# Patient Record
Sex: MALE | Race: BLACK OR AFRICAN AMERICAN | Employment: OTHER | URBAN - METROPOLITAN AREA
[De-identification: names, ages, dates, MRNs, and addresses within clinical notes are randomized per-mention and may not be internally consistent; named-entity substitution may affect disease eponyms.]

---

## 2018-09-04 ENCOUNTER — HOSPITAL ENCOUNTER (EMERGENCY)
Age: 68
Discharge: HOME | End: 2018-09-04
Attending: EMERGENCY MEDICINE
Payer: MEDICARE

## 2018-09-04 ENCOUNTER — APPOINTMENT (OUTPATIENT)
Dept: GENERAL RADIOLOGY | Age: 68
End: 2018-09-04
Attending: EMERGENCY MEDICINE
Payer: MEDICARE

## 2018-09-04 VITALS
DIASTOLIC BLOOD PRESSURE: 83 MMHG | HEART RATE: 66 BPM | HEIGHT: 74 IN | TEMPERATURE: 98 F | SYSTOLIC BLOOD PRESSURE: 147 MMHG | RESPIRATION RATE: 15 BRPM | BODY MASS INDEX: 24.13 KG/M2 | WEIGHT: 188 LBS | OXYGEN SATURATION: 98 %

## 2018-09-04 DIAGNOSIS — M16.12 OSTEOARTHRITIS OF LEFT HIP, UNSPECIFIED OSTEOARTHRITIS TYPE: Primary | ICD-10-CM

## 2018-09-04 PROCEDURE — 73502 X-RAY EXAM HIP UNI 2-3 VIEWS: CPT

## 2018-09-04 PROCEDURE — 99283 EMERGENCY DEPT VISIT LOW MDM: CPT | Performed by: NURSE PRACTITIONER

## 2018-09-04 PROCEDURE — 74011250637 HC RX REV CODE- 250/637: Performed by: NURSE PRACTITIONER

## 2018-09-04 RX ORDER — HYDROCODONE BITARTRATE AND ACETAMINOPHEN 5; 325 MG/1; MG/1
1 TABLET ORAL
Qty: 20 TAB | Refills: 0 | Status: SHIPPED | OUTPATIENT
Start: 2018-09-04 | End: 2018-10-30

## 2018-09-04 RX ORDER — HYDROCODONE BITARTRATE AND ACETAMINOPHEN 5; 325 MG/1; MG/1
1 TABLET ORAL
Status: COMPLETED | OUTPATIENT
Start: 2018-09-04 | End: 2018-09-04

## 2018-09-04 RX ADMIN — HYDROCODONE BITARTRATE AND ACETAMINOPHEN 1 TABLET: 5; 325 TABLET ORAL at 12:50

## 2018-09-04 NOTE — DISCHARGE INSTRUCTIONS
Hip Arthritis: Care Instructions  Your Care Instructions    Arthritis, also called osteoarthritis, is a breakdown of the tissue (cartilage) that cushions your joints. Many people have some arthritis as they age. When the cartilage in your hip joints wears down, your hip bone rubs against the hip socket. This causes pain and stiffness. Work with your doctor to find the right mix of treatments for your arthritis. There are things you can do at home to protect your hip joints, ease your pain, and help you stay active. But if your arthritis becomes so bad that you cannot walk, you may need surgery to replace the hip joint. Follow-up care is a key part of your treatment and safety. Be sure to make and go to all appointments, and call your doctor if you are having problems. It's also a good idea to know your test results and keep a list of the medicines you take. How can you care for yourself at home? · Stay at a healthy weight. Being overweight puts extra strain on your hip joints. · Talk to your doctor or physical therapist about exercises that will help ease hip pain. These tips may help. ¨ Stretch to help prevent stiffness and to prevent injury before you exercise. You may enjoy gentle forms of yoga to help keep your joints and muscles flexible. ¨ Walk instead of jog. Other types of exercise that are less stressful on the joints include riding a bike, swimming, and doing water exercise. ¨ Lift weights. Strong muscles help reduce stress on your joints. Stronger thigh muscles, for example, take some of the stress off of the knees and hips. Learn the right way to lift weights so you do not make joint pain worse. · Take pain medicines exactly as directed. ¨ If the doctor gave you a prescription medicine for pain, take it as prescribed. ¨ If you are not taking a prescription pain medicine, ask your doctor if you can take an over-the-counter medicine.   · Use a cane, crutch, walker, or another device if you need help to get around. These can help rest your hips. You also can use other things to make life easier, such as a higher toilet seat. · Do not sit in low chairs, which can make it painful to get up. · Put heat or cold on your sore hips as needed. Use whichever helps you most. You also can go back and forth between hot and cold packs. ¨ Apply heat 2 or 3 times a day for 20 to 30 minutes-using a heating pad, hot shower, or hot pack-to relieve pain and stiffness. ¨ Put ice or a cold pack on your sore hips for 10 to 20 minutes at a time to numb the area. Put a thin cloth between the ice and your skin. · Think about talking to your doctor about using capsaicin, a cream you apply to the skin for pain relief. When should you call for help? Call your doctor now or seek immediate medical care if:    · You have sudden swelling, warmth, or pain in any joint.     · You have joint pain and a fever or rash.     · You have such bad pain that you cannot use the joint.    Watch closely for changes in your health, and be sure to contact your doctor if:    · You have mild joint symptoms that continue even with more than 6 weeks of care at home.     · You do not get better as expected.     · You have stomach pain or other problems with your medicine. Where can you learn more? Go to http://chris-kvng.info/. Enter I604 in the search box to learn more about \"Hip Arthritis: Care Instructions. \"  Current as of: October 10, 2017  Content Version: 11.7  © 2169-2001 [employer]. Care instructions adapted under license by [employer] (which disclaims liability or warranty for this information). If you have questions about a medical condition or this instruction, always ask your healthcare professional. [address] any warranty or liability for your use of this information.

## 2018-09-04 NOTE — ED PROVIDER NOTES
HPI Comments: Patient states chronic left hip pain. He states over the past couple of weeks pain in left hip has gotten progressively worse. Patient states known history of OA and he states he needs a hip replacement. Patient states he has been taking ibuprofen for pain but pain is not improving. Patient is a 80 y.o. male presenting with hip pain. The history is provided by the patient. Hip Pain This is a chronic problem. The problem occurs constantly. The problem has been gradually worsening. The pain is present in the left hip. The quality of the pain is described as aching. The pain is at a severity of 8/10. The pain is severe. Pertinent negatives include no numbness. The symptoms are aggravated by movement and palpation. Past Medical History:  
Diagnosis Date  Asthma  CAD (coronary artery disease) 9/15/2013  Coronary atherosclerosis of native coronary vessel 11/18/2015  Dental disorder  Dyspepsia and other specified disorders of function of stomach  Hyperlipidemia other unsp dyslipidemia 11/18/2015  Hypertension  Hypertension, benign, controlled 11/18/2015  MI (myocardial infarction) ([facility] [address]  Other ill-defined conditions(799.89)   
 hernia, pt unsure of site  Stroke [facility]) 2011 Past Surgical History:  
Procedure Laterality Date  CARDIAC SURG PROCEDURE UNLIST  2013  
 stent  HX COLECTOMY    
 due to polyp that could not be excised  HX COLONOSCOPY    
 HX PTCA    
 x 1 Family History:  
Problem Relation Age of Onset  Stroke Mother  Diabetes Mother  Heart Disease Maternal Grandmother Social History Social History  Marital status: SINGLE Spouse name: N/A  
 Number of children: N/A  
 Years of education: N/A Occupational History  Not on file. Social History Main Topics  Smoking status: Current Some Day Smoker Packs/day: 0.15  Smokeless tobacco: Not on file  Alcohol use 3.0 oz/week 6 Cans of beer per week Comment: OCC  Drug use: No  
 Sexual activity: Not on file Other Topics Concern  Not on file Social History Narrative ALLERGIES: Review of patient's allergies indicates no known allergies. Review of Systems Musculoskeletal: Positive for arthralgias. Neurological: Negative for numbness. Vitals:  
 09/04/18 1216 BP: 155/88 Pulse: 69 Resp: 18 Temp: 98.2 °F (36.8 °C) SpO2: 96% Weight: 85.3 kg (188 lb) Height: 6' 2\" (1.88 m) Physical Exam  
Constitutional: He is oriented to person, place, and time. He appears well-developed and well-nourished. No distress. Cardiovascular: Normal rate and regular rhythm. No murmur heard. Pulmonary/Chest: Effort normal and breath sounds normal. No respiratory distress. Musculoskeletal:  
     Left hip: He exhibits tenderness. Neurological: He is alert and oriented to person, place, and time. Skin: Skin is warm. He is not diaphoretic. Psychiatric: He has a normal mood and affect. His behavior is normal.  
Nursing note and vitals reviewed. Xr Hip Lt W Or Wo Pelv 2-3 Vws Result Date: 9/4/2018 Left hip Clinical indications: Left hip pain FINDINGS: Three views of the left hip show advanced osteoarthritis of the left hip joint with marginal osteophytes and complete loss of the joint space. There is extensive subchondral sclerosis and fibrocystic change present. No fracture noted. IMPRESSION: Advanced OA of the left hip joint. MDM Number of Diagnoses or Management Options Osteoarthritis of left hip, unspecified osteoarthritis type:  
Diagnosis management comments: Xray positive for sever OA. Patient referred to orthopedics and prescription for norco. Patient given po norco prior to discharge. Amount and/or Complexity of Data Reviewed Tests in the radiology section of CPT®: ordered and reviewed Tests in the medicine section of CPT®: ordered Patient Progress Patient progress: stable ED Course Procedures

## 2018-09-04 NOTE — ED TRIAGE NOTES
Pt c/o left hip pain, x4 years, pt unsure if it is arthritis related.  Pt denies injury/trauma to the leg, denies chest pain, denies shob, denies n/v/d.

## 2018-09-04 NOTE — ED NOTES
I have reviewed discharge instructions with the patient. The patient verbalized understanding. Patient left ED via Discharge Method: ambulatory to Home with (insert name of family/friend, self, transport self). Opportunity for questions and clarification provided. Patient given 1 scripts. To continue your aftercare when you leave the hospital, you may receive an automated call from our care team to check in on how you are doing. This is a free service and part of our promise to provide the best care and service to meet your aftercare needs.  If you have questions, or wish to unsubscribe from this service please call [phone]. Thank you for Choosing our [facility] Emergency Department.

## 2018-10-30 ENCOUNTER — HOSPITAL ENCOUNTER (OUTPATIENT)
Dept: SURGERY | Age: 68
Discharge: HOME | End: 2018-10-30
Payer: MEDICARE

## 2018-10-30 ENCOUNTER — HOSPITAL ENCOUNTER (OUTPATIENT)
Dept: PHYSICAL THERAPY | Age: 68
Discharge: HOME | End: 2018-10-30
Payer: MEDICARE

## 2018-10-30 VITALS
TEMPERATURE: 97.8 F | SYSTOLIC BLOOD PRESSURE: 159 MMHG | DIASTOLIC BLOOD PRESSURE: 98 MMHG | HEIGHT: 74 IN | BODY MASS INDEX: 24.13 KG/M2 | HEART RATE: 81 BPM | OXYGEN SATURATION: 96 % | WEIGHT: 188 LBS

## 2018-10-30 LAB
ALBUMIN SERPL-MCNC: 4.1 G/DL (ref 3.2–4.6)
ANION GAP SERPL CALC-SCNC: 9 MMOL/L
APPEARANCE UR: CLEAR
APTT PPP: 30.4 SEC (ref 23.2–35.3)
ATRIAL RATE: 77 BPM
BACTERIA SPEC CULT: ABNORMAL
BASOPHILS # BLD: 0 K/UL (ref 0–0.2)
BASOPHILS NFR BLD: 1 % (ref 0–2)
BILIRUB UR QL: NEGATIVE
BUN SERPL-MCNC: 6 MG/DL (ref 8–23)
CALCIUM SERPL-MCNC: 9.4 MG/DL (ref 8.3–10.4)
CALCULATED P AXIS, ECG09: 64 DEGREES
CALCULATED R AXIS, ECG10: -36 DEGREES
CALCULATED T AXIS, ECG11: 36 DEGREES
CHLORIDE SERPL-SCNC: 103 MMOL/L (ref 98–107)
CO2 SERPL-SCNC: 28 MMOL/L (ref 21–32)
COLOR UR: YELLOW
CREAT SERPL-MCNC: 1.15 MG/DL (ref 0.8–1.5)
DIAGNOSIS, 93000: NORMAL
DIFFERENTIAL METHOD BLD: ABNORMAL
EOSINOPHIL # BLD: 0.2 K/UL (ref 0–0.8)
EOSINOPHIL NFR BLD: 4 % (ref 0.5–7.8)
ERYTHROCYTE [DISTWIDTH] IN BLOOD BY AUTOMATED COUNT: 13.8 %
EST. AVERAGE GLUCOSE BLD GHB EST-MCNC: 126 MG/DL
GLUCOSE SERPL-MCNC: 108 MG/DL (ref 65–100)
GLUCOSE UR STRIP.AUTO-MCNC: NEGATIVE MG/DL
HBA1C MFR BLD: 6 %
HCT VFR BLD AUTO: 40.3 % (ref 41.1–50.3)
HGB BLD-MCNC: 12.9 G/DL (ref 13.6–17.2)
HGB UR QL STRIP: NEGATIVE
IMM GRANULOCYTES # BLD: 0 K/UL (ref 0–0.5)
IMM GRANULOCYTES NFR BLD AUTO: 0 % (ref 0–5)
INR PPP: 0.9
KETONES UR QL STRIP.AUTO: NEGATIVE MG/DL
LEUKOCYTE ESTERASE UR QL STRIP.AUTO: NEGATIVE
LYMPHOCYTES # BLD: 1.9 K/UL (ref 0.5–4.6)
LYMPHOCYTES NFR BLD: 43 % (ref 13–44)
MCH RBC QN AUTO: 27.2 PG (ref 26.1–32.9)
MCHC RBC AUTO-ENTMCNC: 32 G/DL (ref 31.4–35)
MCV RBC AUTO: 85 FL (ref 79.6–97.8)
MONOCYTES # BLD: 0.4 K/UL (ref 0.1–1.3)
MONOCYTES NFR BLD: 8 % (ref 4–12)
NEUTS SEG # BLD: 1.9 K/UL (ref 1.7–8.2)
NEUTS SEG NFR BLD: 44 % (ref 43–78)
NITRITE UR QL STRIP.AUTO: NEGATIVE
NRBC # BLD: 0 K/UL (ref 0–0.2)
P-R INTERVAL, ECG05: 164 MS
PH UR STRIP: 6 [PH] (ref 5–9)
PLATELET # BLD AUTO: 362 K/UL (ref 150–450)
PMV BLD AUTO: 9.8 FL (ref 9.4–12.3)
POTASSIUM SERPL-SCNC: 3.1 MMOL/L (ref 3.5–5.1)
PROT UR STRIP-MCNC: NEGATIVE MG/DL
PROTHROMBIN TIME: 12.4 SEC (ref 11.5–14.5)
Q-T INTERVAL, ECG07: 428 MS
QRS DURATION, ECG06: 88 MS
QTC CALCULATION (BEZET), ECG08: 484 MS
RBC # BLD AUTO: 4.74 M/UL (ref 4.23–5.6)
SERVICE CMNT-IMP: ABNORMAL
SODIUM SERPL-SCNC: 140 MMOL/L (ref 136–145)
SP GR UR REFRACTOMETRY: 1.02 (ref 1–1.02)
UROBILINOGEN UR QL STRIP.AUTO: 0.2 EU/DL (ref 0.2–1)
VENTRICULAR RATE, ECG03: 77 BPM
WBC # BLD AUTO: 4.4 K/UL (ref 4.3–11.1)

## 2018-10-30 PROCEDURE — 80048 BASIC METABOLIC PNL TOTAL CA: CPT

## 2018-10-30 PROCEDURE — 85610 PROTHROMBIN TIME: CPT

## 2018-10-30 PROCEDURE — 81003 URINALYSIS AUTO W/O SCOPE: CPT

## 2018-10-30 PROCEDURE — 80307 DRUG TEST PRSMV CHEM ANLYZR: CPT

## 2018-10-30 PROCEDURE — G8978 MOBILITY CURRENT STATUS: HCPCS

## 2018-10-30 PROCEDURE — 87641 MR-STAPH DNA AMP PROBE: CPT

## 2018-10-30 PROCEDURE — 83036 HEMOGLOBIN GLYCOSYLATED A1C: CPT

## 2018-10-30 PROCEDURE — G8979 MOBILITY GOAL STATUS: HCPCS

## 2018-10-30 PROCEDURE — 82040 ASSAY OF SERUM ALBUMIN: CPT

## 2018-10-30 PROCEDURE — 85730 THROMBOPLASTIN TIME PARTIAL: CPT

## 2018-10-30 PROCEDURE — G8980 MOBILITY D/C STATUS: HCPCS

## 2018-10-30 PROCEDURE — 97161 PT EVAL LOW COMPLEX 20 MIN: CPT

## 2018-10-30 PROCEDURE — 36415 COLL VENOUS BLD VENIPUNCTURE: CPT

## 2018-10-30 PROCEDURE — 85025 COMPLETE CBC W/AUTO DIFF WBC: CPT

## 2018-10-30 PROCEDURE — 93005 ELECTROCARDIOGRAM TRACING: CPT | Performed by: ORTHOPAEDIC SURGERY

## 2018-10-30 PROCEDURE — 77030027138 HC INCENT SPIROMETER -A

## 2018-10-30 RX ORDER — PRAVASTATIN SODIUM 40 MG/1
40 TABLET ORAL DAILY
COMMUNITY

## 2018-10-30 RX ORDER — OXYBUTYNIN CHLORIDE 5 MG/1
5 TABLET, EXTENDED RELEASE ORAL DAILY
COMMUNITY

## 2018-10-30 RX ORDER — HYDROCHLOROTHIAZIDE 12.5 MG/1
12.5 CAPSULE ORAL DAILY
COMMUNITY
End: 2019-07-02

## 2018-10-30 RX ORDER — OMEPRAZOLE 20 MG/1
20 CAPSULE, DELAYED RELEASE ORAL DAILY
COMMUNITY
End: 2019-07-02

## 2018-10-30 RX ORDER — AMLODIPINE BESYLATE 10 MG/1
10 TABLET ORAL DAILY
COMMUNITY

## 2018-10-30 NOTE — PERIOP NOTES
Dr. Iona Sorto, Your patient recently had labs drawn during a hospital appointment due to an upcoming surgery. The results are attached. If you have any questions or concerns please reach out to your patient for a follow-up in your office. Please do not respond to this message as my mailbox is not monitored. You may call [phone] with questions or concerns. Recent Results (from the past 12 hour(s)) CBC WITH AUTOMATED DIFF Collection Time: 10/30/18 12:25 PM  
Result Value Ref Range WBC 4.4 4.3 - 11.1 K/uL  
 RBC 4.74 4.23 - 5.6 M/uL  
 HGB 12.9 (L) 13.6 - 17.2 g/dL HCT 40.3 (L) 41.1 - 50.3 % MCV 85.0 79.6 - 97.8 FL  
 MCH 27.2 26.1 - 32.9 PG  
 MCHC 32.0 31.4 - 35.0 g/dL  
 RDW 13.8 % PLATELET 170 257 - 186 K/uL MPV 9.8 9.4 - 12.3 FL ABSOLUTE NRBC 0.00 0.0 - 0.2 K/uL  
 DF AUTOMATED NEUTROPHILS 44 43 - 78 % LYMPHOCYTES 43 13 - 44 % MONOCYTES 8 4.0 - 12.0 % EOSINOPHILS 4 0.5 - 7.8 % BASOPHILS 1 0.0 - 2.0 % IMMATURE GRANULOCYTES 0 0.0 - 5.0 %  
 ABS. NEUTROPHILS 1.9 1.7 - 8.2 K/UL  
 ABS. LYMPHOCYTES 1.9 0.5 - 4.6 K/UL  
 ABS. MONOCYTES 0.4 0.1 - 1.3 K/UL  
 ABS. EOSINOPHILS 0.2 0.0 - 0.8 K/UL  
 ABS. BASOPHILS 0.0 0.0 - 0.2 K/UL  
 ABS. IMM. GRANS. 0.0 0.0 - 0.5 K/UL PROTHROMBIN TIME + INR Collection Time: 10/30/18 12:25 PM  
Result Value Ref Range Prothrombin time 12.4 11.5 - 14.5 sec INR 0.9 PTT Collection Time: 10/30/18 12:25 PM  
Result Value Ref Range aPTT 30.4 23.2 - 35.3 SEC METABOLIC PANEL, BASIC Collection Time: 10/30/18 12:25 PM  
Result Value Ref Range Sodium 140 136 - 145 mmol/L Potassium 3.1 (L) 3.5 - 5.1 mmol/L Chloride 103 98 - 107 mmol/L  
 CO2 28 21 - 32 mmol/L Anion gap 9 mmol/L Glucose 108 (H) 65 - 100 mg/dL BUN 6 (L) 8 - 23 MG/DL Creatinine 1.15 0.8 - 1.5 MG/DL  
 GFR est AA >60 >60 ml/min/1.73m2 GFR est non-AA >60 ml/min/1.73m2 Calcium 9.4 8.3 - 10.4 MG/DL URINALYSIS W/ RFLX MICROSCOPIC  
 Collection Time: 10/30/18 12:25 PM  
Result Value Ref Range Color YELLOW Appearance CLEAR Specific gravity 1.017 1.001 - 1.023    
 pH (UA) 6.0 5.0 - 9.0 Protein NEGATIVE  NEG mg/dL Glucose NEGATIVE  mg/dL Ketone NEGATIVE  NEG mg/dL Bilirubin NEGATIVE  NEG Blood NEGATIVE  NEG Urobilinogen 0.2 0.2 - 1.0 EU/dL Nitrites NEGATIVE  NEG Leukocyte Esterase NEGATIVE  NEG    
ALBUMIN Collection Time: 10/30/18 12:25 PM  
Result Value Ref Range Albumin 4.1 3.2 - 4.6 g/dL HEMOGLOBIN A1C WITH EAG Collection Time: 10/30/18 12:25 PM  
Result Value Ref Range Hemoglobin A1c 6.0 % Est. average glucose 126 mg/dL

## 2018-10-30 NOTE — PROGRESS NOTES
10/30/18 1200 Oxygen Therapy O2 Sat (%) 96 % Pulse via Oximetry 96 beats per minute O2 Device Room air Pre-Treatment Breath Sounds Bilateral Diminished Pre FEV1 (liters) 1.7 liters % Predicted 47 
(COPD,ASTHMA) Incentive Spirometry Treatment Actual Volume (ml) 1750 ml Initial respiratory Assessment completed with pt. Pt was interviewed and evaluated in Joint camp prior to surgery. Patient ID: Shyam Beth [id number] 
89 y.o. 
1950 Surgeon: Dr. Wan Mackenzie Date of Surgery: The linked surgery was not found. Please check manually. Procedure: Total Left Hip Arthroplasty Primary Care Physician: None None Specialists:  
                    
          Pt instructed in the use of Incentive Spirometry. Pt instructed to bring Incentive Spirometer back on date of surgery & to start using Is upon return to pt room. Pt taught proper cough technique History of smoking:   CURRENT SMOKER SMOKING CESSATION INFO GIVEN TO PT Quit date:        
 
Secondhand smoke:MOTHER Past procedures with Oxygen desaturation:NONE Past Medical History:  
Diagnosis Date  Arthritis  Asthma   
 albuterol prn (uses 1-2 times per day)  CAD (coronary artery disease) 9/15/2013  Chronic obstructive pulmonary disease ([address]  Coronary atherosclerosis of native coronary vessel 11/18/2015  Current smoker on some days  Dental disorder  Dyspepsia and other specified disorders of function of stomach  GERD (gastroesophageal reflux disease)   
 on med for control  Hyperlipidemia other unsp dyslipidemia 11/18/2015  
 on med  Hypertension   
 on med for control  Hypertension, benign, controlled 11/18/2015  MI (myocardial infarction) ([address] 2013 NSTEMI  
 Multiple renal cysts  Other ill-defined conditions(799.89)   
 hernia, pt unsure of site (resolved with surery)  Prostate cancer ([address]  Stroke [facility]) 2011 TIA; no residual   
  
 
 
HX OF CRACK COCAINE AND ALCOHOL ABUSE., CURRENT SMOKER 
COPD,ASTHMA, HX OF BILATERAL PNA Respiratory history: SOB  ON EXERTION Respiratory meds:  ALBUTEROL NEB BID ACCORDING TO PT 
 
 
                                
FAMILY PRESENT:            
                                            NO 
 
                                   
PAST SLEEP STUDY:                  NO 
HX OF NITIN:                                          NO NITIN assessment: SLEEPS ON SIDE      ,       BACK         &       STOMACH 
                                          
 
 
PHYSICAL EXAM Body mass index is 24.14 kg/m². Visit Vitals BP (!) 159/98 (BP 1 Location: Right arm, BP Patient Position: At rest;Sitting) Pulse 81 Temp 97.8 °F (36.6 °C) Ht 6' 2\" (1.88 m) Wt 85.3 kg (188 lb) SpO2 96% BMI 24.14 kg/m² Neck circumference:  41    cm Loud snoring: DENIES Witnessed apnea or wakening gasping or choking:,             DENIES, Awakens with headaches:                                                  DENIES Morning or daytime tiredness/ sleepiness:                    DENIES Dry mouth or sore throat in morning: DENIES Molina stage:  4 SACS score:4 
 
STOP/BANG:3 
 
                         
CPAP:                       NONE ALBUTEROL NEB Q6 PRN          SPACER 
 
     CONT SAT HS       
 
NONE Referrals: 
 
Pt. Phone Number:

## 2018-10-30 NOTE — PERIOP NOTES
Patient verified name and . Order for consent found in EHR and matches case posting; patient verified. Type 3 surgery, walk in assessment complete. Labs per surgeon: CBC, BMP, U/A, PT/PTT, Albumin, HGB-A1C ; results within [facility] protocols. MRSA nasal swab and Urine nicotine pending. Labs per anesthesia protocol: included in surgeons orders. EKG: done today; abnormal. Reviewed by Dr. Hank Mcburney with anesthesia. Requests that patient gets cardiac clearance prior to surgery since he has a history of a stent/MI and hasn't seen cardio since . Paige Riggs at Dr. Steven Silvestre office and left voicemail that patient needs cardiac clearance prior to surgery per anesthesia request. [facility] cardiology office note dated 13 on chart for reference. Heart cath report dated 13 and prior ekg's with most recent on 05/05/15 in EHR for reference if needed. K+ level of 3.1 and elevated BP called to Santos Coello NP. No orders received. She came to see patient and encouraged diet high in K+. Patient with history of heart stent since . States hasn't been taking Aspirin in a long time. Encouraged patient to start an 81 mg aspirin as soon as possible and take dos. Patient verbalized understanding. Hibiclens and instructions to return bottle on DOS given per hospital policy. Patient provided with handouts including Guide to Surgery, Pain Management, Hand Hygiene, Blood Transfusion Education, and [place] [facility] Brochure. Patient answered medical/surgical history questions at their best of ability. All prior to admission medications documented in [facility]. Original medication prescription bottles visualized during patient appointment. Patient instructed to hold all vitamins 7 days prior to surgery and NSAIDS 5 days prior to surgery. Medications to be held: none.  
 
Patient instructed to continue previous medications as prescribed prior to surgery and to take the following medications the day of surgery according to anesthesia guidelines with a small sip of water: albuterol if needed, amlodipine, omeprazole, oxybutynin, pravastatin, and an aspirin if possible. Instructed to bring inhaler dos. Patient teach back successful and patient demonstrates knowledge of instruction.

## 2018-10-30 NOTE — PROGRESS NOTES
Justino Louis : [birth year](49 y.o.) [address] at [address] [place] [address], [address] [facility] [phone] Physical Therapy Prehab Plan of Treatment and Evaluation Summary:10/30/2018 ICD-10: Treatment Diagnosis:  
· Pain in left hip (M25.552) · Stiffness of Left Hip, Not elsewhere classified (M25.652) · Difficulty in walking, Not elsewhere classified (R26.2) · Other abnormalities of gait and mobility (R26.89) Precautions/Allergies:  
Lisinopril  MEDICAL/REFERRING DIAGNOSIS: 
Unilateral primary osteoarthritis, left hip [M16.12] REFERRING PHYSICIAN: Ilsa Baird MD 
DATE OF SURGERY: 18 Assessment:  
Comments:  Pain in left hip joint with decreased independence with functional mobility. Pt not sure home v. Rehab following hospital stay, as pt has no available family members to assist at home. Mentioned one night stay regarding hip. Pt hopes to go somewhere to get some turkey close to [facility]. Pt talks about being hungry throughout session. Pt offered snacks and water or coffee. PROBLEM LIST (Impacting functional limitations): Mr. Tash Suarez presents with the following left lower extremity(s) problems: 1. Transfers 2. Gait 3. Strength 4. Range of Motion 5. Pain INTERVENTIONS PLANNED: 
1. Home Exercise Program 
2. Educational Discussion TREATMENT PLAN: Effective Dates: 10/30/2018 TO 10/30/2018. Frequency/Duration: Patient to continue to perform home exercise program at least twice per day up until his surgery. GOALS: (Goals have been discussed and agreed upon with patient.) Discharge Goals: Time Frame: 1 Day 1. Patient will demonstrate independence with a home exercise program designed to increase strength, range of motion and pain control to minimize functional deficits and optimize patient for total joint replacement. Rehabilitation Potential For Stated Goals: Good Regarding Wan Valderrama's therapy, I certify that the treatment plan above will be carried out by a therapist or under their direction. Thank you for this referral, Ivette Melendrez, PT     
    
 
 
HISTORY:  
Present Symptoms: 
Pain Intensity 1: 7 Pain Location 1: Hip Pain Orientation 1: Left History of Present Injury/Illness (Reason for Referral): 
Medical/Referring Diagnosis: Unilateral primary osteoarthritis, left hip [M16.12] Past Medical History/Comorbidities:  
Mr. Dayanara Ocasio  has a past medical history of Arthritis, Asthma, CAD (coronary artery disease), Chronic obstructive pulmonary disease ([facility] [address] Coronary atherosclerosis of native coronary vessel, Current smoker on some days, Dental disorder, Dyspepsia and other specified disorders of function of stomach, GERD (gastroesophageal reflux disease), Hyperlipidemia other unsp dyslipidemia, Hypertension, Hypertension, benign, controlled, MI (myocardial infarction) ([address] Other ill-defined conditions(799.89), Prostate cancer ([facility] [address] and Stroke ([facility] [address] Mr. Dayanara Ocasio  has a past surgical history that includes hx colectomy; hx ptca; hx colonoscopy (2018); pr cardiac surg procedure unlist (2013); hx prostatectomy; and hx hernia repair. Social History/Living Environment:  
Home Environment: Private residence # Steps to Enter: 6 One/Two Story Residence: Two story, live on 1st floor # of Interior Steps: 6 Living Alone: Yes Support Systems: Family member(s)(limited support) Patient Expects to be Discharged to[de-identified] Private residence Tub or Shower Type: Tub/Shower combination Work/Activity: 
disabled Dominant Side: 
RIGHT Current Medications:  See Pre-assessment nursing note Number of Personal Factors/Comorbidities that affect the Plan of Care: 0: LOW COMPLEXITY EXAMINATION:  
ADLs (Current Functional Status):  
Ambulation: 
[x] Independent 
[] Walk Indoors Only 
[] Walk Outdoors [] Use Assistive Device [] Use Wheelchair Only Dressing: 
[x] Independent Requires Assistance from Someone for: 
[] Sock/Shoes 
[] Pants [] Everything Bathing/Showering:  
[x] Independent 
[] Requires Assistance from Someone 
[] Sponge Bath Only Household Activities: 
[] Routine house and yard work 
[] Light Housework Only 
[x] None Observation/Orthostatic Postural Assessment:  
Within defined limits ROM/Flexibility:  
AROM: Generally decreased, functional 
 
  
  
  
  
LLE AROM 
L Hip Flexion: 90   
 
RLE Assessment RLE Assessment (WDL): Within defined limits Strength:  
Strength: Generally decreased, functional 
 
  
    
 
   
Functional Mobility:   
Coordination: Generally decreased, functional 
 
Gait Description (WDL): Within defined limits Stand to Sit: Independent Sit to Stand: Independent Distance (ft): 1000 Feet (ft) Ambulation - Level of Assistance: Independent Gait Abnormalities: Antalgic Balance:   
Sitting: Intact Standing: Intact Body Structures Involved: 1. Bones 2. Joints 3. Muscles 4. Ligaments Body Functions Affected: 1. Neuromusculoskeletal 
2. Movement Related Activities and Participation Affected: 1. Mobility Number of elements that affect the Plan of Care: 4+: HIGH COMPLEXITY CLINICAL PRESENTATION:  
Presentation: Stable and uncomplicated: LOW COMPLEXITY CLINICAL DECISION MAKING:  
Outcome Measure: Tool Used: Lower Extremity Functional Scale (LEFS) Score:  Initial: 40/80 Most Recent: X/80 (Date: -- ) Interpretation of Score: 20 questions each scored on a 5 point scale with 0 representing \"extreme difficulty or unable to perform\" and 4 representing \"no difficulty\". The lower the score, the greater the functional disability. 80/80 represents no disability. Minimal detectable change is 9 points. Score 80 79-65 64-49 48-33 32-17 16-1 0 Modifier CH CI CJ CK CL CM CN  
 
? Mobility - Walking and Moving Around:  
  - CURRENT STATUS: CK - 40%-59% impaired, limited or restricted  - GOAL STATUS: CK - 40%-59% impaired, limited or restricted  - D/C STATUS:  CK - 40%-59% impaired, limited or restricted Medical Necessity:  
· Mr. Tomas Bello is expected to optimize his lower extremity strength and ROM in preparation for joint replacement surgery. Reason for Services/Other Comments: · Achieve baseline assesment of musculoskeletal system, functional mobility and home environment. , educate in PT HEP in preparation for surgery, educate in hospital plan of care. Use of outcome tool(s) and clinical judgement create a POC that gives a: Clear prediction of patient's progress: LOW COMPLEXITY  
TREATMENT:  
Treatment/Session Assessment:  Patient was instructed in PT- HEP to increase strength and ROM in LEs. Answered all questions. · Post session pain:  7 
· Compliance with Program/Exercises: compliant most of the time. Total Treatment Duration: PT Patient Time In/Time Out Time In: 1200 Time Out: 1230 Soheila Casas PT

## 2018-10-31 LAB
COTININE UR QL SCN: NEGATIVE NG/ML
DRUG SCREEN COMMENT:, 753798: NORMAL

## 2018-10-31 NOTE — ADVANCED PRACTICE NURSE
Total Joint Surgery Preoperative Chart Review Patient ID: Juwan John [id number] 
45 y.o. 
1950 Surgeon: Dr. Lisa Forbes Date of Surgery: 11/21/2018 Procedure: Total Left Hip Arthroplasty Subjective: Juwan John is a 80 y.o. BLACK OR  male who presents for preoperative evaluation for Total Left Hip arthroplasty. This is a preoperative chart review note based on data collected by the nurse at the surgical Pre-Assessment visit. Past Medical History:  
Diagnosis Date  Arthritis  Asthma   
 albuterol prn (uses 1-2 times per day)  CAD (coronary artery disease) 9/15/2013  Chronic obstructive pulmonary disease ([facility] [address]  Coronary atherosclerosis of native coronary vessel 11/18/2015  Current smoker on some days  Dental disorder  Dyspepsia and other specified disorders of function of stomach  GERD (gastroesophageal reflux disease)   
 on med for control  Hyperlipidemia other unsp dyslipidemia 11/18/2015  
 on med  Hypertension   
 on med for control  Hypertension, benign, controlled 11/18/2015  MI (myocardial infarction) ([facility] [address] 2013 NSTEMI  
 Multiple renal cysts  Other ill-defined conditions(799.89)   
 hernia, pt unsure of site (resolved with surery)  Prostate cancer ([facility] [address]  Stroke [facility]) 2011 TIA; no residual   
  
Past Surgical History:  
Procedure Laterality Date  CARDIAC SURG PROCEDURE UNLIST  2013  
 stent  HX COLECTOMY    
 due to polyp that could not be excised  HX COLONOSCOPY  2018  HX HERNIA REPAIR    
 HX PROSTATECTOMY  HX PTCA    
 x 1 Family History Problem Relation Age of Onset  Stroke Mother  Diabetes Mother  Heart Disease Maternal Grandmother  Cancer Father Social History Tobacco Use  Smoking status: Current Some Day Smoker Years: 50.00  Smokeless tobacco: Never Used Substance Use Topics  Alcohol use: Yes   Alcohol/week: 1.8 oz  
 Types: 3 Cans of beer per week Prior to Admission medications Medication Sig Start Date End Date Taking? Authorizing Provider  
hydroCHLOROthiazide (MICROZIDE) 12.5 mg capsule Take 12.5 mg by mouth daily. Yes Provider, Historical  
oxybutynin chloride XL (DITROPAN XL) 5 mg CR tablet Take 5 mg by mouth daily. Take / use AM day of surgery  per anesthesia protocols. Yes Provider, Historical  
omeprazole (PRILOSEC) 20 mg capsule Take 20 mg by mouth daily. Take / use AM day of surgery  per anesthesia protocols. Yes Provider, Historical  
pravastatin (PRAVACHOL) 40 mg tablet Take 40 mg by mouth daily. Take / use AM day of surgery  per anesthesia protocols. Yes Provider, Historical  
amLODIPine (NORVASC) 10 mg tablet Take 10 mg by mouth daily. Take / use AM day of surgery  per anesthesia protocols. Yes Provider, Historical  
albuterol (PROVENTIL HFA, VENTOLIN HFA, PROAIR HFA) 90 mcg/actuation inhaler Take 2 Puffs by inhalation every six (6) hours as needed for Wheezing. Take every 4-6 hours for wheezing 3/11/15  Yes Delmer Garcia MD  
 
Allergies Allergen Reactions  Lisinopril Swelling Objective:  
 
Physical Exam:  
Patient Vitals for the past 24 hrs: 
 Temp Pulse BP SpO2  
10/30/18 1420  81 (!) 159/98   
10/30/18 1341 97.8 °F (36.6 °C) 86 (!) 163/111 96 % ECG:   
EKG Results Procedure 720 Value Units Date/Time EKG, 12 LEAD, INITIAL [[id number]] Collected:  10/30/18 1334 Order Status:  Completed Updated:  10/30/18 1651 Ventricular Rate 77 BPM   
  Atrial Rate 77 BPM   
  P-R Interval 164 ms QRS Duration 88 ms Q-T Interval 428 ms QTC Calculation (Bezet) 484 ms Calculated P Axis 64 degrees Calculated R Axis -36 degrees Calculated T Axis 36 degrees Diagnosis --  
  Sinus rhythm with sinus arrhythmia with occasional Premature ventricular  
complexes Possible Left atrial enlargement Left axis deviation Prolonged QT Abnormal ECG 
 When compared with ECG of 05-MAY-2015 15:16, 
Premature ventricular complexes are now Present Confirmed by JUANCARLOS DEE ()Rylie ([id number]) on 10/30/2018 4:51:03 PM 
  
  
 
 
Data Review:  
Labs:  
Recent Results (from the past 24 hour(s)) EKG, 12 LEAD, INITIAL Collection Time: 10/30/18  1:34 PM  
Result Value Ref Range Ventricular Rate 77 BPM  
 Atrial Rate 77 BPM  
 P-R Interval 164 ms QRS Duration 88 ms Q-T Interval 428 ms QTC Calculation (Bezet) 484 ms Calculated P Axis 64 degrees Calculated R Axis -36 degrees Calculated T Axis 36 degrees Diagnosis Sinus rhythm with sinus arrhythmia with occasional Premature ventricular  
complexes Possible Left atrial enlargement Left axis deviation Prolonged QT Abnormal ECG When compared with ECG of 05-MAY-2015 15:16, 
Premature ventricular complexes are now Present Confirmed by JUANCARLOS DEE ()Rylie ([id number]) on 10/30/2018 4:51:03 PM 
  
MSSA/MRSA SC BY PCR, NASAL SWAB Collection Time: 10/30/18  2:20 PM  
Result Value Ref Range Special Requests: NO SPECIAL REQUESTS Culture result: (A) MRSA target DNA detected, SA target DNA detected. A positive test result does not necessarily indicate the presence of viable organisms. It is however, presumptive for the presence of MRSA or SA. Results for Jeff Morfin (MRN [id number]) as of 10/31/2018 12:28 Ref. Range 10/30/2018 12:25 Sodium Latest Ref Range: 136 - 145 mmol/L 140 Potassium Latest Ref Range: 3.5 - 5.1 mmol/L 3.1 (L) Chloride Latest Ref Range: 98 - 107 mmol/L 103 CO2 Latest Ref Range: 21 - 32 mmol/L 28 Anion gap Latest Units: mmol/L 9 Glucose Latest Ref Range: 65 - 100 mg/dL 108 (H) BUN Latest Ref Range: 8 - 23 MG/DL 6 (L) Creatinine Latest Ref Range: 0.8 - 1.5 MG/DL 1.15 Calcium Latest Ref Range: 8.3 - 10.4 MG/DL 9.4 GFR est non-AA Latest Units: ml/min/1.73m2 >60  
GFR est AA Latest Ref Range: >60 ml/min/1.73m2 >60  
 Albumin Latest Ref Range: 3.2 - 4.6 g/dL 4.1 Hemoglobin A1c, (calculated) Latest Units: % 6.0 Est. average glucose Latest Units: mg/dL 126 K+ level of 3.1 he was encouraged diet high in K+. Problem List: 
) Patient Active Problem List  
Diagnosis Code  Accelerated hypertension I10  
 NSTEMI (non-ST elevated myocardial infarction) ([facility] [address] I21.4  CAD (coronary artery disease) I25.10  Hyperlipidemia other unsp dyslipidemia E78.5  Hypertension, benign, controlled I10  
 Coronary atherosclerosis of native coronary vessel I25.10 Total Joint Surgery Pre-Assessment Recommendations:   
Current smoker. Recommend continuous saturation monitoring hours of sleep, during hospitalization. Albuterol every 6 hours as need during hospitalization. Signed By: Shekhar Urias, NP-C October 31, 2018

## 2018-10-31 NOTE — PERIOP NOTES
Nasal swab results reviewed:  
Culture result: (Abnormal)      Final  
Abnormal  
MRSA target DNA detected, SA target DNA detected.       A positive test result does not necessarily indicate the presence of viable organisms.  It is however, presumptive for the presence of MRSA or SA

## 2018-11-01 NOTE — PERIOP NOTES
NICOTINE/COTININE, UR, QT [id number] (Order [id number]) Lab Date: 10/30/2018 Department: [facility] Pre Assessment Released By: Guru Clements (auto-released) Authorizing: Carla Gonsales MD  
Component Value Flag Ref Range Units Status Cotinine Screen, urine NEGATIVE    Nomnqf=279 ng/mL Final  
Drug Screen Comment: Comment      Final

## 2018-11-08 PROBLEM — Z72.0 TOBACCO USE: Status: ACTIVE | Noted: 2018-11-08

## 2018-11-09 RX ORDER — ASPIRIN 81 MG/1
81 TABLET ORAL DAILY
COMMUNITY
End: 2018-11-22

## 2018-11-09 NOTE — PERIOP NOTES
Cardiac evaluation by Dr. Ira Salomon at [facility] 11/8/18. Per office note, pt will require stress test prior to surgery: 1. CAD:  Get back on ASA, remain on statin. Need NST before hip replacement.   
  
 
Pt scheduled to have stress test 11/12/18 at 0830 at [facility]. Has f/u appointment with cardiologist 11/20/18 (day before scheduled surgery). Updated medication list in EMR to include Aspirin 81mg daily. Chart posted for f/u after stress test .. Rohit Bullock

## 2018-11-15 NOTE — PERIOP NOTES
Stress test completed on 11/12/18 results as follows. GATED SPECT IMAGE INTERPRETATION:  
 
Left Ventricular Size: normal. 
Transient Ischemic Dilatation: not present. Gated cine images demonstrates reveals normal myocardial thickening and  
wall motion. Ejection Fraction: 63% CONCLUSION:  
1. Stress EKG: Normal.  
2. SPECT Perfusion Imaging: Normal Perfusion. 3. LV Systolic Function is  normal.  
4. Risk Assessment:  Low Risk Scan. Patient cardio follow-up appt 11/20/18 at 1200.

## 2018-11-20 ENCOUNTER — ANESTHESIA EVENT (OUTPATIENT)
Dept: SURGERY | Age: 68
DRG: 470 | End: 2018-11-20
Payer: MEDICARE

## 2018-11-20 PROBLEM — Z01.818 PRE-OP EXAMINATION: Status: ACTIVE | Noted: 2018-11-20

## 2018-11-20 NOTE — PERIOP NOTES
Clearance note dated 11/20/18 states \"Fine to proceed with surgery, recommend optimal BP control in the alvaro-op period. Remain on ASA 81, follow BP, follow labs with low K. \"

## 2018-11-21 ENCOUNTER — HOSPITAL ENCOUNTER (INPATIENT)
Age: 68
LOS: 1 days | Discharge: HOME HEALTH | DRG: 470 | End: 2018-11-22
Attending: ORTHOPAEDIC SURGERY | Admitting: ORTHOPAEDIC SURGERY
Payer: MEDICARE

## 2018-11-21 ENCOUNTER — HOME HEALTH ADMISSION (OUTPATIENT)
Dept: HOME HEALTH SERVICES | Facility: HOME HEALTH | Age: 68
End: 2018-11-21
Payer: MEDICARE

## 2018-11-21 ENCOUNTER — ANESTHESIA (OUTPATIENT)
Dept: SURGERY | Age: 68
DRG: 470 | End: 2018-11-21
Payer: MEDICARE

## 2018-11-21 ENCOUNTER — APPOINTMENT (OUTPATIENT)
Dept: GENERAL RADIOLOGY | Age: 68
DRG: 470 | End: 2018-11-21
Attending: ORTHOPAEDIC SURGERY
Payer: MEDICARE

## 2018-11-21 DIAGNOSIS — M16.12 PRIMARY OSTEOARTHRITIS OF LEFT HIP: Primary | ICD-10-CM

## 2018-11-21 PROBLEM — M16.9 OA (OSTEOARTHRITIS) OF HIP: Status: ACTIVE | Noted: 2018-11-21

## 2018-11-21 LAB
ABO + RH BLD: NORMAL
BLOOD GROUP ANTIBODIES SERPL: NORMAL
GLUCOSE BLD STRIP.AUTO-MCNC: 112 MG/DL (ref 65–100)
HGB BLD-MCNC: 11.7 G/DL (ref 13.6–17.2)
SPECIMEN EXP DATE BLD: NORMAL

## 2018-11-21 PROCEDURE — 77030007880 HC KT SPN EPDRL BBMI -B: Performed by: NURSE ANESTHETIST, CERTIFIED REGISTERED

## 2018-11-21 PROCEDURE — 77030034479 HC ADH SKN CLSR PRINEO J&J -B: Performed by: ORTHOPAEDIC SURGERY

## 2018-11-21 PROCEDURE — 77030003665 HC NDL SPN BBMI -A: Performed by: ANESTHESIOLOGY

## 2018-11-21 PROCEDURE — 77030002933 HC SUT MCRYL J&J -A: Performed by: ORTHOPAEDIC SURGERY

## 2018-11-21 PROCEDURE — 85018 HEMOGLOBIN: CPT

## 2018-11-21 PROCEDURE — 77030003666 HC NDL SPINAL BD -A: Performed by: ORTHOPAEDIC SURGERY

## 2018-11-21 PROCEDURE — C1776 JOINT DEVICE (IMPLANTABLE): HCPCS | Performed by: ORTHOPAEDIC SURGERY

## 2018-11-21 PROCEDURE — 94760 N-INVAS EAR/PLS OXIMETRY 1: CPT

## 2018-11-21 PROCEDURE — 74011250636 HC RX REV CODE- 250/636

## 2018-11-21 PROCEDURE — 77030033138 HC SUT PGA STRATFX J&J -B: Performed by: ORTHOPAEDIC SURGERY

## 2018-11-21 PROCEDURE — 0SRB04A REPLACEMENT OF LEFT HIP JOINT WITH CERAMIC ON POLYETHYLENE SYNTHETIC SUBSTITUTE, UNCEMENTED, OPEN APPROACH: ICD-10-PCS | Performed by: ORTHOPAEDIC SURGERY

## 2018-11-21 PROCEDURE — 72170 X-RAY EXAM OF PELVIS: CPT

## 2018-11-21 PROCEDURE — 74011000250 HC RX REV CODE- 250

## 2018-11-21 PROCEDURE — 77030032490 HC SLV COMPR SCD KNE COVD -B

## 2018-11-21 PROCEDURE — 74011250636 HC RX REV CODE- 250/636: Performed by: ANESTHESIOLOGY

## 2018-11-21 PROCEDURE — 82962 GLUCOSE BLOOD TEST: CPT

## 2018-11-21 PROCEDURE — 74011250636 HC RX REV CODE- 250/636: Performed by: ORTHOPAEDIC SURGERY

## 2018-11-21 PROCEDURE — 77030018836 HC SOL IRR NACL ICUM -A: Performed by: ORTHOPAEDIC SURGERY

## 2018-11-21 PROCEDURE — 97161 PT EVAL LOW COMPLEX 20 MIN: CPT

## 2018-11-21 PROCEDURE — 86580 TB INTRADERMAL TEST: CPT | Performed by: ORTHOPAEDIC SURGERY

## 2018-11-21 PROCEDURE — 74011250637 HC RX REV CODE- 250/637: Performed by: ORTHOPAEDIC SURGERY

## 2018-11-21 PROCEDURE — 65270000029 HC RM PRIVATE

## 2018-11-21 PROCEDURE — 86901 BLOOD TYPING SEROLOGIC RH(D): CPT

## 2018-11-21 PROCEDURE — 77030013727 HC IRR FAN PULSVC ZIMM -B: Performed by: ORTHOPAEDIC SURGERY

## 2018-11-21 PROCEDURE — 76210000016 HC OR PH I REC 1 TO 1.5 HR: Performed by: ORTHOPAEDIC SURGERY

## 2018-11-21 PROCEDURE — 77030018074 HC RTVR SUT ARTH4 S&N -B: Performed by: ORTHOPAEDIC SURGERY

## 2018-11-21 PROCEDURE — 76060000034 HC ANESTHESIA 1.5 TO 2 HR: Performed by: ORTHOPAEDIC SURGERY

## 2018-11-21 PROCEDURE — 74011000302 HC RX REV CODE- 302: Performed by: ORTHOPAEDIC SURGERY

## 2018-11-21 PROCEDURE — 77030002922 HC SUT FBRWRE ARTH -B: Performed by: ORTHOPAEDIC SURGERY

## 2018-11-21 PROCEDURE — 36415 COLL VENOUS BLD VENIPUNCTURE: CPT

## 2018-11-21 PROCEDURE — 77030011266 HC ELECTRD BLD INSL COVD -A: Performed by: ORTHOPAEDIC SURGERY

## 2018-11-21 PROCEDURE — 76010000162 HC OR TIME 1.5 TO 2 HR INTENSV-TIER 1: Performed by: ORTHOPAEDIC SURGERY

## 2018-11-21 PROCEDURE — 77030006835 HC BLD SAW SAG STRY -B: Performed by: ORTHOPAEDIC SURGERY

## 2018-11-21 PROCEDURE — 74011000250 HC RX REV CODE- 250: Performed by: ORTHOPAEDIC SURGERY

## 2018-11-21 PROCEDURE — 77030020782 HC GWN BAIR PAWS FLX 3M -B: Performed by: NURSE ANESTHETIST, CERTIFIED REGISTERED

## 2018-11-21 PROCEDURE — 77030034849: Performed by: ORTHOPAEDIC SURGERY

## 2018-11-21 PROCEDURE — 94762 N-INVAS EAR/PLS OXIMTRY CONT: CPT

## 2018-11-21 PROCEDURE — 97110 THERAPEUTIC EXERCISES: CPT

## 2018-11-21 PROCEDURE — 77030019557 HC ELECTRD VES SEAL MEDT -F: Performed by: ORTHOPAEDIC SURGERY

## 2018-11-21 PROCEDURE — 97165 OT EVAL LOW COMPLEX 30 MIN: CPT

## 2018-11-21 PROCEDURE — 77030006777 HC BLD SAW OSC CNMD -B: Performed by: ORTHOPAEDIC SURGERY

## 2018-11-21 PROCEDURE — 77030020263 HC SOL INJ SOD CL0.9% LFCR 1000ML

## 2018-11-21 PROCEDURE — 77030033067 HC SUT PDO STRATFX SPIR J&J -B: Performed by: ORTHOPAEDIC SURGERY

## 2018-11-21 DEVICE — LINER ACET SZ F ID36MM THK7.9MM 10DEG X3 FOR 58-60MM: Type: IMPLANTABLE DEVICE | Site: HIP | Status: FUNCTIONAL

## 2018-11-21 DEVICE — SCREW BNE L25MM DIA6.5MM STD CANC HIP TI ST CANN NONLOCKING: Type: IMPLANTABLE DEVICE | Site: HIP | Status: FUNCTIONAL

## 2018-11-21 DEVICE — HEAD FEM DELT V40 -2.5MM 36MM -- V40 BIOLOX: Type: IMPLANTABLE DEVICE | Site: HIP | Status: FUNCTIONAL

## 2018-11-21 DEVICE — SCREW BNE CANC STD 6.5X30 MM HIP ST PART THRD CANN NLCK TORX: Type: IMPLANTABLE DEVICE | Site: HIP | Status: FUNCTIONAL

## 2018-11-21 DEVICE — STEM FEM SZ 7 L114MM NK L37MM 46MM OFFSET 132DEG HIP TI: Type: IMPLANTABLE DEVICE | Site: HIP | Status: FUNCTIONAL

## 2018-11-21 DEVICE — SHELL ACET CLUS H 56F TRTANIUM -- TRIDENT II: Type: IMPLANTABLE DEVICE | Site: HIP | Status: FUNCTIONAL

## 2018-11-21 RX ORDER — DEXAMETHASONE SODIUM PHOSPHATE 100 MG/10ML
10 INJECTION INTRAMUSCULAR; INTRAVENOUS ONCE
Status: DISCONTINUED | OUTPATIENT
Start: 2018-11-22 | End: 2018-11-22 | Stop reason: HOSPADM

## 2018-11-21 RX ORDER — FENTANYL CITRATE 50 UG/ML
100 INJECTION, SOLUTION INTRAMUSCULAR; INTRAVENOUS ONCE
Status: DISCONTINUED | OUTPATIENT
Start: 2018-11-21 | End: 2018-11-21 | Stop reason: HOSPADM

## 2018-11-21 RX ORDER — ISOPROPYL ALCOHOL 70 ML/100ML
LIQUID TOPICAL AS NEEDED
Status: DISCONTINUED | OUTPATIENT
Start: 2018-11-21 | End: 2018-11-21 | Stop reason: HOSPADM

## 2018-11-21 RX ORDER — ROPIVACAINE HYDROCHLORIDE 2 MG/ML
INJECTION, SOLUTION EPIDURAL; INFILTRATION; PERINEURAL AS NEEDED
Status: DISCONTINUED | OUTPATIENT
Start: 2018-11-21 | End: 2018-11-21 | Stop reason: HOSPADM

## 2018-11-21 RX ORDER — ASPIRIN 325 MG
325 TABLET, DELAYED RELEASE (ENTERIC COATED) ORAL EVERY 12 HOURS
Status: DISCONTINUED | OUTPATIENT
Start: 2018-11-21 | End: 2018-11-22 | Stop reason: HOSPADM

## 2018-11-21 RX ORDER — ACETAMINOPHEN 500 MG
1000 TABLET ORAL EVERY 6 HOURS
Qty: 120 TAB | Refills: 0 | Status: SHIPPED | OUTPATIENT
Start: 2018-11-22 | End: 2020-01-17

## 2018-11-21 RX ORDER — MIDAZOLAM HYDROCHLORIDE 1 MG/ML
2 INJECTION, SOLUTION INTRAMUSCULAR; INTRAVENOUS
Status: DISCONTINUED | OUTPATIENT
Start: 2018-11-21 | End: 2018-11-21 | Stop reason: HOSPADM

## 2018-11-21 RX ORDER — NALOXONE HYDROCHLORIDE 0.4 MG/ML
.2-.4 INJECTION, SOLUTION INTRAMUSCULAR; INTRAVENOUS; SUBCUTANEOUS
Status: DISCONTINUED | OUTPATIENT
Start: 2018-11-21 | End: 2018-11-22 | Stop reason: HOSPADM

## 2018-11-21 RX ORDER — HYDROCODONE BITARTRATE AND ACETAMINOPHEN 7.5; 325 MG/1; MG/1
1 TABLET ORAL AS NEEDED
Status: DISCONTINUED | OUTPATIENT
Start: 2018-11-21 | End: 2018-11-21 | Stop reason: HOSPADM

## 2018-11-21 RX ORDER — SODIUM CHLORIDE 0.9 % (FLUSH) 0.9 %
5-10 SYRINGE (ML) INJECTION AS NEEDED
Status: DISCONTINUED | OUTPATIENT
Start: 2018-11-21 | End: 2018-11-21 | Stop reason: HOSPADM

## 2018-11-21 RX ORDER — ACETAMINOPHEN 500 MG
1000 TABLET ORAL EVERY 6 HOURS
Status: DISCONTINUED | OUTPATIENT
Start: 2018-11-22 | End: 2018-11-22 | Stop reason: HOSPADM

## 2018-11-21 RX ORDER — AMOXICILLIN 250 MG
2 CAPSULE ORAL DAILY
Qty: 120 TAB | Refills: 0 | Status: SHIPPED | OUTPATIENT
Start: 2018-11-22

## 2018-11-21 RX ORDER — HYDROMORPHONE HYDROCHLORIDE 2 MG/1
2 TABLET ORAL
Status: DISCONTINUED | OUTPATIENT
Start: 2018-11-21 | End: 2018-11-22 | Stop reason: HOSPADM

## 2018-11-21 RX ORDER — HYDRALAZINE HYDROCHLORIDE 20 MG/ML
INJECTION INTRAMUSCULAR; INTRAVENOUS
Status: ACTIVE
Start: 2018-11-21 | End: 2018-11-22

## 2018-11-21 RX ORDER — HYDROMORPHONE HYDROCHLORIDE 2 MG/ML
0.5 INJECTION, SOLUTION INTRAMUSCULAR; INTRAVENOUS; SUBCUTANEOUS
Status: DISCONTINUED | OUTPATIENT
Start: 2018-11-21 | End: 2018-11-21 | Stop reason: HOSPADM

## 2018-11-21 RX ORDER — CEFAZOLIN SODIUM/WATER 2 G/20 ML
2 SYRINGE (ML) INTRAVENOUS ONCE
Status: COMPLETED | OUTPATIENT
Start: 2018-11-21 | End: 2018-11-21

## 2018-11-21 RX ORDER — KETOROLAC TROMETHAMINE 15 MG/ML
15 INJECTION, SOLUTION INTRAMUSCULAR; INTRAVENOUS EVERY 8 HOURS
Status: COMPLETED | OUTPATIENT
Start: 2018-11-21 | End: 2018-11-22

## 2018-11-21 RX ORDER — HYDROCHLOROTHIAZIDE 12.5 MG/1
12.5 CAPSULE ORAL DAILY
Status: DISCONTINUED | OUTPATIENT
Start: 2018-11-22 | End: 2018-11-22 | Stop reason: HOSPADM

## 2018-11-21 RX ORDER — LIDOCAINE HYDROCHLORIDE 10 MG/ML
0.1 INJECTION INFILTRATION; PERINEURAL AS NEEDED
Status: DISCONTINUED | OUTPATIENT
Start: 2018-11-21 | End: 2018-11-21 | Stop reason: HOSPADM

## 2018-11-21 RX ORDER — FAMOTIDINE 20 MG/1
20 TABLET, FILM COATED ORAL ONCE
Status: DISCONTINUED | OUTPATIENT
Start: 2018-11-21 | End: 2018-11-21 | Stop reason: HOSPADM

## 2018-11-21 RX ORDER — TRANEXAMIC ACID 100 MG/ML
INJECTION, SOLUTION INTRAVENOUS AS NEEDED
Status: DISCONTINUED | OUTPATIENT
Start: 2018-11-21 | End: 2018-11-21 | Stop reason: HOSPADM

## 2018-11-21 RX ORDER — DIPHENHYDRAMINE HCL 25 MG
25 CAPSULE ORAL
Status: DISCONTINUED | OUTPATIENT
Start: 2018-11-21 | End: 2018-11-22 | Stop reason: HOSPADM

## 2018-11-21 RX ORDER — PROPOFOL 10 MG/ML
INJECTION, EMULSION INTRAVENOUS AS NEEDED
Status: DISCONTINUED | OUTPATIENT
Start: 2018-11-21 | End: 2018-11-21 | Stop reason: HOSPADM

## 2018-11-21 RX ORDER — PROPOFOL 10 MG/ML
INJECTION, EMULSION INTRAVENOUS
Status: DISCONTINUED | OUTPATIENT
Start: 2018-11-21 | End: 2018-11-21 | Stop reason: HOSPADM

## 2018-11-21 RX ORDER — SODIUM CHLORIDE 0.9 % (FLUSH) 0.9 %
5-10 SYRINGE (ML) INJECTION EVERY 8 HOURS
Status: DISCONTINUED | OUTPATIENT
Start: 2018-11-21 | End: 2018-11-22 | Stop reason: HOSPADM

## 2018-11-21 RX ORDER — SODIUM CHLORIDE 0.9 % (FLUSH) 0.9 %
5-10 SYRINGE (ML) INJECTION AS NEEDED
Status: DISCONTINUED | OUTPATIENT
Start: 2018-11-21 | End: 2018-11-22 | Stop reason: HOSPADM

## 2018-11-21 RX ORDER — FENTANYL CITRATE 50 UG/ML
INJECTION, SOLUTION INTRAMUSCULAR; INTRAVENOUS AS NEEDED
Status: DISCONTINUED | OUTPATIENT
Start: 2018-11-21 | End: 2018-11-21 | Stop reason: HOSPADM

## 2018-11-21 RX ORDER — ASPIRIN 325 MG
325 TABLET, DELAYED RELEASE (ENTERIC COATED) ORAL EVERY 12 HOURS
Qty: 60 TAB | Refills: 0 | Status: SHIPPED | OUTPATIENT
Start: 2018-11-21 | End: 2019-06-14

## 2018-11-21 RX ORDER — HYDROMORPHONE HYDROCHLORIDE 2 MG/ML
1 INJECTION, SOLUTION INTRAMUSCULAR; INTRAVENOUS; SUBCUTANEOUS
Status: DISCONTINUED | OUTPATIENT
Start: 2018-11-21 | End: 2018-11-22 | Stop reason: HOSPADM

## 2018-11-21 RX ORDER — ALBUTEROL SULFATE 90 UG/1
2 AEROSOL, METERED RESPIRATORY (INHALATION)
Status: DISCONTINUED | OUTPATIENT
Start: 2018-11-21 | End: 2018-11-21 | Stop reason: SDUPTHER

## 2018-11-21 RX ORDER — ALBUTEROL SULFATE 0.83 MG/ML
2.5 SOLUTION RESPIRATORY (INHALATION)
Status: DISCONTINUED | OUTPATIENT
Start: 2018-11-21 | End: 2018-11-22 | Stop reason: HOSPADM

## 2018-11-21 RX ORDER — HYDRALAZINE HYDROCHLORIDE 20 MG/ML
10 INJECTION INTRAMUSCULAR; INTRAVENOUS ONCE
Status: COMPLETED | OUTPATIENT
Start: 2018-11-21 | End: 2018-11-21

## 2018-11-21 RX ORDER — OXYBUTYNIN CHLORIDE 5 MG/1
5 TABLET, EXTENDED RELEASE ORAL DAILY
Status: DISCONTINUED | OUTPATIENT
Start: 2018-11-22 | End: 2018-11-22 | Stop reason: HOSPADM

## 2018-11-21 RX ORDER — SODIUM CHLORIDE 0.9 % (FLUSH) 0.9 %
5-10 SYRINGE (ML) INJECTION EVERY 8 HOURS
Status: DISCONTINUED | OUTPATIENT
Start: 2018-11-21 | End: 2018-11-21 | Stop reason: HOSPADM

## 2018-11-21 RX ORDER — ONDANSETRON 2 MG/ML
INJECTION INTRAMUSCULAR; INTRAVENOUS AS NEEDED
Status: DISCONTINUED | OUTPATIENT
Start: 2018-11-21 | End: 2018-11-21 | Stop reason: HOSPADM

## 2018-11-21 RX ORDER — ACETAMINOPHEN 10 MG/ML
1000 INJECTION, SOLUTION INTRAVENOUS ONCE
Status: COMPLETED | OUTPATIENT
Start: 2018-11-21 | End: 2018-11-21

## 2018-11-21 RX ORDER — CEFAZOLIN SODIUM/WATER 2 G/20 ML
2 SYRINGE (ML) INTRAVENOUS EVERY 8 HOURS
Status: COMPLETED | OUTPATIENT
Start: 2018-11-21 | End: 2018-11-22

## 2018-11-21 RX ORDER — NEOMYCIN AND POLYMYXIN B SULFATES 40; 200000 MG/ML; [USP'U]/ML
SOLUTION IRRIGATION AS NEEDED
Status: DISCONTINUED | OUTPATIENT
Start: 2018-11-21 | End: 2018-11-21 | Stop reason: HOSPADM

## 2018-11-21 RX ORDER — VANCOMYCIN HYDROCHLORIDE
1250 ONCE
Status: COMPLETED | OUTPATIENT
Start: 2018-11-21 | End: 2018-11-21

## 2018-11-21 RX ORDER — AMLODIPINE BESYLATE 10 MG/1
10 TABLET ORAL DAILY
Status: DISCONTINUED | OUTPATIENT
Start: 2018-11-22 | End: 2018-11-22 | Stop reason: HOSPADM

## 2018-11-21 RX ORDER — CELECOXIB 200 MG/1
200 CAPSULE ORAL EVERY 12 HOURS
Qty: 60 CAP | Refills: 2 | Status: SHIPPED | OUTPATIENT
Start: 2018-11-21 | End: 2019-02-19

## 2018-11-21 RX ORDER — AMOXICILLIN 250 MG
2 CAPSULE ORAL DAILY
Status: DISCONTINUED | OUTPATIENT
Start: 2018-11-22 | End: 2018-11-22 | Stop reason: HOSPADM

## 2018-11-21 RX ORDER — SODIUM CHLORIDE 9 MG/ML
25 INJECTION, SOLUTION INTRAVENOUS CONTINUOUS
Status: DISCONTINUED | OUTPATIENT
Start: 2018-11-21 | End: 2018-11-21 | Stop reason: HOSPADM

## 2018-11-21 RX ORDER — TRANEXAMIC ACID 650 1/1
1300 TABLET ORAL
Status: COMPLETED | OUTPATIENT
Start: 2018-11-21 | End: 2018-11-21

## 2018-11-21 RX ORDER — OXYCODONE HYDROCHLORIDE 5 MG/1
5 TABLET ORAL
Status: DISCONTINUED | OUTPATIENT
Start: 2018-11-21 | End: 2018-11-21 | Stop reason: HOSPADM

## 2018-11-21 RX ORDER — BUPIVACAINE HYDROCHLORIDE 7.5 MG/ML
INJECTION, SOLUTION EPIDURAL; RETROBULBAR
Status: DISCONTINUED | OUTPATIENT
Start: 2018-11-21 | End: 2018-11-21 | Stop reason: HOSPADM

## 2018-11-21 RX ORDER — SODIUM CHLORIDE 9 MG/ML
100 INJECTION, SOLUTION INTRAVENOUS CONTINUOUS
Status: DISCONTINUED | OUTPATIENT
Start: 2018-11-21 | End: 2018-11-22 | Stop reason: HOSPADM

## 2018-11-21 RX ORDER — CELECOXIB 200 MG/1
200 CAPSULE ORAL EVERY 12 HOURS
Status: DISCONTINUED | OUTPATIENT
Start: 2018-11-21 | End: 2018-11-22 | Stop reason: HOSPADM

## 2018-11-21 RX ORDER — LIDOCAINE HYDROCHLORIDE 10 MG/ML
INJECTION, SOLUTION EPIDURAL; INFILTRATION; INTRACAUDAL; PERINEURAL
Status: DISCONTINUED | OUTPATIENT
Start: 2018-11-21 | End: 2018-11-21 | Stop reason: HOSPADM

## 2018-11-21 RX ORDER — ONDANSETRON 8 MG/1
8 TABLET, ORALLY DISINTEGRATING ORAL
Status: DISCONTINUED | OUTPATIENT
Start: 2018-11-21 | End: 2018-11-22 | Stop reason: HOSPADM

## 2018-11-21 RX ORDER — SODIUM CHLORIDE, SODIUM LACTATE, POTASSIUM CHLORIDE, CALCIUM CHLORIDE 600; 310; 30; 20 MG/100ML; MG/100ML; MG/100ML; MG/100ML
100 INJECTION, SOLUTION INTRAVENOUS CONTINUOUS
Status: DISCONTINUED | OUTPATIENT
Start: 2018-11-21 | End: 2018-11-21 | Stop reason: HOSPADM

## 2018-11-21 RX ORDER — MIDAZOLAM HYDROCHLORIDE 1 MG/ML
INJECTION, SOLUTION INTRAMUSCULAR; INTRAVENOUS AS NEEDED
Status: DISCONTINUED | OUTPATIENT
Start: 2018-11-21 | End: 2018-11-21 | Stop reason: HOSPADM

## 2018-11-21 RX ORDER — HYDROMORPHONE HYDROCHLORIDE 2 MG/1
2 TABLET ORAL
Qty: 42 TAB | Refills: 0 | Status: SHIPPED | OUTPATIENT
Start: 2018-11-21 | End: 2019-06-11 | Stop reason: ALTCHOICE

## 2018-11-21 RX ORDER — NALOXONE HYDROCHLORIDE 0.4 MG/ML
0.1 INJECTION, SOLUTION INTRAMUSCULAR; INTRAVENOUS; SUBCUTANEOUS AS NEEDED
Status: DISCONTINUED | OUTPATIENT
Start: 2018-11-21 | End: 2018-11-21 | Stop reason: HOSPADM

## 2018-11-21 RX ORDER — DEXAMETHASONE SODIUM PHOSPHATE 4 MG/ML
INJECTION, SOLUTION INTRA-ARTICULAR; INTRALESIONAL; INTRAMUSCULAR; INTRAVENOUS; SOFT TISSUE AS NEEDED
Status: DISCONTINUED | OUTPATIENT
Start: 2018-11-21 | End: 2018-11-21 | Stop reason: HOSPADM

## 2018-11-21 RX ORDER — PRAVASTATIN SODIUM 20 MG/1
40 TABLET ORAL DAILY
Status: DISCONTINUED | OUTPATIENT
Start: 2018-11-22 | End: 2018-11-22 | Stop reason: HOSPADM

## 2018-11-21 RX ORDER — ONDANSETRON 8 MG/1
8 TABLET, ORALLY DISINTEGRATING ORAL
Qty: 30 TAB | Refills: 0 | Status: SHIPPED | OUTPATIENT
Start: 2018-11-21

## 2018-11-21 RX ADMIN — PROPOFOL 100 MG: 10 INJECTION, EMULSION INTRAVENOUS at 10:23

## 2018-11-21 RX ADMIN — TRANEXAMIC ACID 1000 MG: 100 INJECTION, SOLUTION INTRAVENOUS at 10:47

## 2018-11-21 RX ADMIN — FENTANYL CITRATE 100 MCG: 50 INJECTION, SOLUTION INTRAMUSCULAR; INTRAVENOUS at 09:30

## 2018-11-21 RX ADMIN — HYDROMORPHONE HYDROCHLORIDE 1 MG: 2 INJECTION, SOLUTION INTRAMUSCULAR; INTRAVENOUS; SUBCUTANEOUS at 13:23

## 2018-11-21 RX ADMIN — HYDROMORPHONE HYDROCHLORIDE 0.5 MG: 2 INJECTION, SOLUTION INTRAMUSCULAR; INTRAVENOUS; SUBCUTANEOUS at 12:05

## 2018-11-21 RX ADMIN — CELECOXIB 200 MG: 200 CAPSULE ORAL at 20:24

## 2018-11-21 RX ADMIN — VANCOMYCIN HYDROCHLORIDE 1250 MG: 10 INJECTION, POWDER, LYOPHILIZED, FOR SOLUTION INTRAVENOUS at 09:24

## 2018-11-21 RX ADMIN — ACETAMINOPHEN 1000 MG: 500 TABLET, FILM COATED ORAL at 23:39

## 2018-11-21 RX ADMIN — HYDROMORPHONE HYDROCHLORIDE 1 MG: 2 INJECTION, SOLUTION INTRAMUSCULAR; INTRAVENOUS; SUBCUTANEOUS at 17:15

## 2018-11-21 RX ADMIN — TUBERCULIN PURIFIED PROTEIN DERIVATIVE 5 UNITS: 5 INJECTION, SOLUTION INTRADERMAL at 07:43

## 2018-11-21 RX ADMIN — SODIUM CHLORIDE, SODIUM LACTATE, POTASSIUM CHLORIDE, AND CALCIUM CHLORIDE: 600; 310; 30; 20 INJECTION, SOLUTION INTRAVENOUS at 10:25

## 2018-11-21 RX ADMIN — MIDAZOLAM HYDROCHLORIDE 2 MG: 1 INJECTION, SOLUTION INTRAMUSCULAR; INTRAVENOUS at 09:37

## 2018-11-21 RX ADMIN — SODIUM CHLORIDE 100 ML/HR: 900 INJECTION, SOLUTION INTRAVENOUS at 13:00

## 2018-11-21 RX ADMIN — DEXAMETHASONE SODIUM PHOSPHATE 10 MG: 4 INJECTION, SOLUTION INTRA-ARTICULAR; INTRALESIONAL; INTRAMUSCULAR; INTRAVENOUS; SOFT TISSUE at 09:55

## 2018-11-21 RX ADMIN — ACETAMINOPHEN 1000 MG: 10 INJECTION, SOLUTION INTRAVENOUS at 17:12

## 2018-11-21 RX ADMIN — HYDRALAZINE HYDROCHLORIDE 10 MG: 20 INJECTION INTRAMUSCULAR; INTRAVENOUS at 12:38

## 2018-11-21 RX ADMIN — HYDROMORPHONE HYDROCHLORIDE 2 MG: 2 TABLET ORAL at 23:50

## 2018-11-21 RX ADMIN — Medication 2 G: at 09:27

## 2018-11-21 RX ADMIN — SODIUM CHLORIDE 100 ML/HR: 900 INJECTION, SOLUTION INTRAVENOUS at 23:39

## 2018-11-21 RX ADMIN — ONDANSETRON 4 MG: 2 INJECTION INTRAMUSCULAR; INTRAVENOUS at 09:55

## 2018-11-21 RX ADMIN — LIDOCAINE HYDROCHLORIDE 0 MG: 10 INJECTION, SOLUTION EPIDURAL; INFILTRATION; INTRACAUDAL; PERINEURAL at 09:35

## 2018-11-21 RX ADMIN — Medication 1 AMPULE: at 20:25

## 2018-11-21 RX ADMIN — TRANEXAMIC ACID 1300 MG: 650 TABLET, FILM COATED ORAL at 17:12

## 2018-11-21 RX ADMIN — PROPOFOL 50 MG: 10 INJECTION, EMULSION INTRAVENOUS at 09:48

## 2018-11-21 RX ADMIN — BUPIVACAINE HYDROCHLORIDE 12 MG: 7.5 INJECTION, SOLUTION EPIDURAL; RETROBULBAR at 09:35

## 2018-11-21 RX ADMIN — Medication 3 AMPULE: at 07:44

## 2018-11-21 RX ADMIN — TRANEXAMIC ACID 1300 MG: 650 TABLET, FILM COATED ORAL at 14:17

## 2018-11-21 RX ADMIN — HYDROMORPHONE HYDROCHLORIDE 2 MG: 2 TABLET ORAL at 20:18

## 2018-11-21 RX ADMIN — KETOROLAC TROMETHAMINE 15 MG: 15 INJECTION, SOLUTION INTRAMUSCULAR; INTRAVENOUS at 20:24

## 2018-11-21 RX ADMIN — ASPIRIN 325 MG: 325 TABLET, DELAYED RELEASE ORAL at 20:25

## 2018-11-21 RX ADMIN — HYDROMORPHONE HYDROCHLORIDE 0.5 MG: 2 INJECTION, SOLUTION INTRAMUSCULAR; INTRAVENOUS; SUBCUTANEOUS at 12:08

## 2018-11-21 RX ADMIN — TRANEXAMIC ACID 1000 MG: 100 INJECTION, SOLUTION INTRAVENOUS at 09:50

## 2018-11-21 RX ADMIN — SODIUM CHLORIDE, SODIUM LACTATE, POTASSIUM CHLORIDE, AND CALCIUM CHLORIDE 100 ML/HR: 600; 310; 30; 20 INJECTION, SOLUTION INTRAVENOUS at 07:41

## 2018-11-21 RX ADMIN — PROPOFOL 160 MCG/KG/MIN: 10 INJECTION, EMULSION INTRAVENOUS at 09:48

## 2018-11-21 RX ADMIN — Medication 2 G: at 17:19

## 2018-11-21 NOTE — PROGRESS NOTES
Patient arrived via bed into room. Patient alert and oriented. Patient able to move lower extremities due to surgery. Pedal pulses present 2+. SCD applied. Neurovascular status WNL. Admission assessment complete. Discuss diet and pain. Bed is low and locked. Call light within reach. Instructed to call for assistance.

## 2018-11-21 NOTE — ANESTHESIA PREPROCEDURE EVALUATION
Anesthetic History Review of Systems / Medical History Patient summary reviewed Pulmonary COPD Smoker Asthma Neuro/Psych  
 
 
 
TIA (? 2013) Cardiovascular Hypertension Past MI (2013), CAD and cardiac stents (2013) Exercise tolerance: >4 METS Comments: NUCLEAR PERFUSION STUDY 11/2018: low risk scan, EF 63% GI/Hepatic/Renal 
  
GERD: well controlled Endo/Other Arthritis Other Findings Physical Exam 
 
Airway Mallampati: III 
TM Distance: > 6 cm Neck ROM: decreased range of motion Mouth opening: Normal 
 
 Cardiovascular Regular rate and rhythm,  S1 and S2 normal,  no murmur, click, rub, or gallop Dental 
 
Dentition: Full upper dentures Pulmonary Breath sounds clear to auscultation Abdominal 
 
 
 
 Other Findings Anesthetic Plan ASA: 3 Anesthesia type: spinal 
 
 
 
 
 
Anesthetic plan and risks discussed with: Patient

## 2018-11-21 NOTE — PROGRESS NOTES
Problem: Self Care Deficits Care Plan (Adult) Goal: *Acute Goals and Plan of Care (Insert Text) GOALS:  
DISCHARGE GOALS (in preparation for going home/rehab):  3 days 1. Mr. Melody Manning will perform one lower body dressing activity with minimal assistance with adaptive equipment to demonstrate improved functional mobility and safety. 2.  Mr. Melody Manning will perform one lower body bathing activity with minimal  assistance with adaptive equipment to demonstrate improved functional mobility and safety. 3.  Mr. Melody Manning will perform toileting/toilet transfer with contact guard assistance with adaptive equipment to demonstrate improved functional mobility and safety. 4.  Mr. Melody Manning will perform shower transfer with contact guard assistance with adaptive equipment to demonstrate improved functional mobility and safety. 5.  Mr. Melody Manning will state DARIA precautions with two verbal cues to demonstrate improved functional mobility and safety. JOINT CAMP OCCUPATIONAL THERAPY DARIA: Initial Assessment 11/21/2018INPATIENT: Hospital Day: 1 Payor: [facility] [place] MEDICARE / Plan: [employer] / Product Type: Managed Care Medicare /  
  
NAME/AGE/GENDER: Kervin Frances is a 80 y.o. male PRIMARY DIAGNOSIS:  Localized osteoarthrosis of left hip [M16.12] Procedure(s) and Anesthesia Type: 
   * TOTAL HIP ARTHROPLASTY/ LEFT - Spinal (Left) ICD-10: Treatment Diagnosis:  
 · Pain in left hip (M25.552) · Stiffness of Left Hip, Not elsewhere classified (M25.652) ASSESSMENT:  
Mr. Melody Manning is s/p Left DARIA and presents with decreased weight bearing on L LE and decreased independence with functional mobility and activities of daily living as compared to prior level of function and safety. Patient would benefit from skilled Occupational Therapy to maximize independence and safety with self-care task and functional mobility.   Pt would also benefit from education on lower body adaptive equipment and hip precautions post-surgery in preparation for going home. Able to transfer from bed to recliner using a RW. This section established at most recent assessment PROBLEM LIST (Impairments causing functional limitations): 1. Decreased Strength 2. Decreased ADL/Functional Activities 3. Decreased Transfer Abilities 4. Increased Pain 5. Increased Fatigue 6. Decreased Flexibility/Joint Mobility 7. Decreased Knowledge of Precautions INTERVENTIONS PLANNED: (Benefits and precautions of occupational therapy have been discussed with the patient.) 1. Activities of daily living training 2. Adaptive equipment training 3. Balance training 4. Clothing management 5. Donning&doffing training 6. Theraputic activity TREATMENT PLAN: Frequency/Duration: Follow patient 1-2tx to address above goals. Rehabilitation Potential For Stated Goals: Excellent RECOMMENDED REHABILITATION/EQUIPMENT: (at time of discharge pending progress): Continue Skilled Therapy. OCCUPATIONAL PROFILE AND HISTORY:  
History of Present Injury/Illness (Reason for Referral): Pt presents this date s/p (left) DARIA. Past Medical History/Comorbidities:  
Mr. Melly Barber  has a past medical history of Arthritis, Asthma, CAD (coronary artery disease), Chronic obstructive pulmonary disease ([address] Coronary atherosclerosis of native coronary vessel, Current smoker on some days, Dental disorder, Dyspepsia and other specified disorders of function of stomach, GERD (gastroesophageal reflux disease), Hyperlipidemia other unsp dyslipidemia, Hypertension, Hypertension, benign, controlled, MI (myocardial infarction) ([address] Multiple renal cysts, Other ill-defined conditions(799.89), Prostate cancer ([address] and Stroke ([address] Mr. Melly Barber  has a past surgical history that includes hx colectomy; hx ptca; hx colonoscopy (2018); pr cardiac surg procedure unlist (2013); hx prostatectomy; and hx hernia repair. Social History/Living Environment: Home Environment: Private residence Living Alone: Yes Patient Expects to be Discharged to[de-identified] Unknown(Home vs Rehab) Prior Level of Function/Work/Activity: 
Used a walker prior, questionable historian. Number of Personal Factors/Comorbidities that affect the Plan of Care: Brief history (0):  LOW COMPLEXITY ASSESSMENT OF OCCUPATIONAL PERFORMANCE[de-identified]  
Most Recent Physical Functioning:  
Balance Sitting: Intact Standing: With support Coordination Fine Motor Skills-Upper: Left Intact; Right Intact Gross Motor Skills-Upper: Left Intact; Right Intact Mental Status Neurologic State: Alert Orientation Level: Oriented X4 Cognition: Follows commands Perception: Appears intact Perseveration: No perseveration noted Basic ADLs (From Assessment) Complex ADLs (From Assessment) Basic ADL Feeding: Independent Oral Facial Hygiene/Grooming: Setup Bathing: Minimum assistance Upper Body Dressing: Contact guard assistance Toileting: Minimum assistance Grooming/Bathing/Dressing Activities of Daily Living Functional Transfers Bathroom Mobility: Minimum assistance Toilet Transfer : Minimum assistance Shower Transfer: Minimum assistance Bed/Mat Mobility Supine to Sit: Contact guard assistance Sit to Stand: Contact guard assistance Bed to Chair: Minimum assistance Physical Skills Involved: 1. Range of Motion 2. Balance 3. Strength Cognitive Skills Affected (resulting in the inability to perform in a timely and safe manner): 
1. [facility] Psychosocial Skills Affected: 
1. WFL Number of elements that affect the Plan of Care: 1-3:  LOW COMPLEXITY CLINICAL DECISION MAKING:  
[employer] AM-PAC 6 Clicks Daily Activity Inpatient Short Form How much help from another person does the patient currently need. .. Total A Lot A Little None 1. Putting on and taking off regular lower body clothing?    [] 1   [x] 2   [] 3   [] 4  
 2.  Bathing (including washing, rinsing, drying)? [] 1   [x] 2   [] 3   [] 4  
3. Toileting, which includes using toilet, bedpan or urinal?   [] 1   [x] 2   [] 3   [] 4  
4. Putting on and taking off regular upper body clothing? [] 1   [] 2   [] 3   [x] 4  
5. Taking care of personal grooming such as brushing teeth? [] 1   [] 2   [] 3   [x] 4  
6. Eating meals? [] 1   [] 2   [] 3   [x] 4  
© 2007, Trustees of [address] [place] [address] [address] [address], under license to [employer]. All rights reserved Score:  Initial: 18 Most Recent: X (Date: -- ) Interpretation of Tool:  Represents activities that are increasingly more difficult (i.e. Bed mobility, Transfers, Gait). Score 24 23 22-20 19-15 14-10 9-7 6 Modifier CH CI CJ CK CL CM CN   
 
? Self Care:  
  - CURRENT STATUS: CK - 40%-59% impaired, limited or restricted  - GOAL STATUS: CJ - 20%-39% impaired, limited or restricted  - D/C STATUS:  ---------------To be determined--------------- Payor: [facility] [place] MEDICARE / Plan[place] [employer] [place] / Product Type: Managed Care Medicare /   
 
Medical Necessity:    
· Skilled intervention continues to be required due to Deficits listed above. Reason for Services/Other Comments: 
· Patient continues to require skilled intervention due to new DARIA. Use of outcome tool(s) and clinical judgement create a POC that gives a: MODERATE COMPLEXITY  
  
 
 
 
TREATMENT:  
(In addition to Assessment/Re-Assessment sessions the following treatments were rendered) Pre-treatment Symptoms/Complaints:   
Pain: Initial:  
Pain Intensity 1: 6  Post Session:  6 Assessment/Reassessment only, no treatment provided today Treatment/Session Assessment:   
 Response to Treatment:  Good, sitting up in recliner. Education: 
[] Home Exercises [x] Fall Precautions [x] Hip Precautions [] Going Home Video 
[] Knee/Hip Prosthesis Review [x] Walker Management/Safety [x] Adaptive Equipment as Needed Interdisciplinary Collaboration:  
o Physical Therapist 
o Occupational Therapist 
o Registered Nurse After treatment position/precautions:  
o Up in chair 
o Bed/Chair-wheels locked 
o Caregiver at bedside 
o Call light within reach 
o RN notified Compliance with Program/Exercises: Compliant all of the time. Recommendations/Intent for next treatment session:  Treatment next visit will focus on increasing Mr. Jasons independence with bed mobility, transfers, self care, functional mobility, modalities for pain, and patient education. Total Treatment Duration: OT Patient Time In/Time Out Time In: 6728 Time Out: 1325 Christa Silver OT

## 2018-11-21 NOTE — PROGRESS NOTES
Problem: Mobility Impaired (Adult and Pediatric) Goal: *Acute Goals and Plan of Care (Insert Text) GOALS (1-4 days): 
(1.)Mr. Kathia Lundy will move from supine to sit and sit to supine  in bed with SUPERVISION. (2.)Mr. Kathia Lundy will transfer from bed to chair and chair to bed with SUPERVISION using the least restrictive device. (3.)Mr. Kathia Lundy will ambulate with SUPERVISION for 230 feet with the least restrictive device. (4.)Mr. Kathia Lundy will ambulate up/down 3 steps with bilateral  railing with STAND BY ASSIST with no device. (5.)Mr. Kathia Lundy will state/observe DARIA precautions with 1 verbal cues. ________________________________________________________________________________________________ PHYSICAL THERAPY Joint camp Daria: Initial Assessment, Treatment Day: Day of Assessment, PM 11/21/2018INPATIENT: Hospital Day: 1 Payor: [facility] MEDICARE / Plan: [place] / Product Type: [employer] Care Medicare /  
  
NAME/AGE/GENDER: Rand Hernandez is a 80 y.o. male PRIMARY DIAGNOSIS:  Localized osteoarthrosis of left hip [M16.12] Procedure(s) and Anesthesia Type: 
   * TOTAL HIP ARTHROPLASTY/ LEFT - Spinal (Left) ICD-10: Treatment Diagnosis:  
 · Pain in left hip (M25.552) · Stiffness of Left Hip, Not elsewhere classified (M25.652) · Difficulty in walking, Not elsewhere classified (R26.2) ASSESSMENT:  
Mr. Kathia Lundy presents s/p L DARIA. Patient demonstrates decreased L LE strength and ROM and decreased independence with mobility. Patient would benefit from therapy to address these deficits. Patient lives alone and was considering SNF at discharge. Patient, however, very happy with how he was doing at time of assessment and feels he can go home. He reports he already feels better than he did prior to surgery. Therapists in agreement with his ability to function safely at home. He reports he was using a walker at home and has a cousin who is also his aide.   Patient anxious to be able to return to [employer]. Patient would benefit from [facility] services at d/c. This section established at most recent assessment PROBLEM LIST (Impairments causing functional limitations): 1. Decreased Strength 2. Decreased ADL/Functional Activities 3. Decreased Transfer Abilities 4. Decreased Ambulation Ability/Technique 5. Decreased Balance 6. Increased Pain 7. Edema/Girth 8. Decreased Knowledge of Precautions 9. Decreased [place] with Home Exercise Program 
 INTERVENTIONS PLANNED: (Benefits and precautions of physical therapy have been discussed with the patient.) 1. Bed Mobility 2. Gait Training 3. Home Exercise Program (HEP) 4. Therapeutic Exercise/Strengthening 5. Transfer Training 6. Range of Motion: active/assisted/passive 7. Therapeutic Activities 8. Group Therapy TREATMENT PLAN: Frequency/Duration: Follow patient BID for duration of hospital stay to address above goals. Rehabilitation Potential For Stated Goals: Good RECOMMENDED REHABILITATION/EQUIPMENT: (at time of discharge pending progress): Continue Skilled Therapy and Home Health: Physical Therapy. HISTORY:  
History of Present Injury/Illness (Reason for Referral): L DARIA Past Medical History/Comorbidities:  
Mr. Mega Lomax  has a past medical history of Arthritis, Asthma, CAD (coronary artery disease), Chronic obstructive pulmonary disease ([address] Coronary atherosclerosis of native coronary vessel, Current smoker on some days, Dental disorder, Dyspepsia and other specified disorders of function of stomach, GERD (gastroesophageal reflux disease), Hyperlipidemia other unsp dyslipidemia, Hypertension, Hypertension, benign, controlled, MI (myocardial infarction) ([address] Multiple renal cysts, Other ill-defined conditions(799.89), Prostate cancer ([address] and Stroke ([address]   Mr. Mega Lomax  has a past surgical history that includes hx colectomy; hx ptca; hx colonoscopy (2018); pr cardiac surg procedure unlist (2013); hx prostatectomy; and hx hernia repair. Social History/Living Environment:  
Home Environment: Private residence One/Two Story Residence: One story(per patient) Living Alone: Yes Support Systems: Jehovah's witness / ortega community, Family member(s) Patient Expects to be Discharged to[de-identified] Private residence Current DME Used/Available at Home: [employer] Prior Level of Function/Work/Activity: 
Per patient, using a walker prior to surgery. Need to clarify home set-up/stairs prior to d/c. Number of Personal Factors/Comorbidities that affect the Plan of Care: 1-2: MODERATE COMPLEXITY EXAMINATION:  
Most Recent Physical Functioning:  
  
Gross Assessment AROM: Within functional limits(R LE) Strength: Generally decreased, functional(R LE) Coordination: Generally decreased, functional 
 
         
 
LLE Strength L Hip Flexion: 2+ 
L Hip ABduction: 2+ L Knee Flexion: 3- 
L Knee Extension: 3- 
 
Bed Mobility Supine to Sit: Contact guard assistance Scooting: Stand-by assistance Transfers Sit to Stand: Contact guard assistance Stand to Sit: Contact guard assistance Bed to Chair: Contact guard assistance Balance Sitting: Intact Standing: With support Weight Bearing Status Left Side Weight Bearing: As tolerated Distance (ft): 4 Feet (ft) Ambulation - Level of Assistance: Contact guard assistance Assistive Device: Walker, rolling Base of Support: Center of gravity altered Speed/Daxa: Slow Step Length: Left shortened;Right shortened Stance: Left decreased Gait Abnormalities: Antalgic Interventions: Safety awareness training;Verbal cues Braces/Orthotics: none Left Hip Cold Type: Cold/ice pack Body Structures Involved: 1. Joints 2. Muscles Body Functions Affected: 1. Movement Related Activities and Participation Affected: 1. Mobility 2. Self Care Number of elements that affect the Plan of Care: 4+: HIGH COMPLEXITY CLINICAL PRESENTATION:  
Presentation: Stable and uncomplicated: LOW COMPLEXITY CLINICAL DECISION MAKING:  
[facility] AM-PAC 6 Clicks Basic Mobility Inpatient Short Form How much difficulty does the patient currently have. .. Unable A Lot A Little None 1. Turning over in bed (including adjusting bedclothes, sheets and blankets)? [] 1   [] 2   [x] 3   [] 4  
2. Sitting down on and standing up from a chair with arms ( e.g., wheelchair, bedside commode, etc.)   [] 1   [] 2   [x] 3   [] 4  
3. Moving from lying on back to sitting on the side of the bed? [] 1   [] 2   [x] 3   [] 4 How much help from another person does the patient currently need. .. Total A Lot A Little None 4. Moving to and from a bed to a chair (including a wheelchair)? [] 1   [] 2   [x] 3   [] 4  
5. Need to walk in hospital room? [] 1   [] 2   [x] 3   [] 4  
6. Climbing 3-5 steps with a railing? [] 1   [] 2   [x] 3   [] 4  
© 2007, Trustees of [facility], under license to [employer]. All rights reserved Score:  Initial: 18 Most Recent: X (Date: -- ) Interpretation of Tool:  Represents activities that are increasingly more difficult (i.e. Bed mobility, Transfers, Gait). Score 24 23 22-20 19-15 14-10 9-7 6 Modifier CH CI CJ CK CL CM CN   
 
? Mobility - Walking and Moving Around:  
  - CURRENT STATUS: CK - 40%-59% impaired, limited or restricted  - GOAL STATUS: CJ - 20%-39% impaired, limited or restricted  - D/C STATUS:  ---------------To be determined--------------- Payor: [facility] MEDICARE / Plan: [facility] MEDICARE HMO/PPO / Product Type: Managed Care Medicare /   
 
Medical Necessity:    
· Patient is expected to demonstrate progress in strength, range of motion, balance and coordination to increase independence with mobility and ADLs.  
Reason for Services/Other Comments: 
· Patient continues to require skilled intervention due to decreased L LE strength and ROM and decreased independence with mobility s/p DARIA. Use of outcome tool(s) and clinical judgement create a POC that gives a: Clear prediction of patient's progress: LOW COMPLEXITY  
  
 
 
 
TREATMENT:  
(In addition to Assessment/Re-Assessment sessions the following treatments were rendered) Pre-treatment Symptoms/Complaints:  Patient agreeable to working with therapy. Pain: Initial:  
Pain Intensity 1: 9 Pain Location 1: Hip Pain Orientation 1: Left Pain Intervention(s) 1: Ambulation/Increased Activity, Cold pack, Exercise  Post Session:  5/10 Therapeutic Exercise: (10 Minutes):  Exercises per grid below to improve mobility and strength. Required minimal verbal, manual and tactile cues to promote proper body alignment. Progressed repetitions and complexity of movement as indicated. Date: 
11/21/18 Date: 
 Date: 
  
ACTIVITY/EXERCISE AM PM AM PM AM PM  
GROUP THERAPY  []  []  []  []  []  [] Ankle Pumps  10 Quad Sets  10 Gluteal Sets  10 Hip ABd/ADduction  10 AA Straight Leg Raises Knee Slides  10 Short Arc Quads  10 [facility] [facility] Chair Slides B = bilateral; AA = active assistive; A = active; P = passive Treatment/Session Assessment:   
 Response to Treatment:  Patient participated well and very happy with how he is feeling and moving. Education: 
[] Home Exercises [x] Fall Precautions [] Hip Precautions [] D/C Instruction Review 
[] Knee/Hip Prosthesis Review [x] Walker Management/Safety [] Adaptive Equipment as Needed Interdisciplinary Collaboration:  
o Physical Therapist 
o Occupational Therapist 
o Registered Nurse 
o  After treatment position/precautions:  
o Up in chair 
o Bed/Chair-wheels locked 
o Call light within reach 
o RN notified Compliance with Program/Exercises: Compliant all of the time. Recommendations/Intent for next treatment session:  Treatment next visit will focus on increasing Mr. Valderrama's independence with bed mobility, transfers, gait training, strength/ROM exercises, modalities for pain, and patient education. Total Treatment Duration: PT Patient Time In/Time Out Time In: 1325 Time Out: 1355 Katiuska Navarro PT

## 2018-11-21 NOTE — ANESTHESIA POSTPROCEDURE EVALUATION
Procedure(s): 
TOTAL HIP ARTHROPLASTY/ LEFT. Anesthesia Post Evaluation Patient location during evaluation: PACU Patient participation: complete - patient participated Level of consciousness: awake and alert Pain management: adequate Airway patency: patent Anesthetic complications: no 
Cardiovascular status: acceptable Respiratory status: acceptable Hydration status: acceptable Visit Vitals BP (!) 176/98 (BP 1 Location: Right arm, BP Patient Position: At rest) Pulse 89 Temp 37 °C (98.6 °F) Resp 16 Ht 6' 2\" (1.88 m) Wt 85.3 kg (187 lb 16 oz) SpO2 99% BMI 24.14 kg/m²

## 2018-11-21 NOTE — PERIOP NOTES
TRANSFER - OUT REPORT: 
 
Verbal report given to Tim Terrell on Noe Hutson 72Cristiano  being transferred to room 320 for routine post - op Report consisted of patients Situation, Background, Assessment and  
Recommendations(SBAR). Information from the following report(s) SBAR, OR Summary, Intake/Output and MAR was reviewed with the receiving nurse. Lines:  
Peripheral IV 11/21/18 Left Forearm (Active) Site Assessment Clean, dry, & intact 11/21/2018  7:30 AM  
Phlebitis Assessment 0 11/21/2018  7:30 AM  
Infiltration Assessment 0 11/21/2018  7:30 AM  
Dressing Status Clean, dry, & intact 11/21/2018  7:30 AM  
Dressing Type Tape;Transparent 11/21/2018  7:30 AM  
Hub Color/Line Status Green; Infusing 11/21/2018  7:30 AM  
Action Taken Blood drawn 11/21/2018  7:30 AM  
  
 
Opportunity for questions and clarification was provided. Patient transported with: 
 O2 @ 3 liters

## 2018-11-21 NOTE — PROGRESS NOTES
11/21/18 1444 Oxygen Therapy O2 Sat (%) 99 % Pulse via Oximetry 79 beats per minute O2 Device Room air Incentive Spirometry Treatment Actual Volume (ml) 1500 ml Number of Attempts 1 Joint Camp Notes Reviewed. Pt working on IS. Pt encouraged to do 10 breaths per hour while awake on IS. Good NPC. No respiratory distress noted at this time. No complications noted at this time. C/s 15 at bedside.

## 2018-11-21 NOTE — PROGRESS NOTES
Care Management Interventions Transition of Care Consult (CM Consult): Discharge Planning, Home Health [facility] - INPATIENT: Yes Physical Therapy Consult: Yes Occupational Therapy Consult: Yes Current Support Network: Own Home, Lives Alone Confirm Follow Up Transport: Family Plan discussed with Pt/Family/Caregiver: Yes Freedom of Choice Offered: Yes Discharge Location Discharge Placement: Home with home health [facility] spoke with pt who was just evaluated by PT ( PT still in room ). PT states pt did very well and pt is also pleased with how well he did. Pt now has plans to d/c home with family support and HH. Pt lives alone but receives services from [facility] ( pt's cousin Jessica Connor is his aide ). Pt needs DME ( [facility] and [facility] ). [facility] made referral to [facility] for DME. Pt offered choices of HH and prefers [facility]. [facility]OPE made referral to Vinnie Baer with [facility].

## 2018-11-21 NOTE — PROGRESS NOTES
[facility] [place] - INPATIENT Face to Face Encounter Patients Name: Lizbeth Leyva    YOB: 1950 Ordering Physician:   Marcello Cota Primary Diagnosis: Localized osteoarthrosis of left hip [M16.12] Date of Face to Face:   11/21/2018 Face to Face Encounter findings are related to primary reason for home care:   yes. 1. I certify that the patient needs intermittent care as follows: physical therapy: strengthening and gait/stair training 2. I certify that this patient is homebound, that is: 1) patient requires the use of a walker device, special transportation, or assistance of another to leave the home; or 2) patient's condition makes leaving the home medically contraindicated; and 3) patient has a normal inability to leave the home and leaving the home requires considerable and taxing effort. Patient may leave the home for infrequent and short duration for medical reasons, and occasional absences for non-medical reasons. Homebound status is due to the following functional limitations: Patient currently under activity restrictions secondary to recent surgical procedure, this hinders their ability to safely leave the home. 3. I certify that this patient is under my care and that I, or a nurse practitioner or  [id number], or clinical nurse specialist, or certified nurse midwife, working with me, had a Face-to-Face Encounter that meets the physician Face-to-Face Encounter requirements. The following are the clinical findings from the [address] [place] [address] encounter that support the need for skilled services and is a summary of the encounter:   See Progress Notes See attached progess note LARA Ramirez 
11/21/2018 THE FOLLOWING TO BE COMPLETED BY THE COMMUNITY PHYSICIAN: 
 
I concur with the findings described above from the [facility] encounter that this patient is homebound and in need of a skilled service. Certifying Physician: _____________________________________ Printed Certifying Physician Name: _____________________________________ Date: _________________

## 2018-11-21 NOTE — H&P
[facility] Pre Operative History and Physical Exam 
 
Patient ID: Nico Winchester [id number] 
89 y.o. 
1950 Today: November 21, 2018 CC:  Left hip pain HPI:   The patient has end stage arthritis of the left hip. The patient was evaluated and examined in the office prior to today and was found to have a history and physical exam consistent with intra-articular pathology of the left hip. Patient complains of anterior groin pain predominately. Pain occurs during activity. Patient has difficulty putting on socks/shoes and has notable pain when getting up from a chair and getting out of a car. Patient does not complain of significant lateral hip pain or radicular pain down the leg. There have been no changes to the patient's orthopedic condition since the last office visit Past Medical History: 
Past Medical History:  
Diagnosis Date  Arthritis  Asthma   
 albuterol prn (uses 1-2 times per day)  CAD (coronary artery disease) 9/15/2013  Chronic obstructive pulmonary disease ([address]  Coronary atherosclerosis of native coronary vessel 11/18/2015  Current smoker on some days  Dental disorder  Dyspepsia and other specified disorders of function of stomach  GERD (gastroesophageal reflux disease)   
 on med for control  Hyperlipidemia other unsp dyslipidemia 11/18/2015  
 on med  Hypertension   
 on med for control  Hypertension, benign, controlled 11/18/2015  MI (myocardial infarction) ([address] 2013 NSTEMI  
 Multiple renal cysts  Other ill-defined conditions(799.89)   
 hernia, pt unsure of site (resolved with surery)  Prostate cancer ([address]  Stroke [facility]) 2011 TIA; no residual   
 
 
Past Surgical History: 
Past Surgical History:  
Procedure Laterality Date  CARDIAC SURG PROCEDURE UNLIST  2013  
 stent  HX COLECTOMY    
 due to polyp that could not be excised  HX COLONOSCOPY  2018  HX HERNIA REPAIR    
  HX PROSTATECTOMY  HX PTCA    
 x 1 Medications: 
  
Prior to Admission medications Medication Sig Start Date End Date Taking? Authorizing Provider  
aspirin delayed-release 81 mg tablet Take 81 mg by mouth daily. Provider, Historical  
hydroCHLOROthiazide (MICROZIDE) 12.5 mg capsule Take 12.5 mg by mouth daily. Provider, Historical  
oxybutynin chloride XL (DITROPAN XL) 5 mg CR tablet Take 5 mg by mouth daily. Take / use AM day of surgery  per anesthesia protocols. Provider, Historical  
omeprazole (PRILOSEC) 20 mg capsule Take 20 mg by mouth daily. Take / use AM day of surgery  per anesthesia protocols. Provider, Historical  
pravastatin (PRAVACHOL) 40 mg tablet Take 40 mg by mouth daily. Take / use AM day of surgery  per anesthesia protocols. Provider, Historical  
amLODIPine (NORVASC) 10 mg tablet Take 10 mg by mouth daily. Take / use AM day of surgery  per anesthesia protocols. Provider, Historical  
albuterol (PROVENTIL HFA, VENTOLIN HFA, PROAIR HFA) 90 mcg/actuation inhaler Take 2 Puffs by inhalation every six (6) hours as needed for Wheezing. Take every 4-6 hours for wheezing 3/11/15   Jakob Lei MD  
 
 
Family History: 
  
Family History Problem Relation Age of Onset  Stroke Mother  Diabetes Mother  Heart Disease Maternal Grandmother  Cancer Father Social History:  
  
Social History Tobacco Use  Smoking status: Current Some Day Smoker Years: 50.00  Smokeless tobacco: Never Used Substance Use Topics  Alcohol use: Yes Alcohol/week: 1.8 oz Types: 3 Cans of beer per week Allergies: Allergies Allergen Reactions  Lisinopril Swelling Vitals:  
  
Visit Vitals Ht 6' 2\" (1.88 m) Wt 85.3 kg (187 lb 16 oz) BMI 24.14 kg/m² Objective:  
      General: No Acute distress HEENT: Normocephalic/atramatic Lungs:  Breathing non-labored Heart:   RRR Abdomen: soft Extremities:  Pain with ROM of the left hip which manifests as anterior groin pain. There is decreased internal and external rotation of the affected hip. No significant pain with palpation over the greater trochanteric bursa. No radicular pain with straight leg raise. Patient walks with and antalgic gait. Distally patient has no neurologic deficit. Patient Active Problem List  
Diagnosis Code  Accelerated hypertension I10  
 CAD (coronary artery disease) I25.10  Hyperlipidemia other unsp dyslipidemia E78.5  Hypertension, benign, controlled I10  
 Coronary atherosclerosis of native coronary vessel I25.10  Tobacco use Z72.0  Pre-op examination Z01.818 Assessment: 1. Arthritis of the Left hip Plan: The patient has failed previous conservative treatment for this condition. The patient has pain in the left hip which causes daily symptoms which affects the patient's activities of daily living and quality of life. The risks, benefits, alternatives and possible complications of left total hip arthroplasty have been discussed with the patient including but not limited to infection, bleeding, damage to nerves and blood vessels with particular attention given to risk of damage of the femoral, obturator, lateral femoral cutaneous, superior gluteal, inferior gluteal, and sciatic nerve, risk of dislocation, fracture both intra-op and post-op, limb length inequality, polyethylene wear, implant loosening, risk for continued pain, and risk for revision surgery secondary to but not limited to all of these discussed risks. Further we discussed risk of venous thrombo-embolism including deep vein thrombosis and pulmonary embolism despite being on prophylaxis.  We have also previously discussed the potential of morbidity and mortality associated with, but not limited to, the actual surgical procedure, anesthesia, prior medical conditions, and/or the administration of medications perioperatively. I have made no guarantees to the patient regarding outcomes and the patient has voiced understanding of that. The patient has been given the opportunity to ask questions all of which have been answered and the patient wishes to proceed. The patient will be admitted the day of surgery for left total hip replacement. Signed By: Candida Balderas MD 
November 21, 2018

## 2018-11-21 NOTE — PERIOP NOTES
Teach back method used in review of Hibiclens usage preop/postop, TB screening, pain management goals, falls precautions and use of Nozin for prevention of staph infections. Incentive spirometer reviewed and pt reached  TOTAL TIDAL VOLUME 1500 ML OBSERVED  in preop holding.

## 2018-11-21 NOTE — PROGRESS NOTES
TRANSFER - IN REPORT: 
 
Verbal report received from Nadeem Hurt (name) on Oscar Calix  being received from PACU(unit) for routine post - op Report consisted of patients Situation, Background, Assessment and  
Recommendations(SBAR). Information from the following report(s) SBAR, Kardex, OR Summary, Procedure Summary, Intake/Output and MAR was reviewed with the receiving nurse. Opportunity for questions and clarification was provided. Assessment completed upon patients arrival to unit and care assumed.

## 2018-11-21 NOTE — ANESTHESIA PROCEDURE NOTES
Spinal Block Start time: 11/21/2018 9:29 AM 
End time: 11/21/2018 9:36 AM 
Performed by: Mar Boeck, MD 
Authorized by: Mar Boeck, MD  
 
Pre-procedure: Indications: primary anesthetic  Preanesthetic Checklist: patient identified, risks and benefits discussed, anesthesia consent, site marked, patient being monitored and timeout performed Timeout Time: 09:29 Spinal Block:  
Patient Position:  Seated Prep Region:  Lumbar Prep: DuraPrep Location:  L3-4 Technique:  Single shot Local Dose (mL):  3 Needle:  
Needle Type:  Quincke Needle Gauge:  22 G Attempts:  1 Events: CSF confirmed, no blood with aspiration and no paresthesia Assessment: 
Insertion:  Uncomplicated Patient tolerance:  Patient tolerated the procedure well with no immediate complications Paramedian approach

## 2018-11-21 NOTE — PERIOP NOTES
Betadine lavage:  17.5cc of betadine lot # [id number], exp. Date 01/2020, 
in 500cc of . 9NS Lot # [id number], exp. Date : 07/01/2021

## 2018-11-21 NOTE — OP NOTES
Total Hip Procedure Note    Arsenio Mark,  [id number],  1950    Pre-operative Diagnosis:  Localized osteoarthrosis of left hip [M16.12]    Post-operative Diagnosis: Localized osteoarthrosis of left hip [M16.12]    Procedure: Left Total Hip Arthroplasty    BMI: Body mass index is 24.14 kg/m². Tiesha Sampson Location: [address] [place] [address]    Surgeon: Brendon Alcantara MD    Assistant: None    Anesthesia: Spinal     Complications: None    EBL: 200cc    Drains: None    Case Note: It should be noted that no first assist help was available to help in retraction and/or wound closure. Therefore, if the case took longer than average is should noted that the absence of a first assistant contributed to the increased length of time. In addition, any delay in starting a case in a flip room is assuredly associated with the fact that I had no first assist help and had to close and breakdown drapes myself. Intra-op Findings: Pre-operatively leg lengths were assessed using preop Xrays and with the patient in the lateral decubitus position and operative leg was felt to be 2-3mm shorter in length compared to the contralateral leg. The operative hip showed notable cartilage loss of both the femoral head and acetabulum. No intra-operative periprosthetic fracture was encountered. Sciatic nerve was noted to be intact at the end of the procedure. We measured the distance of our resected femoral head/neck from the cut surface to the center of the femoral head to be approximately 37mm. The overall length replaced with the implanted head/neck was 35mm. Intra-operatively we felt that we restored the patients leg lengths to equal lengths using the method described later. Postop with the patient supine we assessed leg lengths and felt they were similar. Patient condition at completion of Procedure: Stable    Implants:   Implant Name Type Inv.  Item Serial No.  Lot No. LRB No. Used   SHELL ACET CLUS H 56F TRTANIUM -- TRIDENT II - [id number]  SHELL ACET CLUS H 56F TRTANIUM -- TRIDENT II [id number] [employer] HOW [id number] Left 1   SCR BNE ACET CANC 6.5X30MM -- TRIDENT - [id number]  SCR BNE ACET CANC 6.5X30MM -- TRIDENT [id number] [employer] HOW [id number] Left 1   SCR BNE ACET CANC 6.5X25MM -- TRIDENT - [id number]  SCR BNE ACET CANC 6.5X25MM -- TRIDENT [id number] [employer] HOW [id number] Left 1   INSERT ACET 10D 36MM F --  - [id number]  INSERT ACET 10D 36MM F --  [id number] [employer] HOW [id number] Left 1   STEM FEM SZ 7 132D [id number] -- ACCOLADE II V40 - [id number]  STEM FEM SZ 7 132D [id number] -- ACCOLADE II V40 [id number] [employer] HOW [id number] Left 1   HEAD FEM DELT V40 -2.5MM 36MM -- V40 BIOLOX - [id number]  HEAD FEM DELT V40 -2.5MM 36MM -- V40 BIOLOX [id number] [employer] HOW [id number] Left 1       Description of Procedure    The complexity of the total joint surgery requires the use of a first assistant for positioning, retraction and assistance in closure. However, none was provided or available     Katherine Herring was brought to the operating room. Patient was transferred from the stretcher to OR bed. Spinal anesthetic was induced. Pereira catheter was placed. IV antibiotics were administered per protocol. Katherine Herring was positioned in the lateral decubitus position and the pelvis/torso stabilized with posts. The limb was prepped and draped in the usual sterile fashion. A time out identifying the patient, procedure, operative side and surgeon was administered and charted by the OR Nurse. Prior to incision one gram of TXA was given intravenously. A standard posterior approach was utilized to expose the hip. The incision was carried through the subcutaneous tissue and underlying fascia with hemostasis obtained using the bovie cauterization and Aquamantys. A Charmley retractor was inserted. We resected any redundent bursal tissue off the external rotators. We were able to identify the piriformis tendon.  The short external rotators and capsule were dissected off the posterior femur as a single layer just superior to the piriformis tendon. The sciatic nerve was palpated and protected during dissection. The femoral head was dislocated. The articular surface revealed loss of cartilage, exposed bone and bone spurring. The neck was osteotomized approximately 1cm above the top of the lesser trochanter. We were careful to protect the greater trochanter during osteotomy and protect it from iatrogenic injury. Acetabular retractors were placed both anterior and posterior just superficial to the acetabular labrum. Remaining acetabular labrum was resected and any soft tissue was removed from the acetabulum including any tissue within the codyloid fossa. The acetabular surface revealed loss of cartilage with exposed bone. The acetabulum was sequentially reamed noting proper anteversion and inclination during reaming. The transverse acetabular ligament was used as the primary anatomic landmark to determine version and inclination. The acetabulum was sized to a 56 mm acetabular component. The prepared acetabulum was irrigated of any residual reamings and soft tissue. The permanent acetabular component was impacted into place to achieve appropriate horizontal tilt, anteversion, bone coverage and stability. We visualize that the acetabular component was fully seated. A trial liner was impacted into position. We did have to excise overhanging osteophytes anterior and posterior  in order to minimize the risk of impingement. We then turned our attention to the proximal femur. A 2-prong proximal femoral retractor was placed. We gained access to the proximal femur using a  and femoral canal finder. The canal was prepared with appropriate lateralization in which we used the initial smaller broaches to remove lateral bone to avoid varus placement of the femoral component. The canal was then broached progressively.  The broach was positioned with the appropriate anatomic femoral anteversion. We broached up to a size 7 Accolade II stem. A calcar planar was used. A trial reduction with a size #7 132 degree Accolade II stem with a -2.5 neck length and 36 mm head was performed. This was found to be the most stable to flexion greater than 90 degrees and on internal and external rotation. Limb lengths were assessed using three techniques. First, the position of the tip of the operative greater trochanter was compared to the center of the femoral head component and was felt to match this same anatomic relationship as the non-operative hip based on preoperative X-rays. Next, we measured the length of our resected femoral head neck and felt that the trial components matched this resected length as closely as possible when accounting for the length provided by the femoral neck/head. Finally, we compared leg lengths by comparing the possible of the patella with the patients heels together with the patient in the lateral decubitus position. Using these methods it was felt that the patients leg lengths were equilibrated and with appropriate stability as mentioned above. All trial components were removed. Prior to final polyethylene insertion two screws was placed to further stabilize the cup. The final liner was impacted into place which was a 10 degree elevated liner. A cementless size 7 132 degree Accolade II stem was impacted into place carefully. We were careful to observe the calcar region for any iatrogenic fractures during insertion. The permanent femoral head was impacted into place which was a -2.5mm 36mm ceramic head. José Miguel Valderrama's hip was reduced and stability was as mentioned above. Dilute Betadine solution was allowed to sit in the surgical site for a 3 minute period and copious saline was used to irrigate the surgical site. The sciatic nerve was palpated and noted to be intact.  A periarticular of ropivicaine, toradol, and morphine was injected about the surgical site with care being taken not to inject in the vicinity of neurovascular structures. Prior to wound closure one additional gram of TXA was given for a total of 2 grams. The capsule was repaired with a three #2 Fiberwires secured through drill holes placed in the posterior aspect of the trochanter. No drain was placed. The fascia was closed with a  #0 Bidirectional Stratafix barbed suture. The sub-Q layer was closed with 2-0 Vicryl suture and a  3-0 moncril stratafix suture in a running subcuticular fashion was used to close the skin. The incision site was thoroughly cleaned with alcohol and the Exofin wound closure system was applied to seal it off from external contamination after the overlying skin was thoroughly cleaned with alcohol. A thin layer of [place] lubricant was applied over the wound to keep the dressing from adhering to the overlying sterile bandage and said bandage was placed. Drapes were then broken down and patient was transferred carefully back to the OR stretcher. The sponge and needle counts were correct. The patient tolerated the procedure without difficulty and left the operating room in satisfactory condition.     Signed By: Patricia Gomez MD

## 2018-11-22 VITALS
TEMPERATURE: 98.4 F | SYSTOLIC BLOOD PRESSURE: 160 MMHG | WEIGHT: 188 LBS | HEIGHT: 74 IN | RESPIRATION RATE: 18 BRPM | HEART RATE: 85 BPM | BODY MASS INDEX: 24.13 KG/M2 | DIASTOLIC BLOOD PRESSURE: 90 MMHG | OXYGEN SATURATION: 98 %

## 2018-11-22 LAB
ANION GAP SERPL CALC-SCNC: 9 MMOL/L
BUN SERPL-MCNC: 16 MG/DL (ref 8–23)
CALCIUM SERPL-MCNC: 8.9 MG/DL (ref 8.3–10.4)
CHLORIDE SERPL-SCNC: 105 MMOL/L (ref 98–107)
CO2 SERPL-SCNC: 26 MMOL/L (ref 21–32)
CREAT SERPL-MCNC: 1.17 MG/DL (ref 0.8–1.5)
GLUCOSE SERPL-MCNC: 137 MG/DL (ref 65–100)
HGB BLD-MCNC: 10.8 G/DL (ref 13.6–17.2)
POTASSIUM SERPL-SCNC: 4.1 MMOL/L (ref 3.5–5.1)
SODIUM SERPL-SCNC: 140 MMOL/L (ref 136–145)

## 2018-11-22 PROCEDURE — 74011250637 HC RX REV CODE- 250/637: Performed by: ORTHOPAEDIC SURGERY

## 2018-11-22 PROCEDURE — 97110 THERAPEUTIC EXERCISES: CPT

## 2018-11-22 PROCEDURE — 80048 BASIC METABOLIC PNL TOTAL CA: CPT

## 2018-11-22 PROCEDURE — 97150 GROUP THERAPEUTIC PROCEDURES: CPT

## 2018-11-22 PROCEDURE — 97535 SELF CARE MNGMENT TRAINING: CPT

## 2018-11-22 PROCEDURE — 94762 N-INVAS EAR/PLS OXIMTRY CONT: CPT

## 2018-11-22 PROCEDURE — 74011250636 HC RX REV CODE- 250/636: Performed by: ORTHOPAEDIC SURGERY

## 2018-11-22 PROCEDURE — 85018 HEMOGLOBIN: CPT

## 2018-11-22 PROCEDURE — 36415 COLL VENOUS BLD VENIPUNCTURE: CPT

## 2018-11-22 PROCEDURE — 97116 GAIT TRAINING THERAPY: CPT

## 2018-11-22 RX ADMIN — PRAVASTATIN SODIUM 40 MG: 20 TABLET ORAL at 09:04

## 2018-11-22 RX ADMIN — Medication 1 AMPULE: at 09:03

## 2018-11-22 RX ADMIN — HYDROMORPHONE HYDROCHLORIDE 2 MG: 2 TABLET ORAL at 06:49

## 2018-11-22 RX ADMIN — CELECOXIB 200 MG: 200 CAPSULE ORAL at 09:06

## 2018-11-22 RX ADMIN — AMLODIPINE BESYLATE 10 MG: 10 TABLET ORAL at 09:06

## 2018-11-22 RX ADMIN — HYDROCHLOROTHIAZIDE 12.5 MG: 12.5 CAPSULE ORAL at 09:06

## 2018-11-22 RX ADMIN — KETOROLAC TROMETHAMINE 15 MG: 15 INJECTION, SOLUTION INTRAMUSCULAR; INTRAVENOUS at 02:31

## 2018-11-22 RX ADMIN — HYDROMORPHONE HYDROCHLORIDE 1 MG: 2 INJECTION, SOLUTION INTRAMUSCULAR; INTRAVENOUS; SUBCUTANEOUS at 02:31

## 2018-11-22 RX ADMIN — OXYBUTYNIN CHLORIDE 5 MG: 5 TABLET, EXTENDED RELEASE ORAL at 09:00

## 2018-11-22 RX ADMIN — ASPIRIN 325 MG: 325 TABLET, DELAYED RELEASE ORAL at 09:03

## 2018-11-22 RX ADMIN — Medication 2 G: at 02:30

## 2018-11-22 RX ADMIN — HYDROMORPHONE HYDROCHLORIDE 2 MG: 2 TABLET ORAL at 10:46

## 2018-11-22 RX ADMIN — KETOROLAC TROMETHAMINE 15 MG: 15 INJECTION, SOLUTION INTRAMUSCULAR; INTRAVENOUS at 09:15

## 2018-11-22 RX ADMIN — SENNOSIDES AND DOCUSATE SODIUM 2 TABLET: 8.6; 5 TABLET ORAL at 09:05

## 2018-11-22 RX ADMIN — ACETAMINOPHEN 1000 MG: 500 TABLET, FILM COATED ORAL at 06:49

## 2018-11-22 NOTE — PROGRESS NOTES
11/22/18 6226 Oxygen Therapy O2 Sat (%) 96 % Pulse via Oximetry 106 beats per minute O2 Device Room air O2 Flow Rate (L/min) 0 l/min Patient encouraged to do IS every hour while awake-patient agreed and demonstrated. No shortness of breath or distress noted.

## 2018-11-22 NOTE — PROGRESS NOTES
2018 Post Op day: 1 Day Post-Op Admit Date: 2018 Admit Diagnosis: Localized osteoarthrosis of left hip [M16.12] Subjective: Patient stable. No acute events. Objective:  
Visit Vitals BP (!) 156/93 (BP 1 Location: Right arm, BP Patient Position: At rest) Pulse 90 Temp 98.2 °F (36.8 °C) Resp 16 Ht 6' 2\" (1.88 m) Wt 85.3 kg (187 lb 16 oz) SpO2 96% BMI 24.14 kg/m² Temp (24hrs), Av.2 °F (36.8 °C), Min:97.8 °F (36.6 °C), Max:98.6 °F (37 °C) Lab Results Component Value Date/Time HGB 10.8 (L) 2018 05:33 AM  
 
Extremity Exam 
Dressing clean and dry Tibialis Anterior and Gastroc-Soleus functioning normally left lower extremity Sensation intact to light touch on operative limb Extremity perfused TEDS/SCDS in place No sign of DVT Assessment / Plan : 
WBAT LLE Posterior hip precautions Continue PT/OT Continue current DVT prophylaxis in house. Discharge on ASA BID Aracely Kumar Patient Active Problem List  
Diagnosis Code  Accelerated hypertension I10  
 CAD (coronary artery disease) I25.10  Hyperlipidemia other unsp dyslipidemia E78.5  Hypertension, benign, controlled I10  
 Coronary atherosclerosis of native coronary vessel I25.10  Tobacco use Z72.0  Pre-op examination Z01.818  
 OA (osteoarthritis) of hip M16.9 Signed By: Rena Gibbs MD  
 
I have reviewed the patients controlled substance prescription history, as maintained in the [place] prescription monitoring program, so that the prescription(s) for a  controlled substance can be given.

## 2018-11-22 NOTE — PROGRESS NOTES
Shift assessment completed. Pt is alert & oriented x4. Able to verbalize needs. Resting quietly with no distress noted. Dressing to left hip is clean, dry and intact. Bul[place] in place. Neurovascular and peripheral vascular checks WNL. Patient is able to dorsi/planter flex bilaterally with +2 pedal pulses. Pereira draining clear yellow urine to bag. Incentive spirometry at bedside. Patient encouraged to use hourly x 10 repetitions. Pain is being managed with Dilaudid, patient tolerating well. Bed locked and lowered. Call light within reach. Patient instructed to call for assistance. Pt verbalizes understanding. Will monitor.

## 2018-11-22 NOTE — DISCHARGE INSTRUCTIONS
[facility]    DISCHARGE INSTRUCTIONS      IF YOU HAVE ANY PROBLEMS ONCE YOU ARE AT HOME CALL THE FOLLOWING NUMBERS:   Main office number: [phone]        Medications    · The medications you are to continue on are listed on the medication reconciliation sheet. · Narcotic pain medications as well as supplemental iron can cause constipation. If this occurs try stopping the narcotic pain medication and/or the iron. · It is important that you take the medication exactly as they are prescribed. · Medications which increase your risk of blood clots are listed to stop for 5 weeks after surgery as well as medications or supplements which increase your risk of bleeding complications. · Keep your medication in the bottles provided by the pharmacist and keep a list of the medication names, dosages, and times to be taken in your wallet. · Do not take other medications without consulting your doctor.         Important Information     Do NOT smoke as this will greatly increase your risk of infection!     Resume your prehospital diet. If you have excessive nausea or vomitting call your doctor's office      Leg swelling and warmth is normal for 6 months after surgery. If you experience swelling in your leg elevate you leg while laying down with your toes above your heart. If you have sudden onset severe swelling with leg pain call our office. Use Ryan Hose stockings until we see you in the office for your follow up appointment.     The stitches deep inside take approximately 6 months to dissolve. There will be sharp shooting, stinging and burning pain. This is normal and will resolve between 3-6 months after surgery.      Difficulty sleeping is normal following total Knee and Hip replacement. You may try melatonin, an over-the-counter sleep aid or benadryl to help with sleep. Most patients will resume sleeping through the night 8 weeks after surgery.      Home Physical Therapy is arranged.  Home [facility] will contact you within 48 hrs of discharge that you have chosen. If you have not received a call within this time frame please contact that provider you chose. You should be given this information before you leave the hospital.      You are at a risk for falls. Use the rolling walker when walking.      Patients who have had a joint replacement should not drive if they are still taking narcotic pain mediation during the daytime hours. Most patients wean themselves off of pain medication within 2-5 weeks after surgery.         When to Call the office     - If you have a temperature greater then 101  - Uncontrolled vomiting   - Loose control of your bladder or bowel function  - Are unable to bear any wieght   - Need a pain medication refill        Hip Replacement Surgery (Posterior): What to Expect at Home  Your Recovery  Hip replacement surgery replaces the worn parts of your hip joint. When you leave the hospital, you will probably be walking with crutches or a walker. You may be able to climb a few stairs and get in and out of bed and chairs. But you will need someone to help you at home for the next few weeks or until you have more energy and can move around better. If there is no one to help you at home, you may go to a rehabilitation center or long-term care center. You will go home with a bandage and stitches, staples, tissue glue, or tape strips. You can remove the bandage when your doctor tells you to. If you have stitches or staples, your doctor will remove them 10 days to 3 weeks after your surgery. Glue or tape strips will fall off on their own over time. You may still have some mild pain, and the area may be swollen for 3 to 4 months after surgery. Your doctor will give you medicine for the pain. You will continue the rehabilitation program (rehab) you started in the hospital. The better you do with your rehab exercises, the sooner you will get your strength and movement back.  Most people are able to return to work 4 weeks to 4 months after surgery. This care sheet gives you a general idea about how long it will take for you to recover. But each person recovers at a different pace. Follow the steps below to get better as quickly as possible. How can you care for yourself at home? Activity    · Your doctor may not want your affected leg to cross the center of your body toward the other leg. If so, your therapist may suggest these ideas:  ? Do not cross your legs. ? Be very careful as you get in or out of bed or a car, so your leg does not cross that imaginary line in the middle of your body.     · Rest when you feel tired. You may take a nap, but do not stay in bed all day.     · Work with your physical therapist to learn the best way to exercise. You may be able to take frequent, short walks using crutches or a walker. You will probably have to use crutches or a walker for at least 4 to 6 weeks.     · Your doctor may advise you to stay away from activities that put stress on the joint. This includes sports such as tennis, football, and jogging.     · Try not to sit for too long at one time. You will feel less stiff if you take a short walk about every hour. When you sit, use chairs with arms, and do not sit in low chairs.     · Do not bend over more than 90 degrees (like the angle in a letter \"L\").     · Sleep on your back with your legs slightly apart or on your side with a pillow between your knees for about 6 weeks or as your doctor tells you. Do not sleep on your stomach or affected leg.     · Ask your doctor when you can drive again.     · Most people are able to return to work 4 weeks to 4 months after surgery.     · Ask your doctor when it is okay for you to have sex. Diet    · By the time you leave the hospital, you will probably be eating your normal diet. If your stomach is upset, try bland, low-fat foods like plain rice, broiled chicken, toast, and yogurt.  Your doctor may recommend that you take iron and vitamin supplements.     · Drink plenty of fluids (unless your doctor tells you not to).   · Eat healthy foods, and watch your portion sizes. Try to stay at your ideal weight. Too much weight puts more stress on your new hip joint.     · You may notice that your bowel movements are not regular right after your surgery. This is common. Try to avoid constipation and straining with bowel movements. You may want to take a fiber supplement every day. If you have not had a bowel movement after a couple of days, ask your doctor about taking a mild laxative. Medicines    · Your doctor will tell you if and when you can restart your medicines. He or she will also give you instructions about taking any new medicines.     · If you take blood thinners, such as warfarin (Coumadin), clopidogrel (Plavix), or aspirin, be sure to talk to your doctor. He or she will tell you if and when to start taking those medicines again. Make sure that you understand exactly what your doctor wants you to do.     · Your doctor may give you a blood-thinning medicine to prevent blood clots. If you take a blood thinner, be sure you get instructions about how to take your medicine safely. Blood thinners can cause serious bleeding problems. This medicine could be in pill form or as a shot (injection). If a shot is necessary, your doctor will tell you how to do this.     · Be safe with medicines. Take pain medicines exactly as directed. ? If the doctor gave you a prescription medicine for pain, take it as prescribed. ? If you are not taking a prescription pain medicine, ask your doctor if you can take an over-the-counter medicine.     · If you think your pain medicine is making you sick to your stomach:  ? Take your medicine after meals (unless your doctor has told you not to). ? Ask your doctor for a different pain medicine.     · If your doctor prescribed antibiotics, take them as directed.  Do not stop taking them just because you feel better. You need to take the full course of antibiotics. Incision care    · If your doctor told you how to care for your cut (incision), follow your doctor's instructions. You will have a dressing over the cut. A dressing helps the incision heal and protects it. Your doctor will tell you how to take care of this.     · If you did not get instructions, follow this general advice:  ? If you have strips of tape on the cut the doctor made, leave the tape on for a week or until it falls off.  ? If you have stitches or staples, your doctor will tell you when to come back to have them removed. ? If you have skin adhesive on the cut, leave it on until it falls off. Skin adhesive is also called glue or liquid stitches. ? Change the bandage every day. ? Wash the area daily with warm water, and pat it dry. Don't use hydrogen peroxide or alcohol. They can slow healing. ? You may cover the area with a gauze bandage if it oozes fluid or rubs against clothing. ? You may shower 24 to 48 hours after surgery. Pat the incision dry. Don't swim or take a bath for the first 2 weeks, or until your doctor tells you it is okay. Exercise    · Your rehab program will include a number of exercises to do. Always do them as your therapist tells you. Ice and elevation    · For pain, put ice or a cold pack on the area for 10 to 20 minutes at a time. Put a thin cloth between the ice and your skin.     · Your ankle may swell for about 3 months. Prop up your ankle when you ice it or anytime you sit or lie down. Try to keep it above the level of your heart. This will help reduce swelling. Other instructions   Continue to wear your support stockings as your doctor says. These help to prevent blood clots. The length of time that you will have to wear them depends on your activity level and the amount of swelling you have. Most people wear these stockings for 4 to 6 weeks after surgery.   Preventing falls is also very important.  To prevent falls:    · Arrange furniture so that you will not trip on it.     · Get rid of throw rugs, and move electrical cords out of the way.     · Walk only in areas with plenty of light.     · Put grab bars in showers and bathtubs.     · Avoid icy or snowy sidewalks.     · Wear shoes with sturdy, flat soles. Follow-up care is a key part of your treatment and safety. Be sure to make and go to all appointments, and call your doctor if you are having problems. It's also a good idea to know your test results and keep a list of the medicines you take. When should you call for help? Call 911 anytime you think you may need emergency care. For example, call if:    · You passed out (lost consciousness).     · You have severe trouble breathing.     · You have sudden chest pain and shortness of breath, or you cough up blood.    Call your doctor now or seek immediate medical care if:    · You have signs that your hip may be dislocated, including:  ? Severe pain and not being able to stand. ? A crooked leg that looks like your hip is out of position. ? Not being able to bend or straighten your leg.     · Your leg or foot is cool or pale or changes color.     · You cannot feel or move your leg.     · You have signs of a blood clot, such as:  ? Pain in your calf, back of the knee, thigh, or groin. ? Redness and swelling in your leg or groin.     · Your incision comes open and begins to bleed, or the bleeding increases.     · You feel like your heart is racing or beating irregularly.     · You have signs of infection, such as:  ? Increased pain, swelling, warmth, or redness. ? Red streaks leading from the incision. ? Pus draining from the incision. ? A fever.    Watch closely for changes in your health, and be sure to contact your doctor if:    · You do not have a bowel movement after taking a laxative.     · You do not get better as expected. Where can you learn more?   Go to http://chris-kvng.info/. Enter T133 in the search box to learn more about \"Hip Replacement Surgery (Posterior): What to Expect at Home. \"  Current as of: November 29, 2017  Content Version: 11.8  © 2616-2982 [employer]. Care instructions adapted under license by [employer] (which disclaims liability or warranty for this information). If you have questions about a medical condition or this instruction, always ask your healthcare professional. [place] [address] any warranty or liability for your use of this information.

## 2018-11-22 NOTE — PROGRESS NOTES
Shift assessment complete. Pt resting in recliner watching TV. Had shower with OT, stated he felt a lot better. Call light within reach. Bed low to ground and wheels locked. Pt able to dosi/plantar flex bilaterally with +2 pedal pulses. Dressing is dry and intact. Ambulates to bathroom with walker. Voiding yellow clear urine. IS at bedside. No needs at this time.

## 2018-11-22 NOTE — PROGRESS NOTES
Problem: Mobility Impaired (Adult and Pediatric) Goal: *Acute Goals and Plan of Care (Insert Text) GOALS (1-4 days): 
(1.)Mr. Dayanara Ocasio will move from supine to sit and sit to supine  in bed with SUPERVISION. (2.)Mr. Dayanara Ocasio will transfer from bed to chair and chair to bed with SUPERVISION using the least restrictive device. (3.)Mr. Dayanara Ocasio will ambulate with SUPERVISION for 230 feet with the least restrictive device. (4.)Mr. Dayanara Ocasio will ambulate up/down 3 steps with bilateral  railing with STAND BY ASSIST with no device. (5.)Mr. Dayanara Ocasio will state/observe DARIA precautions with 1 verbal cues. ________________________________________________________________________________________________ PHYSICAL THERAPY Joint camp Daria: Daily Note, Treatment Day: 1st, AM 11/22/2018INPATIENT: Hospital Day: 2 Payor: [facility] [place] MEDICARE / Plan: [place] / Product Type: Managed Care Medicare /  
  
NAME/AGE/GENDER: Itz Daniel is a 80 y.o. male PRIMARY DIAGNOSIS:  Localized osteoarthrosis of left hip [M16.12] Procedure(s) and Anesthesia Type: 
   * TOTAL HIP ARTHROPLASTY/ LEFT - Spinal (Left) ICD-10: Treatment Diagnosis:  
 · Pain in left hip (M25.552) · Stiffness of Left Hip, Not elsewhere classified (M25.652) · Difficulty in walking, Not elsewhere classified (R26.2) ASSESSMENT:  
Mr. Dayanara Ocasio presents s/p L DARIA up in chair on contact and agreeable to therapy. Worked on gait training in the gilliam with verbal cues making great progress with gait distance. Nice reciprocal pattern and steady gait. In room worked on [employer] exercises with verbal cues progressing  With repetitions. Pt stayed up in chair with ice by hip and needs in reach. Hope to further progress in the afternoon. This section established at most recent assessment PROBLEM LIST (Impairments causing functional limitations): 1. Decreased Strength 2. Decreased ADL/Functional Activities 3. Decreased Transfer Abilities 4. Decreased Ambulation Ability/Technique 5. Decreased Balance 6. Increased Pain 7. Edema/Girth 8. Decreased Knowledge of Precautions 9. Decreased [place] with Home Exercise Program 
 INTERVENTIONS PLANNED: (Benefits and precautions of physical therapy have been discussed with the patient.) 1. Bed Mobility 2. Gait Training 3. Home Exercise Program (HEP) 4. Therapeutic Exercise/Strengthening 5. Transfer Training 6. Range of Motion: active/assisted/passive 7. Therapeutic Activities 8. Group Therapy TREATMENT PLAN: Frequency/Duration: Follow patient BID for duration of hospital stay to address above goals. Rehabilitation Potential For Stated Goals: Good RECOMMENDED REHABILITATION/EQUIPMENT: (at time of discharge pending progress): Continue Skilled Therapy and Home Health: Physical Therapy. HISTORY:  
History of Present Injury/Illness (Reason for Referral): L DARIA Past Medical History/Comorbidities:  
Mr. Paul Reid  has a past medical history of Arthritis, Asthma, CAD (coronary artery disease), Chronic obstructive pulmonary disease ([address] Coronary atherosclerosis of native coronary vessel, Current smoker on some days, Dental disorder, Dyspepsia and other specified disorders of function of stomach, GERD (gastroesophageal reflux disease), Hyperlipidemia other unsp dyslipidemia, Hypertension, Hypertension, benign, controlled, MI (myocardial infarction) ([address] Multiple renal cysts, Other ill-defined conditions(799.89), Prostate cancer ([address] and Stroke ([address] Mr. Paul Reid  has a past surgical history that includes hx colectomy; hx ptca; hx colonoscopy (2018); pr cardiac surg procedure unlist (2013); hx prostatectomy; and hx hernia repair. Social History/Living Environment:  
Home Environment: Private residence One/Two Story Residence: One story(per patient) Living Alone: Yes Support Systems: [employer] / [place] community, Family member(s) Patient Expects to be Discharged to[de-identified] Private residence Current DME Used/Available at Home: Jacques Mo Prior Level of Function/Work/Activity: 
Per patient, using a walker prior to surgery. Need to clarify home set-up/stairs prior to d/c. Number of Personal Factors/Comorbidities that affect the Plan of Care: 1-2: MODERATE COMPLEXITY EXAMINATION:  
Most Recent Physical Functioning:  
  
  
 
         
 
  
 
  
 
Transfers Sit to Stand: Stand-by assistance Stand to Sit: Stand-by assistance Balance Sitting: Intact Standing: Intact; With support Gait Training: Yes 
 
Weight Bearing Status Left Side Weight Bearing: As tolerated Distance (ft): 200 Feet (ft) Ambulation - Level of Assistance: Stand-by assistance Assistive Device: Walker, rolling Speed/Daxa: Delayed Step Length: Right shortened Stance: Left decreased Gait Abnormalities: Antalgic Interventions: Safety awareness training;Verbal cues Braces/Orthotics: none Left Hip Cold Type: Cold/ice pack Body Structures Involved: 1. Joints 2. Muscles Body Functions Affected: 1. Movement Related Activities and Participation Affected: 1. Mobility 2. Self Care Number of elements that affect the Plan of Care: 4+: HIGH COMPLEXITY CLINICAL PRESENTATION:  
Presentation: Stable and uncomplicated: LOW COMPLEXITY CLINICAL DECISION MAKING:  
[employer] AM-PAC 6 Clicks Basic Mobility Inpatient Short Form How much difficulty does the patient currently have. .. Unable A Lot A Little None 1. Turning over in bed (including adjusting bedclothes, sheets and blankets)? [] 1   [] 2   [x] 3   [] 4  
2. Sitting down on and standing up from a chair with arms ( e.g., wheelchair, bedside commode, etc.)   [] 1   [] 2   [x] 3   [] 4  
3. Moving from lying on back to sitting on the side of the bed? [] 1   [] 2   [x] 3   [] 4 How much help from another person does the patient currently need. .. Total A Lot A Little None 4. Moving to and from a bed to a chair (including a wheelchair)? [] 1   [] 2   [x] 3   [] 4  
5. Need to walk in hospital room? [] 1   [] 2   [x] 3   [] 4  
6. Climbing 3-5 steps with a railing? [] 1   [] 2   [x] 3   [] 4  
© 2007, Trustees of [facility] [employer], under license to [employer]. All rights reserved Score:  Initial: 18 Most Recent: X (Date: -- ) Interpretation of Tool:  Represents activities that are increasingly more difficult (i.e. Bed mobility, Transfers, Gait). Score 24 23 22-20 19-15 14-10 9-7 6 Modifier CH CI CJ CK CL CM CN   
 
? Mobility - Walking and Moving Around:  
  - CURRENT STATUS: CK - 40%-59% impaired, limited or restricted  - GOAL STATUS: CJ - 20%-39% impaired, limited or restricted  - D/C STATUS:  ---------------To be determined--------------- Payor: [facility] [place] MEDICARE / [place] [employer] / Product Type: Managed Care Medicare /   
 
Medical Necessity:    
· Patient is expected to demonstrate progress in strength, range of motion, balance and coordination to increase independence with mobility and ADLs. Reason for Services/Other Comments: 
· Patient continues to require skilled intervention due to decreased L LE strength and ROM and decreased independence with mobility s/p DARIA. Use of outcome tool(s) and clinical judgement create a POC that gives a: Clear prediction of patient's progress: LOW COMPLEXITY  
  
 
 
 
TREATMENT:  
(In addition to Assessment/Re-Assessment sessions the following treatments were rendered) Pre-treatment Symptoms/Complaints:  Patient agreeable to working with therapy. Pain: Initial:  
   Post Session:  5/10 Therapeutic Exercise: (15 Minutes):  Exercises per grid below to improve mobility and strength. Required minimal verbal, manual and tactile cues to promote proper body alignment. Progressed repetitions and complexity of movement as indicated. Gait Training (15 Minutes):  Gait training to improve and/or restore physical functioning as related to mobility and strength. Ambulated 200 Feet (ft) with Stand-by assistance using a Walker, rolling and minimal Safety awareness training;Verbal cues related to their stance phase and stride length to promote proper body alignment and promote proper body posture. Instruction in performance of walker use and gait sequencing to correct stance phase and stride length. Date: 
11/21/18 Date: 
11/22/18 Date: 
  
ACTIVITY/EXERCISE AM PM AM PM AM PM  
GROUP THERAPY  []  []  []  []  []  [] Ankle Pumps  10 15 Quad Sets  10 15 Gluteal Sets  10 15 Hip ABd/ADduction  10 AA 15 Straight Leg Raises Knee Slides  10 15 Short Arc Quads  10 [address]   15 Chair Slides B = bilateral; AA = active assistive; A = active; P = passive Treatment/Session Assessment:   
 Response to Treatment:  Patient tolerated well, great progress Education: 
[x] Home Exercises [x] Fall Precautions [] Hip Precautions [] D/C Instruction Review [] Hip Prosthesis Review [x] Walker Management/Safety [] Adaptive Equipment as Needed Interdisciplinary Collaboration:  
o Registered Nurse After treatment position/precautions:  
o Up in chair 
o Bed/Chair-wheels locked 
o Call light within reach Compliance with Program/Exercises: Compliant all of the time. Recommendations/Intent for next treatment session:  Treatment next visit will focus on increasing Mr. Jasons independence with bed mobility, transfers, gait training, strength/ROM exercises, modalities for pain, and patient education. Total Treatment Duration: PT Patient Time In/Time Out Time In: 0820 Time Out: 2519 Kenyetta Earl, PT

## 2018-11-22 NOTE — PROGRESS NOTES
Problem: Mobility Impaired (Adult and Pediatric) Goal: *Acute Goals and Plan of Care (Insert Text) GOALS (1-4 days): 
(1.)Mr. Betsy Solano will move from supine to sit and sit to supine  in bed with SUPERVISION. (2.)Mr. Betsy Solano will transfer from bed to chair and chair to bed with SUPERVISION using the least restrictive device. (3.)Mr. Betsy Solano will ambulate with SUPERVISION for 230 feet with the least restrictive device. (4.)Mr. Betsy Solano will ambulate up/down 3 steps with bilateral  railing with STAND BY ASSIST with no device. (5.)Mr. Betsy Solano will state/observe DARIA precautions with 1 verbal cues. ________________________________________________________________________________________________ PHYSICAL THERAPY Joint camp Daria: Daily Note, Treatment Day: 1st, PM 11/22/2018INPATIENT: Hospital Day: 2 Payor: [facility] [place] [facility] / Plan[place] / Product Type: Managed Care Medicare /  
  
NAME/AGE/GENDER: Juwan John is a 80 y.o. male PRIMARY DIAGNOSIS:  Localized osteoarthrosis of left hip [M16.12] Procedure(s) and Anesthesia Type: 
   * TOTAL HIP ARTHROPLASTY/ LEFT - Spinal (Left) ICD-10: Treatment Diagnosis:  
 · Pain in left hip (M25.552) · Stiffness of Left Hip, Not elsewhere classified (M25.652) · Difficulty in walking, Not elsewhere classified (R26.2) ASSESSMENT:  
Mr. Betsy Solano presents up in chair on contact and ready for therapy. Worked on gait training in the gilliam with verbal cues progressing nicely with gait distance. In gym practiced stairs with verbal cues. Then worked on [employer] exercises with verbal cues. Reviewed HEP and frequency. Pt walked back to his room. He plans to discharge home after therapy. HHPT to follow up. Mr. Mcdonough Reusing functional progress occurred more rapidly than expected as evidenced by goal attainment. He will be discharged to his home environment with a PT HEP, assistive device(s), and Home Health PT services. This section established at most recent assessment PROBLEM LIST (Impairments causing functional limitations): 1. Decreased Strength 2. Decreased ADL/Functional Activities 3. Decreased Transfer Abilities 4. Decreased Ambulation Ability/Technique 5. Decreased Balance 6. Increased Pain 7. Edema/Girth 8. Decreased Knowledge of Precautions 9. Decreased [place] with Home Exercise Program 
 INTERVENTIONS PLANNED: (Benefits and precautions of physical therapy have been discussed with the patient.) 1. Bed Mobility 2. Gait Training 3. Home Exercise Program (HEP) 4. Therapeutic Exercise/Strengthening 5. Transfer Training 6. Range of Motion: active/assisted/passive 7. Therapeutic Activities 8. Group Therapy TREATMENT PLAN: Frequency/Duration: Follow patient BID for duration of hospital stay to address above goals. Rehabilitation Potential For Stated Goals: Good RECOMMENDED REHABILITATION/EQUIPMENT: (at time of discharge pending progress): Continue Skilled Therapy and Home Health: Physical Therapy. HISTORY:  
History of Present Injury/Illness (Reason for Referral): L DARIA Past Medical History/Comorbidities:  
Mr. Kurtis Stein  has a past medical history of Arthritis, Asthma, CAD (coronary artery disease), Chronic obstructive pulmonary disease ([address] Coronary atherosclerosis of native coronary vessel, Current smoker on some days, Dental disorder, Dyspepsia and other specified disorders of function of stomach, GERD (gastroesophageal reflux disease), Hyperlipidemia other unsp dyslipidemia, Hypertension, Hypertension, benign, controlled, MI (myocardial infarction) ([address] Multiple renal cysts, Other ill-defined conditions(799.89), Prostate cancer ([address] and Stroke ([address] Mr. Kurtis Stein  has a past surgical history that includes hx colectomy; hx ptca; hx colonoscopy (2018); pr cardiac surg procedure unlist (2013); hx prostatectomy; and hx hernia repair. Social History/Living Environment: Home Environment: Private residence One/Two Story Residence: One story Living Alone: Yes Support Systems: Family member(s) Patient Expects to be Discharged to[de-identified] Private residence Current DME Used/Available at Home: Andrei Pleitez Prior Level of Function/Work/Activity: 
Per patient, using a walker prior to surgery. Need to clarify home set-up/stairs prior to d/c. Number of Personal Factors/Comorbidities that affect the Plan of Care: 1-2: MODERATE COMPLEXITY EXAMINATION:  
Most Recent Physical Functioning:  
  
  
 
         
 
  
 
Bed Mobility Supine to Sit: Supervision Transfers Sit to Stand: Supervision Stand to Sit: Supervision Bed to Chair: Supervision Balance Sitting: Intact Standing: Intact; With support Gait Training: Yes 
 
Weight Bearing Status Left Side Weight Bearing: As tolerated Distance (ft): 234 Feet (ft)(and another 234 feet) Ambulation - Level of Assistance: Stand-by assistance Assistive Device: Walker, rolling Speed/Daxa: Delayed Step Length: Right shortened Stance: Left decreased Gait Abnormalities: Antalgic Number of Stairs Trained: 3 Stairs - Level of Assistance: Stand-by assistance;Contact guard assistance Rail Use: Both Interventions: Safety awareness training;Verbal cues Braces/Orthotics: none Left Hip Cold Type: Cold/ice pack Body Structures Involved: 1. Joints 2. Muscles Body Functions Affected: 1. Movement Related Activities and Participation Affected: 1. Mobility 2. Self Care Number of elements that affect the Plan of Care: 4+: HIGH COMPLEXITY CLINICAL PRESENTATION:  
Presentation: Stable and uncomplicated: LOW COMPLEXITY CLINICAL DECISION MAKING:  
[employer] 6 Clicks Basic Mobility Inpatient Short Form How much difficulty does the patient currently have. .. Unable A Lot A Little None 1. Turning over in bed (including adjusting bedclothes, sheets and blankets)?    [] 1   [] 2   [x] 3   [] 4  
 2.  Sitting down on and standing up from a chair with arms ( e.g., wheelchair, bedside commode, etc.)   [] 1   [] 2   [x] 3   [] 4  
3. Moving from lying on back to sitting on the side of the bed? [] 1   [] 2   [x] 3   [] 4 How much help from another person does the patient currently need. .. Total A Lot A Little None 4. Moving to and from a bed to a chair (including a wheelchair)? [] 1   [] 2   [x] 3   [] 4  
5. Need to walk in hospital room? [] 1   [] 2   [x] 3   [] 4  
6. Climbing 3-5 steps with a railing? [] 1   [] 2   [x] 3   [] 4  
© 2007, Trustees of [address] [place] [address] [address] [address], under license to [employer]. All rights reserved Score:  Initial: 18 Most Recent: X (Date: -- ) Interpretation of Tool:  Represents activities that are increasingly more difficult (i.e. Bed mobility, Transfers, Gait). Score 24 23 22-20 19-15 14-10 9-7 6 Modifier CH CI CJ CK CL CM CN   
 
? Mobility - Walking and Moving Around:  
  - CURRENT STATUS: CK - 40%-59% impaired, limited or restricted  - GOAL STATUS: CJ - 20%-39% impaired, limited or restricted  - D/C STATUS:  ---------------To be determined--------------- Payor: [facility] [place] / [place] / Product Type: Managed Care Medicare /   
 
Medical Necessity:    
· Patient is expected to demonstrate progress in strength, range of motion, balance and coordination to increase independence with mobility and ADLs. Reason for Services/Other Comments: 
· Patient continues to require skilled intervention due to decreased L LE strength and ROM and decreased independence with mobility s/p DARIA. Use of outcome tool(s) and clinical judgement create a POC that gives a: Clear prediction of patient's progress: LOW COMPLEXITY  
  
 
 
 
TREATMENT:  
(In addition to Assessment/Re-Assessment sessions the following treatments were rendered) Pre-treatment Symptoms/Complaints:  Patient agreeable to working with therapy. Pain Initial: no reports of pain Post Session:  No reports of pain Therapeutic Exercise: (45 Minutes(group)):  Exercises per grid below to improve mobility and strength. Required minimal verbal, manual and tactile cues to promote proper body alignment. Progressed repetitions and complexity of movement as indicated. Gait Training (15 Minutes):  Gait training to improve and/or restore physical functioning as related to mobility and strength. Ambulated 234 Feet (ft)(and another 234 feet) with Stand-by assistance using a Walker, rolling and minimal Safety awareness training;Verbal cues related to their stance phase and stride length to promote proper body alignment and promote proper body posture. Instruction in performance of walker use and gait sequencing to correct stance phase and stride length. Date: 
11/21/18 Date: 
11/22/18 Date: 
  
ACTIVITY/EXERCISE AM PM AM PM AM PM  
GROUP THERAPY  []  []  []  [x]  []  [] Ankle Pumps  10 15 20 Quad Sets  10 15 20 Gluteal Sets  10 15 20 Hip ABd/ADduction  10 AA 15 20 Straight Leg Raises Knee Slides  10 15 20 Short Arc Quads  10  20 [address]   15 20 Chair Slides B = bilateral; AA = active assistive; A = active; P = passive Treatment/Session Assessment:   
 Response to Treatment:  Patient tolerated well, continues with great progress Education: 
[x] Home Exercises [x] Fall Precautions [x] Hip Precautions [x] D/C Instruction Review [x] Hip Prosthesis Review [x] Walker Management/Safety [] Adaptive Equipment as Needed Interdisciplinary Collaboration:  
o Registered Nurse 
o Rehabilitation Attendant After treatment position/precautions:  
o Up in chair 
o Bed/Chair-wheels locked 
o Call light within reach Compliance with Program/Exercises: Compliant all of the time.   
 Recommendations/Intent for next treatment session:  Treatment next visit will focus on increasing Mr. Valderrama's independence with bed mobility, transfers, gait training, strength/ROM exercises, modalities for pain, and patient education. Total Treatment Duration: PT Patient Time In/Time Out Time In: 1300 Time Out: 1400 Magali Villasenor PT

## 2018-11-22 NOTE — PROGRESS NOTES
Problem: Self Care Deficits Care Plan (Adult) Goal: *Acute Goals and Plan of Care (Insert Text) GOALS:  
DISCHARGE GOALS (in preparation for going home/rehab):  3 days 1. Mr. Meghana Arias will perform one lower body dressing activity with minimal assistance with adaptive equipment to demonstrate improved functional mobility and safety. GOAL MET 11/22/2018 2. Mr. Meghana Arias will perform one lower body bathing activity with minimal  assistance with adaptive equipment to demonstrate improved functional mobility and safety. GOAL MET 11/22/2018 3. Mr. Meghana Arias will perform toileting/toilet transfer with contact guard assistance with adaptive equipment to demonstrate improved functional mobility and safety. GOAL MET 11/22/2018 4. Mr. Meghana Arias will perform shower transfer with contact guard assistance with adaptive equipment to demonstrate improved functional mobility and safety. GOAL MET 11/22/2018 5. Mr. Meghana Arias will state DARIA precautions with two verbal cues to demonstrate improved functional mobility and safety. GOAL MET 11/22/2018 JOINT CAMP OCCUPATIONAL THERAPY DARIA: Daily Note, Treatment Day: 1st and AM 11/22/2018INPATIENT: Hospital Day: 2 Payor: [facility] [place] MEDICARE / Plan: [facility] [place] / Product Type: Managed Care Medicare /  
  
NAME/AGE/GENDER: Tash Archuleta is a 80 y.o. male PRIMARY DIAGNOSIS:  Localized osteoarthrosis of left hip [M16.12] Procedure(s) and Anesthesia Type: 
   * TOTAL HIP ARTHROPLASTY/ LEFT - Spinal (Left) ICD-10: Treatment Diagnosis:  
 · Pain in left hip (M25.552) · Stiffness of Left Hip, Not elsewhere classified (M25.652) ASSESSMENT:  
 Mr. Meghana Arias is s/p L DARIA and presents with decreased weight bearing on L LE and decreased independence with functional mobility and activities of daily living. Patient completed shower and dressing as charter below in ADL grid and is ambulating with rolling walker with supervision.   Patient has met 5/5 goals and plans to return home with good family support. Family able to provide patient with appropriate level of assistance at this time. OT reviewed hip precautions throughout session and issued long handled sponge for home use. Patient instructed to call for assistance when needing to get up from recliner and all needs in reach. Patient verbalized understanding of call light. This section established at most recent assessment PROBLEM LIST (Impairments causing functional limitations): 1. Decreased Strength 2. Decreased ADL/Functional Activities 3. Decreased Transfer Abilities 4. Increased Pain 5. Increased Fatigue 6. Decreased Flexibility/Joint Mobility 7. Decreased Knowledge of Precautions INTERVENTIONS PLANNED: (Benefits and precautions of occupational therapy have been discussed with the patient.) 1. Activities of daily living training 2. Adaptive equipment training 3. Balance training 4. Clothing management 5. Donning&doffing training 6. Theraputic activity TREATMENT PLAN: Frequency/Duration: Follow patient 1-2tx to address above goals. Rehabilitation Potential For Stated Goals: Excellent RECOMMENDED REHABILITATION/EQUIPMENT: (at time of discharge pending progress): Continue Skilled Therapy. OCCUPATIONAL PROFILE AND HISTORY:  
History of Present Injury/Illness (Reason for Referral): Pt presents this date s/p (left) DARIA.    
Past Medical History/Comorbidities:  
Mr. Tomas Bello  has a past medical history of Arthritis, Asthma, CAD (coronary artery disease), Chronic obstructive pulmonary disease ([address] Coronary atherosclerosis of native coronary vessel, Current smoker on some days, Dental disorder, Dyspepsia and other specified disorders of function of stomach, GERD (gastroesophageal reflux disease), Hyperlipidemia other unsp dyslipidemia, Hypertension, Hypertension, benign, controlled, MI (myocardial infarction) ([facility] [address] Multiple renal cysts, Other ill-defined conditions(799.89), Prostate cancer ([facility] [address] and Stroke ([facility] [address] Mr. Kathia Lundy  has a past surgical history that includes hx colectomy; hx ptca; hx colonoscopy (2018); pr cardiac surg procedure unlist (2013); hx prostatectomy; and hx hernia repair. Social History/Living Environment:  
Home Environment: Private residence One/Two Story Residence: One story(per patient) Living Alone: Yes Support Systems: [employer] / [place] community, Family member(s) Patient Expects to be Discharged to[de-identified] Private residence Current DME Used/Available at Home: Alexandr Mary Prior Level of Function/Work/Activity: 
Used a walker prior, questionable historian. Number of Personal Factors/Comorbidities that affect the Plan of Care: Brief history (0):  LOW COMPLEXITY ASSESSMENT OF OCCUPATIONAL PERFORMANCE[de-identified]  
Most Recent Physical Functioning:  
Balance Sitting: Intact Standing: With support Mental Status Neurologic State: Alert Orientation Level: Oriented X4 Cognition: Appropriate decision making; Appropriate for age attention/concentration Perception: Appears intact Perseveration: No perseveration noted Safety/Judgement: Fall prevention Basic ADLs (From Assessment) Complex ADLs (From Assessment) Basic ADL Feeding: Independent Oral Facial Hygiene/Grooming: Setup Bathing: Minimum assistance Type of Bath: Full, Shower, Chlorhexidine (CHG) Upper Body Dressing: Contact guard assistance Toileting: Minimum assistance Grooming/Bathing/Dressing Activities of Daily Living Cognitive Retraining Safety/Judgement: Fall prevention Upper Body Bathing Bathing Assistance: Independent Position Performed: Standing Lower Body Bathing Bathing Assistance: Independent Perineal  : Independent Lower Body : Independent Adaptive equipment: long sponge Upper Body Dressing Assistance Dressing Assistance: Independent Pullover Shirt: Independent Functional Transfers Shower Transfer: Supervision Lower Body Dressing Assistance Dressing Assistance: Minimum assistance Protective [place]: Minimum assistance Pants With Elastic Waist: Minimum assistance Socks: Minimum assistance Slip on Shoes Without Back: Minimum assistance Adaptive equipment: Shoe horn, sock aid, reacher Bed/Mat Mobility Supine to Sit: Supervision Sit to Stand: Supervision Bed to Chair: Supervision Physical Skills Involved: 1. Range of Motion 2. Balance 3. Strength Cognitive Skills Affected (resulting in the inability to perform in a timely and safe manner): 
1. [facility] Psychosocial Skills Affected: 
1. WFL Number of elements that affect the Plan of Care: 1-3:  LOW COMPLEXITY CLINICAL DECISION MAKIN[address] [place] [address] AM-PAC 6 Clicks Daily Activity Inpatient Short Form How much help from another person does the patient currently need. .. Total A Lot A Little None 1. Putting on and taking off regular lower body clothing? [] 1   [x] 2   [] 3   [] 4  
2. Bathing (including washing, rinsing, drying)? [] 1   [x] 2   [] 3   [] 4  
3. Toileting, which includes using toilet, bedpan or urinal?   [] 1   [x] 2   [] 3   [] 4  
4. Putting on and taking off regular upper body clothing? [] 1   [] 2   [] 3   [x] 4  
5. Taking care of personal grooming such as brushing teeth? [] 1   [] 2   [] 3   [x] 4  
6. Eating meals? [] 1   [] 2   [] 3   [x] 4  
© , Trustees of [address] [place] [id number], under license to [employer]. All rights reserved Score:  Initial: 18 Most Recent: X (Date: -- ) Interpretation of Tool:  Represents activities that are increasingly more difficult (i.e. Bed mobility, Transfers, Gait). Score 24 23 22-20 19-15 14-10 9-7 6 Modifier CH CI CJ CK CL CM CN   
 
? Self Care:  
  - CURRENT STATUS: CK - 40%-59% impaired, limited or restricted  - GOAL STATUS: CJ - 20%-39% impaired, limited or restricted  - D/C STATUS:  ---------------To be determined--------------- Payor: [facility] MEDICARE / Plan: [place] [facility] MEDIC[facility] HMO/PPO / Product Type: Managed Care Medicare /   
  
Use of outcome tool(s) and clinical judgement create a POC that gives a: MODERATE COMPLEXITY  
  
 
 
 
TREATMENT:  
(In addition to Assessment/Re-Assessment sessions the following treatments were rendered) Pre-treatment Symptoms/Complaints:   
Pain: Initial:  
Pain Intensity 1: 0  Post Session:  6 Self Care: (25): Procedure(s) (per grid) utilized to improve and/or restore self-care/home management as related to dressing and bathing. Required min verbal and manual cueing to facilitate activities of daily living skills. Treatment/Session Assessment:   
 Response to Treatment:  Good, sitting up in recliner. Education: 
[] Home Exercises [x] Fall Precautions [x] Hip Precautions [] Going Home Video 
[] Knee/Hip Prosthesis Review [x] Walker Management/Safety [x] Adaptive Equipment as Needed Interdisciplinary Collaboration:  
o Physical Therapist 
o Certified Occupational Therapy Assistant 
o Registered Nurse After treatment position/precautions:  
o Up in chair 
o Bed/Chair-wheels locked 
o Bed in low position 
o Call light within reach 
o RN notified Compliance with Program/Exercises: Compliant all of the time. Pt doing well all goals met and will do well at home with support from family. Patient will be discharged home with home health PT. No further Occupation Therapy warranted, will discharge Occupational Therapy services. Total Treatment Duration: OT Patient Time In/Time Out Time In: 0800 Time Out: 0825 Tiffany Tavarez

## 2018-11-23 ENCOUNTER — HOME CARE VISIT (OUTPATIENT)
Dept: SCHEDULING | Facility: HOME HEALTH | Age: 68
End: 2018-11-23
Payer: MEDICARE

## 2018-11-23 VITALS
HEART RATE: 100 BPM | SYSTOLIC BLOOD PRESSURE: 130 MMHG | RESPIRATION RATE: 20 BRPM | TEMPERATURE: 98 F | DIASTOLIC BLOOD PRESSURE: 85 MMHG

## 2018-11-23 PROCEDURE — G0299 HHS/HOSPICE OF RN EA 15 MIN: HCPCS

## 2018-11-23 PROCEDURE — 3331090001 HH PPS REVENUE CREDIT

## 2018-11-23 PROCEDURE — 3331090002 HH PPS REVENUE DEBIT

## 2018-11-23 PROCEDURE — G0151 HHCP-SERV OF PT,EA 15 MIN: HCPCS

## 2018-11-23 PROCEDURE — 400013 HH SOC

## 2018-11-23 NOTE — REHAB NOTE
I am accessing Mr. Valderrama's chart as a part of our department's internal chart auditing process. I certify that Mr. Cheyanne Bravo is, or was, a patient in our department. Thank you, Blaise Isaac, PT 
11/23/2018

## 2018-11-24 PROCEDURE — 3331090002 HH PPS REVENUE DEBIT

## 2018-11-24 PROCEDURE — 3331090001 HH PPS REVENUE CREDIT

## 2018-11-25 VITALS
RESPIRATION RATE: 18 BRPM | SYSTOLIC BLOOD PRESSURE: 144 MMHG | HEART RATE: 91 BPM | TEMPERATURE: 98.1 F | OXYGEN SATURATION: 98 % | DIASTOLIC BLOOD PRESSURE: 66 MMHG

## 2018-11-25 PROCEDURE — 3331090001 HH PPS REVENUE CREDIT

## 2018-11-25 PROCEDURE — 3331090002 HH PPS REVENUE DEBIT

## 2018-11-26 ENCOUNTER — HOME CARE VISIT (OUTPATIENT)
Dept: SCHEDULING | Facility: HOME HEALTH | Age: 68
End: 2018-11-26
Payer: MEDICARE

## 2018-11-26 VITALS
RESPIRATION RATE: 18 BRPM | DIASTOLIC BLOOD PRESSURE: 90 MMHG | HEART RATE: 82 BPM | TEMPERATURE: 98.2 F | SYSTOLIC BLOOD PRESSURE: 140 MMHG

## 2018-11-26 PROCEDURE — G0151 HHCP-SERV OF PT,EA 15 MIN: HCPCS

## 2018-11-26 PROCEDURE — 3331090001 HH PPS REVENUE CREDIT

## 2018-11-26 PROCEDURE — 3331090002 HH PPS REVENUE DEBIT

## 2018-11-27 ENCOUNTER — HOME CARE VISIT (OUTPATIENT)
Dept: SCHEDULING | Facility: HOME HEALTH | Age: 68
End: 2018-11-27
Payer: MEDICARE

## 2018-11-27 PROCEDURE — G0299 HHS/HOSPICE OF RN EA 15 MIN: HCPCS

## 2018-11-27 PROCEDURE — 3331090001 HH PPS REVENUE CREDIT

## 2018-11-27 PROCEDURE — 3331090002 HH PPS REVENUE DEBIT

## 2018-11-28 ENCOUNTER — HOME CARE VISIT (OUTPATIENT)
Dept: SCHEDULING | Facility: HOME HEALTH | Age: 68
End: 2018-11-28
Payer: MEDICARE

## 2018-11-28 VITALS
RESPIRATION RATE: 18 BRPM | DIASTOLIC BLOOD PRESSURE: 92 MMHG | SYSTOLIC BLOOD PRESSURE: 148 MMHG | TEMPERATURE: 98.1 F | HEART RATE: 99 BPM

## 2018-11-28 PROCEDURE — 3331090002 HH PPS REVENUE DEBIT

## 2018-11-28 PROCEDURE — G0157 HHC PT ASSISTANT EA 15: HCPCS

## 2018-11-28 PROCEDURE — 3331090001 HH PPS REVENUE CREDIT

## 2018-11-29 ENCOUNTER — HOME CARE VISIT (OUTPATIENT)
Dept: HOME HEALTH SERVICES | Facility: HOME HEALTH | Age: 68
End: 2018-11-29
Payer: MEDICARE

## 2018-11-29 ENCOUNTER — HOME CARE VISIT (OUTPATIENT)
Dept: SCHEDULING | Facility: HOME HEALTH | Age: 68
End: 2018-11-29
Payer: MEDICARE

## 2018-11-29 VITALS
DIASTOLIC BLOOD PRESSURE: 64 MMHG | RESPIRATION RATE: 16 BRPM | TEMPERATURE: 98.2 F | SYSTOLIC BLOOD PRESSURE: 118 MMHG | HEART RATE: 74 BPM

## 2018-11-29 VITALS
HEART RATE: 84 BPM | DIASTOLIC BLOOD PRESSURE: 92 MMHG | TEMPERATURE: 97.8 F | SYSTOLIC BLOOD PRESSURE: 160 MMHG | RESPIRATION RATE: 18 BRPM

## 2018-11-29 PROCEDURE — 3331090001 HH PPS REVENUE CREDIT

## 2018-11-29 PROCEDURE — 3331090002 HH PPS REVENUE DEBIT

## 2018-11-29 PROCEDURE — G0151 HHCP-SERV OF PT,EA 15 MIN: HCPCS

## 2018-11-30 ENCOUNTER — HOME CARE VISIT (OUTPATIENT)
Dept: SCHEDULING | Facility: HOME HEALTH | Age: 68
End: 2018-11-30
Payer: MEDICARE

## 2018-11-30 PROCEDURE — 3331090002 HH PPS REVENUE DEBIT

## 2018-11-30 PROCEDURE — 3331090001 HH PPS REVENUE CREDIT

## 2018-11-30 PROCEDURE — G0299 HHS/HOSPICE OF RN EA 15 MIN: HCPCS

## 2018-12-01 PROCEDURE — 3331090001 HH PPS REVENUE CREDIT

## 2018-12-01 PROCEDURE — 3331090002 HH PPS REVENUE DEBIT

## 2018-12-02 VITALS
HEART RATE: 99 BPM | SYSTOLIC BLOOD PRESSURE: 140 MMHG | OXYGEN SATURATION: 99 % | TEMPERATURE: 98.1 F | DIASTOLIC BLOOD PRESSURE: 92 MMHG | RESPIRATION RATE: 16 BRPM

## 2018-12-02 PROCEDURE — 3331090001 HH PPS REVENUE CREDIT

## 2018-12-02 PROCEDURE — 3331090002 HH PPS REVENUE DEBIT

## 2018-12-03 PROCEDURE — 3331090002 HH PPS REVENUE DEBIT

## 2018-12-03 PROCEDURE — A6199 ALGINATE DRSG WOUND FILLER: HCPCS

## 2018-12-03 PROCEDURE — 3331090001 HH PPS REVENUE CREDIT

## 2018-12-04 ENCOUNTER — HOME CARE VISIT (OUTPATIENT)
Dept: SCHEDULING | Facility: HOME HEALTH | Age: 68
End: 2018-12-04
Payer: MEDICARE

## 2018-12-04 PROCEDURE — 3331090001 HH PPS REVENUE CREDIT

## 2018-12-04 PROCEDURE — G0157 HHC PT ASSISTANT EA 15: HCPCS

## 2018-12-04 PROCEDURE — 3331090002 HH PPS REVENUE DEBIT

## 2018-12-05 ENCOUNTER — HOME CARE VISIT (OUTPATIENT)
Dept: SCHEDULING | Facility: HOME HEALTH | Age: 68
End: 2018-12-05
Payer: MEDICARE

## 2018-12-05 VITALS
HEART RATE: 85 BPM | SYSTOLIC BLOOD PRESSURE: 146 MMHG | RESPIRATION RATE: 20 BRPM | TEMPERATURE: 97.6 F | DIASTOLIC BLOOD PRESSURE: 82 MMHG

## 2018-12-05 PROCEDURE — 3331090002 HH PPS REVENUE DEBIT

## 2018-12-05 PROCEDURE — G0299 HHS/HOSPICE OF RN EA 15 MIN: HCPCS

## 2018-12-05 PROCEDURE — 3331090001 HH PPS REVENUE CREDIT

## 2018-12-06 ENCOUNTER — HOME CARE VISIT (OUTPATIENT)
Dept: SCHEDULING | Facility: HOME HEALTH | Age: 68
End: 2018-12-06
Payer: MEDICARE

## 2018-12-06 PROCEDURE — G0157 HHC PT ASSISTANT EA 15: HCPCS

## 2018-12-06 PROCEDURE — 3331090001 HH PPS REVENUE CREDIT

## 2018-12-06 PROCEDURE — 3331090002 HH PPS REVENUE DEBIT

## 2018-12-07 VITALS
RESPIRATION RATE: 18 BRPM | DIASTOLIC BLOOD PRESSURE: 87 MMHG | SYSTOLIC BLOOD PRESSURE: 132 MMHG | HEART RATE: 82 BPM | TEMPERATURE: 97.2 F

## 2018-12-07 PROCEDURE — 3331090002 HH PPS REVENUE DEBIT

## 2018-12-07 PROCEDURE — 3331090001 HH PPS REVENUE CREDIT

## 2018-12-08 PROCEDURE — 3331090002 HH PPS REVENUE DEBIT

## 2018-12-08 PROCEDURE — 3331090001 HH PPS REVENUE CREDIT

## 2018-12-09 PROCEDURE — 3331090001 HH PPS REVENUE CREDIT

## 2018-12-09 PROCEDURE — 3331090002 HH PPS REVENUE DEBIT

## 2018-12-10 PROCEDURE — 3331090002 HH PPS REVENUE DEBIT

## 2018-12-10 PROCEDURE — 3331090001 HH PPS REVENUE CREDIT

## 2018-12-11 ENCOUNTER — HOME CARE VISIT (OUTPATIENT)
Dept: SCHEDULING | Facility: HOME HEALTH | Age: 68
End: 2018-12-11
Payer: MEDICARE

## 2018-12-11 VITALS
SYSTOLIC BLOOD PRESSURE: 133 MMHG | RESPIRATION RATE: 18 BRPM | DIASTOLIC BLOOD PRESSURE: 82 MMHG | HEART RATE: 82 BPM | TEMPERATURE: 97.2 F

## 2018-12-11 PROCEDURE — G0157 HHC PT ASSISTANT EA 15: HCPCS

## 2018-12-11 PROCEDURE — 3331090001 HH PPS REVENUE CREDIT

## 2018-12-11 PROCEDURE — 3331090002 HH PPS REVENUE DEBIT

## 2018-12-12 PROCEDURE — 3331090002 HH PPS REVENUE DEBIT

## 2018-12-12 PROCEDURE — 3331090001 HH PPS REVENUE CREDIT

## 2018-12-13 ENCOUNTER — HOME CARE VISIT (OUTPATIENT)
Dept: SCHEDULING | Facility: HOME HEALTH | Age: 68
End: 2018-12-13
Payer: MEDICARE

## 2018-12-13 PROCEDURE — 3331090001 HH PPS REVENUE CREDIT

## 2018-12-13 PROCEDURE — 3331090002 HH PPS REVENUE DEBIT

## 2018-12-13 PROCEDURE — G0157 HHC PT ASSISTANT EA 15: HCPCS

## 2018-12-14 PROCEDURE — 3331090002 HH PPS REVENUE DEBIT

## 2018-12-14 PROCEDURE — 3331090001 HH PPS REVENUE CREDIT

## 2018-12-15 VITALS
DIASTOLIC BLOOD PRESSURE: 78 MMHG | HEART RATE: 82 BPM | RESPIRATION RATE: 20 BRPM | TEMPERATURE: 97.2 F | SYSTOLIC BLOOD PRESSURE: 132 MMHG

## 2018-12-15 PROCEDURE — 3331090001 HH PPS REVENUE CREDIT

## 2018-12-15 PROCEDURE — 3331090002 HH PPS REVENUE DEBIT

## 2018-12-16 PROCEDURE — 3331090001 HH PPS REVENUE CREDIT

## 2018-12-16 PROCEDURE — 3331090002 HH PPS REVENUE DEBIT

## 2018-12-17 ENCOUNTER — HOME CARE VISIT (OUTPATIENT)
Dept: SCHEDULING | Facility: HOME HEALTH | Age: 68
End: 2018-12-17
Payer: MEDICARE

## 2018-12-17 VITALS
SYSTOLIC BLOOD PRESSURE: 138 MMHG | RESPIRATION RATE: 18 BRPM | TEMPERATURE: 97.2 F | DIASTOLIC BLOOD PRESSURE: 83 MMHG | HEART RATE: 82 BPM

## 2018-12-17 PROCEDURE — 3331090001 HH PPS REVENUE CREDIT

## 2018-12-17 PROCEDURE — 3331090002 HH PPS REVENUE DEBIT

## 2018-12-17 PROCEDURE — G0151 HHCP-SERV OF PT,EA 15 MIN: HCPCS

## 2019-01-31 ENCOUNTER — HOSPITAL ENCOUNTER (EMERGENCY)
Age: 69
Discharge: HOME | End: 2019-01-31
Attending: EMERGENCY MEDICINE
Payer: COMMERCIAL

## 2019-01-31 ENCOUNTER — APPOINTMENT (OUTPATIENT)
Dept: GENERAL RADIOLOGY | Age: 69
End: 2019-01-31
Attending: PHYSICIAN ASSISTANT
Payer: COMMERCIAL

## 2019-01-31 VITALS
HEART RATE: 84 BPM | TEMPERATURE: 98.9 F | OXYGEN SATURATION: 97 % | RESPIRATION RATE: 16 BRPM | DIASTOLIC BLOOD PRESSURE: 87 MMHG | SYSTOLIC BLOOD PRESSURE: 162 MMHG | BODY MASS INDEX: 25.03 KG/M2 | WEIGHT: 195 LBS | HEIGHT: 74 IN

## 2019-01-31 DIAGNOSIS — M19.019 SHOULDER ARTHRITIS: Primary | ICD-10-CM

## 2019-01-31 PROCEDURE — 73030 X-RAY EXAM OF SHOULDER: CPT

## 2019-01-31 PROCEDURE — 99283 EMERGENCY DEPT VISIT LOW MDM: CPT | Performed by: EMERGENCY MEDICINE

## 2019-01-31 RX ORDER — MELOXICAM 7.5 MG/1
7.5 TABLET ORAL DAILY
Qty: 30 TAB | Refills: 0 | Status: SHIPPED | OUTPATIENT
Start: 2019-01-31 | End: 2019-06-14

## 2019-01-31 NOTE — ED TRIAGE NOTES
Pt states his left arm has been hurting for the last 7 months. Has a stent in his heart. Had a stress test a few weeks ago and was told everything looked good. 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 I'm tired of hurting. Viky Dire gone have to do something\".

## 2019-01-31 NOTE — ED PROVIDER NOTES
Pt c/o left shoulder pain off and on for past 6-7 months, worse when leaning on his arm, no trauma,no cp or sob, has not seen pmd for pain The history is provided by the patient. Arm Pain This is a chronic problem. Episode onset: 6-7 months. The problem occurs constantly. The problem has not changed since onset. Pain location: left shoulder. The quality of the pain is described as aching. The pain is at a severity of 8/10. The pain is moderate. Associated symptoms include stiffness. The symptoms are aggravated by activity. He has tried nothing for the symptoms. The treatment provided no relief. There has been no history of extremity trauma. Past Medical History:  
Diagnosis Date  Arthritis  Asthma   
 albuterol prn (uses 1-2 times per day)  CAD (coronary artery disease) 9/15/2013  Chronic obstructive pulmonary disease ([facility] [address]  Coronary atherosclerosis of native coronary vessel 11/18/2015  Current smoker on some days  Dental disorder  Dyspepsia and other specified disorders of function of stomach  GERD (gastroesophageal reflux disease)   
 on med for control  Hyperlipidemia other unsp dyslipidemia 11/18/2015  
 on med  Hypertension   
 on med for control  Hypertension, benign, controlled 11/18/2015  MI (myocardial infarction) ([facility] [address] 2013 NSTEMI  
 Multiple renal cysts  Other ill-defined conditions(799.89)   
 hernia, pt unsure of site (resolved with surery)  Prostate cancer ([facility] [address]  Stroke [facility]) 2011 TIA; no residual   
 
 
Past Surgical History:  
Procedure Laterality Date  CARDIAC SURG PROCEDURE UNLIST  2013  
 stent  HX COLECTOMY    
 due to polyp that could not be excised  HX COLONOSCOPY  2018  HX HERNIA REPAIR    
 HX PROSTATECTOMY  HX PTCA    
 x 1 Family History:  
Problem Relation Age of Onset  Stroke Mother  Diabetes Mother  Heart Disease Maternal Grandmother  Cancer Father Social History Socioeconomic History  Marital status: SINGLE Spouse name: Not on file  Number of children: Not on file  Years of education: Not on file  Highest education level: Not on file Social Needs  Financial resource strain: Not on file  Food insecurity - worry: Not on file  Food insecurity - inability: Not on file  Transportation needs - medical: Not on file  Transportation needs - non-medical: Not on file Occupational History  Not on file Tobacco Use  Smoking status: Current Some Day Smoker Years: 50.00  Smokeless tobacco: Never Used Substance and Sexual Activity  Alcohol use: Yes Alcohol/week: 1.8 oz Types: 3 Cans of beer per week  Drug use: No  
 Sexual activity: Not on file Other Topics Concern  Not on file Social History Narrative  Not on file ALLERGIES: Lisinopril and Tramadol Review of Systems Musculoskeletal: Positive for stiffness. All other systems reviewed and are negative. Vitals:  
 01/31/19 1115 BP: (!) 140/93 Pulse: 84 Resp: 18 Temp: 99 °F (37.2 °C) SpO2: 94% Weight: 88.5 kg (195 lb) Height: 6' 2\" (1.88 m) Physical Exam  
Constitutional: He is oriented to person, place, and time. He appears well-developed and well-nourished. No distress. HENT:  
Head: Normocephalic and atraumatic. Eyes: EOM are normal. Pupils are equal, round, and reactive to light. Neck: Normal range of motion. Neck supple. Cardiovascular: Normal rate and regular rhythm. Pulmonary/Chest: Effort normal and breath sounds normal.  
Abdominal: Soft. Bowel sounds are normal.  
Musculoskeletal: Normal range of motion. He exhibits edema. He exhibits no tenderness or deformity. Left arm w/o swelling, pain to anterior left shoulder area, full but painful active motion, + crepitus to passive motion, + 5 strength, intact radial pulses, no cervical pain to palpation, full neck motion Neurological: He is alert and oriented to person, place, and time. Skin: Skin is warm. He is not diaphoretic. Psychiatric: He has a normal mood and affect. Nursing note and vitals reviewed. MDM Number of Diagnoses or Management Options Diagnosis management comments: Left shoulder x rays with djd no fx Will place on mobic, pt has ortho md ,to see for routine follow up Amount and/or Complexity of Data Reviewed Tests in the radiology section of CPT®: ordered and reviewed Review and summarize past medical records: yes Risk of Complications, Morbidity, and/or Mortality Presenting problems: low Diagnostic procedures: low Management options: low Patient Progress Patient progress: improved Procedures

## 2019-01-31 NOTE — ED NOTES
I have reviewed discharge instructions with the patient. The patient verbalized understanding. Patient left ED via Discharge Method: ambulatory to Home with caregiver. Opportunity for questions and clarification provided. Patient given 1 scripts. To continue your aftercare when you leave the hospital, you may receive an automated call from our care team to check in on how you are doing. This is a free service and part of our promise to provide the best care and service to meet your aftercare needs.  If you have questions, or wish to unsubscribe from this service please call [phone]. Thank you for Choosing our [facility] Emergency Department.

## 2019-06-11 ENCOUNTER — APPOINTMENT (OUTPATIENT)
Dept: GENERAL RADIOLOGY | Age: 69
DRG: 176 | End: 2019-06-11
Attending: EMERGENCY MEDICINE
Payer: COMMERCIAL

## 2019-06-11 ENCOUNTER — APPOINTMENT (OUTPATIENT)
Dept: CT IMAGING | Age: 69
DRG: 176 | End: 2019-06-11
Attending: INTERNAL MEDICINE
Payer: COMMERCIAL

## 2019-06-11 ENCOUNTER — HOSPITAL ENCOUNTER (INPATIENT)
Age: 69
LOS: 3 days | Discharge: HOME | DRG: 176 | End: 2019-06-14
Attending: EMERGENCY MEDICINE | Admitting: FAMILY MEDICINE
Payer: COMMERCIAL

## 2019-06-11 DIAGNOSIS — I26.99 ACUTE PULMONARY EMBOLISM WITHOUT ACUTE COR PULMONALE, UNSPECIFIED PULMONARY EMBOLISM TYPE (HCC): ICD-10-CM

## 2019-06-11 DIAGNOSIS — Z72.0 TOBACCO USE: ICD-10-CM

## 2019-06-11 DIAGNOSIS — I10 HYPERTENSION, BENIGN: ICD-10-CM

## 2019-06-11 DIAGNOSIS — R91.8 MULTIPLE PULMONARY NODULES: ICD-10-CM

## 2019-06-11 DIAGNOSIS — I25.10 ATHEROSCLEROSIS OF NATIVE CORONARY ARTERY OF NATIVE HEART WITHOUT ANGINA PECTORIS: ICD-10-CM

## 2019-06-11 DIAGNOSIS — J44.1 COPD EXACERBATION (HCC): ICD-10-CM

## 2019-06-11 DIAGNOSIS — J96.01 ACUTE RESPIRATORY FAILURE WITH HYPOXIA (HCC): Primary | ICD-10-CM

## 2019-06-11 DIAGNOSIS — J44.1 ACUTE EXACERBATION OF CHRONIC OBSTRUCTIVE PULMONARY DISEASE (COPD) (HCC): ICD-10-CM

## 2019-06-11 LAB
ALBUMIN SERPL-MCNC: 3.8 G/DL (ref 3.2–4.6)
ALBUMIN/GLOB SERPL: 1 {RATIO} (ref 1.2–3.5)
ALP SERPL-CCNC: 149 U/L (ref 50–136)
ALT SERPL-CCNC: 65 U/L (ref 12–65)
ANION GAP SERPL CALC-SCNC: 10 MMOL/L (ref 7–16)
ANION GAP SERPL CALC-SCNC: 12 MMOL/L (ref 7–16)
APTT PPP: 25.5 SEC (ref 24.7–39.8)
ARTERIAL PATENCY WRIST A: YES
AST SERPL-CCNC: 60 U/L (ref 15–37)
ATRIAL RATE: 91 BPM
BASE DEFICIT BLD-SCNC: 1 MMOL/L
BASOPHILS # BLD: 0 K/UL (ref 0–0.2)
BASOPHILS # BLD: 0.1 K/UL (ref 0–0.2)
BASOPHILS NFR BLD: 0 % (ref 0–2)
BASOPHILS NFR BLD: 1 % (ref 0–2)
BDY SITE: ABNORMAL
BILIRUB SERPL-MCNC: 0.3 MG/DL (ref 0.2–1.1)
BNP SERPL-MCNC: 9 PG/ML
BODY TEMPERATURE: 98.6
BUN SERPL-MCNC: 11 MG/DL (ref 8–23)
BUN SERPL-MCNC: 16 MG/DL (ref 8–23)
CALCIUM SERPL-MCNC: 9.1 MG/DL (ref 8.3–10.4)
CALCIUM SERPL-MCNC: 9.3 MG/DL (ref 8.3–10.4)
CALCULATED P AXIS, ECG09: 68 DEGREES
CALCULATED R AXIS, ECG10: -32 DEGREES
CALCULATED T AXIS, ECG11: 35 DEGREES
CHLORIDE SERPL-SCNC: 105 MMOL/L (ref 98–107)
CHLORIDE SERPL-SCNC: 108 MMOL/L (ref 98–107)
CO2 BLD-SCNC: 24 MMOL/L
CO2 SERPL-SCNC: 23 MMOL/L (ref 21–32)
CO2 SERPL-SCNC: 24 MMOL/L (ref 21–32)
COLLECT TIME,HTIME: 640
CREAT SERPL-MCNC: 1.28 MG/DL (ref 0.8–1.5)
CREAT SERPL-MCNC: 1.29 MG/DL (ref 0.8–1.5)
D DIMER PPP FEU-MCNC: 0.61 UG/ML(FEU)
DIAGNOSIS, 93000: NORMAL
DIFFERENTIAL METHOD BLD: ABNORMAL
DIFFERENTIAL METHOD BLD: ABNORMAL
EOSINOPHIL # BLD: 0 K/UL (ref 0–0.8)
EOSINOPHIL # BLD: 0.2 K/UL (ref 0–0.8)
EOSINOPHIL NFR BLD: 0 % (ref 0.5–7.8)
EOSINOPHIL NFR BLD: 3 % (ref 0.5–7.8)
ERYTHROCYTE [DISTWIDTH] IN BLOOD BY AUTOMATED COUNT: 14.3 % (ref 11.9–14.6)
ERYTHROCYTE [DISTWIDTH] IN BLOOD BY AUTOMATED COUNT: 14.4 % (ref 11.9–14.6)
EXHALED MINUTE VOLUME, VE: 23 L/MIN
GAS FLOW.O2 O2 DELIVERY SYS: ABNORMAL L/MIN
GAS FLOW.O2 SETTING OXYMISER: 8 BPM
GLOBULIN SER CALC-MCNC: 3.8 G/DL (ref 2.3–3.5)
GLUCOSE SERPL-MCNC: 119 MG/DL (ref 65–100)
GLUCOSE SERPL-MCNC: 139 MG/DL (ref 65–100)
HCO3 BLD-SCNC: 23.1 MMOL/L (ref 22–26)
HCT VFR BLD AUTO: 35.2 % (ref 41.1–50.3)
HCT VFR BLD AUTO: 36.6 % (ref 41.1–50.3)
HGB BLD-MCNC: 11.4 G/DL (ref 13.6–17.2)
HGB BLD-MCNC: 12 G/DL (ref 13.6–17.2)
IMM GRANULOCYTES # BLD AUTO: 0 K/UL (ref 0–0.5)
IMM GRANULOCYTES # BLD AUTO: 0.1 K/UL (ref 0–0.5)
IMM GRANULOCYTES NFR BLD AUTO: 0 % (ref 0–5)
IMM GRANULOCYTES NFR BLD AUTO: 1 % (ref 0–5)
INR PPP: 1.1
LACTATE BLD-SCNC: 2.89 MMOL/L (ref 0.5–1.9)
LACTATE BLD-SCNC: 3.34 MMOL/L (ref 0.5–1.9)
LACTATE SERPL-SCNC: 2.7 MMOL/L (ref 0.4–2)
LACTATE SERPL-SCNC: 2.7 MMOL/L (ref 0.4–2)
LACTATE SERPL-SCNC: 6.2 MMOL/L (ref 0.4–2)
LYMPHOCYTES # BLD: 0.6 K/UL (ref 0.5–4.6)
LYMPHOCYTES # BLD: 2 K/UL (ref 0.5–4.6)
LYMPHOCYTES NFR BLD: 33 % (ref 13–44)
LYMPHOCYTES NFR BLD: 8 % (ref 13–44)
MCH RBC QN AUTO: 27.9 PG (ref 26.1–32.9)
MCH RBC QN AUTO: 28 PG (ref 26.1–32.9)
MCHC RBC AUTO-ENTMCNC: 32.4 G/DL (ref 31.4–35)
MCHC RBC AUTO-ENTMCNC: 32.8 G/DL (ref 31.4–35)
MCV RBC AUTO: 85.1 FL (ref 79.6–97.8)
MCV RBC AUTO: 86.5 FL (ref 79.6–97.8)
MONOCYTES # BLD: 0.1 K/UL (ref 0.1–1.3)
MONOCYTES # BLD: 0.5 K/UL (ref 0.1–1.3)
MONOCYTES NFR BLD: 1 % (ref 4–12)
MONOCYTES NFR BLD: 8 % (ref 4–12)
NEUTS SEG # BLD: 3.3 K/UL (ref 1.7–8.2)
NEUTS SEG # BLD: 7 K/UL (ref 1.7–8.2)
NEUTS SEG NFR BLD: 54 % (ref 43–78)
NEUTS SEG NFR BLD: 90 % (ref 43–78)
NRBC # BLD: 0 K/UL (ref 0–0.2)
NRBC # BLD: 0 K/UL (ref 0–0.2)
O2/TOTAL GAS SETTING VFR VENT: 30 %
P-R INTERVAL, ECG05: 148 MS
PCO2 BLD: 37.8 MMHG (ref 35–45)
PEEP RESPIRATORY: 8 CMH2O
PH BLD: 7.39 [PH] (ref 7.35–7.45)
PLATELET # BLD AUTO: 328 K/UL (ref 150–450)
PLATELET # BLD AUTO: 340 K/UL (ref 150–450)
PMV BLD AUTO: 9.3 FL (ref 9.4–12.3)
PMV BLD AUTO: 9.9 FL (ref 9.4–12.3)
PO2 BLD: 73 MMHG (ref 75–100)
POTASSIUM SERPL-SCNC: 3.3 MMOL/L (ref 3.5–5.1)
POTASSIUM SERPL-SCNC: 3.8 MMOL/L (ref 3.5–5.1)
PRESSURE CONTROL, IPC: 14
PROCALCITONIN SERPL-MCNC: <0.1 NG/ML
PROT SERPL-MCNC: 7.6 G/DL (ref 6.3–8.2)
PROTHROMBIN TIME: 14.3 SEC (ref 11.7–14.5)
Q-T INTERVAL, ECG07: 328 MS
QRS DURATION, ECG06: 84 MS
QTC CALCULATION (BEZET), ECG08: 403 MS
RBC # BLD AUTO: 4.07 M/UL (ref 4.23–5.6)
RBC # BLD AUTO: 4.3 M/UL (ref 4.23–5.6)
SAO2 % BLD: 95 % (ref 95–98)
SERVICE CMNT-IMP: ABNORMAL
SODIUM SERPL-SCNC: 141 MMOL/L (ref 136–145)
SODIUM SERPL-SCNC: 141 MMOL/L (ref 136–145)
SPECIMEN TYPE: ABNORMAL
TROPONIN I BLD-MCNC: 0.01 NG/ML (ref 0.02–0.05)
VENTRICULAR RATE, ECG03: 91 BPM
VT SETTING VENT: 802 ML
WBC # BLD AUTO: 6 K/UL (ref 4.3–11.1)
WBC # BLD AUTO: 7.7 K/UL (ref 4.3–11.1)

## 2019-06-11 PROCEDURE — 74011000258 HC RX REV CODE- 258: Performed by: EMERGENCY MEDICINE

## 2019-06-11 PROCEDURE — 83880 ASSAY OF NATRIURETIC PEPTIDE: CPT

## 2019-06-11 PROCEDURE — 99223 1ST HOSP IP/OBS HIGH 75: CPT | Performed by: INTERNAL MEDICINE

## 2019-06-11 PROCEDURE — 80053 COMPREHEN METABOLIC PANEL: CPT

## 2019-06-11 PROCEDURE — 93005 ELECTROCARDIOGRAM TRACING: CPT | Performed by: EMERGENCY MEDICINE

## 2019-06-11 PROCEDURE — 71260 CT THORAX DX C+: CPT

## 2019-06-11 PROCEDURE — 94010 BREATHING CAPACITY TEST: CPT

## 2019-06-11 PROCEDURE — 74011250637 HC RX REV CODE- 250/637: Performed by: FAMILY MEDICINE

## 2019-06-11 PROCEDURE — 85379 FIBRIN DEGRADATION QUANT: CPT

## 2019-06-11 PROCEDURE — 74011250636 HC RX REV CODE- 250/636: Performed by: FAMILY MEDICINE

## 2019-06-11 PROCEDURE — 99285 EMERGENCY DEPT VISIT HI MDM: CPT | Performed by: EMERGENCY MEDICINE

## 2019-06-11 PROCEDURE — 77030020263 HC SOL INJ SOD CL0.9% LFCR 1000ML

## 2019-06-11 PROCEDURE — 77030013140 HC MSK NEB VYRM -A

## 2019-06-11 PROCEDURE — 84484 ASSAY OF TROPONIN QUANT: CPT

## 2019-06-11 PROCEDURE — 82803 BLOOD GASES ANY COMBINATION: CPT

## 2019-06-11 PROCEDURE — 87040 BLOOD CULTURE FOR BACTERIA: CPT

## 2019-06-11 PROCEDURE — 84145 PROCALCITONIN (PCT): CPT

## 2019-06-11 PROCEDURE — 96367 TX/PROPH/DG ADDL SEQ IV INF: CPT | Performed by: EMERGENCY MEDICINE

## 2019-06-11 PROCEDURE — 85025 COMPLETE CBC W/AUTO DIFF WBC: CPT

## 2019-06-11 PROCEDURE — 74011000250 HC RX REV CODE- 250: Performed by: NURSE PRACTITIONER

## 2019-06-11 PROCEDURE — 74011636320 HC RX REV CODE- 636/320: Performed by: FAMILY MEDICINE

## 2019-06-11 PROCEDURE — 83605 ASSAY OF LACTIC ACID: CPT

## 2019-06-11 PROCEDURE — 74011250636 HC RX REV CODE- 250/636: Performed by: INTERNAL MEDICINE

## 2019-06-11 PROCEDURE — 36415 COLL VENOUS BLD VENIPUNCTURE: CPT

## 2019-06-11 PROCEDURE — 96375 TX/PRO/DX INJ NEW DRUG ADDON: CPT | Performed by: EMERGENCY MEDICINE

## 2019-06-11 PROCEDURE — 77010033678 HC OXYGEN DAILY

## 2019-06-11 PROCEDURE — 85610 PROTHROMBIN TIME: CPT

## 2019-06-11 PROCEDURE — 94760 N-INVAS EAR/PLS OXIMETRY 1: CPT

## 2019-06-11 PROCEDURE — 74011250636 HC RX REV CODE- 250/636: Performed by: EMERGENCY MEDICINE

## 2019-06-11 PROCEDURE — 94640 AIRWAY INHALATION TREATMENT: CPT

## 2019-06-11 PROCEDURE — 36600 WITHDRAWAL OF ARTERIAL BLOOD: CPT

## 2019-06-11 PROCEDURE — 65270000029 HC RM PRIVATE

## 2019-06-11 PROCEDURE — 85730 THROMBOPLASTIN TIME PARTIAL: CPT

## 2019-06-11 PROCEDURE — 96365 THER/PROPH/DIAG IV INF INIT: CPT | Performed by: EMERGENCY MEDICINE

## 2019-06-11 PROCEDURE — 71045 X-RAY EXAM CHEST 1 VIEW: CPT

## 2019-06-11 PROCEDURE — 74011000258 HC RX REV CODE- 258: Performed by: FAMILY MEDICINE

## 2019-06-11 PROCEDURE — 74011250637 HC RX REV CODE- 250/637: Performed by: NURSE PRACTITIONER

## 2019-06-11 RX ORDER — IPRATROPIUM BROMIDE AND ALBUTEROL SULFATE 2.5; .5 MG/3ML; MG/3ML
3 SOLUTION RESPIRATORY (INHALATION)
Status: DISCONTINUED | OUTPATIENT
Start: 2019-06-11 | End: 2019-06-11

## 2019-06-11 RX ORDER — POTASSIUM CHLORIDE 20 MEQ/1
40 TABLET, EXTENDED RELEASE ORAL
Status: COMPLETED | OUTPATIENT
Start: 2019-06-11 | End: 2019-06-11

## 2019-06-11 RX ORDER — PANTOPRAZOLE SODIUM 40 MG/1
40 TABLET, DELAYED RELEASE ORAL
Status: DISCONTINUED | OUTPATIENT
Start: 2019-06-12 | End: 2019-06-14 | Stop reason: HOSPADM

## 2019-06-11 RX ORDER — MELOXICAM 7.5 MG/1
7.5 TABLET ORAL DAILY
Status: DISCONTINUED | OUTPATIENT
Start: 2019-06-11 | End: 2019-06-14 | Stop reason: HOSPADM

## 2019-06-11 RX ORDER — SODIUM CHLORIDE 0.9 % (FLUSH) 0.9 %
5-40 SYRINGE (ML) INJECTION AS NEEDED
Status: DISCONTINUED | OUTPATIENT
Start: 2019-06-11 | End: 2019-06-14 | Stop reason: HOSPADM

## 2019-06-11 RX ORDER — AMLODIPINE BESYLATE 10 MG/1
10 TABLET ORAL DAILY
Status: DISCONTINUED | OUTPATIENT
Start: 2019-06-11 | End: 2019-06-14 | Stop reason: HOSPADM

## 2019-06-11 RX ORDER — OXYBUTYNIN CHLORIDE 5 MG/1
5 TABLET, EXTENDED RELEASE ORAL DAILY
Status: DISCONTINUED | OUTPATIENT
Start: 2019-06-11 | End: 2019-06-14 | Stop reason: HOSPADM

## 2019-06-11 RX ORDER — ONDANSETRON 8 MG/1
4 TABLET, ORALLY DISINTEGRATING ORAL
Status: DISCONTINUED | OUTPATIENT
Start: 2019-06-11 | End: 2019-06-14 | Stop reason: HOSPADM

## 2019-06-11 RX ORDER — BISACODYL 5 MG
5 TABLET, DELAYED RELEASE (ENTERIC COATED) ORAL DAILY PRN
Status: DISCONTINUED | OUTPATIENT
Start: 2019-06-11 | End: 2019-06-14 | Stop reason: HOSPADM

## 2019-06-11 RX ORDER — GUAIFENESIN 600 MG/1
600 TABLET, EXTENDED RELEASE ORAL EVERY 12 HOURS
Status: DISCONTINUED | OUTPATIENT
Start: 2019-06-11 | End: 2019-06-11

## 2019-06-11 RX ORDER — HEPARIN SODIUM 5000 [USP'U]/ML
5000 INJECTION, SOLUTION INTRAVENOUS; SUBCUTANEOUS ONCE
Status: COMPLETED | OUTPATIENT
Start: 2019-06-11 | End: 2019-06-11

## 2019-06-11 RX ORDER — BUDESONIDE 0.5 MG/2ML
500 INHALANT ORAL
Status: DISCONTINUED | OUTPATIENT
Start: 2019-06-11 | End: 2019-06-14 | Stop reason: HOSPADM

## 2019-06-11 RX ORDER — MIRTAZAPINE 15 MG/1
15 TABLET, FILM COATED ORAL
Status: DISCONTINUED | OUTPATIENT
Start: 2019-06-11 | End: 2019-06-14 | Stop reason: HOSPADM

## 2019-06-11 RX ORDER — IBUPROFEN 200 MG
1 TABLET ORAL EVERY 24 HOURS
Status: DISCONTINUED | OUTPATIENT
Start: 2019-06-11 | End: 2019-06-14 | Stop reason: HOSPADM

## 2019-06-11 RX ORDER — NICOTINE 7MG/24HR
1 PATCH, TRANSDERMAL 24 HOURS TRANSDERMAL EVERY 24 HOURS
Status: DISCONTINUED | OUTPATIENT
Start: 2019-06-11 | End: 2019-06-11

## 2019-06-11 RX ORDER — ALBUTEROL SULFATE 0.83 MG/ML
2.5 SOLUTION RESPIRATORY (INHALATION)
Status: DISCONTINUED | OUTPATIENT
Start: 2019-06-11 | End: 2019-06-14 | Stop reason: HOSPADM

## 2019-06-11 RX ORDER — HYDROCHLOROTHIAZIDE 12.5 MG/1
12.5 CAPSULE ORAL DAILY
Status: DISCONTINUED | OUTPATIENT
Start: 2019-06-11 | End: 2019-06-14 | Stop reason: HOSPADM

## 2019-06-11 RX ORDER — ACETAMINOPHEN 325 MG/1
650 TABLET ORAL
Status: DISCONTINUED | OUTPATIENT
Start: 2019-06-11 | End: 2019-06-11 | Stop reason: SDUPTHER

## 2019-06-11 RX ORDER — ASPIRIN 325 MG
325 TABLET, DELAYED RELEASE (ENTERIC COATED) ORAL EVERY 12 HOURS
Status: DISCONTINUED | OUTPATIENT
Start: 2019-06-11 | End: 2019-06-14 | Stop reason: HOSPADM

## 2019-06-11 RX ORDER — SODIUM CHLORIDE 0.9 % (FLUSH) 0.9 %
10 SYRINGE (ML) INJECTION
Status: COMPLETED | OUTPATIENT
Start: 2019-06-11 | End: 2019-06-11

## 2019-06-11 RX ORDER — HEPARIN SODIUM 5000 [USP'U]/ML
5000 INJECTION, SOLUTION INTRAVENOUS; SUBCUTANEOUS EVERY 8 HOURS
Status: DISCONTINUED | OUTPATIENT
Start: 2019-06-11 | End: 2019-06-11 | Stop reason: SDUPTHER

## 2019-06-11 RX ORDER — PRAVASTATIN SODIUM 20 MG/1
40 TABLET ORAL DAILY
Status: DISCONTINUED | OUTPATIENT
Start: 2019-06-11 | End: 2019-06-14 | Stop reason: HOSPADM

## 2019-06-11 RX ORDER — SODIUM CHLORIDE 0.9 % (FLUSH) 0.9 %
5-40 SYRINGE (ML) INJECTION EVERY 8 HOURS
Status: DISCONTINUED | OUTPATIENT
Start: 2019-06-11 | End: 2019-06-14 | Stop reason: HOSPADM

## 2019-06-11 RX ORDER — SODIUM CHLORIDE 9 MG/ML
1000 INJECTION, SOLUTION INTRAVENOUS ONCE
Status: COMPLETED | OUTPATIENT
Start: 2019-06-11 | End: 2019-06-11

## 2019-06-11 RX ORDER — ACETAMINOPHEN 500 MG
1000 TABLET ORAL EVERY 6 HOURS
Status: DISCONTINUED | OUTPATIENT
Start: 2019-06-11 | End: 2019-06-14 | Stop reason: HOSPADM

## 2019-06-11 RX ORDER — HEPARIN SODIUM 5000 [USP'U]/100ML
18-36 INJECTION, SOLUTION INTRAVENOUS
Status: DISCONTINUED | OUTPATIENT
Start: 2019-06-11 | End: 2019-06-12

## 2019-06-11 RX ORDER — MORPHINE SULFATE 2 MG/ML
2 INJECTION, SOLUTION INTRAMUSCULAR; INTRAVENOUS
Status: COMPLETED | OUTPATIENT
Start: 2019-06-11 | End: 2019-06-11

## 2019-06-11 RX ORDER — SODIUM CHLORIDE 9 MG/ML
100 INJECTION, SOLUTION INTRAVENOUS CONTINUOUS
Status: DISCONTINUED | OUTPATIENT
Start: 2019-06-11 | End: 2019-06-13

## 2019-06-11 RX ORDER — GUAIFENESIN 600 MG/1
1200 TABLET, EXTENDED RELEASE ORAL EVERY 12 HOURS
Status: DISCONTINUED | OUTPATIENT
Start: 2019-06-11 | End: 2019-06-14 | Stop reason: HOSPADM

## 2019-06-11 RX ADMIN — OXYBUTYNIN CHLORIDE 5 MG: 5 TABLET, EXTENDED RELEASE ORAL at 11:52

## 2019-06-11 RX ADMIN — AMLODIPINE BESYLATE 10 MG: 10 TABLET ORAL at 11:52

## 2019-06-11 RX ADMIN — BUDESONIDE 500 MCG: 0.5 INHALANT RESPIRATORY (INHALATION) at 12:15

## 2019-06-11 RX ADMIN — Medication 10 ML: at 14:00

## 2019-06-11 RX ADMIN — SODIUM CHLORIDE 1000 ML: 900 INJECTION, SOLUTION INTRAVENOUS at 09:49

## 2019-06-11 RX ADMIN — Medication 10 ML: at 13:30

## 2019-06-11 RX ADMIN — SODIUM CHLORIDE 1000 ML: 900 INJECTION, SOLUTION INTRAVENOUS at 11:50

## 2019-06-11 RX ADMIN — AZITHROMYCIN MONOHYDRATE 500 MG: 500 INJECTION, POWDER, LYOPHILIZED, FOR SOLUTION INTRAVENOUS at 08:35

## 2019-06-11 RX ADMIN — ACETAMINOPHEN 1000 MG: 500 TABLET, FILM COATED ORAL at 11:51

## 2019-06-11 RX ADMIN — METHYLPREDNISOLONE SODIUM SUCCINATE 40 MG: 40 INJECTION, POWDER, FOR SOLUTION INTRAMUSCULAR; INTRAVENOUS at 22:35

## 2019-06-11 RX ADMIN — ASPIRIN 325 MG: 325 TABLET, DELAYED RELEASE ORAL at 22:33

## 2019-06-11 RX ADMIN — ASPIRIN 325 MG: 325 TABLET, DELAYED RELEASE ORAL at 11:52

## 2019-06-11 RX ADMIN — HEPARIN SODIUM 18 UNITS/KG/HR: 5000 INJECTION, SOLUTION INTRAVENOUS at 22:33

## 2019-06-11 RX ADMIN — PRAVASTATIN SODIUM 40 MG: 20 TABLET ORAL at 11:52

## 2019-06-11 RX ADMIN — METHYLPREDNISOLONE SODIUM SUCCINATE 125 MG: 125 INJECTION, POWDER, FOR SOLUTION INTRAMUSCULAR; INTRAVENOUS at 07:03

## 2019-06-11 RX ADMIN — GUAIFENESIN 1200 MG: 600 TABLET, EXTENDED RELEASE ORAL at 22:33

## 2019-06-11 RX ADMIN — MIRTAZAPINE 15 MG: 15 TABLET, FILM COATED ORAL at 22:33

## 2019-06-11 RX ADMIN — Medication 10 ML: at 18:46

## 2019-06-11 RX ADMIN — ACETAMINOPHEN 1000 MG: 500 TABLET, FILM COATED ORAL at 17:50

## 2019-06-11 RX ADMIN — HEPARIN SODIUM 5000 UNITS: 5000 INJECTION, SOLUTION INTRAVENOUS; SUBCUTANEOUS at 22:44

## 2019-06-11 RX ADMIN — MORPHINE SULFATE 2 MG: 2 INJECTION, SOLUTION INTRAMUSCULAR; INTRAVENOUS at 07:03

## 2019-06-11 RX ADMIN — ACETAMINOPHEN 1000 MG: 500 TABLET, FILM COATED ORAL at 23:33

## 2019-06-11 RX ADMIN — Medication 10 ML: at 13:31

## 2019-06-11 RX ADMIN — POTASSIUM CHLORIDE 40 MEQ: 20 TABLET, EXTENDED RELEASE ORAL at 11:52

## 2019-06-11 RX ADMIN — CEFTRIAXONE 2 G: 2 INJECTION, POWDER, FOR SOLUTION INTRAMUSCULAR; INTRAVENOUS at 07:33

## 2019-06-11 RX ADMIN — Medication 10 ML: at 22:48

## 2019-06-11 RX ADMIN — BUDESONIDE 500 MCG: 0.5 INHALANT RESPIRATORY (INHALATION) at 19:39

## 2019-06-11 RX ADMIN — IOPAMIDOL 100 ML: 755 INJECTION, SOLUTION INTRAVENOUS at 18:46

## 2019-06-11 RX ADMIN — SODIUM CHLORIDE 100 ML: 900 INJECTION, SOLUTION INTRAVENOUS at 18:46

## 2019-06-11 RX ADMIN — SODIUM CHLORIDE 125 ML/HR: 900 INJECTION, SOLUTION INTRAVENOUS at 16:00

## 2019-06-11 RX ADMIN — MELOXICAM 7.5 MG: 7.5 TABLET ORAL at 11:52

## 2019-06-11 RX ADMIN — HYDROCHLOROTHIAZIDE 12.5 MG: 12.5 CAPSULE ORAL at 11:52

## 2019-06-11 RX ADMIN — HEPARIN SODIUM 5000 UNITS: 5000 INJECTION, SOLUTION INTRAVENOUS; SUBCUTANEOUS at 13:30

## 2019-06-11 NOTE — H&P
HOSPITALIST H&P/CONSULT  NAME:  Karley José   Age:  80 y.o.  :   1950   MRN:   [id number]  PCP: Angel Ley MD  Consulting MD:  Treatment Team: Attending Provider: Keisha Vasquez MD; Primary Nurse: Susanna Hansen, HEMAL; Utilization Review: Tish Olguin  HPI:   Patient is a 69yo M with hx Asthma, HTN, and CAD who presents for 3 days shortness of breath acutely worse today. Associated with dry cough. No fever, no mucous production. Home albuterol not helping. Does not take any other BDs. Denies hx hospitalization for breathing before. Has been smoking for 50 years, about 1 pack per week. Breathing acutely worse this morning and called EMS. Brought to ER with severe respiratory distress and wheezing noted by ER physician. Placed on bipap and given BDs and steroids. Weaned off bipap to NC and breathing feeling significantly improved. ABG on bipap without CO2 retention or acidosis. K 3.3, LA 2.89 - 3.34.       Complete ROS done and is as stated in HPI or otherwise negative  Past Medical History:   Diagnosis Date    Arthritis     Asthma     albuterol prn (uses 1-2 times per day)    CAD (coronary artery disease) 9/15/2013    [facility] [facility]     Coronary atherosclerosis of native coronary vessel 2015    Current smoker on some days     Dental disorder     Dyspepsia and other specified disorders of function of stomach     GERD (gastroesophageal reflux disease)     on med for control     Hyperlipidemia other unsp dyslipidemia 2015    on med    Hypertension     on med for control     Hypertension, benign, controlled 2015    MI ([facility] infarction) [facility])     NSTEMI    Multiple renal cysts     Other ill-defined conditions(799.89)     hernia, pt unsure of site (resolved with surery)    Prostate cancer ([address]     Stroke ([facility] [address]     TIA; no residual       Past Surgical History:   Procedure Laterality Date    CARDIAC SURG PROCEDURE UNLIST  2013    stent    HX COLECTOMY      due to polyp that could not be excised    HX COLONOSCOPY  2018    HX HERNIA REPAIR      HX PROSTATECTOMY      HX PTCA      x 1    reviewed   Prior to Admission Medications   Prescriptions Last Dose Informant Patient Reported? Taking? HYDROmorphone (DILAUDID) 2 mg tablet   No No   Sig: Take 1 Tab by mouth every four (4) hours as needed. Max Daily Amount: 12 mg. Patient taking differently: Take 1 Tab by mouth every four (4) hours as needed for Pain. acetaminophen (TYLENOL) 500 mg tablet   No No   Sig: Take 2 Tabs by mouth every six (6) hours. albuterol (PROVENTIL HFA, VENTOLIN HFA, PROAIR HFA) 90 mcg/actuation inhaler   No No   Sig: Take 2 Puffs by inhalation every six (6) hours as needed for Wheezing. Take every 4-6 hours for wheezing   amLODIPine (NORVASC) 10 mg tablet   Yes No   Sig: Take 10 mg by mouth daily. Take / use AM day of surgery  per anesthesia protocols. aspirin delayed-release 325 mg tablet   No No   Sig: Take 1 Tab by mouth every twelve (12) hours. hydroCHLOROthiazide (MICROZIDE) 12.5 mg capsule   Yes No   Sig: Take 12.5 mg by mouth daily. meloxicam (MOBIC) 7.5 mg tablet   No No   Sig: Take 1 Tab by mouth daily. mirtazapine (REMERON) 15 mg tablet   Yes No   Sig: Take 15 mg by mouth nightly. omeprazole (PRILOSEC) 20 mg capsule   Yes No   Sig: Take 20 mg by mouth daily. Take / use AM day of surgery  per anesthesia protocols. ondansetron (ZOFRAN ODT) 8 mg disintegrating tablet   No No   Sig: Take 1 Tab by mouth every eight (8) hours as needed for Nausea. oxybutynin chloride XL (DITROPAN XL) 5 mg CR tablet   Yes No   Sig: Take 5 mg by mouth daily. Take / use AM day of surgery  per anesthesia protocols. pravastatin (PRAVACHOL) 40 mg tablet   Yes No   Sig: Take 40 mg by mouth daily. Take / use AM day of surgery  per anesthesia protocols. senna-docusate (PERICOLACE) 8.6-50 mg per tablet   No No   Sig: Take 2 Tabs by mouth daily. Facility-Administered Medications: None     Allergies   Allergen Reactions    Lisinopril Swelling    Tramadol Rash and Itching      Social History     Tobacco Use    Smoking status: Current Some Day Smoker     Years: 50.00    Smokeless tobacco: Never Used   Substance Use Topics    Alcohol use: Yes     Alcohol/week: 1.8 oz     Types: 3 Cans of beer per week      Family History   Problem Relation Age of Onset    Stroke Mother     Diabetes Mother     Heart Disease Maternal Grandmother     Cancer Father     reviewed   Objective:     Visit Vitals  BP (!) 141/92   Pulse 93   Temp 98.1 °F (36.7 °C)   Resp 20   Ht 6' 2\" (1.88 m)   Wt 88.5 kg (195 lb)   SpO2 99%   BMI 25.04 kg/m²      Temp (24hrs), Av.1 °F (36.7 °C), Min:98.1 °F (36.7 °C), Max:98.1 °F (36.7 °C)    Oxygen Therapy  O2 Sat (%): 99 % (19)  Pulse via Oximetry: 91 beats per minute (19)  O2 Device: Nasal cannula (19)  O2 Flow Rate (L/min): 2 l/min (19)  FIO2 (%): 30 % (19 0645)  Physical Exam:  General:    Alert, mild distress from dsypnea  Head:   Normocephalic, without obvious abnormality, atraumatic. Nose:  Nares normal. No drainage or sinus tenderness. Lungs:   Wheeze throughout, borderline tachypnea. Good air movement. Heart:   Regular rate and rhythm,  no murmur, rub or gallop. Abdomen:   Soft, non-tender. Not distended. Bowel sounds normal.   Extremities: No cyanosis. No edema. No clubbing  Skin:     Texture, turgor normal. No rashes or lesions.   Not Jaundiced  Neurologic: Alert and oriented x 3, no focal deficits   Data Review:   Recent Results (from the past 24 hour(s))   CBC WITH AUTOMATED DIFF    Collection Time: 19  6:44 AM   Result Value Ref Range    WBC 6.0 4.3 - 11.1 K/uL    RBC 4.30 4.23 - 5.6 M/uL    HGB 12.0 (L) 13.6 - 17.2 g/dL    HCT 36.6 (L) 41.1 - 50.3 %    MCV 85.1 79.6 - 97.8 FL    MCH 27.9 26.1 - 32.9 PG    MCHC 32.8 31.4 - 35.0 g/dL    RDW 14.3 11.9 - 14.6 % PLATELET 901 483 - 628 K/uL    MPV 9.3 (L) 9.4 - 12.3 FL    ABSOLUTE NRBC 0.00 0.0 - 0.2 K/uL    DF AUTOMATED      NEUTROPHILS 54 43 - 78 %    LYMPHOCYTES 33 13 - 44 %    MONOCYTES 8 4.0 - 12.0 %    EOSINOPHILS 3 0.5 - 7.8 %    BASOPHILS 1 0.0 - 2.0 %    IMMATURE GRANULOCYTES 1 0.0 - 5.0 %    ABS. NEUTROPHILS 3.3 1.7 - 8.2 K/UL    ABS. LYMPHOCYTES 2.0 0.5 - 4.6 K/UL    ABS. MONOCYTES 0.5 0.1 - 1.3 K/UL    ABS. EOSINOPHILS 0.2 0.0 - 0.8 K/UL    ABS. BASOPHILS 0.1 0.0 - 0.2 K/UL    ABS. IMM. GRANS. 0.1 0.0 - 0.5 K/UL   METABOLIC PANEL, COMPREHENSIVE    Collection Time: 06/11/19  6:44 AM   Result Value Ref Range    Sodium 141 136 - 145 mmol/L    Potassium 3.3 (L) 3.5 - 5.1 mmol/L    Chloride 105 98 - 107 mmol/L    CO2 24 21 - 32 mmol/L    Anion gap 12 7 - 16 mmol/L    Glucose 119 (H) 65 - 100 mg/dL    BUN 11 8 - 23 MG/DL    Creatinine 1.28 0.8 - 1.5 MG/DL    GFR est AA >60 >60 ml/min/1.73m2    GFR est non-AA 60 (L) >60 ml/min/1.73m2    Calcium 9.3 8.3 - 10.4 MG/DL    Bilirubin, total 0.3 0.2 - 1.1 MG/DL    ALT (SGPT) 65 12 - 65 U/L    AST (SGOT) 60 (H) 15 - 37 U/L    Alk. phosphatase 149 (H) 50 - 136 U/L    Protein, total 7.6 6.3 - 8.2 g/dL    Albumin 3.8 3.2 - 4.6 g/dL    Globulin 3.8 (H) 2.3 - 3.5 g/dL    A-G Ratio 1.0 (L) 1.2 - 3.5     POC G3    Collection Time: 06/11/19  6:44 AM   Result Value Ref Range    Device: BIPAP      FIO2 (POC) 30 %    pH (POC) 7.395 7.35 - 7.45      pCO2 (POC) 37.8 35 - 45 MMHG    pO2 (POC) 73 (L) 75 - 100 MMHG    HCO3 (POC) 23.1 22 - 26 MMOL/L    sO2 (POC) 95 95 - 98 %    Base deficit (POC) 1 mmol/L    Tidal volume 802 ml    Set Rate 8 bpm    PEEP/CPAP (POC) 8 cmH2O    Allens test (POC) YES      Site RIGHT RADIAL      Patient temp.  98.6      Specimen type (POC) ARTERIAL      Performed by Raul     CO2, POC 24 MMOL/L    Pressure control 14      Exhaled minute volume 23.00 L/min    COLLECT TIME 640     PROCALCITONIN    Collection Time: 06/11/19  6:44 AM   Result Value Ref Range Procalcitonin <0.1 ng/mL   BNP    Collection Time: 06/11/19  6:44 AM   Result Value Ref Range    BNP 9 (H) 0 pg/mL   POC TROPONIN-I    Collection Time: 06/11/19  6:44 AM   Result Value Ref Range    Troponin-I (POC) 0.01 (L) 0.02 - 0.05 ng/ml   POC LACTIC ACID    Collection Time: 06/11/19  6:48 AM   Result Value Ref Range    Lactic Acid (POC) 2.89 (H) 0.5 - 1.9 mmol/L   EKG, 12 LEAD, INITIAL    Collection Time: 06/11/19  8:02 AM   Result Value Ref Range    Ventricular Rate 91 BPM    Atrial Rate 91 BPM    P-R Interval 148 ms    QRS Duration 84 ms    Q-T Interval 328 ms    QTC Calculation (Bezet) 403 ms    Calculated P Axis 68 degrees    Calculated R Axis -32 degrees    Calculated T Axis 35 degrees    Diagnosis       !! AGE AND GENDER SPECIFIC ECG ANALYSIS !! Normal sinus rhythm  Left axis deviation  Nonspecific T wave abnormality  Abnormal ECG  When compared with ECG of 30-OCT-2018 13:34,  Premature ventricular complexes are no longer Present  QT has shortened     POC LACTIC ACID    Collection Time: 06/11/19  9:23 AM   Result Value Ref Range    Lactic Acid (POC) 3.34 (H) 0.5 - 1.9 mmol/L     Imaging /Procedures /Studies   XR CHEST PORT   Final Result   IMPRESSION: No evidence of acute cardiopulmonary process. Not significantly   changed since previous study. Assessment and Plan: Active Hospital Problems    Diagnosis Date Noted    COPD exacerbation ([facility] [address] 06/11/2019    Tobacco use 11/08/2018    Hypertension, benign, controlled 11/18/2015    Coronary atherosclerosis of native coronary vessel 11/18/2015       PLAN:  COPDe:  Continue steroids, inhaled BDs. Mucinex  Given rocephin/azithro, but CXR without pna. Continue azithro. Lactic acidosis: bolus fluids, trend. Negative pct, no leukocytosis.     Hypokalemia: replace, monitor    Tobacco abuse: patch, encouraged to quit    CAD/HTN: home meds    DVT PPx: HSQ  Code Status: Full    Anticipated discharge: 2-3 days    Signed By: Emmett New MD     June 11, 2019

## 2019-06-11 NOTE — ED TRIAGE NOTES
Patient arrives via [facility] with respiratory distress. EMS states patient has been short of breath for about 4 days. Patient attempted using inhaler but he ran out of it 2 days ago. Ems states placed patient on cpap, gave 10 albuterol, 2 nitro, 1\" nitro paste.  Patient placed on bipap upon arrival.

## 2019-06-11 NOTE — PROGRESS NOTES
Pt resting in the bed with family present. Pt is stable. Pt is axo. Respirations are fast and shallow with nasal cannula in place, 2 L/min. Pt states its a challenge to catch a breath. Diminished breath sounds heard upon auscultation. No skin breakdown to note. Dual skin assessment performed with Kyle Andrade RN. Bed is in low and locked position. Call light is within reach. Pt instructed to ask for assistance if needed. Will continue to monitor.

## 2019-06-11 NOTE — PROGRESS NOTES
Pt back in bed from CT. Pt is on 2 L NC. Audible wheezing and dyspnea on exertion noted. Pt assisted back to bed. No active distress noted at this time.  Pt instructed to call for assistance, call light in reach

## 2019-06-11 NOTE — PROGRESS NOTES
Pt assisted to bathroom. Pt experienced dyspnea on exertion while ambulating, needed and increase in oxygen to 4L/min briefly. Nasal cannula turned down to 2 L/min as tolerated. Pt is stable. Call light within reach.

## 2019-06-11 NOTE — CONSULTS
CONSULT NOTE    Claudia Mendieta    6/11/2019    Date of Admission:  6/11/2019    The patient's chart is reviewed and the patient is discussed with the staff. Subjective:     Patient is a 80 y.o.  male seen and evaluated at the request of Dr. Arlen Lion for COPD exacerbation. The patient was admitted today following three days of shortness of breath that progressively became worse. Family at the bedside states that the patient has been short of breath for [facility]" and has simply gotten to the point that he could not continue to tolerate worsening symptoms. He states coughing with difficulty expectorating mucus, wheezing, difficulty taking a deep breath. He states he has been using albuterol inhaler so much at home that the inhaler \"runs out\" way before it is time for a prescription refill. He is a 50 year 1 ppd smoker. Smoking cessation counseling provided, patient educated on the risks of continued use of smoking and its effect on increasing symptom progression. He denies any fevers, chills, nausea, vomiting, or diarrhea. The patient states he does not have a pulmonologist and is using no other treatment than his prn albuterol inhaler. Lactic acid drawn in the ED noted to be 3.34, CXR unremarkable.         Review of Systems  A comprehensive review of systems was negative except for: Respiratory: positive for cough, pleurisy/chest pain, wheezing or dyspnea on exertion    Patient Active Problem List   Diagnosis Code    Accelerated hypertension I10    CAD (coronary artery disease) I25.10    Hyperlipidemia other unsp dyslipidemia E78.5    Hypertension, benign, controlled I10    Coronary atherosclerosis of native coronary vessel I25.10    Tobacco use Z72.0    Pre-op examination Z01.818    OA (osteoarthritis) of hip M16.9    COPD exacerbation ([facility] J44.1       Home DME company: None    Prior to Admission Medications   Prescriptions Last Dose Informant Patient Reported? Taking?   acetaminophen (TYLENOL) 500 mg tablet   No No   Sig: Take 2 Tabs by mouth every six (6) hours. albuterol (PROVENTIL HFA, VENTOLIN HFA, PROAIR HFA) 90 mcg/actuation inhaler   No No   Sig: Take 2 Puffs by inhalation every six (6) hours as needed for Wheezing. Take every 4-6 hours for wheezing   amLODIPine (NORVASC) 10 mg tablet   Yes No   Sig: Take 10 mg by mouth daily. Take / use AM day of surgery  per anesthesia protocols. aspirin delayed-release 325 mg tablet   No No   Sig: Take 1 Tab by mouth every twelve (12) hours. hydroCHLOROthiazide (MICROZIDE) 12.5 mg capsule   Yes No   Sig: Take 12.5 mg by mouth daily. meloxicam (MOBIC) 7.5 mg tablet   No No   Sig: Take 1 Tab by mouth daily. mirtazapine (REMERON) 15 mg tablet   Yes No   Sig: Take 15 mg by mouth nightly. omeprazole (PRILOSEC) 20 mg capsule   Yes No   Sig: Take 20 mg by mouth daily. Take / use AM day of surgery  per anesthesia protocols. ondansetron (ZOFRAN ODT) 8 mg disintegrating tablet   No No   Sig: Take 1 Tab by mouth every eight (8) hours as needed for Nausea. oxybutynin chloride XL (DITROPAN XL) 5 mg CR tablet   Yes No   Sig: Take 5 mg by mouth daily. Take / use AM day of surgery  per anesthesia protocols. pravastatin (PRAVACHOL) 40 mg tablet   Yes No   Sig: Take 40 mg by mouth daily. Take / use AM day of surgery  per anesthesia protocols. senna-docusate (PERICOLACE) 8.6-50 mg per tablet   No No   Sig: Take 2 Tabs by mouth daily.       Facility-Administered Medications: None       Past Medical History:   Diagnosis Date    Arthritis     Asthma     albuterol prn (uses 1-2 times per day)    CAD (coronary artery disease) 9/15/2013    Chronic obstructive pulmonary disease (HCC)     Coronary atherosclerosis of native coronary vessel 11/18/2015    Current smoker on some days     Dental disorder     Dyspepsia and other specified disorders of function of stomach     GERD (gastroesophageal reflux disease)     on med for control     Hyperlipidemia other unsp dyslipidemia 11/18/2015    on med    Hypertension     on med for control     Hypertension, benign, controlled 11/18/2015    [facility] ([facility]) 2013    NSTEMI    Multiple renal cysts     Other ill-defined conditions(799.89)     hernia, pt unsure of site (resolved with surery)    Prostate cancer ([facility] [address]     Stroke ([facility] [address] 2011    TIA; no residual      Past Surgical History:   Procedure Laterality Date    CARDIAC SURG PROCEDURE UNLIST  2013    stent    HX COLECTOMY      due to polyp that could not be excised    HX COLONOSCOPY  2018    HX HERNIA REPAIR      HX PROSTATECTOMY      HX PTCA      x 1     Social History     Socioeconomic History    Marital status: SINGLE     Spouse name: Not on file    Number of children: Not on file    Years of education: Not on file    Highest education level: Not on file   Occupational History    Not on file   Social Needs    Financial resource strain: Not on file    Food insecurity:     Worry: Not on file     Inability: Not on file    Transportation needs:     Medical: Not on file     Non-medical: Not on file   Tobacco Use    Smoking status: Current Some Day Smoker     Years: 50.00    Smokeless tobacco: Never Used   Substance and Sexual Activity    Alcohol use:  Yes     Alcohol/week: 1.8 oz     Types: 3 Cans of beer per week    Drug use: No    Sexual activity: Not on file   Lifestyle    Physical activity:     Days per week: Not on file     Minutes per session: Not on file    Stress: Not on file   Relationships    Social connections:     Talks on phone: Not on file     Gets together: Not on file     Attends Orthodox service: Not on file     Active member of club or organization: Not on file     Attends meetings of clubs or organizations: Not on file     Relationship status: Not on file    Intimate partner violence:     Fear of current or ex partner: Not on file     Emotionally abused: Not on file Physically abused: Not on file     Forced sexual activity: Not on file   Other Topics Concern    Not on file   Social History Narrative    Not on file     Family History   Problem Relation Age of Onset    Stroke Mother     Diabetes Mother     Heart Disease Maternal Grandmother     Cancer Father      Allergies   Allergen Reactions    Lisinopril Swelling    Tramadol Rash and Itching       Current Facility-Administered Medications   Medication Dose Route Frequency    azithromycin (ZITHROMAX) 500 mg in 0.9% sodium chloride (MBP/ADV) 250 mL  500 mg IntraVENous Q24H    acetaminophen (TYLENOL) tablet 1,000 mg  1,000 mg Oral Q6H    amLODIPine (NORVASC) tablet 10 mg  10 mg Oral DAILY    aspirin delayed-release tablet 325 mg  325 mg Oral Q12H    hydroCHLOROthiazide (MICROZIDE) capsule 12.5 mg  12.5 mg Oral DAILY    meloxicam (MOBIC) tablet 7.5 mg  7.5 mg Oral DAILY    mirtazapine (REMERON) tablet 15 mg  15 mg Oral QHS    [START ON 6/12/2019] pantoprazole (PROTONIX) tablet 40 mg  40 mg Oral ACB    oxybutynin chloride XL (DITROPAN XL) tablet 5 mg  5 mg Oral DAILY    pravastatin (PRAVACHOL) tablet 40 mg  40 mg Oral DAILY    sodium chloride (NS) flush 5-40 mL  5-40 mL IntraVENous Q8H    sodium chloride (NS) flush 5-40 mL  5-40 mL IntraVENous PRN    albuterol-ipratropium (DUO-NEB) 2.5 MG-0.5 MG/3 ML  3 mL Nebulization Q6H RT    albuterol (PROVENTIL VENTOLIN) nebulizer solution 2.5 mg  2.5 mg Nebulization Q4H PRN    methylPREDNISolone (PF) (SOLU-MEDROL) injection 40 mg  40 mg IntraVENous Q12H    ondansetron (ZOFRAN ODT) tablet 4 mg  4 mg Oral Q4H PRN    bisacodyl (DULCOLAX) tablet 5 mg  5 mg Oral DAILY PRN    nicotine (NICODERM CQ) 7 mg/24 hr patch 1 Patch  1 Patch TransDERmal Q24H    heparin (porcine) injection 5,000 Units  5,000 Units SubCUTAneous Q8H    guaiFENesin ER (MUCINEX) tablet 600 mg  600 mg Oral Q12H    sodium chloride 0.9 % bolus infusion 1,000 mL  1,000 mL IntraVENous ONCE    potassium chloride (K-DUR, KLOR-CON) SR tablet 40 mEq  40 mEq Oral NOW         Objective:     Vitals:    06/11/19 0721 06/11/19 0802 06/11/19 0820 06/11/19 1100   BP: (!) 142/92  (!) 141/92 161/88   Pulse: (!) 101  93 96   Resp: 20   18   Temp:    98.5 °F (36.9 °C)   SpO2: 100% 100% 99% 98%   Weight:       Height:           PHYSICAL EXAM     Constitutional:  the patient is well developed and in no acute distress  EENMT:  Sclera clear, pupils equal, oral mucosa moist  Respiratory: Bilateral wheezing, O2 at 2L sat 98%  Cardiovascular:  RRR without M,G,R  Gastrointestinal: soft and non-tender; with positive bowel sounds. Musculoskeletal: warm without cyanosis. There is no lower extremity edema. Skin:  no jaundice or rashes, no wounds   Neurologic: no gross neuro deficits     Psychiatric:  alert and oriented x 3, pleasant and following commands    CXR:  6/11/2019    IMPRESSION: No evidence of acute cardiopulmonary process. Not significantly  changed since previous study. Recent Labs     06/11/19  0644   WBC 6.0   HGB 12.0*   HCT 36.6*        Recent Labs     06/11/19  0644      K 3.3*      *   CO2 24   BUN 11   CREA 1.28   CA 9.3   ALB 3.8   TBILI 0.3   ALT 65   SGOT 60*     No results for input(s): PH, PCO2, PO2, HCO3 in the last 72 hours. No results for input(s): LCAD, LAC in the last 72 hours.     Assessment:  (Medical Decision Making)     Hospital Problems  Date Reviewed: 6/11/2019          Codes Class Noted POA    * (Principal) [facility] ICD-10-CM: J44.1  ICD-9-CM: 491.21  6/11/2019 Yes    Current smoker x 50 years, no previous Pulmonary follow up or maintenance therapy    Tobacco use ICD-10-CM: Z72.0  ICD-9-CM: 305.1  11/8/2018 Yes    Smoker X 50 years, cessation counseling provided    Hypertension, benign, controlled ICD-10-CM: I10  ICD-9-CM: 401.1  11/18/2015 Yes    Currently on antihypertensives    Coronary atherosclerosis of native coronary vessel ICD-10-CM: I25.10  ICD-9-CM: 414.01  11/18/2015 Yes    Chronic          Plan:  (Medical Decision Making)     --Increase nicotine patch  --Stop Duonebs, continue albuterol nebulizers and add Pulmicort BID  --Check bedside PFTs  --Smoking cessation counseling provided  --Continue IV azithromycin, Day 1, blood cultures pending  --Continue IV steroids  --Agree with rechecking Lactic Acid later today        More than 50% of the time documented was spent in face-to-face contact with the patient and in the care of the patient on the floor/unit where the patient is located. Thank you very much for this referral.  We appreciate the opportunity to participate in this patient's care. Will follow along with above stated plan. Dayana Ochoa NP     Lungs:  Decreased BS bilaterally  Heart:  RRR with no Murmur/Rubs/Gallops    Additional Comments:  Pt reports he is smoking 1 PPW but had smoked 1 PPD in the past. Currently quite dyspneic on O2. Having cough and some chest wall pain. Has hx of prostate CA in the past. Will check a D-dimer and add Spiriva to albuterol and inhaled/ IV steroids that are currently ordered. Smoking cessation essential. Check CTA if D-dimer elevated. Gordon Mane MD      I have spoken with and examined the patient. I agree with the above assessment and plan as documented.     Gordon Mane MD

## 2019-06-11 NOTE — ED PROVIDER NOTES
Presents with complaint of shortness of breath. Patient reports increased cough and states nonproductive. He denies any chest pain. He does not wear home oxygen. He continues to smoke. History is limited by the fact the patient is on BiPAP. He was given 10 mg of albuterol and nitroglycerin by EMS. The history is provided by the patient and the EMS personnel. The history is limited by the condition of the patient. Respiratory Distress   This is a new problem. The problem occurs continuously. The current episode started more than 2 days ago. The problem has been rapidly worsening. Associated symptoms include cough and wheezing. Pertinent negatives include no fever, no sputum production, no hemoptysis, no chest pain and no leg swelling. He has tried beta-agonist inhalers for the symptoms. The treatment provided no relief. He has had prior hospitalizations. He has had prior ED visits. He has had prior ICU admissions. Associated medical issues include COPD.         Past Medical History:   Diagnosis Date    Arthritis     Asthma     albuterol prn (uses 1-2 times per day)    CAD (coronary artery disease) 9/15/2013    Chronic obstructive pulmonary disease ([address]     Coronary atherosclerosis of native coronary vessel 11/18/2015    Current smoker on some days     Dental disorder     Dyspepsia and other specified disorders of function of stomach     GERD (gastroesophageal reflux disease)     on med for control     Hyperlipidemia other unsp dyslipidemia 11/18/2015    on med    Hypertension     on med for control     Hypertension, benign, controlled 11/18/2015    MI (myocardial infarction) [facility]) 2013    NSTEMI    Multiple renal cysts     Other ill-defined conditions(799.89)     hernia, pt unsure of site (resolved with surery)    Prostate cancer ([address]     Stroke ([address] 2011    TIA; no residual        Past Surgical History:   Procedure Laterality Date    CARDIAC SURG PROCEDURE UNLIST  2013    stent    HX COLECTOMY      due to polyp that could not be excised    HX COLONOSCOPY  2018    HX HERNIA REPAIR      HX PROSTATECTOMY      HX PTCA      x 1         Family History:   Problem Relation Age of Onset    Stroke Mother     Diabetes Mother     Heart Disease Maternal Grandmother     Cancer Father        Social History     Socioeconomic History    Marital status: SINGLE     Spouse name: Not on file    Number of children: Not on file    Years of education: Not on file    Highest education level: Not on file   Occupational History    Not on file   Social Needs    Financial resource strain: Not on file    Food insecurity:     Worry: Not on file     Inability: Not on file    Transportation needs:     Medical: Not on file     Non-medical: Not on file   Tobacco Use    Smoking status: Current Some Day Smoker     Years: 50.00    Smokeless tobacco: Never Used   Substance and Sexual Activity    Alcohol use: Yes     Alcohol/week: 1.8 oz     Types: 3 Cans of beer per week    Drug use: No    Sexual activity: Not on file   Lifestyle    Physical activity:     Days per week: Not on file     Minutes per session: Not on file    Stress: Not on file   Relationships    Social connections:     Talks on phone: Not on file     Gets together: Not on file     Attends [employer] service: Not on file     Active member of club or organization: Not on file     Attends meetings of clubs or organizations: Not on file     Relationship status: Not on file    Intimate partner violence:     Fear of current or ex partner: Not on file     Emotionally abused: Not on file     Physically abused: Not on file     Forced sexual activity: Not on file   Other Topics Concern    Not on file   Social History Narrative    Not on file         ALLERGIES: Lisinopril and Tramadol    Review of Systems   Unable to perform ROS: Severe respiratory distress   Constitutional: Negative for fever. Respiratory: Positive for cough and wheezing.  Negative for hemoptysis and sputum production. Cardiovascular: Negative for chest pain and leg swelling. Vitals:    06/11/19 0645 06/11/19 0650 06/11/19 0701 06/11/19 0721   BP:  (!) 139/98 (!) 140/104 (!) 142/92   Pulse:  (!) 102 (!) 101 (!) 101   Resp:  (!) 88 (!) 51    Temp:       SpO2: 99% 99%  100%   Weight:       Height:                Physical Exam   Constitutional: He is oriented to person, place, and time. He appears well-developed and well-nourished. He appears distressed. On BiPAP   HENT:   Head: Normocephalic and atraumatic. Eyes: Conjunctivae are normal. Right eye exhibits no discharge. Left eye exhibits no discharge. Neck: Normal range of motion. Neck supple. Cardiovascular: Normal rate and regular rhythm. Pulmonary/Chest: He is in respiratory distress. He has wheezes (occasional patient has fairly good air movement). Abdominal: Soft. He exhibits no distension. There is no tenderness. Musculoskeletal: Normal range of motion. He exhibits edema (trace bilaterally). Neurological: He is alert and oriented to person, place, and time. No cranial nerve deficit. Skin: Skin is warm and dry. Capillary refill takes less than 2 seconds. He is not diaphoretic. Psychiatric: He has a normal mood and affect. His behavior is normal.   Nursing note and vitals reviewed. MDM  Number of Diagnoses or Management Options  Acute exacerbation of chronic obstructive pulmonary disease (COPD) ([facility]):   Acute respiratory failure with hypoxia [facility]):   Diagnosis management comments: Patient complained of headache likely secondary to nitroglycerin was given 2 mg of morphine. After an hour of BiPAP rechecked on him he still has good air movement and occasional wheezing but his work of breathing is normal.  Agreeable to trying BiPAP off.    8:35 AM  Pt doing well on NC, still occasional wheezes but good air movement.   D/w hospitalist.    Pt given abx for elevated lactic but cxr wbc and procal neg and waited for BNP for fluids. I do not think he has sepsis. Given fluids after BNP and repeat lactic.    ===================================================================  This patient is critically ill and there is a high probability of of imminent or life threatening deterioration in the patient's condition without immediate management. The nature of the patient's clinical problem is: resp failure    I have spent 45 minutes in direct patient care, documentation, review of labs/xrays/old records, discussion with Staff, Colleague, Nursing, RT . The time involved in the performance of separately reportable procedures was not counted toward critical care time.      Meenakshi Mercado MD; 6/11/2019 @8:36 AM  ===================================================================             Amount and/or Complexity of Data Reviewed  Clinical lab tests: ordered and reviewed  Decide to obtain previous medical records or to obtain history from someone other than the patient: yes (EMS)  Review and summarize past medical records: yes (No recent admissions.  )  Discuss the patient with other providers: yes  Independent visualization of images, tracings, or specimens: yes (nsr no ST elevation nl QRS)    Risk of Complications, Morbidity, and/or Mortality  Presenting problems: high  Diagnostic procedures: high  Management options: high    Critical Care  Total time providing critical care: 30-74 minutes    Patient Progress  Patient progress: improved         Procedures

## 2019-06-11 NOTE — PROGRESS NOTES
Day shift nurse received a call from IR in regards to patient's CT results. MD notified of patients CT results of pulmonary embolus. Will start heparin drip per protocol and place patient on bedrest order per MD telephone order.

## 2019-06-11 NOTE — ED NOTES
TRANSFER - OUT REPORT:    Verbal report given to HEMAL Warren (name) on Claudia Mendieta  being transferred to 817(unit) for routine progression of care       Report consisted of patients Situation, Background, Assessment and   Recommendations(SBAR). Information from the following report(s) SBAR, ED Summary and Recent Results was reviewed with the receiving nurse. Lines:   Peripheral IV 06/11/19 Right Forearm (Active)   Site Assessment Clean, dry, & intact 6/11/2019  7:14 AM   Phlebitis Assessment 0 6/11/2019  7:14 AM   Infiltration Assessment 0 6/11/2019  7:14 AM       Peripheral IV 06/11/19 Left Forearm (Active)   Site Assessment Clean, dry, & intact 6/11/2019  7:14 AM   Phlebitis Assessment 0 6/11/2019  7:14 AM   Infiltration Assessment 0 6/11/2019  7:14 AM   Dressing Status Clean, dry, & intact 6/11/2019  7:14 AM        Opportunity for questions and clarification was provided.       Patient transported with:   O2 @ 2 liters

## 2019-06-12 ENCOUNTER — APPOINTMENT (OUTPATIENT)
Dept: ULTRASOUND IMAGING | Age: 69
DRG: 176 | End: 2019-06-12
Attending: INTERNAL MEDICINE
Payer: COMMERCIAL

## 2019-06-12 PROBLEM — I26.99 PULMONARY EMBOLI (HCC): Status: ACTIVE | Noted: 2019-06-12

## 2019-06-12 PROBLEM — R91.8 MULTIPLE PULMONARY NODULES: Status: ACTIVE | Noted: 2019-06-12

## 2019-06-12 LAB
ANION GAP SERPL CALC-SCNC: 9 MMOL/L (ref 7–16)
APTT PPP: 141.2 SEC (ref 24.7–39.8)
BASOPHILS # BLD: 0 K/UL (ref 0–0.2)
BASOPHILS NFR BLD: 0 % (ref 0–2)
BUN SERPL-MCNC: 15 MG/DL (ref 8–23)
CALCIUM SERPL-MCNC: 9.3 MG/DL (ref 8.3–10.4)
CHLORIDE SERPL-SCNC: 109 MMOL/L (ref 98–107)
CO2 SERPL-SCNC: 22 MMOL/L (ref 21–32)
CREAT SERPL-MCNC: 1.18 MG/DL (ref 0.8–1.5)
DIFFERENTIAL METHOD BLD: ABNORMAL
EOSINOPHIL # BLD: 0 K/UL (ref 0–0.8)
EOSINOPHIL NFR BLD: 0 % (ref 0.5–7.8)
ERYTHROCYTE [DISTWIDTH] IN BLOOD BY AUTOMATED COUNT: 14.4 % (ref 11.9–14.6)
GLUCOSE SERPL-MCNC: 153 MG/DL (ref 65–100)
HCT VFR BLD AUTO: 34.3 % (ref 41.1–50.3)
HGB BLD-MCNC: 11.2 G/DL (ref 13.6–17.2)
IMM GRANULOCYTES # BLD AUTO: 0.1 K/UL (ref 0–0.5)
IMM GRANULOCYTES NFR BLD AUTO: 1 % (ref 0–5)
LACTATE SERPL-SCNC: 2.4 MMOL/L (ref 0.4–2)
LACTATE SERPL-SCNC: 2.5 MMOL/L (ref 0.4–2)
LYMPHOCYTES # BLD: 0.9 K/UL (ref 0.5–4.6)
LYMPHOCYTES NFR BLD: 9 % (ref 13–44)
MAGNESIUM SERPL-MCNC: 1.9 MG/DL (ref 1.8–2.4)
MCH RBC QN AUTO: 28.3 PG (ref 26.1–32.9)
MCHC RBC AUTO-ENTMCNC: 32.7 G/DL (ref 31.4–35)
MCV RBC AUTO: 86.6 FL (ref 79.6–97.8)
MONOCYTES # BLD: 0.3 K/UL (ref 0.1–1.3)
MONOCYTES NFR BLD: 3 % (ref 4–12)
NEUTS SEG # BLD: 8.7 K/UL (ref 1.7–8.2)
NEUTS SEG NFR BLD: 88 % (ref 43–78)
NRBC # BLD: 0 K/UL (ref 0–0.2)
PLATELET # BLD AUTO: 320 K/UL (ref 150–450)
PMV BLD AUTO: 10 FL (ref 9.4–12.3)
POTASSIUM SERPL-SCNC: 3.6 MMOL/L (ref 3.5–5.1)
RBC # BLD AUTO: 3.96 M/UL (ref 4.23–5.6)
SODIUM SERPL-SCNC: 140 MMOL/L (ref 136–145)
WBC # BLD AUTO: 9.9 K/UL (ref 4.3–11.1)

## 2019-06-12 PROCEDURE — 65270000029 HC RM PRIVATE

## 2019-06-12 PROCEDURE — 94640 AIRWAY INHALATION TREATMENT: CPT

## 2019-06-12 PROCEDURE — 93970 EXTREMITY STUDY: CPT

## 2019-06-12 PROCEDURE — 85730 THROMBOPLASTIN TIME PARTIAL: CPT

## 2019-06-12 PROCEDURE — 99232 SBSQ HOSP IP/OBS MODERATE 35: CPT | Performed by: INTERNAL MEDICINE

## 2019-06-12 PROCEDURE — 77010033678 HC OXYGEN DAILY

## 2019-06-12 PROCEDURE — 74011000250 HC RX REV CODE- 250: Performed by: NURSE PRACTITIONER

## 2019-06-12 PROCEDURE — 94760 N-INVAS EAR/PLS OXIMETRY 1: CPT

## 2019-06-12 PROCEDURE — 74011250636 HC RX REV CODE- 250/636: Performed by: FAMILY MEDICINE

## 2019-06-12 PROCEDURE — 74011000250 HC RX REV CODE- 250: Performed by: FAMILY MEDICINE

## 2019-06-12 PROCEDURE — 93306 TTE W/DOPPLER COMPLETE: CPT

## 2019-06-12 PROCEDURE — 74011250637 HC RX REV CODE- 250/637: Performed by: FAMILY MEDICINE

## 2019-06-12 PROCEDURE — 83605 ASSAY OF LACTIC ACID: CPT

## 2019-06-12 PROCEDURE — 36415 COLL VENOUS BLD VENIPUNCTURE: CPT

## 2019-06-12 PROCEDURE — 74011250636 HC RX REV CODE- 250/636: Performed by: INTERNAL MEDICINE

## 2019-06-12 PROCEDURE — 83735 ASSAY OF MAGNESIUM: CPT

## 2019-06-12 PROCEDURE — 85025 COMPLETE CBC W/AUTO DIFF WBC: CPT

## 2019-06-12 PROCEDURE — 80048 BASIC METABOLIC PNL TOTAL CA: CPT

## 2019-06-12 PROCEDURE — 74011250637 HC RX REV CODE- 250/637: Performed by: NURSE PRACTITIONER

## 2019-06-12 PROCEDURE — 77030020263 HC SOL INJ SOD CL0.9% LFCR 1000ML

## 2019-06-12 PROCEDURE — 74011250637 HC RX REV CODE- 250/637: Performed by: INTERNAL MEDICINE

## 2019-06-12 RX ORDER — IBUPROFEN 200 MG
200 TABLET ORAL ONCE
Status: COMPLETED | OUTPATIENT
Start: 2019-06-12 | End: 2019-06-12

## 2019-06-12 RX ORDER — TEMAZEPAM 15 MG/1
15 CAPSULE ORAL
Status: DISCONTINUED | OUTPATIENT
Start: 2019-06-12 | End: 2019-06-14 | Stop reason: HOSPADM

## 2019-06-12 RX ORDER — TRIPROLIDINE/PSEUDOEPHEDRINE 2.5MG-60MG
200 TABLET ORAL ONCE
Status: DISCONTINUED | OUTPATIENT
Start: 2019-06-12 | End: 2019-06-12

## 2019-06-12 RX ADMIN — MIRTAZAPINE 15 MG: 15 TABLET, FILM COATED ORAL at 20:22

## 2019-06-12 RX ADMIN — PANTOPRAZOLE SODIUM 40 MG: 40 TABLET, DELAYED RELEASE ORAL at 05:21

## 2019-06-12 RX ADMIN — RIVAROXABAN 15 MG: 15 TABLET, FILM COATED ORAL at 12:16

## 2019-06-12 RX ADMIN — Medication 10 ML: at 20:24

## 2019-06-12 RX ADMIN — ALBUTEROL SULFATE 2.5 MG: 2.5 SOLUTION RESPIRATORY (INHALATION) at 19:57

## 2019-06-12 RX ADMIN — TEMAZEPAM 15 MG: 15 CAPSULE ORAL at 20:23

## 2019-06-12 RX ADMIN — Medication 5 ML: at 05:21

## 2019-06-12 RX ADMIN — ASPIRIN 325 MG: 325 TABLET, DELAYED RELEASE ORAL at 08:47

## 2019-06-12 RX ADMIN — BUDESONIDE 500 MCG: 0.5 INHALANT RESPIRATORY (INHALATION) at 19:57

## 2019-06-12 RX ADMIN — OXYBUTYNIN CHLORIDE 5 MG: 5 TABLET, EXTENDED RELEASE ORAL at 08:47

## 2019-06-12 RX ADMIN — TIOTROPIUM BROMIDE 18 MCG: 18 CAPSULE ORAL; RESPIRATORY (INHALATION) at 08:26

## 2019-06-12 RX ADMIN — ACETAMINOPHEN 1000 MG: 500 TABLET, FILM COATED ORAL at 12:16

## 2019-06-12 RX ADMIN — ASPIRIN 325 MG: 325 TABLET, DELAYED RELEASE ORAL at 20:22

## 2019-06-12 RX ADMIN — RIVAROXABAN 15 MG: 15 TABLET, FILM COATED ORAL at 16:58

## 2019-06-12 RX ADMIN — SODIUM CHLORIDE 100 ML/HR: 900 INJECTION, SOLUTION INTRAVENOUS at 20:29

## 2019-06-12 RX ADMIN — Medication 10 ML: at 16:59

## 2019-06-12 RX ADMIN — AZITHROMYCIN MONOHYDRATE 500 MG: 500 INJECTION, POWDER, LYOPHILIZED, FOR SOLUTION INTRAVENOUS at 05:19

## 2019-06-12 RX ADMIN — GUAIFENESIN 1200 MG: 600 TABLET, EXTENDED RELEASE ORAL at 20:22

## 2019-06-12 RX ADMIN — IBUPROFEN 200 MG: 200 TABLET, FILM COATED ORAL at 03:27

## 2019-06-12 RX ADMIN — METHYLPREDNISOLONE SODIUM SUCCINATE 40 MG: 40 INJECTION, POWDER, FOR SOLUTION INTRAMUSCULAR; INTRAVENOUS at 20:25

## 2019-06-12 RX ADMIN — BUDESONIDE 500 MCG: 0.5 INHALANT RESPIRATORY (INHALATION) at 08:25

## 2019-06-12 RX ADMIN — ALBUTEROL SULFATE 2.5 MG: 2.5 SOLUTION RESPIRATORY (INHALATION) at 16:40

## 2019-06-12 RX ADMIN — SODIUM CHLORIDE 125 ML/HR: 900 INJECTION, SOLUTION INTRAVENOUS at 05:18

## 2019-06-12 RX ADMIN — ACETAMINOPHEN 1000 MG: 500 TABLET, FILM COATED ORAL at 05:21

## 2019-06-12 RX ADMIN — AMLODIPINE BESYLATE 10 MG: 10 TABLET ORAL at 08:48

## 2019-06-12 RX ADMIN — ALBUTEROL SULFATE 2.5 MG: 2.5 SOLUTION RESPIRATORY (INHALATION) at 03:34

## 2019-06-12 RX ADMIN — PRAVASTATIN SODIUM 40 MG: 20 TABLET ORAL at 08:47

## 2019-06-12 RX ADMIN — METHYLPREDNISOLONE SODIUM SUCCINATE 40 MG: 40 INJECTION, POWDER, FOR SOLUTION INTRAMUSCULAR; INTRAVENOUS at 08:48

## 2019-06-12 RX ADMIN — MELOXICAM 7.5 MG: 7.5 TABLET ORAL at 08:47

## 2019-06-12 RX ADMIN — GUAIFENESIN 1200 MG: 600 TABLET, EXTENDED RELEASE ORAL at 08:47

## 2019-06-12 RX ADMIN — HYDROCHLOROTHIAZIDE 12.5 MG: 12.5 CAPSULE ORAL at 08:47

## 2019-06-12 NOTE — PROGRESS NOTES
Pt resting quietly in bed on 2 L NC. Pt reports feeling a little better but has not slept well tonight. Pt mentions speaking with doctor today about something to help him sleep. IVF, heparin gtt, and abx infusing at this time with no abnormalities.  Pt reminded of bed rest order and is instructed to call for assistance, call light in reach

## 2019-06-12 NOTE — PROGRESS NOTES
Hospitalist Progress Note    2019  Admit Date: 2019  6:38 AM   NAME: Lisa Shipley   :  1950   MRN:  [id number]   Attending: Dallin Brooks MD  PCP:  Kody Ochoa MD    SUBJECTIVE:   Patient is a 71yo M with hx Asthma, HTN, and CAD who presented for 3 days shortness of breath acutely worse for one day. Initially on bipap and wheezing, improved with duonebs to NC. Noted to have lactic acidosis on admit. CTA demonstrated subsegmental PEs and possible infarct. : duplex legs negative. Transitioned heparin to lovenox. Breathing feeling improved, still very short when up ambulating in room. Review of Systems negative with exception of pertinent positives noted above  PHYSICAL EXAM     Visit Vitals  /90   Pulse 99   Temp 98.8 °F (37.1 °C)   Resp 18   Ht 6' 2\" (1.88 m)   Wt 88.5 kg (195 lb)   SpO2 97%   BMI 25.04 kg/m²      Temp (24hrs), Av.5 °F (36.9 °C), Min:97.8 °F (36.6 °C), Max:99 °F (37.2 °C)    Oxygen Therapy  O2 Sat (%): 97 % (19 1157)  Pulse via Oximetry: 97 beats per minute (19 0334)  O2 Device: Nasal cannula (19 0829)  O2 Flow Rate (L/min): 2 l/min (19 0829)  FIO2 (%): 30 % (19 0645)    Intake/Output Summary (Last 24 hours) at 2019 1409  Last data filed at 2019 1310  Gross per 24 hour   Intake 1942 ml   Output 1025 ml   Net 917 ml      General: No acute distress    Lungs:  Scattered wheeze. Heart:  Regular rate and rhythm,  No murmur, rub, or gallop  Abdomen: Soft, Non distended, Non tender, Positive bowel sounds  Extremities: No cyanosis, clubbing or edema  Neurologic:  No focal deficits    DUPLEX LOWER EXT VENOUS BILAT   Final Result   IMPRESSION: No evidence of deep venous thrombosis in either lower extremity         CT CHEST W CONT   Final Result      XR CHEST PORT   Final Result   IMPRESSION: No evidence of acute cardiopulmonary process. Not significantly   changed since previous study.             [address] Problems    Diagnosis Date Noted    Pulmonary emboli ([facility] [address] 06/12/2019    Multiple pulmonary nodules 06/12/2019    COPD exacerbation ([facility] 06/11/2019    Tobacco use 11/08/2018    Hypertension, benign, controlled 11/18/2015    Coronary atherosclerosis of native coronary vessel 11/18/2015     Plan:    PE   CT small subsegmental LAVONNE, possible RLL subsegmental  Possible small pulm infarct RLL- possible source of elevated lactate  heparin drip transitioned to xarelto today  Wean oxygen as tolerated  Duplex legs negative    COPDe  Azithro/mucinex/steroids/inhaled BDs. pulm following    Pulm nodules  Noted on CTA. Noncalcified, monitor outpatient 1yr.     DVT Prophylaxis: anticoagulated  Dispo: home after wean oxygen    Signed By: Ivy Wilkes MD     June 12, 2019

## 2019-06-12 NOTE — PROGRESS NOTES
Spiritual Care Visit, initial visit. Attempted to visit with patient at bedside. Unable to visit because an an ultrasound was in progress. Visit by Dennise Dhillon, Staff .  Polo., Silvia.B., B.A.

## 2019-06-12 NOTE — PROGRESS NOTES
Beside shift report received from [address] [place] [address]  Patient lying in bed  Respirations present  No signs of distress  No needs expressed at this time  Safety measures in place

## 2019-06-12 NOTE — PROGRESS NOTES
Initial visit to assess spiritual needs. Pt conferred that she was optimistic about her health moving forward. Pt and  had prayer. Kippie Jeneen Heimlich, M,Div.

## 2019-06-12 NOTE — PROGRESS NOTES
Problem: Falls - Risk of  Goal: *Absence of Falls  Description  Document [facility] Fall Risk and appropriate interventions in the flowsheet.   Outcome: Progressing Towards Goal     Problem: Chronic Obstructive Pulmonary Disease (COPD)  Goal: *Absence of hypoxia  Outcome: Progressing Towards Goal  Goal: *Optimize nutritional status  Outcome: Progressing Towards Goal

## 2019-06-12 NOTE — PROGRESS NOTES
Patient admitted overnight for COPD exacerbation. However, it was discovered that she is positive for a PE as well. Patient has been started on Xarelto and we anticipate discharge home with Xarelto Rx. We will provide a 30 day free coupon for Xarelto. Case Management will continue to follow. Care Management Interventions  PCP Verified by CM:  Yes  Transition of Care Consult (CM Consult): Discharge Planning  Discharge Durable Medical Equipment: No  Physical Therapy Consult: No  Occupational Therapy Consult: No  Speech Therapy Consult: No  Current Support Network: Own Home  Confirm Follow Up Transport: Family  Plan discussed with Pt/Family/Caregiver: Yes  Freedom of Choice Offered: Yes  Discharge Location  Discharge Placement: Home

## 2019-06-12 NOTE — PROGRESS NOTES
Pt reports arthritic shoulder pain despite tylenol. MD made aware of patients pain.  Will order ibuprofen 200 mg po one time dose per md telephone order

## 2019-06-12 NOTE — PROGRESS NOTES
Interdisciplinary team rounds were held 6/12/2019 with the following team members:Care Management, Nursing, Physical Therapy, Physician and Clinical Coordinator and the patient. Plan of care discussed. See clinical pathway and/or care plan for interventions and desired outcomes.

## 2019-06-12 NOTE — PROGRESS NOTES
CONSULT NOTE    Fervaldo Lorenzana    6/12/2019    Date of Admission:  6/11/2019    The patient's chart is reviewed and the patient is discussed with the staff. 80 y.o.  male , PMH prostate cancer, tobacco abuse, HTN, CAD, COPD seen here for COPD exacerbation. The patient was admitted today following three days of shortness of breath that progressively became worse. Family at the bedside states that the patient has been short of breath for [facility]" and has simply gotten to the point that he could not continue to tolerate worsening symptoms. He states coughing with difficulty expectorating mucus, wheezing, difficulty taking a deep breath. He states he has been using albuterol inhaler so much at home that the inhaler \"runs out\" way before it is time for a prescription refill. He is a 50 year 1 ppd smoker. Smoking cessation counseling provided, patient educated on the risks of continued use of smoking and its effect on increasing symptom progression. He denies any fevers, chills, nausea, vomiting, or diarrhea. The patient states he does not have a pulmonologist and is using no other treatment than his prn albuterol inhaler. Lactic acid drawn in the ED noted to be 3.34, CXR unremarkable. D dimer was drawn 6/11/19 and noted to be 0.61; CT chest was done and revealed LAVONNE and RLL pulmonary artery pulmonary emboli, as well as numerous non calcified bilateral pulmonary nodules. Patient was placed on heparin gtt. Subjective:     Patient seen resting in bed, states he can \"breathe a little better today\". Currently on heparin gtt, O2 at 2L NC.       Review of Systems  A comprehensive review of systems was negative except for: Respiratory: positive for cough, pleurisy/chest pain, wheezing or dyspnea on exertion    Patient Active Problem List   Diagnosis Code    Accelerated hypertension I10    CAD (coronary artery disease) I25.10    Hyperlipidemia other unsp dyslipidemia E78.5    Hypertension, benign, controlled I10    Coronary atherosclerosis of native coronary vessel I25.10    Tobacco use Z72.0    Pre-op examination Z01.818    OA (osteoarthritis) of hip M16.9    COPD exacerbation (HCC) J44.1    Pulmonary emboli ([address] I26.99       Home DME company: None    Prior to Admission Medications   Prescriptions Last Dose Informant Patient Reported? Taking?   acetaminophen (TYLENOL) 500 mg tablet   No No   Sig: Take 2 Tabs by mouth every six (6) hours. albuterol (PROVENTIL HFA, VENTOLIN HFA, PROAIR HFA) 90 mcg/actuation inhaler   No No   Sig: Take 2 Puffs by inhalation every six (6) hours as needed for Wheezing. Take every 4-6 hours for wheezing   amLODIPine (NORVASC) 10 mg tablet   Yes No   Sig: Take 10 mg by mouth daily. Take / use AM day of surgery  per anesthesia protocols. aspirin delayed-release 325 mg tablet   No No   Sig: Take 1 Tab by mouth every twelve (12) hours. hydroCHLOROthiazide (MICROZIDE) 12.5 mg capsule   Yes No   Sig: Take 12.5 mg by mouth daily. meloxicam (MOBIC) 7.5 mg tablet   No No   Sig: Take 1 Tab by mouth daily. mirtazapine (REMERON) 15 mg tablet   Yes No   Sig: Take 15 mg by mouth nightly. omeprazole (PRILOSEC) 20 mg capsule   Yes No   Sig: Take 20 mg by mouth daily. Take / use AM day of surgery  per anesthesia protocols. ondansetron (ZOFRAN ODT) 8 mg disintegrating tablet   No No   Sig: Take 1 Tab by mouth every eight (8) hours as needed for Nausea. oxybutynin chloride XL (DITROPAN XL) 5 mg CR tablet   Yes No   Sig: Take 5 mg by mouth daily. Take / use AM day of surgery  per anesthesia protocols. pravastatin (PRAVACHOL) 40 mg tablet   Yes No   Sig: Take 40 mg by mouth daily. Take / use AM day of surgery  per anesthesia protocols. senna-docusate (PERICOLACE) 8.6-50 mg per tablet   No No   Sig: Take 2 Tabs by mouth daily.       Facility-Administered Medications: None       Past Medical History:   Diagnosis Date    Arthritis     Asthma albuterol prn (uses 1-2 times per day)    CAD (coronary artery disease) 9/15/2013    [facility]     Coronary atherosclerosis of native coronary vessel 11/18/2015    Current smoker on some days     Dental disorder     Dyspepsia and other specified disorders of function of stomach     GERD (gastroesophageal reflux disease)     on med for control     Hyperlipidemia other unsp dyslipidemia 11/18/2015    on med    Hypertension     on med for control     Hypertension, benign, controlled 11/18/2015    MI (myocardial infarction) ([facility] 2013    NSTEMI    Multiple renal cysts     Other ill-defined conditions(799.89)     hernia, pt unsure of site (resolved with surery)    [facility]     Pulmonary emboli ([facility] 6/12/2019    Stroke ([facility] 2011    TIA; no residual      Past Surgical History:   Procedure Laterality Date    CARDIAC SURG PROCEDURE UNLIST  2013    stent    HX COLECTOMY      due to polyp that could not be excised    HX COLONOSCOPY  2018    HX HERNIA REPAIR      HX PROSTATECTOMY      HX PTCA      x 1     Social History     Socioeconomic History    Marital status: SINGLE     Spouse name: Not on file    Number of children: Not on file    Years of education: Not on file    Highest education level: Not on file   Occupational History    Not on file   Social Needs    Financial resource strain: Not on file    Food insecurity:     Worry: Not on file     Inability: Not on file    Transportation needs:     Medical: Not on file     Non-medical: Not on file   Tobacco Use    Smoking status: Current Some Day Smoker     Years: 50.00    Smokeless tobacco: Never Used   Substance and Sexual Activity    Alcohol use:  Yes     Alcohol/week: 1.8 oz     Types: 3 Cans of beer per week    Drug use: No    Sexual activity: Not on file   Lifestyle    Physical activity:     Days per week: Not on file     Minutes per session: Not on file    Stress: Not on file Relationships    Social connections:     Talks on phone: Not on file     Gets together: Not on file     Attends Christian service: Not on file     Active member of club or organization: Not on file     Attends meetings of clubs or organizations: Not on file     Relationship status: Not on file    Intimate partner violence:     Fear of current or ex partner: Not on file     Emotionally abused: Not on file     Physically abused: Not on file     Forced sexual activity: Not on file   Other Topics Concern    Not on file   Social History Narrative    Not on file     Family History   Problem Relation Age of Onset    Stroke Mother     Diabetes Mother     Heart Disease Maternal Grandmother     Cancer Father      Allergies   Allergen Reactions    Lisinopril Swelling    Tramadol Rash and Itching       Current Facility-Administered Medications   Medication Dose Route Frequency    azithromycin (ZITHROMAX) 500 mg in 0.9% sodium chloride (MBP/ADV) 250 mL  500 mg IntraVENous Q24H    acetaminophen (TYLENOL) tablet 1,000 mg  1,000 mg Oral Q6H    amLODIPine (NORVASC) tablet 10 mg  10 mg Oral DAILY    aspirin delayed-release tablet 325 mg  325 mg Oral Q12H    hydroCHLOROthiazide (MICROZIDE) capsule 12.5 mg  12.5 mg Oral DAILY    meloxicam (MOBIC) tablet 7.5 mg  7.5 mg Oral DAILY    mirtazapine (REMERON) tablet 15 mg  15 mg Oral QHS    pantoprazole (PROTONIX) tablet 40 mg  40 mg Oral ACB    oxybutynin chloride XL (DITROPAN XL) tablet 5 mg  5 mg Oral DAILY    pravastatin (PRAVACHOL) tablet 40 mg  40 mg Oral DAILY    sodium chloride (NS) flush 5-40 mL  5-40 mL IntraVENous Q8H    sodium chloride (NS) flush 5-40 mL  5-40 mL IntraVENous PRN    albuterol (PROVENTIL VENTOLIN) nebulizer solution 2.5 mg  2.5 mg Nebulization Q4H PRN    methylPREDNISolone (PF) (SOLU-MEDROL) injection 40 mg  40 mg IntraVENous Q12H    ondansetron (ZOFRAN ODT) tablet 4 mg  4 mg Oral Q4H PRN    bisacodyl (DULCOLAX) tablet 5 mg  5 mg Oral DAILY PRN    nicotine (NICODERM CQ) 21 mg/24 hr patch 1 Patch  1 Patch TransDERmal Q24H    budesonide (PULMICORT) 500 mcg/2 ml nebulizer suspension  500 mcg Nebulization BID RT    tiotropium (SPIRIVA) inhalation capsule 18 mcg  1 Cap Inhalation DAILY    guaiFENesin ER (MUCINEX) tablet 1,200 mg  1,200 mg Oral Q12H    0.9% sodium chloride infusion  125 mL/hr IntraVENous CONTINUOUS    heparin 25,000 units in dextrose 500 mL infusion  18-36 Units/kg/hr IntraVENous TITRATE         Objective:     Vitals:    06/11/19 2002 06/11/19 2329 06/12/19 0334 06/12/19 0345   BP: 156/86 157/88  (!) 152/92   Pulse: (!) 110 (!) 108  95   Resp: 20 20  20   Temp: 98.5 °F (36.9 °C) 97.8 °F (36.6 °C)  98.5 °F (36.9 °C)   SpO2: 98% 99% 99% 98%   Weight:       Height:           PHYSICAL EXAM     Constitutional:  the patient is well developed and in no acute distress  EENMT:  Sclera clear, pupils equal, oral mucosa moist  Respiratory: Diminished  bases, O2 at 2L sat 98%  Cardiovascular:  RRR without M,G,R  Gastrointestinal: soft and non-tender; with positive bowel sounds. Musculoskeletal: warm without cyanosis. There is no lower extremity edema. Skin:  no jaundice or rashes, no wounds   Neurologic: no gross neuro deficits     Psychiatric:  alert and oriented x 3, pleasant and following commands    CXR:  6/11/2019    IMPRESSION: No evidence of acute cardiopulmonary process. Not significantly  changed since previous study. CT CHEST W/CONTRAST:  6/11/2019      IMPRESSIONS:   1. Findings are positive for a small subsegmental left upper lobe pulmonary  embolus. The findings are equivocal for a small embolus within the subsegmental  right lower lobe pulmonary artery. 2. Numerous noncalcified bilateral pulmonary nodules. One-year follow-up  recommended only if the patient is high risk.   3. Right basilar airspace opacity which could represent a small infarct given  the question of ulnar embolus at this site or could represent atelectasis. 4. Bullous change at the lung apices. 5. Bilateral renal cysts    Recent Labs     06/12/19  0337 06/11/19 2039 06/11/19  0644   WBC 9.9 7.7 6.0   HGB 11.2* 11.4* 12.0*   HCT 34.3* 35.2* 36.6*    328 340   INR  --  1.1  --      Recent Labs     06/12/19 0337 06/11/19 2039 06/11/19  0644    141 141   K 3.6 3.8 3.3*   * 108* 105   * 139* 119*   CO2 22 23 24   BUN 15 16 11   CREA 1.18 1.29 1.28   MG 1.9  --   --    CA 9.3 9.1 9.3   ALB  --   --  3.8   TBILI  --   --  0.3   ALT  --   --  65   SGOT  --   --  60*     No results for input(s): PH, PCO2, PO2, HCO3 in the last 72 hours. Recent Labs     06/12/19 0337 06/11/19 2040 06/11/19  1717   LAC 2.5* 2.7* 2.7*       Assessment:  (Medical Decision Making)     Hospital Problems  Date Reviewed: 6/11/2019          Codes Class Noted POA    * (Principal) [facility] exacerbation [facility] ICD-10-CM: J44.1  ICD-9-CM: 491.21  6/11/2019 Yes    Current smoker x 50 years, no previous Pulmonary follow up or maintenance therapy    Tobacco use ICD-10-CM: Z72.0  ICD-9-CM: 305.1  11/8/2018 Yes    Smoker X 50 years, cessation counseling provided    Hypertension, benign, controlled ICD-10-CM: I10  ICD-9-CM: 401.1  11/18/2015 Yes    Currently on antihypertensives    Coronary atherosclerosis of native coronary vessel ICD-10-CM: I25.10  ICD-9-CM: 414.01  11/18/2015 Yes    Chronic        Pulmonary embolism- now on heparin    Pulmonary nodules- will need CT follow up    Plan:  (Medical Decision Making)     --Continue nicotine patch  --Continue albuterol nebulizers and Pulmicort BID  --Check bedside PFTs  --Smoking cessation counseling provided  --Continue IV azithromycin, Day 2, blood cultures pending  --Continue IV steroids  --Continue heparin gtt, will need [address]        More than 50% of the time documented was spent in face-to-face contact with the patient and in the care of the patient on the floor/unit where the patient is located.         Harry Amaya, NP Lungs: decreased BS with a few trace wheezes  Heart:  RRR with no Murmur/Rubs/Gallops    Additional Comments:  PTE on CT last PM and now on heparin drip. Need to check venous US and echo. Has been followed at [facility] and should be able to convert to Xarelto. Await spirometry. Smoking cessation strongly encouraged. I have spoken with and examined the patient. I agree with the above assessment and plan as documented.     Cristina Sesay MD

## 2019-06-13 LAB
ANION GAP SERPL CALC-SCNC: 8 MMOL/L (ref 7–16)
BUN SERPL-MCNC: 19 MG/DL (ref 8–23)
CALCIUM SERPL-MCNC: 8.6 MG/DL (ref 8.3–10.4)
CHLORIDE SERPL-SCNC: 110 MMOL/L (ref 98–107)
CO2 SERPL-SCNC: 23 MMOL/L (ref 21–32)
CREAT SERPL-MCNC: 1.18 MG/DL (ref 0.8–1.5)
ERYTHROCYTE [DISTWIDTH] IN BLOOD BY AUTOMATED COUNT: 14.5 % (ref 11.9–14.6)
GLUCOSE SERPL-MCNC: 139 MG/DL (ref 65–100)
HCT VFR BLD AUTO: 28 % (ref 41.1–50.3)
HGB BLD-MCNC: 11.3 G/DL (ref 13.6–17.2)
HGB BLD-MCNC: 8.7 G/DL (ref 13.6–17.2)
MCH RBC QN AUTO: 27.6 PG (ref 26.1–32.9)
MCHC RBC AUTO-ENTMCNC: 31.1 G/DL (ref 31.4–35)
MCV RBC AUTO: 88.9 FL (ref 79.6–97.8)
NRBC # BLD: 0 K/UL (ref 0–0.2)
PLATELET # BLD AUTO: 229 K/UL (ref 150–450)
PMV BLD AUTO: 9.8 FL (ref 9.4–12.3)
POTASSIUM SERPL-SCNC: 3.2 MMOL/L (ref 3.5–5.1)
RBC # BLD AUTO: 3.15 M/UL (ref 4.23–5.6)
SODIUM SERPL-SCNC: 141 MMOL/L (ref 136–145)
WBC # BLD AUTO: 9.9 K/UL (ref 4.3–11.1)

## 2019-06-13 PROCEDURE — 74011250637 HC RX REV CODE- 250/637: Performed by: NURSE PRACTITIONER

## 2019-06-13 PROCEDURE — 74011000250 HC RX REV CODE- 250: Performed by: NURSE PRACTITIONER

## 2019-06-13 PROCEDURE — 65270000029 HC RM PRIVATE

## 2019-06-13 PROCEDURE — 80048 BASIC METABOLIC PNL TOTAL CA: CPT

## 2019-06-13 PROCEDURE — 77010033678 HC OXYGEN DAILY

## 2019-06-13 PROCEDURE — 85018 HEMOGLOBIN: CPT

## 2019-06-13 PROCEDURE — 74011250637 HC RX REV CODE- 250/637: Performed by: FAMILY MEDICINE

## 2019-06-13 PROCEDURE — 94760 N-INVAS EAR/PLS OXIMETRY 1: CPT

## 2019-06-13 PROCEDURE — 85027 COMPLETE CBC AUTOMATED: CPT

## 2019-06-13 PROCEDURE — 74011250636 HC RX REV CODE- 250/636: Performed by: INTERNAL MEDICINE

## 2019-06-13 PROCEDURE — 94640 AIRWAY INHALATION TREATMENT: CPT

## 2019-06-13 PROCEDURE — 74011250636 HC RX REV CODE- 250/636: Performed by: FAMILY MEDICINE

## 2019-06-13 PROCEDURE — 99232 SBSQ HOSP IP/OBS MODERATE 35: CPT | Performed by: INTERNAL MEDICINE

## 2019-06-13 PROCEDURE — 74011250637 HC RX REV CODE- 250/637: Performed by: INTERNAL MEDICINE

## 2019-06-13 PROCEDURE — 36415 COLL VENOUS BLD VENIPUNCTURE: CPT

## 2019-06-13 PROCEDURE — 77030020263 HC SOL INJ SOD CL0.9% LFCR 1000ML

## 2019-06-13 PROCEDURE — 74011000250 HC RX REV CODE- 250: Performed by: FAMILY MEDICINE

## 2019-06-13 RX ORDER — POTASSIUM CHLORIDE 20 MEQ/1
40 TABLET, EXTENDED RELEASE ORAL 2 TIMES DAILY
Status: COMPLETED | OUTPATIENT
Start: 2019-06-13 | End: 2019-06-13

## 2019-06-13 RX ORDER — AZITHROMYCIN 250 MG/1
500 TABLET, FILM COATED ORAL DAILY
Status: DISCONTINUED | OUTPATIENT
Start: 2019-06-14 | End: 2019-06-14 | Stop reason: HOSPADM

## 2019-06-13 RX ORDER — PREDNISONE 10 MG/1
20 TABLET ORAL
Status: DISCONTINUED | OUTPATIENT
Start: 2019-06-14 | End: 2019-06-14 | Stop reason: HOSPADM

## 2019-06-13 RX ADMIN — ACETAMINOPHEN 1000 MG: 500 TABLET, FILM COATED ORAL at 06:12

## 2019-06-13 RX ADMIN — AMLODIPINE BESYLATE 10 MG: 10 TABLET ORAL at 08:22

## 2019-06-13 RX ADMIN — ASPIRIN 325 MG: 325 TABLET, DELAYED RELEASE ORAL at 21:45

## 2019-06-13 RX ADMIN — POTASSIUM CHLORIDE 40 MEQ: 20 TABLET, EXTENDED RELEASE ORAL at 17:10

## 2019-06-13 RX ADMIN — HYDROCHLOROTHIAZIDE 12.5 MG: 12.5 CAPSULE ORAL at 09:14

## 2019-06-13 RX ADMIN — GUAIFENESIN 1200 MG: 600 TABLET, EXTENDED RELEASE ORAL at 21:45

## 2019-06-13 RX ADMIN — ALBUTEROL SULFATE 2.5 MG: 2.5 SOLUTION RESPIRATORY (INHALATION) at 07:46

## 2019-06-13 RX ADMIN — ACETAMINOPHEN 1000 MG: 500 TABLET, FILM COATED ORAL at 17:10

## 2019-06-13 RX ADMIN — MIRTAZAPINE 15 MG: 15 TABLET, FILM COATED ORAL at 21:45

## 2019-06-13 RX ADMIN — TEMAZEPAM 15 MG: 15 CAPSULE ORAL at 21:49

## 2019-06-13 RX ADMIN — RIVAROXABAN 15 MG: 15 TABLET, FILM COATED ORAL at 17:10

## 2019-06-13 RX ADMIN — PANTOPRAZOLE SODIUM 40 MG: 40 TABLET, DELAYED RELEASE ORAL at 06:12

## 2019-06-13 RX ADMIN — MELOXICAM 7.5 MG: 7.5 TABLET ORAL at 08:22

## 2019-06-13 RX ADMIN — AZITHROMYCIN MONOHYDRATE 500 MG: 500 INJECTION, POWDER, LYOPHILIZED, FOR SOLUTION INTRAVENOUS at 06:15

## 2019-06-13 RX ADMIN — ALBUTEROL SULFATE 2.5 MG: 2.5 SOLUTION RESPIRATORY (INHALATION) at 19:47

## 2019-06-13 RX ADMIN — Medication 5 ML: at 17:13

## 2019-06-13 RX ADMIN — TIOTROPIUM BROMIDE 18 MCG: 18 CAPSULE ORAL; RESPIRATORY (INHALATION) at 07:46

## 2019-06-13 RX ADMIN — ACETAMINOPHEN 1000 MG: 500 TABLET, FILM COATED ORAL at 11:27

## 2019-06-13 RX ADMIN — BUDESONIDE 500 MCG: 0.5 INHALANT RESPIRATORY (INHALATION) at 07:46

## 2019-06-13 RX ADMIN — PRAVASTATIN SODIUM 40 MG: 20 TABLET ORAL at 08:22

## 2019-06-13 RX ADMIN — POTASSIUM CHLORIDE 40 MEQ: 20 TABLET, EXTENDED RELEASE ORAL at 11:26

## 2019-06-13 RX ADMIN — METHYLPREDNISOLONE SODIUM SUCCINATE 40 MG: 40 INJECTION, POWDER, FOR SOLUTION INTRAMUSCULAR; INTRAVENOUS at 08:23

## 2019-06-13 RX ADMIN — ASPIRIN 325 MG: 325 TABLET, DELAYED RELEASE ORAL at 08:27

## 2019-06-13 RX ADMIN — RIVAROXABAN 15 MG: 15 TABLET, FILM COATED ORAL at 08:23

## 2019-06-13 RX ADMIN — ACETAMINOPHEN 1000 MG: 500 TABLET, FILM COATED ORAL at 23:24

## 2019-06-13 RX ADMIN — Medication 10 ML: at 06:12

## 2019-06-13 RX ADMIN — OXYBUTYNIN CHLORIDE 5 MG: 5 TABLET, EXTENDED RELEASE ORAL at 08:22

## 2019-06-13 RX ADMIN — Medication 5 ML: at 21:45

## 2019-06-13 RX ADMIN — SODIUM CHLORIDE 100 ML/HR: 900 INJECTION, SOLUTION INTRAVENOUS at 06:18

## 2019-06-13 RX ADMIN — GUAIFENESIN 1200 MG: 600 TABLET, EXTENDED RELEASE ORAL at 08:23

## 2019-06-13 RX ADMIN — BUDESONIDE 500 MCG: 0.5 INHALANT RESPIRATORY (INHALATION) at 19:47

## 2019-06-13 NOTE — PROGRESS NOTES
CONSULT NOTE    Claudia Mendieta    6/13/2019    Date of Admission:  6/11/2019    The patient's chart is reviewed and the patient is discussed with the staff. 80 y.o.  male , PMH prostate cancer, tobacco abuse, HTN, CAD, COPD seen here for COPD exacerbation. The patient was admitted today following three days of shortness of breath that progressively became worse. Family at the bedside states that the patient [facility] and has simply gotten to the point that he could not continue to tolerate worsening symptoms. He states coughing with difficulty expectorating mucus, wheezing, difficulty taking a deep breath. He states he has been using albuterol inhaler so much at home that the inhaler \"runs out\" way before it is time for a prescription refill. He is a 51 year 1 ppd smoker. Smoking cessation counseling provided, patient educated on the risks of continued use of smoking and its effect on increasing symptom progression. He denies any fevers, chills, nausea, vomiting, or diarrhea. The patient states he does not have a pulmonologist and is using no other treatment than his prn albuterol inhaler. Lactic acid drawn in the ED noted to be 3.34, CXR unremarkable. D dimer was drawn 6/11/19 and noted to be 0.61; CT chest was done and revealed LAVONNE and RLL pulmonary artery pulmonary emboli, as well as numerous non calcified bilateral pulmonary nodules. Patient was placed on heparin gtt. Subjective:     Changed to Xarelto yesterday and tolerating. Still on 2L with sat %. Feels much better.        Review of Systems    Constitutional: negative for fever, chills, sweats  Cardiac: negative for chest pain, palpitations, syncope, edema  GI: negative for dysphagia, reflux, vomiting, diarrhea, abdominal pain, or melena  Neuro: negative for focal weakness, numbness, headache        Patient Active Problem List   Diagnosis Code    Accelerated hypertension I10    CAD (coronary artery disease) I25.10    Hyperlipidemia other unsp dyslipidemia E78.5    Hypertension, benign, controlled I10    Coronary atherosclerosis of native coronary vessel I25.10    Tobacco use Z72.0    Pre-op examination Z01.818    OA (osteoarthritis) of hip M16.9    COPD exacerbation (HCC) J44.1    Pulmonary emboli (HCC) I26.99    Multiple pulmonary nodules R91.8       Home DME company: None    Prior to Admission Medications   Prescriptions Last Dose Informant Patient Reported? Taking?   acetaminophen (TYLENOL) 500 mg tablet   No No   Sig: Take 2 Tabs by mouth every six (6) hours. albuterol (PROVENTIL HFA, VENTOLIN HFA, PROAIR HFA) 90 mcg/actuation inhaler   No No   Sig: Take 2 Puffs by inhalation every six (6) hours as needed for Wheezing. Take every 4-6 hours for wheezing   amLODIPine (NORVASC) 10 mg tablet   Yes No   Sig: Take 10 mg by mouth daily. Take / use AM day of surgery  per anesthesia protocols. aspirin delayed-release 325 mg tablet   No No   Sig: Take 1 Tab by mouth every twelve (12) hours. hydroCHLOROthiazide (MICROZIDE) 12.5 mg capsule   Yes No   Sig: Take 12.5 mg by mouth daily. meloxicam (MOBIC) 7.5 mg tablet   No No   Sig: Take 1 Tab by mouth daily. mirtazapine (REMERON) 15 mg tablet   Yes No   Sig: Take 15 mg by mouth nightly. omeprazole (PRILOSEC) 20 mg capsule   Yes No   Sig: Take 20 mg by mouth daily. Take / use AM day of surgery  per anesthesia protocols. ondansetron (ZOFRAN ODT) 8 mg disintegrating tablet   No No   Sig: Take 1 Tab by mouth every eight (8) hours as needed for Nausea. oxybutynin chloride XL (DITROPAN XL) 5 mg CR tablet   Yes No   Sig: Take 5 mg by mouth daily. Take / use AM day of surgery  per anesthesia protocols. pravastatin (PRAVACHOL) 40 mg tablet   Yes No   Sig: Take 40 mg by mouth daily. Take / use AM day of surgery  per anesthesia protocols.    senna-docusate (PERICOLACE) 8.6-50 mg per tablet   No No   Sig: Take 2 Tabs by mouth daily. Facility-Administered Medications: None       Past Medical History:   Diagnosis Date    Arthritis     Asthma     albuterol prn (uses 1-2 times per day)    CAD (coronary artery disease) 9/15/2013    Chronic obstructive pulmonary disease (HCC)     Coronary atherosclerosis of native coronary vessel 11/18/2015    Current smoker on some days     Dental disorder     Dyspepsia and other specified disorders of function of stomach     GERD (gastroesophageal reflux disease)     on med for control     Hyperlipidemia other unsp dyslipidemia 11/18/2015    on med    Hypertension     on med for control     Hypertension, benign, controlled 11/18/2015    MI (myocardial infarction) ([facility] [address] 2013    NSTEMI    Multiple renal cysts     Other ill-defined conditions(799.89)     hernia, pt unsure of site (resolved with surery)    Prostate cancer ([facility] [address]     Pulmonary emboli ([facility] [address] 6/12/2019    Stroke ([facility] [address] 2011    TIA; no residual      Past Surgical History:   Procedure Laterality Date    CARDIAC SURG PROCEDURE UNLIST  2013    stent    HX COLECTOMY      due to polyp that could not be excised    HX COLONOSCOPY  2018    HX HERNIA REPAIR      HX PROSTATECTOMY      HX PTCA      x 1     Social History     Socioeconomic History    Marital status: SINGLE     Spouse name: Not on file    Number of children: Not on file    Years of education: Not on file    Highest education level: Not on file   Occupational History    Not on file   Social Needs    Financial resource strain: Not on file    Food insecurity:     Worry: Not on file     Inability: Not on file    Transportation needs:     Medical: Not on file     Non-medical: Not on file   Tobacco Use    Smoking status: Current Some Day Smoker     Years: 50.00    Smokeless tobacco: Never Used   Substance and Sexual Activity    Alcohol use:  Yes     Alcohol/week: 1.8 oz     Types: 3 Cans of beer per week    Drug use: No    Sexual activity: Not on file   Lifestyle    Physical activity:     Days per week: Not on file     Minutes per session: Not on file    Stress: Not on file   Relationships    Social connections:     Talks on phone: Not on file     Gets together: Not on file     Attends Jain service: Not on file     Active member of club or organization: Not on file     Attends meetings of clubs or organizations: Not on file     Relationship status: Not on file    Intimate partner violence:     Fear of current or ex partner: Not on file     Emotionally abused: Not on file     Physically abused: Not on file     Forced sexual activity: Not on file   Other Topics Concern    Not on file   Social History Narrative    Not on file     Family History   Problem Relation Age of Onset    Stroke Mother     Diabetes Mother     Heart Disease Maternal Grandmother     Cancer Father      Allergies   Allergen Reactions    Lisinopril Swelling    Tramadol Rash and Itching       Current Facility-Administered Medications   Medication Dose Route Frequency    [START ON 6/14/2019] azithromycin (ZITHROMAX) tablet 500 mg  500 mg Oral DAILY    rivaroxaban (XARELTO) tablet 15 mg  15 mg Oral BID WITH MEALS    temazepam (RESTORIL) capsule 15 mg  15 mg Oral QHS PRN    acetaminophen (TYLENOL) tablet 1,000 mg  1,000 mg Oral Q6H    amLODIPine (NORVASC) tablet 10 mg  10 mg Oral DAILY    aspirin delayed-release tablet 325 mg  325 mg Oral Q12H    hydroCHLOROthiazide (MICROZIDE) capsule 12.5 mg  12.5 mg Oral DAILY    meloxicam (MOBIC) tablet 7.5 mg  7.5 mg Oral DAILY    mirtazapine (REMERON) tablet 15 mg  15 mg Oral QHS    pantoprazole (PROTONIX) tablet 40 mg  40 mg Oral ACB    oxybutynin chloride XL (DITROPAN XL) tablet 5 mg  5 mg Oral DAILY    pravastatin (PRAVACHOL) tablet 40 mg  40 mg Oral DAILY    sodium chloride (NS) flush 5-40 mL  5-40 mL IntraVENous Q8H    sodium chloride (NS) flush 5-40 mL  5-40 mL IntraVENous PRN    albuterol (PROVENTIL VENTOLIN) nebulizer solution 2.5 mg  2.5 mg Nebulization Q4H PRN    methylPREDNISolone (PF) (SOLU-MEDROL) injection 40 mg  40 mg IntraVENous Q12H    ondansetron (ZOFRAN ODT) tablet 4 mg  4 mg Oral Q4H PRN    bisacodyl (DULCOLAX) tablet 5 mg  5 mg Oral DAILY PRN    nicotine (NICODERM CQ) 21 mg/24 hr patch 1 Patch  1 Patch TransDERmal Q24H    budesonide (PULMICORT) 500 mcg/2 ml nebulizer suspension  500 mcg Nebulization BID RT    tiotropium (SPIRIVA) inhalation capsule 18 mcg  1 Cap Inhalation DAILY    guaiFENesin ER (MUCINEX) tablet 1,200 mg  1,200 mg Oral Q12H    0.9% sodium chloride infusion  100 mL/hr IntraVENous CONTINUOUS         Objective:     Vitals:    06/12/19 2309 06/13/19 0324 06/13/19 0700 06/13/19 0746   BP: 132/76 144/78 147/87    Pulse: 99 91 88    Resp: 18 19 18    Temp: 98.1 °F (36.7 °C) 97.6 °F (36.4 °C) 97.6 °F (36.4 °C)    SpO2: 100% 98% 99% 96%   Weight:       Height:           PHYSICAL EXAM     Constitutional:  the patient is well developed and in no acute distress   EENMT:  Sclera clear, pupils equal, oral mucosa moist  Respiratory: Diminished  bases, O2 at 2L sat 98%  Cardiovascular:  RRR without M,G,R  Gastrointestinal: soft and non-tender; with positive bowel sounds. Musculoskeletal: warm without cyanosis. There is no lower extremity edema. Skin:  no jaundice or rashes, no wounds   Neurologic: no gross neuro deficits     Psychiatric:  alert and oriented x 3, pleasant and following commands    CXR:  6/11/2019    IMPRESSION: No evidence of acute cardiopulmonary process. Not significantly  changed since previous study. CT CHEST W/CONTRAST:  6/11/2019    IMPRESSIONS:   1. Findings are positive for a small subsegmental left upper lobe pulmonary  embolus. The findings are equivocal for a small embolus within the subsegmental  right lower lobe pulmonary artery. 2. Numerous noncalcified bilateral pulmonary nodules. One-year follow-up  recommended only if the patient is high risk.   3. Right basilar airspace opacity which could represent a small infarct given  the question of ulnar embolus at this site or could represent atelectasis. 4. Bullous change at the lung apices. 5. Bilateral renal cysts    Recent Labs     06/13/19  0635 06/12/19 0337 06/11/19 2039 06/11/19 0644   WBC 9.9 9.9 7.7 6.0   HGB 8.7* 11.2* 11.4* 12.0*   HCT 28.0* 34.3* 35.2* 36.6*    320 328 340   INR  --   --  1.1  --      Recent Labs     06/12/19 0337 06/11/19 2039 06/11/19 0644    141 141   K 3.6 3.8 3.3*   * 108* 105   * 139* 119*   CO2 22 23 24   BUN 15 16 11   CREA 1.18 1.29 1.28   MG 1.9  --   --    CA 9.3 9.1 9.3   ALB  --   --  3.8   TBILI  --   --  0.3   ALT  --   --  65   SGOT  --   --  60*     No results for input(s): PH, PCO2, PO2, HCO3 in the last 72 hours. Recent Labs     06/12/19  1145 06/12/19 0337 06/11/19  2040   LAC 2.4* 2.5* 2.7*     Venous US- negative for DVT    Echo 6/12:    PROCEDURE: This was a routine study. A transthoracic echocardiogram was  performed. The study included complete 2D imaging, M-mode, complete spectral  Doppler, and color Doppler. Image quality was adequate. LEFT VENTRICLE: Size was normal. Systolic function was normal. Ejection  fraction was estimated in the range of 60 % to 65 %. There were no regional  wall motion abnormalities. Wall thickness was mildly increased. Left  ventricular diastolic function parameters were normal. Avg E/e': 11.1. RIGHT VENTRICLE: The size was normal. Systolic function was normal. The  tricuspid jet envelope definition was inadequate for estimation of RV   systolic  pressure. LEFT ATRIUM: Size was normal.    RIGHT ATRIUM: Size was normal.    SYSTEMIC VEINS: IVC: The inferior vena cava was normal in size and course. The  respirophasic change in diameter was more than 50%. AORTIC VALVE: The valve was trileaflet. There was no evidence for stenosis. There was no insufficiency.     MITRAL VALVE: There was mild annular calcification. There was no evidence for  stenosis. There was trivial regurgitation. TRICUSPID VALVE: The valve structure was normal. There was no evidence for  stenosis. There was no regurgitation. There was trace regurgitation. PULMONIC VALVE: The valve structure was normal. There was no evidence for  stenosis. There was no insufficiency. PERICARDIUM: There was no pericardial effusion. AORTA: The root exhibited normal size. Assessment:  (Medical Decision Making)     Hospital Problems  Date Reviewed: 6/12/2019          Codes Class Noted POA    * (Principal) Pulmonary emboli ([facility] [address] ICD-10-CM: I26.99  ICD-9-CM: 415.19  6/12/2019 Yes    Now on Xarelto and tolerating. Multiple pulmonary nodules ICD-10-CM: R91.8  ICD-9-CM: 793.19  6/12/2019 Yes    Will need follow up CT in 1 year    [facility] ICD-10-CM: J44.1  ICD-9-CM: 491.21  6/11/2019 Yes    Better    Tobacco use ICD-10-CM: Z72.0  ICD-9-CM: 305.1  11/8/2018 Yes        Hypertension, benign, controlled ICD-10-CM: I10  ICD-9-CM: 401.1  11/18/2015 Yes    Better    Coronary atherosclerosis of native coronary vessel ICD-10-CM: I25.10  ICD-9-CM: 414.01  11/18/2015 Yes                Plan:  (Medical Decision Making)     Check RA sat. May be able to stay off oxygen. Continue nebs  Wean steroids; change to po. Stop IVF. Await results of bedside spirometry. Likely home tomorrow. More than 50% of the time documented was spent in face-to-face contact with the patient and in the care of the patient on the floor/unit where the patient is located.       Alia Morales MD

## 2019-06-13 NOTE — PROGRESS NOTES
Hospitalist Progress Note    2019  Admit Date: 2019  6:38 AM   NAME: Charles Deleon   :  1950   MRN:  [id number]   Attending: Anshul Herring MD  PCP:  Armando Hall MD    SUBJECTIVE:   Patient is a 71yo M with hx Asthma, HTN, and CAD who presented for 3 days shortness of breath acutely worse for one day. Initially on bipap and wheezing, improved with duonebs to NC. Noted to have lactic acidosis on admit. CTA demonstrated subsegmental PEs and possible infarct. : Patient off the Heparin drip, tolerating Xarelto, states feeling and breathing much better. I have changed his IV Zithromax to oral to begin tomorrow for 2 days. Plans to discharge tomorrow as long as remains stable. Saturations 99% 2 LPM NC. 8.7 hemoglobin today, no active signs of bleeding, denies abdominal pain, N/V/D. Review of Systems negative with exception of pertinent positives noted above  PHYSICAL EXAM     Visit Vitals  /87   Pulse 88   Temp 97.6 °F (36.4 °C)   Resp 18   Ht 6' 2\" (1.88 m)   Wt 88.5 kg (195 lb)   SpO2 99%   BMI 25.04 kg/m²      Temp (24hrs), Av.1 °F (36.7 °C), Min:97.6 °F (36.4 °C), Max:98.8 °F (37.1 °C)    Oxygen Therapy  O2 Sat (%): 99 % (19 0700)  Pulse via Oximetry: 102 beats per minute (19)  O2 Device: Nasal cannula (19)  O2 Flow Rate (L/min): 2 l/min (19)  FIO2 (%): 30 % (19 0645)    Intake/Output Summary (Last 24 hours) at 2019 0906  Last data filed at 2019 0619  Gross per 24 hour   Intake 2260 ml   Output 1300 ml   Net 960 ml      General: No acute distress    Lungs:  Scattered wheeze.    Heart:  Regular rate and rhythm,  No murmur, rub, or gallop  Abdomen: Soft, Non distended, Non tender, Positive bowel sounds  Extremities: No cyanosis, clubbing or edema  Neurologic:  No focal deficits    DUPLEX LOWER EXT VENOUS BILAT   Final Result   IMPRESSION: No evidence of deep venous thrombosis in either lower extremity         CT CHEST W CONT Final Result      XR CHEST PORT   Final Result   IMPRESSION: No evidence of acute cardiopulmonary process. Not significantly   changed since previous study. ASSESSMENT      Active Hospital Problems    Diagnosis Date Noted    Pulmonary emboli ([facility] [address] 06/12/2019    Multiple pulmonary nodules 06/12/2019    COPD exacerbation ([facility] [address] 06/11/2019    Tobacco use 11/08/2018    Hypertension, benign, controlled 11/18/2015    Coronary atherosclerosis of native coronary vessel 11/18/2015     Plan:    PE   CT small subsegmental LAVONNE, possible RLL subsegmental  Possible small pulm infarct RLL- possible source of elevated lactate, continues on Zithromax; I have converted to oral for 2 more doses for total of 5 days. xarelto today-hemoglobin 8.7, no signs of active bleeding. Continue to observe and trend. Wean oxygen as tolerated  Duplex legs negative    COPDe  Azithro/mucinex/steroids/inhaled BDs. pulm following    Pulm nodules  Noted on CTA. Noncalcified, monitor outpatient 1yr. DVT Prophylaxis: anticoagulated  Dispo: home tomorrow.      Signed By: Melody Patel NP     June 13, 2019

## 2019-06-14 VITALS
SYSTOLIC BLOOD PRESSURE: 132 MMHG | DIASTOLIC BLOOD PRESSURE: 86 MMHG | OXYGEN SATURATION: 98 % | HEART RATE: 78 BPM | RESPIRATION RATE: 18 BRPM | WEIGHT: 195 LBS | TEMPERATURE: 98 F | BODY MASS INDEX: 25.03 KG/M2 | HEIGHT: 74 IN

## 2019-06-14 LAB
ANION GAP SERPL CALC-SCNC: 9 MMOL/L (ref 7–16)
BASOPHILS # BLD: 0 K/UL (ref 0–0.2)
BASOPHILS NFR BLD: 0 % (ref 0–2)
BUN SERPL-MCNC: 22 MG/DL (ref 8–23)
CALCIUM SERPL-MCNC: 8.9 MG/DL (ref 8.3–10.4)
CHLORIDE SERPL-SCNC: 111 MMOL/L (ref 98–107)
CO2 SERPL-SCNC: 24 MMOL/L (ref 21–32)
CREAT SERPL-MCNC: 1.13 MG/DL (ref 0.8–1.5)
DIFFERENTIAL METHOD BLD: ABNORMAL
EOSINOPHIL # BLD: 0 K/UL (ref 0–0.8)
EOSINOPHIL NFR BLD: 0 % (ref 0.5–7.8)
ERYTHROCYTE [DISTWIDTH] IN BLOOD BY AUTOMATED COUNT: 14.7 % (ref 11.9–14.6)
GLUCOSE SERPL-MCNC: 101 MG/DL (ref 65–100)
HCT VFR BLD AUTO: 36.8 % (ref 41.1–50.3)
HGB BLD-MCNC: 11.7 G/DL (ref 13.6–17.2)
IMM GRANULOCYTES # BLD AUTO: 0.2 K/UL (ref 0–0.5)
IMM GRANULOCYTES NFR BLD AUTO: 2 % (ref 0–5)
LYMPHOCYTES # BLD: 2.6 K/UL (ref 0.5–4.6)
LYMPHOCYTES NFR BLD: 20 % (ref 13–44)
MCH RBC QN AUTO: 27.7 PG (ref 26.1–32.9)
MCHC RBC AUTO-ENTMCNC: 31.8 G/DL (ref 31.4–35)
MCV RBC AUTO: 87 FL (ref 79.6–97.8)
MONOCYTES # BLD: 0.9 K/UL (ref 0.1–1.3)
MONOCYTES NFR BLD: 7 % (ref 4–12)
NEUTS SEG # BLD: 9.3 K/UL (ref 1.7–8.2)
NEUTS SEG NFR BLD: 71 % (ref 43–78)
NRBC # BLD: 0 K/UL (ref 0–0.2)
PLATELET # BLD AUTO: 333 K/UL (ref 150–450)
PMV BLD AUTO: 10 FL (ref 9.4–12.3)
POTASSIUM SERPL-SCNC: 3.4 MMOL/L (ref 3.5–5.1)
RBC # BLD AUTO: 4.23 M/UL (ref 4.23–5.6)
SODIUM SERPL-SCNC: 144 MMOL/L (ref 136–145)
WBC # BLD AUTO: 13.1 K/UL (ref 4.3–11.1)

## 2019-06-14 PROCEDURE — 80048 BASIC METABOLIC PNL TOTAL CA: CPT

## 2019-06-14 PROCEDURE — 74011000250 HC RX REV CODE- 250: Performed by: FAMILY MEDICINE

## 2019-06-14 PROCEDURE — 94640 AIRWAY INHALATION TREATMENT: CPT

## 2019-06-14 PROCEDURE — 85025 COMPLETE CBC W/AUTO DIFF WBC: CPT

## 2019-06-14 PROCEDURE — 74011250637 HC RX REV CODE- 250/637: Performed by: FAMILY MEDICINE

## 2019-06-14 PROCEDURE — 94760 N-INVAS EAR/PLS OXIMETRY 1: CPT

## 2019-06-14 PROCEDURE — 74011250637 HC RX REV CODE- 250/637: Performed by: NURSE PRACTITIONER

## 2019-06-14 PROCEDURE — 74011000250 HC RX REV CODE- 250: Performed by: NURSE PRACTITIONER

## 2019-06-14 PROCEDURE — 99232 SBSQ HOSP IP/OBS MODERATE 35: CPT | Performed by: INTERNAL MEDICINE

## 2019-06-14 PROCEDURE — 74011636637 HC RX REV CODE- 636/637: Performed by: INTERNAL MEDICINE

## 2019-06-14 PROCEDURE — 36415 COLL VENOUS BLD VENIPUNCTURE: CPT

## 2019-06-14 RX ORDER — ASPIRIN 81 MG/1
81 TABLET ORAL DAILY
Qty: 30 TAB | Refills: 0 | Status: SHIPPED | OUTPATIENT
Start: 2019-06-14

## 2019-06-14 RX ORDER — ALBUTEROL SULFATE 90 UG/1
2 AEROSOL, METERED RESPIRATORY (INHALATION)
Qty: 1 INHALER | Refills: 0 | Status: SHIPPED | OUTPATIENT
Start: 2019-06-14 | End: 2019-07-08 | Stop reason: SDUPTHER

## 2019-06-14 RX ORDER — AZITHROMYCIN 500 MG/1
500 TABLET, FILM COATED ORAL DAILY
Qty: 4 TAB | Refills: 0 | Status: SHIPPED | OUTPATIENT
Start: 2019-06-14 | End: 2019-06-18

## 2019-06-14 RX ORDER — PREDNISONE 10 MG/1
TABLET ORAL
Qty: 10 TAB | Refills: 0 | Status: SHIPPED | OUTPATIENT
Start: 2019-06-14 | End: 2019-07-02

## 2019-06-14 RX ADMIN — PANTOPRAZOLE SODIUM 40 MG: 40 TABLET, DELAYED RELEASE ORAL at 05:15

## 2019-06-14 RX ADMIN — AMLODIPINE BESYLATE 10 MG: 10 TABLET ORAL at 09:14

## 2019-06-14 RX ADMIN — PRAVASTATIN SODIUM 40 MG: 20 TABLET ORAL at 09:14

## 2019-06-14 RX ADMIN — BUDESONIDE 500 MCG: 0.5 INHALANT RESPIRATORY (INHALATION) at 08:23

## 2019-06-14 RX ADMIN — TIOTROPIUM BROMIDE 18 MCG: 18 CAPSULE ORAL; RESPIRATORY (INHALATION) at 00:00

## 2019-06-14 RX ADMIN — MELOXICAM 7.5 MG: 7.5 TABLET ORAL at 09:14

## 2019-06-14 RX ADMIN — ALBUTEROL SULFATE 2.5 MG: 2.5 SOLUTION RESPIRATORY (INHALATION) at 05:20

## 2019-06-14 RX ADMIN — ASPIRIN 325 MG: 325 TABLET, DELAYED RELEASE ORAL at 09:14

## 2019-06-14 RX ADMIN — ACETAMINOPHEN 1000 MG: 500 TABLET, FILM COATED ORAL at 11:20

## 2019-06-14 RX ADMIN — Medication 10 ML: at 05:16

## 2019-06-14 RX ADMIN — OXYBUTYNIN CHLORIDE 5 MG: 5 TABLET, EXTENDED RELEASE ORAL at 09:14

## 2019-06-14 RX ADMIN — HYDROCHLOROTHIAZIDE 12.5 MG: 12.5 CAPSULE ORAL at 09:14

## 2019-06-14 RX ADMIN — GUAIFENESIN 1200 MG: 600 TABLET, EXTENDED RELEASE ORAL at 09:14

## 2019-06-14 RX ADMIN — AZITHROMYCIN 500 MG: 250 TABLET, FILM COATED ORAL at 09:13

## 2019-06-14 RX ADMIN — RIVAROXABAN 15 MG: 15 TABLET, FILM COATED ORAL at 09:14

## 2019-06-14 RX ADMIN — PREDNISONE 20 MG: 10 TABLET ORAL at 09:14

## 2019-06-14 NOTE — DISCHARGE SUMMARY
Hospitalist Discharge Summary     Patient ID:  Lashawn Joiner  [id number]  46 y.o.  1950  Admit date: 6/11/2019  6:38 AM  Discharge date and time: 6/14/2019  Attending: No att. providers found  PCP:  Corinne Mims MD  Treatment Team: Utilization Review: Elsie Martínez; Consulting Provider: Albert Mosley MD    Principal Diagnosis Pulmonary emboli [facility])   Principal Problem:    Pulmonary emboli ([facility] [address] (6/12/2019)    Active Problems:    Hypertension, benign, controlled (11/18/2015)      Coronary atherosclerosis of native coronary vessel (11/18/2015)      Tobacco use (11/8/2018)      COPD exacerbation ([facility] [address] (6/11/2019)      Multiple pulmonary nodules (6/12/2019)             Hospital Course:  Please refer to the admission H&P for details of presentation. In summary, the patient is 71yo M with hx Asthma, HTN, and CAD who presented for 3 days shortness of breath acutely worse for one day. Initially on bipap and wheezing, improved with duonebs to NC. Noted to have lactic acidosis on admit. CTA demonstrated subsegmental PEs and possible infarct. LE duplex negative. Started on hep gtt and transitioned to Cindy Severiano. Pt now tolerating room air. Will continue steroid taper and zithromax started on admission for COPD exacerbation. Was also noted to have pulm nodules on CT scan, non calcified, that radiology recommends monitoring as outpatient in with repeat imaging 1 year.      Significant Diagnostic Studies:   Final [99] 6/12/2019 10:21 6/12/2019 10:48   Study Result     Bilateral lower extremity venous ultrasound     INDICATION: Pulmonary embolus     Doppler ultrasound of both lower extremities was performed.     FINDINGS:  There is normal flow in the greater saphenous, common femoral,  superficial femoral, and popliteal veins. Normal compression and augmentation is  demonstrated.  The proximal calf veins are also patent.     IMPRESSION  IMPRESSION: No evidence of deep venous thrombosis in either lower extremity    Status Exam Begun  Exam Ended    Final [99] 6/11/2019 18:46 6/11/2019 18:53   Study Result     HISTORY: Progressively increasing shortness of breath. Wheezing, cough.     Exam: CT chest, PE protocol     Technique: Thin section axial CT images are obtained from the thoracic inlet  through the upper abdomen. Coronal reformatted images are obtained based on the  axial data. 100 cc Isovue 370 is administered intraveneously without incident. Radiation dose reduction techniques were used for this study. Our CT scanners  use one or all of the following: Automated exposure control, adjustment of the  mA and/or kV according to patient size, use of iterative reconstruction.     Comparison: None     Findings: There is a sizable hiatal hernia. There is a peripheral filling defect  within a subsegmental left upper lobe pulmonary artery (for example on image  37). There is question of subsegmental filling defect within the right lower  lobe (image 86). Adjacent to this, there is some airspace opacity seen at the  right lung base. There is no mediastinal, hilar, or axillary lymphadenopathy  seen. There is a 3 mm pulmonary nodule within the right middle lobe (image 75). There is a faint 2 mm pulmonary nodule in the left lower lobe (image 80). There  is also a subpleural nodule in the left lower lobe measuring 3 mm (image 86). There is a right lower lobe pulmonary nodule measuring 4 mm (image 73). There is  a subpleural left lower lobe pulmonary nodule measuring 2 mm (image 73). There  is a tiny right lower lobe pulmonary nodule measuring 2 mm (image 81). No  pleural or pericardial effusions. There is bullous change at the lung apices.     Evaluation of the upper abdomen demonstrates bilateral renal cysts. Bone window  evaluation demonstrates no aggressive osseous lesions.     IMPRESSIONS:     1. Findings are positive for a small subsegmental left upper lobe pulmonary  embolus.  The findings are equivocal for a small embolus within the subsegmental  right lower lobe pulmonary artery. 2. Numerous noncalcified bilateral pulmonary nodules. One-year follow-up  recommended only if the patient is high risk. 3. Right basilar airspace opacity which could represent a small infarct given  the question of ulnar embolus at this site or could represent atelectasis. 4. Bullous change at the lung apices. 5. Bilateral renal cysts. Final [99] 6/11/2019 07:00 6/11/2019 07:09   Study Result     Chest x-ray, 6/11/2019.     INDICATION: Shortness of breath.     COMPARISON: 5/5/2015.     FINDINGS: Single portable upright chest x-ray was provided.     Since the previous exam the heart remains of normal size and contour. The  trachea chris and mediastinum appear to be within normal limits. Pulmonary  vascularity appears to be within normal limits. No consolidations or lung masses  are seen. No acute bony or soft tissue abnormalities are seen.     IMPRESSION  IMPRESSION: No evidence of acute cardiopulmonary process. Not significantly  changed since previous study. Labs: Results:       Chemistry Recent Labs     06/14/19  0535 06/13/19  0955 06/12/19  0337   * 139* 153*    141 140   K 3.4* 3.2* 3.6   * 110* 109*   CO2 24 23 22   BUN 22 19 15   CREA 1.13 1.18 1.18   CA 8.9 8.6 9.3   AGAP 9 8 9      CBC w/Diff Recent Labs     06/14/19  0535 06/13/19  1448 06/13/19  0635 06/12/19  0337 06/11/19  2039   WBC 13.1*  --  9.9 9.9 7.7   RBC 4.23  --  3.15* 3.96* 4.07*   HGB 11.7* 11.3* 8.7* 11.2* 11.4*   HCT 36.8*  --  28.0* 34.3* 35.2*     --  229 320 328   GRANS 71  --   --  88* 90*   LYMPH 20  --   --  9* 8*   EOS 0*  --   --  0* 0*      Cardiac Enzymes No results for input(s): CPK, CKND1, PEREZ in the last 72 hours.     No lab exists for component: CKRMB, TROIP   Coagulation Recent Labs     06/12/19  0337 06/11/19 2039   PTP  --  14.3   INR  --  1.1   APTT 141.2* 25.5       Lipid Panel Lab Results   Component Value Date/Time Cholesterol, total 176 09/14/2013 06:35 AM    HDL Cholesterol 54 09/14/2013 06:35 AM    LDL, calculated 77.4 09/14/2013 06:35 AM    VLDL, calculated 44.6 (H) 09/14/2013 06:35 AM    Triglyceride 223 (H) 09/14/2013 06:35 AM    CHOL/HDL Ratio 3.3 09/14/2013 06:35 AM      BNP No results for input(s): BNPP in the last 72 hours. Liver Enzymes No results for input(s): TP, ALB, TBIL, AP, SGOT, GPT in the last 72 hours. No lab exists for component: DBIL   Thyroid Studies No results found for: T4, T3U, TSH, TSHEXT         Discharge Exam:  Visit Vitals  /86   Pulse 78   Temp 98 °F (36.7 °C)   Resp 18   Ht 6' 2\" (1.88 m)   Wt 88.5 kg (195 lb)   SpO2 98%   BMI 25.04 kg/m²     General appearance: alert, cooperative, no distress, appears stated age  Lungs: clear to auscultation bilaterally  Heart: regular rate and rhythm, S1, S2 normal, no murmur, click, rub or gallop  Abdomen: soft, non-tender. Bowel sounds normal. No masses,  no organomegaly  Extremities: no cyanosis or edema  Neurologic: Grossly normal    Disposition: home  Discharge Condition: stable  Patient Instructions:   Discharge Medication List as of 6/14/2019 12:55 PM      START taking these medications    Details   azithromycin (ZITHROMAX) 500 mg tab Take 1 Tab by mouth daily for 4 days. , Print, Disp-4 Tab, R-0      guaiFENesin ER (MUCINEX) 1,200 mg Ta12 ER tablet Take 1 Tab by mouth every twelve (12) hours for 4 days. , Print, Disp-8 Tab, R-0      predniSONE (DELTASONE) 10 mg tablet Two tabs by mouth daily x 3 days then one tab by mouth daily x 3 days then stop, Print, Disp-10 Tab, R-0      !! rivaroxaban (XARELTO) 15 mg tab tablet Take 1 Tab by mouth two (2) times daily (with meals) for 18 days. , Print, Disp-36 Tab, R-0      !! rivaroxaban (XARELTO) 20 mg tab tablet Take 1 Tab by mouth daily (with breakfast). , Print, Disp-30 Tab, R-0      tiotropium (SPIRIVA) 18 mcg inhalation capsule Take 1 Cap by inhalation daily. , Print, Disp-30 Cap, R-0       !! - Potential duplicate medications found. Please discuss with provider. CONTINUE these medications which have CHANGED    Details   aspirin delayed-release 81 mg tablet Take 1 Tab by mouth daily. , Print, Disp-30 Tab, R-0      albuterol (PROVENTIL HFA, VENTOLIN HFA, PROAIR HFA) 90 mcg/actuation inhaler Take 2 Puffs by inhalation every six (6) hours as needed for Wheezing. Take every 4-6 hours for wheezing, Print, Disp-1 Inhaler, R-0         CONTINUE these medications which have NOT CHANGED    Details   mirtazapine (REMERON) 15 mg tablet Take 15 mg by mouth nightly., Historical Med      acetaminophen (TYLENOL) 500 mg tablet Take 2 Tabs by mouth every six (6) hours. , Print, Disp-120 Tab, R-0      ondansetron (ZOFRAN ODT) 8 mg disintegrating tablet Take 1 Tab by mouth every eight (8) hours as needed for Nausea. , Print, Disp-30 Tab, R-0      senna-docusate (PERICOLACE) 8.6-50 mg per tablet Take 2 Tabs by mouth daily. , Print, Disp-120 Tab, R-0      hydroCHLOROthiazide (MICROZIDE) 12.5 mg capsule Take 12.5 mg by mouth daily. , Historical Med      oxybutynin chloride XL (DITROPAN XL) 5 mg CR tablet Take 5 mg by mouth daily. Take / use AM day of surgery  per anesthesia protocols., Historical Med      omeprazole (PRILOSEC) 20 mg capsule Take 20 mg by mouth daily. Take / use AM day of surgery  per anesthesia protocols., Historical Med      pravastatin (PRAVACHOL) 40 mg tablet Take 40 mg by mouth daily. Take / use AM day of surgery  per anesthesia protocols., Historical Med      amLODIPine (NORVASC) 10 mg tablet Take 10 mg by mouth daily.  Take / use AM day of surgery  per anesthesia protocols., Historical Med         STOP taking these medications       meloxicam (MOBIC) 7.5 mg tablet Comments:   Reason for Stopping:         HYDROmorphone (DILAUDID) 2 mg tablet Comments:   Reason for Stopping:               Activity: as tolerated  Diet: cardiac      Follow-up  ·   Pulm in 1-2 weeks, PCP in 1-2 weks  Time spent to discharge patient 35 minutes  Signed:  Sariah Abdul DO  6/14/2019  5:04 PM

## 2019-06-14 NOTE — PROGRESS NOTES
Patient resting in bed, alert and oriented, patient denies pain or distress, dyspnea on exertion, safety measures in place, call light within reach.

## 2019-06-14 NOTE — PROGRESS NOTES
CONSULT NOTE    Alice Shannon    6/14/2019    Date of Admission:  6/11/2019    The patient's chart is reviewed and the patient is discussed with the staff. 80 y.o.  male , PMH prostate cancer, tobacco abuse, HTN, CAD, COPD seen here for COPD exacerbation. The patient was admitted today following three days of shortness of breath that progressively became worse. Family at the bedside states that the [facility] and has simply gotten to the point that he could not continue to tolerate worsening symptoms. He states coughing with difficulty expectorating mucus, wheezing, difficulty taking a deep breath. He states he has been using albuterol inhaler so much at home that the inhaler \"runs out\" way before it is time for a prescription refill. He is a 51 year 1 ppd smoker. Smoking cessation counseling provided, patient educated on the risks of continued use of smoking and its effect on increasing symptom progression. He denies any fevers, chills, nausea, vomiting, or diarrhea. The patient states he does not have a pulmonologist and is using no other treatment than his prn albuterol inhaler. Lactic acid drawn in the ED noted to be 3.34, CXR unremarkable. D dimer was drawn 6/11/19 and noted to be 0.61; CT chest was done and revealed LAVONNE and RLL pulmonary artery pulmonary emboli, as well as numerous non calcified bilateral pulmonary nodules. Patient was placed on heparin gtt.       Subjective:     Asking lot of questions, all answered  On RA      Review of Systems    Constitutional: negative for fever, chills, sweats  Cardiac: negative for chest pain, palpitations, syncope, edema  GI: negative for dysphagia, reflux, vomiting, diarrhea, abdominal pain, or melena  Neuro: negative for focal weakness, numbness, headache        Patient Active Problem List   Diagnosis Code    Accelerated hypertension I10    CAD (coronary artery disease) I25.10    Hyperlipidemia other unsp dyslipidemia E78.5    Hypertension, benign, controlled I10    Coronary atherosclerosis of native coronary vessel I25.10    Tobacco use Z72.0    Pre-op examination Z01.818    OA (osteoarthritis) of hip M16.9    COPD exacerbation (HCC) J44.1    Pulmonary emboli (HCC) I26.99    Multiple pulmonary nodules R91.8       Home DME company: None    Prior to Admission Medications   Prescriptions Last Dose Informant Patient Reported? Taking?   acetaminophen (TYLENOL) 500 mg tablet   No No   Sig: Take 2 Tabs by mouth every six (6) hours. albuterol (PROVENTIL HFA, VENTOLIN HFA, PROAIR HFA) 90 mcg/actuation inhaler   No No   Sig: Take 2 Puffs by inhalation every six (6) hours as needed for Wheezing. Take every 4-6 hours for wheezing   amLODIPine (NORVASC) 10 mg tablet   Yes No   Sig: Take 10 mg by mouth daily. Take / use AM day of surgery  per anesthesia protocols. aspirin delayed-release 325 mg tablet   No No   Sig: Take 1 Tab by mouth every twelve (12) hours. hydroCHLOROthiazide (MICROZIDE) 12.5 mg capsule   Yes No   Sig: Take 12.5 mg by mouth daily. meloxicam (MOBIC) 7.5 mg tablet   No No   Sig: Take 1 Tab by mouth daily. mirtazapine (REMERON) 15 mg tablet   Yes No   Sig: Take 15 mg by mouth nightly. omeprazole (PRILOSEC) 20 mg capsule   Yes No   Sig: Take 20 mg by mouth daily. Take / use AM day of surgery  per anesthesia protocols. ondansetron (ZOFRAN ODT) 8 mg disintegrating tablet   No No   Sig: Take 1 Tab by mouth every eight (8) hours as needed for Nausea. oxybutynin chloride XL (DITROPAN XL) 5 mg CR tablet   Yes No   Sig: Take 5 mg by mouth daily. Take / use AM day of surgery  per anesthesia protocols. pravastatin (PRAVACHOL) 40 mg tablet   Yes No   Sig: Take 40 mg by mouth daily. Take / use AM day of surgery  per anesthesia protocols. senna-docusate (PERICOLACE) 8.6-50 mg per tablet   No No   Sig: Take 2 Tabs by mouth daily.       Facility-Administered Medications: None       Past Medical History:   Diagnosis Date    Arthritis     Asthma     albuterol prn (uses 1-2 times per day)    CAD (coronary artery disease) 9/15/2013    Chronic obstructive pulmonary disease (HCC)     Coronary atherosclerosis of native coronary vessel 11/18/2015    Current smoker on some days     Dental disorder     Dyspepsia and other specified disorders of function of stomach     GERD (gastroesophageal reflux disease)     on med for control     Hyperlipidemia other unsp dyslipidemia 11/18/2015    on med    Hypertension     on med for control     Hypertension, benign, controlled 11/18/2015    MI (myocardial infarction) ([facility] [address] 2013    NSTEMI    Multiple renal cysts     Other ill-defined conditions(799.89)     hernia, pt unsure of site (resolved with surery)    Prostate cancer ([facility] [address]     Pulmonary emboli ([facility] [address] 6/12/2019    [facility]    TIA; no residual      Past Surgical History:   Procedure Laterality Date    CARDIAC SURG PROCEDURE UNLIST  2013    stent    HX COLECTOMY      due to polyp that could not be excised    HX COLONOSCOPY  2018    HX HERNIA REPAIR      HX PROSTATECTOMY      HX PTCA      x 1     Social History     Socioeconomic History    Marital status: SINGLE     Spouse name: Not on file    Number of children: Not on file    Years of education: Not on file    Highest education level: Not on file   Occupational History    Not on file   Social Needs    Financial resource strain: Not on file    Food insecurity:     Worry: Not on file     Inability: Not on file    Transportation needs:     Medical: Not on file     Non-medical: Not on file   Tobacco Use    Smoking status: Current Some Day Smoker     Years: 50.00    Smokeless tobacco: Never Used   Substance and Sexual Activity    Alcohol use:  Yes     Alcohol/week: 1.8 oz     Types: 3 Cans of beer per week    Drug use: No    Sexual activity: Not on file   Lifestyle    Physical activity:     Days per week: Not on file     Minutes per session: Not on file    Stress: Not on file   Relationships    Social connections:     Talks on phone: Not on file     Gets together: Not on file     Attends Sikh service: Not on file     Active member of club or organization: Not on file     Attends meetings of clubs or organizations: Not on file     Relationship status: Not on file    Intimate partner violence:     Fear of current or ex partner: Not on file     Emotionally abused: Not on file     Physically abused: Not on file     Forced sexual activity: Not on file   Other Topics Concern    Not on file   Social History Narrative    Not on file     Family History   Problem Relation Age of Onset    Stroke Mother     Diabetes Mother     Heart Disease Maternal Grandmother     Cancer Father      Allergies   Allergen Reactions    Lisinopril Swelling    Tramadol Rash and Itching       Current Facility-Administered Medications   Medication Dose Route Frequency    azithromycin (ZITHROMAX) tablet 500 mg  500 mg Oral DAILY    predniSONE (DELTASONE) tablet 20 mg  20 mg Oral DAILY WITH BREAKFAST    [START ON 7/3/2019] rivaroxaban (XARELTO) tablet 20 mg  20 mg Oral DAILY WITH DINNER    rivaroxaban (XARELTO) tablet 15 mg  15 mg Oral BID WITH MEALS    temazepam (RESTORIL) capsule 15 mg  15 mg Oral QHS PRN    acetaminophen (TYLENOL) tablet 1,000 mg  1,000 mg Oral Q6H    amLODIPine (NORVASC) tablet 10 mg  10 mg Oral DAILY    aspirin delayed-release tablet 325 mg  325 mg Oral Q12H    hydroCHLOROthiazide (MICROZIDE) capsule 12.5 mg  12.5 mg Oral DAILY    meloxicam (MOBIC) tablet 7.5 mg  7.5 mg Oral DAILY    mirtazapine (REMERON) tablet 15 mg  15 mg Oral QHS    pantoprazole (PROTONIX) tablet 40 mg  40 mg Oral ACB    oxybutynin chloride XL (DITROPAN XL) tablet 5 mg  5 mg Oral DAILY    pravastatin (PRAVACHOL) tablet 40 mg  40 mg Oral DAILY    sodium chloride (NS) flush 5-40 mL  5-40 mL IntraVENous Q8H    sodium chloride (NS) flush 5-40 mL  5-40 mL IntraVENous PRN    albuterol (PROVENTIL VENTOLIN) nebulizer solution 2.5 mg  2.5 mg Nebulization Q4H PRN    ondansetron (ZOFRAN ODT) tablet 4 mg  4 mg Oral Q4H PRN    bisacodyl (DULCOLAX) tablet 5 mg  5 mg Oral DAILY PRN    nicotine (NICODERM CQ) 21 mg/24 hr patch 1 Patch  1 Patch TransDERmal Q24H    budesonide (PULMICORT) 500 mcg/2 ml nebulizer suspension  500 mcg Nebulization BID RT    tiotropium (SPIRIVA) inhalation capsule 18 mcg  1 Cap Inhalation DAILY    guaiFENesin ER (MUCINEX) tablet 1,200 mg  1,200 mg Oral Q12H         Objective:     Vitals:    06/14/19 0309 06/14/19 0520 06/14/19 0713 06/14/19 0825   BP: 130/86  151/90    Pulse: 70  80    Resp: 18  19    Temp: 97.7 °F (36.5 °C)  98.8 °F (37.1 °C)    SpO2: 98% 97% 98% 96%   Weight:       Height:           PHYSICAL EXAM     Constitutional:  the patient is well developed and in no acute distress   EENMT:  Sclera clear, pupils equal, oral mucosa moist  Respiratory: Diminished  bases, O2 at 2L sat 98%  Cardiovascular:  RRR without M,G,R  Gastrointestinal: soft and non-tender; with positive bowel sounds. Musculoskeletal: warm without cyanosis. There is no lower extremity edema. Skin:  no jaundice or rashes, no wounds   Neurologic: no gross neuro deficits     Psychiatric:  alert and oriented x 3, pleasant and following commands    CXR:  6/11/2019    IMPRESSION: No evidence of acute cardiopulmonary process. Not significantly  changed since previous study. CT CHEST W/CONTRAST:  6/11/2019    IMPRESSIONS:   1. Findings are positive for a small subsegmental left upper lobe pulmonary  embolus. The findings are equivocal for a small embolus within the subsegmental  right lower lobe pulmonary artery. 2. Numerous noncalcified bilateral pulmonary nodules. One-year follow-up  recommended only if the patient is high risk.   3. Right basilar airspace opacity which could represent a small infarct given  the question of ulnar embolus at this site or could represent atelectasis. 4. Bullous change at the lung apices. 5. Bilateral renal cysts    Recent Labs     06/14/19  0535 06/13/19  1448 06/13/19  0635 06/12/19  0337 06/11/19  2039   WBC 13.1*  --  9.9 9.9 7.7   HGB 11.7* 11.3* 8.7* 11.2* 11.4*   HCT 36.8*  --  28.0* 34.3* 35.2*     --  229 320 328   INR  --   --   --   --  1.1     Recent Labs     06/14/19  0535 06/13/19  0955 06/12/19  0337    141 140   K 3.4* 3.2* 3.6   * 110* 109*   * 139* 153*   CO2 24 23 22   BUN 22 19 15   CREA 1.13 1.18 1.18   MG  --   --  1.9   CA 8.9 8.6 9.3     No results for input(s): PH, PCO2, PO2, HCO3 in the last 72 hours. Recent Labs     06/12/19  1145 06/12/19  0337 06/11/19  2040   LAC 2.4* 2.5* 2.7*     Venous US- negative for DVT    Echo 6/12:    PROCEDURE: This was a routine study. A transthoracic echocardiogram was  performed. The study included complete 2D imaging, M-mode, complete spectral  Doppler, and color Doppler. Image quality was adequate. LEFT VENTRICLE: Size was normal. Systolic function was normal. Ejection  fraction was estimated in the range of 60 % to 65 %. There were no regional  wall motion abnormalities. Wall thickness was mildly increased. Left  ventricular diastolic function parameters were normal. Avg E/e': 11.1. RIGHT VENTRICLE: The size was normal. Systolic function was normal. The  tricuspid jet envelope definition was inadequate for estimation of RV   systolic  pressure. LEFT ATRIUM: Size was normal.    RIGHT ATRIUM: Size was normal.    SYSTEMIC VEINS: IVC: The inferior vena cava was normal in size and course. The  respirophasic change in diameter was more than 50%. AORTIC VALVE: The valve was trileaflet. There was no evidence for stenosis. There was no insufficiency. MITRAL VALVE: There was mild annular calcification. There was no evidence for  stenosis. There was trivial regurgitation.     TRICUSPID VALVE: The valve structure was normal. There was no evidence for  stenosis. There was no regurgitation. There was trace regurgitation. PULMONIC VALVE: The valve structure was normal. There was no evidence for  stenosis. There was no insufficiency. PERICARDIUM: There was no pericardial effusion. AORTA: The root exhibited normal size. Assessment:  (Medical Decision Making)     Hospital Problems  Date Reviewed: 6/12/2019          Codes Class Noted POA    * (Principal) Pulmonary emboli ([facility] [address] ICD-10-CM: I26.99  ICD-9-CM: 415.19  6/12/2019 Yes    Now on Xarelto and tolerating. Multiple pulmonary nodules ICD-10-CM: R91.8  ICD-9-CM: 793.19  6/12/2019 Yes    Will need follow up CT in 1 year    [facility] ICD-10-CM: J44.1  ICD-9-CM: 491.21  6/11/2019 Yes    Better    Tobacco use ICD-10-CM: Z72.0  ICD-9-CM: 305.1  11/8/2018 Yes        Hypertension, benign, controlled ICD-10-CM: I10  ICD-9-CM: 401.1  11/18/2015 Yes    Better    Coronary atherosclerosis of native coronary vessel ICD-10-CM: I25.10  ICD-9-CM: 414.01  11/18/2015 Yes                Plan:  (Medical Decision Making)   -Sandro Ogden to go home  -will follow up in our office  Smoking cessation  -    More than 50% of the time documented was spent in face-to-face contact with the patient and in the care of the patient on the floor/unit where the patient is located.       Kwasi Siddiqi MD

## 2019-06-14 NOTE — DISCHARGE INSTRUCTIONS
DISCHARGE SUMMARY from Nurse    PATIENT INSTRUCTIONS:    *  Please give a list of your current medications to your Primary Care Provider. *  Please update this list whenever your medications are discontinued, doses are      changed, or new medications (including over-the-counter products) are added. *  Please carry medication information at all times in case of emergency situations. These are general instructions for a healthy lifestyle:    No smoking/ No tobacco products/ Avoid exposure to second hand smoke  Surgeon General's Warning:  Quitting smoking now greatly reduces serious risk to your health. Obesity, smoking, and sedentary lifestyle greatly increases your risk for illness    A healthy diet, regular physical exercise & weight monitoring are important for maintaining a healthy lifestyle    You may be retaining fluid if you have a history of heart failure or if you experience any of the following symptoms:  Weight gain of 3 pounds or more overnight or 5 pounds in a week, increased swelling in our hands or feet or shortness of breath while lying flat in bed. Please call your doctor as soon as you notice any of these symptoms; do not wait until your next office visit. Recognize signs and symptoms of STROKE:    F-face looks uneven    A-arms unable to move or move unevenly    S-speech slurred or non-existent    T-time-call 911 as soon as signs and symptoms begin-DO NOT go       Back to bed or wait to see if you get better-TIME IS BRAIN. Warning Signs of HEART ATTACK     Call 911 if you have these symptoms:   Chest discomfort. Most heart attacks involve discomfort in the center of the chest that lasts more than a few minutes, or that goes away and comes back. It can feel like uncomfortable pressure, squeezing, fullness, or pain.  Discomfort in other areas of the upper body. Symptoms can include pain or discomfort in one or both arms, the back, neck, jaw, or stomach.    Shortness of breath with or without chest discomfort.  Other signs may include breaking out in a cold sweat, nausea, or lightheadedness. Don't wait more than five minutes to call 911 - MINUTES MATTER! Fast action can save your life. Calling 911 is almost always the fastest way to get lifesaving treatment. Emergency Medical Services staff can begin treatment when they arrive -- up to an hour sooner than if someone gets to the hospital by car. The discharge information has been reviewed with the patient. The patient verbalized understanding. Discharge medications reviewed with the patient and appropriate educational materials and side effects teaching were provided. ___________________________________________________________________________________________________________________________________    Patient Education        Pulmonary Embolism: Care Instructions  Your Care Instructions    Pulmonary embolism is the sudden blockage of an artery in the lung. Blood clots in the deep veins of the leg or pelvis (deep vein thrombosis, or DVT) are the most common cause. These blood clots can travel to the lungs. Pulmonary embolism can be very serious. Because you have had one pulmonary embolism, you are at greater risk for having another one. But you can take steps to prevent another pulmonary embolism by following your doctor's instructions. You will probably take a prescription blood-thinning medicine to prevent blood clots. A blood thinner can stop a blood clot from growing larger and prevent new clots from forming. Follow-up care is a key part of your treatment and safety. Be sure to make and go to all appointments, and call your doctor if you are having problems. It's also a good idea to know your test results and keep a list of the medicines you take. How can you care for yourself at home? · Take your medicines exactly as prescribed. Call your doctor if you think you are having a problem with your medicine.  You will get more details on the specific medicines your doctor prescribes. · If you are taking a blood thinner, be sure you get instructions about how to take your medicine safely. Blood thinners can cause serious bleeding problems. Preventing future pulmonary embolisms  · Exercise. Keep blood moving in your legs to keep clots from forming. If you are traveling by car, stop every hour or so. Get out and walk around for a few minutes. If you are traveling by bus, train, or plane, get out of your seat and walk up and down the aisles every hour or so. You also can do leg exercises while you are seated. Pump your feet up and down by pulling your toes up toward your knees then pointing them down. · Get up out of bed as soon as possible after an illness or surgery. · Do not smoke. If you need help quitting, talk to your doctor about stop-smoking programs and medicines. These can increase your chances of quitting for good. · Check with your doctor before taking hormone or birth control pills. These may increase your risk of blot clots. · Ask your doctor about wearing compression stockings to help prevent blood clots in your legs. There are different types of stockings, and they need to fit right. So your doctor will recommend what you need. When should you call for help? Call 911 anytime you think you may need emergency care. For example, call if:    · You have shortness of breath.     · You have chest pain.     · You passed out (lost consciousness).     · You cough up blood.    Call your doctor now or seek immediate medical care if:    · You have new or worsening pain or swelling in your leg.    Watch closely for changes in your health, and be sure to contact your doctor if:    · You do not get better as expected. Where can you learn more? Go to http://chris-kvng.info/. Enter K542 in the search box to learn more about \"Pulmonary Embolism: Care Instructions. \"  Current as of: September 26, 2018  Content Version: 11.9  © 0360-1592 [employer]. Care instructions adapted under license by [employer] (which disclaims liability or warranty for this information). If you have questions about a medical condition or this instruction, always ask your healthcare professional. [address] any warranty or liability for your use of this information.

## 2019-06-14 NOTE — PROGRESS NOTES
Discharge instructions and prescriptions provided and explained to patient, patient voiced understanding. Medication side effect sheet reviewed with patient. Discussed bleeding precautions. Patient voiced understanding. No home medications or valuables to return. Opportunity for questions provided. Instructed to call once ready to leave the floor.

## 2019-06-14 NOTE — PROCEDURES
[facility]  PROCEDURE NOTE    Name:  Tom Quintero  MR#:  [id number]  :  1950  ACCOUNT #:  [de-identified]  DATE OF SERVICE:  2019    INTERPRETATION OF SPIROMETRY    FVC decreased to 1.86 which is 37% of predicted. FEV1 was decreased to 0.96 which is 29% of predicted. FEV1% was reduced at 53%. IMPRESSION:  Abnormal spirometry consistent with a very severe obstructive defect. Forced vital capacity was reduced and maybe secondary to obstruction but lung volumes will be needed to confirm this.         Brandie De La Vega MD [id number]/  D:  2019 16:56  T:  2019 3:37  JOB #:  [id number]

## 2019-06-14 NOTE — PROGRESS NOTES
Pt resting in chair watching tv. Pt is stable. Pt is axo. Respirations are even and unlabored on room air. Pt denies SOB, pain or discomfort. Bed is in low and locked position. Call light is within reach. Pt instructed to ask for assistance if needed. Will continue to monitor.

## 2019-06-16 LAB
BACTERIA SPEC CULT: NORMAL
BACTERIA SPEC CULT: NORMAL
SERVICE CMNT-IMP: NORMAL
SERVICE CMNT-IMP: NORMAL

## 2019-06-29 ENCOUNTER — APPOINTMENT (OUTPATIENT)
Dept: GENERAL RADIOLOGY | Age: 69
DRG: 377 | End: 2019-06-29
Attending: NURSE PRACTITIONER
Payer: COMMERCIAL

## 2019-06-29 ENCOUNTER — HOSPITAL ENCOUNTER (OUTPATIENT)
Age: 69
Setting detail: OBSERVATION
Discharge: HOME | DRG: 377 | End: 2019-07-02
Attending: EMERGENCY MEDICINE | Admitting: FAMILY MEDICINE
Payer: COMMERCIAL

## 2019-06-29 DIAGNOSIS — M10.9 ACUTE GOUT INVOLVING TOE OF LEFT FOOT, UNSPECIFIED CAUSE: ICD-10-CM

## 2019-06-29 DIAGNOSIS — K92.2 GASTROINTESTINAL HEMORRHAGE, UNSPECIFIED GASTROINTESTINAL HEMORRHAGE TYPE: Primary | ICD-10-CM

## 2019-06-29 LAB
ANION GAP SERPL CALC-SCNC: 12 MMOL/L (ref 7–16)
BASOPHILS # BLD: 0.1 K/UL (ref 0–0.2)
BASOPHILS NFR BLD: 1 % (ref 0–2)
BUN SERPL-MCNC: 14 MG/DL (ref 8–23)
CALCIUM SERPL-MCNC: 9.4 MG/DL (ref 8.3–10.4)
CHLORIDE SERPL-SCNC: 105 MMOL/L (ref 98–107)
CO2 SERPL-SCNC: 23 MMOL/L (ref 21–32)
CREAT SERPL-MCNC: 1.19 MG/DL (ref 0.8–1.5)
DIFFERENTIAL METHOD BLD: ABNORMAL
EOSINOPHIL # BLD: 0.2 K/UL (ref 0–0.8)
EOSINOPHIL NFR BLD: 3 % (ref 0.5–7.8)
ERYTHROCYTE [DISTWIDTH] IN BLOOD BY AUTOMATED COUNT: 14.3 % (ref 11.9–14.6)
GLUCOSE SERPL-MCNC: 92 MG/DL (ref 65–100)
HCT VFR BLD AUTO: 28.8 % (ref 41.1–50.3)
HCT VFR BLD AUTO: 29.5 % (ref 41.1–50.3)
HGB BLD-MCNC: 9.4 G/DL (ref 13.6–17.2)
HGB BLD-MCNC: 9.5 G/DL (ref 13.6–17.2)
IMM GRANULOCYTES # BLD AUTO: 0 K/UL (ref 0–0.5)
IMM GRANULOCYTES NFR BLD AUTO: 1 % (ref 0–5)
LYMPHOCYTES # BLD: 1.4 K/UL (ref 0.5–4.6)
LYMPHOCYTES NFR BLD: 18 % (ref 13–44)
MCH RBC QN AUTO: 28.2 PG (ref 26.1–32.9)
MCHC RBC AUTO-ENTMCNC: 32.6 G/DL (ref 31.4–35)
MCV RBC AUTO: 86.5 FL (ref 79.6–97.8)
MONOCYTES # BLD: 0.5 K/UL (ref 0.1–1.3)
MONOCYTES NFR BLD: 7 % (ref 4–12)
NEUTS SEG # BLD: 5.5 K/UL (ref 1.7–8.2)
NEUTS SEG NFR BLD: 71 % (ref 43–78)
NRBC # BLD: 0 K/UL (ref 0–0.2)
PLATELET # BLD AUTO: 357 K/UL (ref 150–450)
PMV BLD AUTO: 9.9 FL (ref 9.4–12.3)
POTASSIUM SERPL-SCNC: 4.7 MMOL/L (ref 3.5–5.1)
RBC # BLD AUTO: 3.33 M/UL (ref 4.23–5.6)
SODIUM SERPL-SCNC: 140 MMOL/L (ref 136–145)
URATE SERPL-MCNC: 10.7 MG/DL (ref 2.6–6)
WBC # BLD AUTO: 7.8 K/UL (ref 4.3–11.1)

## 2019-06-29 PROCEDURE — 99218 HC RM OBSERVATION: CPT

## 2019-06-29 PROCEDURE — 65660000000 HC RM CCU STEPDOWN

## 2019-06-29 PROCEDURE — 77030013140 HC MSK NEB VYRM -A

## 2019-06-29 PROCEDURE — 73630 X-RAY EXAM OF FOOT: CPT

## 2019-06-29 PROCEDURE — 36415 COLL VENOUS BLD VENIPUNCTURE: CPT

## 2019-06-29 PROCEDURE — 74011250637 HC RX REV CODE- 250/637: Performed by: NURSE PRACTITIONER

## 2019-06-29 PROCEDURE — 84550 ASSAY OF BLOOD/URIC ACID: CPT

## 2019-06-29 PROCEDURE — 74011000250 HC RX REV CODE- 250: Performed by: FAMILY MEDICINE

## 2019-06-29 PROCEDURE — 74011250637 HC RX REV CODE- 250/637: Performed by: FAMILY MEDICINE

## 2019-06-29 PROCEDURE — 85018 HEMOGLOBIN: CPT

## 2019-06-29 PROCEDURE — 94760 N-INVAS EAR/PLS OXIMETRY 1: CPT

## 2019-06-29 PROCEDURE — 85025 COMPLETE CBC W/AUTO DIFF WBC: CPT

## 2019-06-29 PROCEDURE — 96376 TX/PRO/DX INJ SAME DRUG ADON: CPT

## 2019-06-29 PROCEDURE — 96375 TX/PRO/DX INJ NEW DRUG ADDON: CPT

## 2019-06-29 PROCEDURE — 80048 BASIC METABOLIC PNL TOTAL CA: CPT

## 2019-06-29 PROCEDURE — 94640 AIRWAY INHALATION TREATMENT: CPT

## 2019-06-29 PROCEDURE — 96374 THER/PROPH/DIAG INJ IV PUSH: CPT

## 2019-06-29 PROCEDURE — C9113 INJ PANTOPRAZOLE SODIUM, VIA: HCPCS | Performed by: FAMILY MEDICINE

## 2019-06-29 PROCEDURE — 99283 EMERGENCY DEPT VISIT LOW MDM: CPT | Performed by: NURSE PRACTITIONER

## 2019-06-29 PROCEDURE — 74011250636 HC RX REV CODE- 250/636: Performed by: FAMILY MEDICINE

## 2019-06-29 PROCEDURE — 94664 DEMO&/EVAL PT USE INHALER: CPT

## 2019-06-29 RX ORDER — ALBUTEROL SULFATE 0.83 MG/ML
2.5 SOLUTION RESPIRATORY (INHALATION)
Status: DISCONTINUED | OUTPATIENT
Start: 2019-06-29 | End: 2019-07-02 | Stop reason: HOSPADM

## 2019-06-29 RX ORDER — HYDROCODONE BITARTRATE AND ACETAMINOPHEN 5; 325 MG/1; MG/1
1 TABLET ORAL ONCE
Status: COMPLETED | OUTPATIENT
Start: 2019-06-29 | End: 2019-06-29

## 2019-06-29 RX ORDER — AMLODIPINE BESYLATE 10 MG/1
10 TABLET ORAL DAILY
Status: DISCONTINUED | OUTPATIENT
Start: 2019-06-30 | End: 2019-07-02 | Stop reason: HOSPADM

## 2019-06-29 RX ORDER — HYDROCODONE BITARTRATE AND ACETAMINOPHEN 5; 325 MG/1; MG/1
1 TABLET ORAL
Status: DISCONTINUED | OUTPATIENT
Start: 2019-06-29 | End: 2019-06-30

## 2019-06-29 RX ORDER — HYDROCHLOROTHIAZIDE 12.5 MG/1
12.5 CAPSULE ORAL DAILY
Status: DISCONTINUED | OUTPATIENT
Start: 2019-06-30 | End: 2019-06-30

## 2019-06-29 RX ORDER — SODIUM CHLORIDE 0.9 % (FLUSH) 0.9 %
5-40 SYRINGE (ML) INJECTION AS NEEDED
Status: DISCONTINUED | OUTPATIENT
Start: 2019-06-29 | End: 2019-07-02 | Stop reason: HOSPADM

## 2019-06-29 RX ORDER — DIPHENHYDRAMINE HYDROCHLORIDE 50 MG/ML
12.5 INJECTION, SOLUTION INTRAMUSCULAR; INTRAVENOUS
Status: DISCONTINUED | OUTPATIENT
Start: 2019-06-29 | End: 2019-07-02 | Stop reason: HOSPADM

## 2019-06-29 RX ORDER — NALOXONE HYDROCHLORIDE 0.4 MG/ML
0.4 INJECTION, SOLUTION INTRAMUSCULAR; INTRAVENOUS; SUBCUTANEOUS AS NEEDED
Status: DISCONTINUED | OUTPATIENT
Start: 2019-06-29 | End: 2019-07-02 | Stop reason: HOSPADM

## 2019-06-29 RX ORDER — ACETAMINOPHEN 325 MG/1
650 TABLET ORAL
Status: DISCONTINUED | OUTPATIENT
Start: 2019-06-29 | End: 2019-07-02 | Stop reason: HOSPADM

## 2019-06-29 RX ORDER — ONDANSETRON 2 MG/ML
4 INJECTION INTRAMUSCULAR; INTRAVENOUS
Status: DISCONTINUED | OUTPATIENT
Start: 2019-06-29 | End: 2019-07-02 | Stop reason: HOSPADM

## 2019-06-29 RX ORDER — COLCHICINE 0.6 MG/1
0.6 TABLET ORAL DAILY
Status: DISCONTINUED | OUTPATIENT
Start: 2019-06-29 | End: 2019-07-02 | Stop reason: HOSPADM

## 2019-06-29 RX ORDER — OXYBUTYNIN CHLORIDE 5 MG/1
5 TABLET, EXTENDED RELEASE ORAL DAILY
Status: DISCONTINUED | OUTPATIENT
Start: 2019-06-30 | End: 2019-07-02 | Stop reason: HOSPADM

## 2019-06-29 RX ORDER — MIRTAZAPINE 15 MG/1
15 TABLET, FILM COATED ORAL
Status: DISCONTINUED | OUTPATIENT
Start: 2019-06-29 | End: 2019-07-02 | Stop reason: HOSPADM

## 2019-06-29 RX ORDER — PRAVASTATIN SODIUM 20 MG/1
40 TABLET ORAL DAILY
Status: DISCONTINUED | OUTPATIENT
Start: 2019-06-30 | End: 2019-07-02 | Stop reason: HOSPADM

## 2019-06-29 RX ORDER — MORPHINE SULFATE 2 MG/ML
1 INJECTION, SOLUTION INTRAMUSCULAR; INTRAVENOUS
Status: DISCONTINUED | OUTPATIENT
Start: 2019-06-29 | End: 2019-06-30

## 2019-06-29 RX ORDER — ALLOPURINOL 100 MG/1
100 TABLET ORAL DAILY
Status: DISCONTINUED | OUTPATIENT
Start: 2019-06-30 | End: 2019-06-30

## 2019-06-29 RX ORDER — SODIUM CHLORIDE 0.9 % (FLUSH) 0.9 %
5-40 SYRINGE (ML) INJECTION EVERY 8 HOURS
Status: DISCONTINUED | OUTPATIENT
Start: 2019-06-29 | End: 2019-07-02 | Stop reason: HOSPADM

## 2019-06-29 RX ADMIN — HYDROCODONE BITARTRATE AND ACETAMINOPHEN 1 TABLET: 5; 325 TABLET ORAL at 18:18

## 2019-06-29 RX ADMIN — ALBUTEROL SULFATE 2.5 MG: 2.5 SOLUTION RESPIRATORY (INHALATION) at 21:45

## 2019-06-29 RX ADMIN — MORPHINE SULFATE 1 MG: 2 INJECTION, SOLUTION INTRAMUSCULAR; INTRAVENOUS at 19:53

## 2019-06-29 RX ADMIN — MIRTAZAPINE 15 MG: 15 TABLET, FILM COATED ORAL at 21:00

## 2019-06-29 RX ADMIN — MORPHINE SULFATE 1 MG: 2 INJECTION, SOLUTION INTRAMUSCULAR; INTRAVENOUS at 23:53

## 2019-06-29 RX ADMIN — Medication 10 ML: at 21:00

## 2019-06-29 RX ADMIN — SODIUM CHLORIDE 40 MG: 9 INJECTION, SOLUTION INTRAMUSCULAR; INTRAVENOUS; SUBCUTANEOUS at 20:57

## 2019-06-29 RX ADMIN — HYDROCODONE BITARTRATE AND ACETAMINOPHEN 1 TABLET: 5; 325 TABLET ORAL at 21:17

## 2019-06-29 RX ADMIN — HYDROCODONE BITARTRATE AND ACETAMINOPHEN 1 TABLET: 5; 325 TABLET ORAL at 16:14

## 2019-06-29 NOTE — ED PROVIDER NOTES
67-year-old male with recent history of PE 2 weeks ago (now on anticoagulant), coronary artery disease s/p MI/stent, arthritis, hypertension, GERD,CVA, presents with 2 day history of right great toe pain. He's had no injury. However he presents with significant pain. Mild erythema. Pain with even gentle touch. No fever chills nausea vomiting or diarrhea.            Past Medical History:   Diagnosis Date    Arthritis     Asthma     albuterol prn (uses 1-2 times per day)    CAD (coronary artery disease) 9/15/2013    Chronic obstructive pulmonary disease ([address]     Coronary atherosclerosis of native coronary vessel 11/18/2015    Current smoker on some days     Dental disorder     Dyspepsia and other specified disorders of function of stomach     GERD (gastroesophageal reflux disease)     on med for control     Hyperlipidemia other unsp dyslipidemia 11/18/2015    on med    Hypertension     on med for control     Hypertension, benign, controlled 11/18/2015    MI (myocardial infarction) ([address] 2013    NSTEMI    Multiple renal cysts     Other ill-defined conditions(799.89)     hernia, pt unsure of site (resolved with surery)    Prostate cancer ([address]     Pulmonary emboli ([address] 6/12/2019    Stroke ([address] 2011    TIA; no residual        Past Surgical History:   Procedure Laterality Date    CARDIAC SURG PROCEDURE UNLIST  2013    stent    HX COLECTOMY      due to polyp that could not be excised    HX COLONOSCOPY  2018    HX HERNIA REPAIR      HX PROSTATECTOMY      HX PTCA      x 1         Family History:   Problem Relation Age of Onset    Stroke Mother     Diabetes Mother     Heart Disease Maternal Grandmother     Cancer Father        Social History     Socioeconomic History    Marital status: SINGLE     Spouse name: Not on file    Number of children: Not on file    Years of education: Not on file    Highest education level: Not on file   Occupational History    Not on file   Social Needs    Financial resource strain: Not on file    Food insecurity:     Worry: Not on file     Inability: Not on file    Transportation needs:     Medical: Not on file     Non-medical: Not on file   Tobacco Use    Smoking status: Current Some Day Smoker     Years: 50.00    Smokeless tobacco: Never Used   Substance and Sexual Activity    Alcohol use: Yes     Alcohol/week: 1.8 oz     Types: 3 Cans of beer per week    Drug use: No    Sexual activity: Not on file   Lifestyle    Physical activity:     Days per week: Not on file     Minutes per session: Not on file    Stress: Not on file   Relationships    Social connections:     Talks on phone: Not on file     Gets together: Not on file     Attends Sikh service: Not on file     Active member of club or organization: Not on file     Attends meetings of clubs or organizations: Not on file     Relationship status: Not on file    Intimate partner violence:     Fear of current or ex partner: Not on file     Emotionally abused: Not on file     Physically abused: Not on file     Forced sexual activity: Not on file   Other Topics Concern    Not on file   Social History Narrative    Not on file         ALLERGIES: Lisinopril and Tramadol    Review of Systems   Constitutional: Negative for chills and fever. HENT: Negative for facial swelling and nosebleeds. Eyes: Negative for discharge and redness. Respiratory: Negative for cough and shortness of breath. Cardiovascular: Negative for chest pain and palpitations. Gastrointestinal: Negative for nausea and vomiting. Genitourinary: Negative for dysuria. Musculoskeletal: Positive for gait problem and myalgias. Skin: Negative for color change and wound. Neurological: Negative for dizziness and weakness. Psychiatric/Behavioral: Negative for confusion and decreased concentration.        Vitals:    06/29/19 1506   Pulse: (!) 105   Resp: 19   Temp: 98.7 °F (37.1 °C)   SpO2: 97%   Weight: 88.5 kg (195 lb) Height: 6' 2\" (1.88 m)            Physical Exam   Constitutional: He is oriented to person, place, and time. He appears well-developed and well-nourished. HENT:   Head: Normocephalic and atraumatic. Eyes: Pupils are equal, round, and reactive to light. EOM are normal.   Neck: Normal range of motion. Cardiovascular: Regular rhythm and normal heart sounds. Pulmonary/Chest: Effort normal and breath sounds normal.   Musculoskeletal: Normal range of motion. He exhibits tenderness. Right foot: There is tenderness. Feet:    Neurological: He is alert and oriented to person, place, and time. Skin: Skin is warm and dry. Capillary refill takes less than 2 seconds. He is not diaphoretic. Psychiatric: He has a normal mood and affect. His behavior is normal. Judgment and thought content normal.   Nursing note and vitals reviewed. MDM  Number of Diagnoses or Management Options  Diagnosis management comments: 60-year-old male with recent history of PE 2 weeks ago (now on anticoagulant), coronary artery disease s/p MI/stent, arthritis, hypertension, GERD,CVA, presents with 2 day history of right great toe pain. He's had no injury. However he presents with significant pain. Mild erythema. Pain with even gentle touch. No fever chills nausea vomiting or diarrhea. Will evaluate x-ray, blood work presentation is consistent with gout however. 5:14 PM  Uric acid > 10.  hgb has dropped 11.7 to 9.4 today since 6/14. Will complete guiac of stool. Pt admits his stools have been dark. Started blood thinners with his PE two weeks ago. 5:43 PM  guiac pos. Hospitalist paged.   5:45 PM  D/w hospitalist.         Amount and/or Complexity of Data Reviewed  Clinical lab tests: ordered and reviewed  Tests in the radiology section of CPT®: ordered and reviewed  Discuss the patient with other providers: yes (Pancho/hospitalist)    Patient Progress  Patient progress: stable         Procedures

## 2019-06-29 NOTE — PROGRESS NOTES
Patient is being admitted to floor from ER      Will follow upon transfer to floor    Staff douglas Ellison  C: [phone]  /  [email or web address]

## 2019-06-29 NOTE — ED TRIAGE NOTES
Patient reports pain to right big toe. Denies injury or trauma. Denies falling.  No redness or swelling noted in triage

## 2019-06-29 NOTE — ROUTINE PROCESS
TRANSFER - OUT REPORT: 
 
Verbal report given to Eloise Bhardwaj RN on [facility]  being transferred to [phone] for routine progression of care Report consisted of patients Situation, Background, Assessment and  
Recommendations(SBAR). Information from the following report(s) SBAR, ED Summary, [facility] and Recent Results was reviewed with the receiving nurse. Lines:  
Peripheral IV 06/29/19 Left Forearm (Active) Site Assessment Clean, dry, & intact 6/29/2019  4:20 PM  
Phlebitis Assessment 0 6/29/2019  4:20 PM  
Infiltration Assessment 0 6/29/2019  4:20 PM  
Dressing Status Clean, dry, & intact 6/29/2019  4:20 PM  
  
 
Opportunity for questions and clarification was provided. Patient transported with: 
 [facility]

## 2019-06-29 NOTE — H&P
Hospitalist H&P Note     Admit Date:  2019  3:06 PM   Name:  Marva Jack   Age:  80 y.o.  :  1950   MRN:  [id number]   PCP:  Bharat Marti MD  Treatment Team: Nurse Practitioner: Prince Shante NP; Primary Nurse: Krystian Baca, RN  Rt big toe pain, black stools  HPI:   69yo M with hx Asthma, HTN,GERD, HLD,CAD,recent PE-19-Findings are positive for a small subsegmental left upper lobe pulmonary embolus. The findings are equivocal for a small embolus within the subsegmental right lower lobe pulmonary artery. Pt came to ER with 1 day history of rt big toe region swelling associated with pain- pt uric acid was elevated- diagnosed with gout- labs showed mild decrease in hb when compared to recent discharge- when asked if he was having black/red stools- pt then said that since few days after discharge he noticed his bowel was dark- more since past 3 days. Pt otherwise doesn't c/o abdominal pain or fever or nausea or hemoptysis or hematemesis or chest pain or sob. Hb 9.4, uric acid 10.7    Pt will be admitted for gi bleed  and gout . 10 systems reviewed and negative except as noted in HPI.   Past Medical History:   Diagnosis Date    Arthritis     Asthma     albuterol prn (uses 1-2 times per day)    CAD (coronary artery disease) 9/15/2013    [facility]     Coronary atherosclerosis of native coronary vessel 2015    Current smoker on some days     Dental disorder     Dyspepsia and other specified disorders of function of stomach     GERD (gastroesophageal reflux disease)     on med for control     GI bleed 2019    Hyperlipidemia other unsp dyslipidemia 2015    on med    Hypertension     on med for control     Hypertension, benign, controlled 2015    [facility] ([facility]) [facility])     NSTEMI    Multiple renal cysts     Other ill-defined conditions(799.89)     hernia, pt unsure of site (resolved with demetrius)    Prostate cancer [facility])     Pulmonary emboli ([facility] 6/12/2019    Stroke ([facility] 2011    TIA; no residual       Past Surgical History:   Procedure Laterality Date    CARDIAC SURG PROCEDURE UNLIST  2013    stent    HX COLECTOMY      due to polyp that could not be excised    HX COLONOSCOPY  2018    HX HERNIA REPAIR      HX PROSTATECTOMY      HX PTCA      x 1      Allergies   Allergen Reactions    Lisinopril Swelling    Tramadol Rash and Itching      Social History     Tobacco Use    Smoking status: Current Some Day Smoker     Years: 50.00    Smokeless tobacco: Never Used   Substance Use Topics    Alcohol use: Yes     Alcohol/week: 1.8 oz     Types: 3 Cans of beer per week      Family History   Problem Relation Age of Onset   Jayden Reid Stroke Mother     Diabetes Mother     Heart Disease Maternal Grandmother     Cancer Father       Immunization History   Administered Date(s) Administered    Influenza Vaccine PF 09/15/2013    TB Skin Test (PPD) Intradermal 11/21/2018    TD Vaccine 07/15/2002     PTA Medications:  Prior to Admission Medications   Prescriptions Last Dose Informant Patient Reported? Taking?   acetaminophen (TYLENOL) 500 mg tablet   No No   Sig: Take 2 Tabs by mouth every six (6) hours. albuterol (PROVENTIL HFA, VENTOLIN HFA, PROAIR HFA) 90 mcg/actuation inhaler   No No   Sig: Take 2 Puffs by inhalation every six (6) hours as needed for Wheezing. Take every 4-6 hours for wheezing   amLODIPine (NORVASC) 10 mg tablet   Yes No   Sig: Take 10 mg by mouth daily. Take / use AM day of surgery  per anesthesia protocols. aspirin delayed-release 81 mg tablet   No No   Sig: Take 1 Tab by mouth daily. hydroCHLOROthiazide (MICROZIDE) 12.5 mg capsule   Yes No   Sig: Take 12.5 mg by mouth daily. mirtazapine (REMERON) 15 mg tablet   Yes No   Sig: Take 15 mg by mouth nightly. omeprazole (PRILOSEC) 20 mg capsule   Yes No   Sig: Take 20 mg by mouth daily.  Take / use AM day of surgery  per anesthesia protocols. ondansetron (ZOFRAN ODT) 8 mg disintegrating tablet   No No   Sig: Take 1 Tab by mouth every eight (8) hours as needed for Nausea. oxybutynin chloride XL (DITROPAN XL) 5 mg CR tablet   Yes No   Sig: Take 5 mg by mouth daily. Take / use AM day of surgery  per anesthesia protocols. pravastatin (PRAVACHOL) 40 mg tablet   Yes No   Sig: Take 40 mg by mouth daily. Take / use AM day of surgery  per anesthesia protocols. predniSONE (DELTASONE) 10 mg tablet   No No   Sig: Two tabs by mouth daily x 3 days then one tab by mouth daily x 3 days then stop   senna-docusate (PERICOLACE) 8.6-50 mg per tablet   No No   Sig: Take 2 Tabs by mouth daily. tiotropium (SPIRIVA) 18 mcg inhalation capsule   No No   Sig: Take 1 Cap by inhalation daily. Facility-Administered Medications: None       Objective:     Patient Vitals for the past 24 hrs:   Temp Pulse Resp BP SpO2   06/29/19 1821 98.6 °F (37 °C) 92 18 (!) 153/91 98 %   06/29/19 1506 98.7 °F (37.1 °C) (!) 105 19  97 %     Oxygen Therapy  O2 Sat (%): 98 % (06/29/19 1821)  O2 Device: Room air (06/29/19 1821)  No intake or output data in the 24 hours ending 06/29/19 1826    Physical Exam:  General:    Well nourished. Alert. Eyes:   Normal sclera. Extraocular movements intact. ENT:  Normocephalic, atraumatic. Moist mucous membranes  CV:   RRR. No murmur, rub, or gallop. Lungs:  CTAB. No wheezing, rhonchi, or rales. Abdomen: Soft, nontender, nondistended. Bowel sounds normal.   Extremities: Warm and dry. No cyanosis or edema. tenderness  Rt big toe and rt  Distal half of foot ,warn to touch. Good range of movement. Mild  Swelling. Neurologic: CN II-XII grossly intact. Sensation intact. Skin:     No rashes or jaundice. Psych:  Normal mood and affect. I reviewed the labs.   Data Review:   Recent Results (from the past 24 hour(s))   URIC ACID    Collection Time: 06/29/19  4:12 PM   Result Value Ref Range    Uric acid 10.7 (H) 2.6 - 6.0 MG/DL   METABOLIC PANEL, BASIC    Collection Time: 06/29/19  4:12 PM   Result Value Ref Range    Sodium 140 136 - 145 mmol/L    Potassium 4.7 3.5 - 5.1 mmol/L    Chloride 105 98 - 107 mmol/L    CO2 23 21 - 32 mmol/L    Anion gap 12 7 - 16 mmol/L    Glucose 92 65 - 100 mg/dL    BUN 14 8 - 23 MG/DL    Creatinine 1.19 0.8 - 1.5 MG/DL    GFR est AA >60 >60 ml/min/1.73m2    GFR est non-AA >60 >60 ml/min/1.73m2    Calcium 9.4 8.3 - 10.4 MG/DL   CBC WITH AUTOMATED DIFF    Collection Time: 06/29/19  4:20 PM   Result Value Ref Range    WBC 7.8 4.3 - 11.1 K/uL    RBC 3.33 (L) 4.23 - 5.6 M/uL    HGB 9.4 (L) 13.6 - 17.2 g/dL    HCT 28.8 (L) 41.1 - 50.3 %    MCV 86.5 79.6 - 97.8 FL    MCH 28.2 26.1 - 32.9 PG    MCHC 32.6 31.4 - 35.0 g/dL    RDW 14.3 11.9 - 14.6 %    PLATELET 870 715 - 500 K/uL    MPV 9.9 9.4 - 12.3 FL    ABSOLUTE NRBC 0.00 0.0 - 0.2 K/uL    DF AUTOMATED      NEUTROPHILS 71 43 - 78 %    LYMPHOCYTES 18 13 - 44 %    MONOCYTES 7 4.0 - 12.0 %    EOSINOPHILS 3 0.5 - 7.8 %    BASOPHILS 1 0.0 - 2.0 %    IMMATURE GRANULOCYTES 1 0.0 - 5.0 %    ABS. NEUTROPHILS 5.5 1.7 - 8.2 K/UL    ABS. LYMPHOCYTES 1.4 0.5 - 4.6 K/UL    ABS. MONOCYTES 0.5 0.1 - 1.3 K/UL    ABS. EOSINOPHILS 0.2 0.0 - 0.8 K/UL    ABS. BASOPHILS 0.1 0.0 - 0.2 K/UL    ABS. IMM. GRANS. 0.0 0.0 - 0.5 K/UL       All Micro Results     None          Other Studies:  Xr Foot Rt Min 3 V    Result Date: 6/29/2019  RIGHT FOOT 3 view(s). HISTORY: Pain right great toe for 2 days without acute trauma TECHNIQUE: AP and lateral and oblique views. COMPARISON: None. FINDINGS: Moderate osteoarthritis at the first metatarsal phalangeal joint. There is mild subluxation medially. There are osteophytes. Small subchondral cyst in the head of the first metatarsal Mild hallux valgus. The sesamoids are deviated laterally. Small accessory ossicle cuboid. No periostitis or erosions.      IMPRESSION: Moderate osteoarthritis first metatarsal phalangeal joint with mild hallux obvious deformity. .        Assessment and Plan:     Hospital Problems as of 6/29/2019 Date Reviewed: 6/12/2019          Codes Class Noted - Resolved POA    Gout ICD-10-CM: M10.9  ICD-9-CM: 274.9  6/29/2019 - Present Unknown        * (Principal) GI bleed ICD-10-CM: K92.2  ICD-9-CM: 578.9  6/29/2019 - Present Unknown        Pulmonary emboli (HCC) ICD-10-CM: I26.99  ICD-9-CM: 415.19  6/12/2019 - Present Yes        Hypertension, benign, controlled ICD-10-CM: I10  ICD-9-CM: 401.1  11/18/2015 - Present Yes        Coronary atherosclerosis of native coronary vessel ICD-10-CM: I25.10  ICD-9-CM: 414.01  11/18/2015 - Present Yes        CAD (coronary artery disease) (Chronic) ICD-10-CM: I25.10  ICD-9-CM: 414.00  9/15/2013 - Present Yes        Accelerated hypertension ICD-10-CM: I10  ICD-9-CM: 401.0  9/13/2013 - Present Yes              PLAN:  GI bleed - prob from xarelto- r/o other etiologies- cont protonix, npo from mid night- gi consult in am.  Gout flare- colcrys  Recent PE- cannot cont anticoagulation sec to gi bleed.   CAD  htn  Asthma/copd        DVT ppx: scd   Anticipated DC needs:    Code status:  Full  Estimated LOS:  Greater than 2 midnights  Risk:  high    Signed:  Sonu Clemens MD

## 2019-06-30 LAB
ANION GAP SERPL CALC-SCNC: 9 MMOL/L (ref 7–16)
BASOPHILS # BLD: 0 K/UL (ref 0–0.2)
BASOPHILS NFR BLD: 1 % (ref 0–2)
BUN SERPL-MCNC: 14 MG/DL (ref 8–23)
CALCIUM SERPL-MCNC: 8.9 MG/DL (ref 8.3–10.4)
CHLORIDE SERPL-SCNC: 103 MMOL/L (ref 98–107)
CO2 SERPL-SCNC: 28 MMOL/L (ref 21–32)
CREAT SERPL-MCNC: 1.23 MG/DL (ref 0.8–1.5)
DIFFERENTIAL METHOD BLD: ABNORMAL
EOSINOPHIL # BLD: 0.2 K/UL (ref 0–0.8)
EOSINOPHIL NFR BLD: 2 % (ref 0.5–7.8)
ERYTHROCYTE [DISTWIDTH] IN BLOOD BY AUTOMATED COUNT: 14.3 % (ref 11.9–14.6)
GLUCOSE SERPL-MCNC: 134 MG/DL (ref 65–100)
HCT VFR BLD AUTO: 27 % (ref 41.1–50.3)
HCT VFR BLD AUTO: 27.7 % (ref 41.1–50.3)
HCT VFR BLD AUTO: 27.9 % (ref 41.1–50.3)
HCT VFR BLD AUTO: 28.1 % (ref 41.1–50.3)
HGB BLD-MCNC: 8.5 G/DL (ref 13.6–17.2)
HGB BLD-MCNC: 8.8 G/DL (ref 13.6–17.2)
HGB BLD-MCNC: 8.8 G/DL (ref 13.6–17.2)
HGB BLD-MCNC: 8.9 G/DL (ref 13.6–17.2)
IMM GRANULOCYTES # BLD AUTO: 0 K/UL (ref 0–0.5)
IMM GRANULOCYTES NFR BLD AUTO: 0 % (ref 0–5)
INR PPP: 1.1
LYMPHOCYTES # BLD: 0.9 K/UL (ref 0.5–4.6)
LYMPHOCYTES NFR BLD: 12 % (ref 13–44)
MCH RBC QN AUTO: 27.6 PG (ref 26.1–32.9)
MCHC RBC AUTO-ENTMCNC: 31.5 G/DL (ref 31.4–35)
MCV RBC AUTO: 87.5 FL (ref 79.6–97.8)
MONOCYTES # BLD: 0.6 K/UL (ref 0.1–1.3)
MONOCYTES NFR BLD: 8 % (ref 4–12)
NEUTS SEG # BLD: 6 K/UL (ref 1.7–8.2)
NEUTS SEG NFR BLD: 77 % (ref 43–78)
NRBC # BLD: 0 K/UL (ref 0–0.2)
PLATELET # BLD AUTO: 328 K/UL (ref 150–450)
PMV BLD AUTO: 9.8 FL (ref 9.4–12.3)
POTASSIUM SERPL-SCNC: 3.5 MMOL/L (ref 3.5–5.1)
PROTHROMBIN TIME: 14.3 SEC (ref 11.7–14.5)
RBC # BLD AUTO: 3.19 M/UL (ref 4.23–5.6)
SODIUM SERPL-SCNC: 140 MMOL/L (ref 136–145)
WBC # BLD AUTO: 7.8 K/UL (ref 4.3–11.1)

## 2019-06-30 PROCEDURE — 36415 COLL VENOUS BLD VENIPUNCTURE: CPT

## 2019-06-30 PROCEDURE — 74011000250 HC RX REV CODE- 250: Performed by: FAMILY MEDICINE

## 2019-06-30 PROCEDURE — 80048 BASIC METABOLIC PNL TOTAL CA: CPT

## 2019-06-30 PROCEDURE — 77030032490 HC SLV COMPR SCD KNE COVD -B

## 2019-06-30 PROCEDURE — 94640 AIRWAY INHALATION TREATMENT: CPT

## 2019-06-30 PROCEDURE — 74011250636 HC RX REV CODE- 250/636: Performed by: FAMILY MEDICINE

## 2019-06-30 PROCEDURE — 74011250636 HC RX REV CODE- 250/636: Performed by: PHYSICIAN ASSISTANT

## 2019-06-30 PROCEDURE — 77030020263 HC SOL INJ SOD CL0.9% LFCR 1000ML

## 2019-06-30 PROCEDURE — 74011250637 HC RX REV CODE- 250/637: Performed by: FAMILY MEDICINE

## 2019-06-30 PROCEDURE — 96361 HYDRATE IV INFUSION ADD-ON: CPT

## 2019-06-30 PROCEDURE — 96376 TX/PRO/DX INJ SAME DRUG ADON: CPT

## 2019-06-30 PROCEDURE — C9113 INJ PANTOPRAZOLE SODIUM, VIA: HCPCS | Performed by: FAMILY MEDICINE

## 2019-06-30 PROCEDURE — 85025 COMPLETE CBC W/AUTO DIFF WBC: CPT

## 2019-06-30 PROCEDURE — 99218 HC RM OBSERVATION: CPT

## 2019-06-30 PROCEDURE — 85018 HEMOGLOBIN: CPT

## 2019-06-30 PROCEDURE — 77030039270 HC TU BLD FLTR CARD -A

## 2019-06-30 PROCEDURE — 94760 N-INVAS EAR/PLS OXIMETRY 1: CPT

## 2019-06-30 PROCEDURE — 85610 PROTHROMBIN TIME: CPT

## 2019-06-30 PROCEDURE — 65660000000 HC RM CCU STEPDOWN

## 2019-06-30 RX ORDER — MORPHINE SULFATE 2 MG/ML
4 INJECTION, SOLUTION INTRAMUSCULAR; INTRAVENOUS
Status: DISCONTINUED | OUTPATIENT
Start: 2019-06-30 | End: 2019-07-01

## 2019-06-30 RX ORDER — HYDROCODONE BITARTRATE AND ACETAMINOPHEN 5; 325 MG/1; MG/1
2 TABLET ORAL
Status: DISCONTINUED | OUTPATIENT
Start: 2019-06-30 | End: 2019-07-01

## 2019-06-30 RX ORDER — PANTOPRAZOLE SODIUM 40 MG/1
40 TABLET, DELAYED RELEASE ORAL EVERY 12 HOURS
Status: DISCONTINUED | OUTPATIENT
Start: 2019-06-30 | End: 2019-06-30 | Stop reason: CLARIF

## 2019-06-30 RX ORDER — SODIUM CHLORIDE 9 MG/ML
125 INJECTION, SOLUTION INTRAVENOUS CONTINUOUS
Status: DISCONTINUED | OUTPATIENT
Start: 2019-06-30 | End: 2019-07-01

## 2019-06-30 RX ADMIN — MORPHINE SULFATE 4 MG: 2 INJECTION, SOLUTION INTRAMUSCULAR; INTRAVENOUS at 23:08

## 2019-06-30 RX ADMIN — HYDROCODONE BITARTRATE AND ACETAMINOPHEN 2 TABLET: 5; 325 TABLET ORAL at 17:08

## 2019-06-30 RX ADMIN — Medication 10 ML: at 05:42

## 2019-06-30 RX ADMIN — PRAVASTATIN SODIUM 40 MG: 20 TABLET ORAL at 08:46

## 2019-06-30 RX ADMIN — COLCHICINE 0.6 MG: 0.6 TABLET, FILM COATED ORAL at 08:46

## 2019-06-30 RX ADMIN — HYDROCHLOROTHIAZIDE 12.5 MG: 12.5 CAPSULE ORAL at 08:46

## 2019-06-30 RX ADMIN — ALLOPURINOL 100 MG: 100 TABLET ORAL at 08:46

## 2019-06-30 RX ADMIN — Medication 10 ML: at 21:12

## 2019-06-30 RX ADMIN — ALBUTEROL SULFATE 2.5 MG: 2.5 SOLUTION RESPIRATORY (INHALATION) at 22:31

## 2019-06-30 RX ADMIN — HYDROCODONE BITARTRATE AND ACETAMINOPHEN 2 TABLET: 5; 325 TABLET ORAL at 12:55

## 2019-06-30 RX ADMIN — MORPHINE SULFATE 4 MG: 2 INJECTION, SOLUTION INTRAMUSCULAR; INTRAVENOUS at 18:44

## 2019-06-30 RX ADMIN — HYDROCODONE BITARTRATE AND ACETAMINOPHEN 2 TABLET: 5; 325 TABLET ORAL at 21:11

## 2019-06-30 RX ADMIN — MORPHINE SULFATE 4 MG: 2 INJECTION, SOLUTION INTRAMUSCULAR; INTRAVENOUS at 02:45

## 2019-06-30 RX ADMIN — SODIUM CHLORIDE 40 MG: 9 INJECTION, SOLUTION INTRAMUSCULAR; INTRAVENOUS; SUBCUTANEOUS at 08:48

## 2019-06-30 RX ADMIN — MORPHINE SULFATE 4 MG: 2 INJECTION, SOLUTION INTRAMUSCULAR; INTRAVENOUS at 10:44

## 2019-06-30 RX ADMIN — MORPHINE SULFATE 4 MG: 2 INJECTION, SOLUTION INTRAMUSCULAR; INTRAVENOUS at 14:39

## 2019-06-30 RX ADMIN — MORPHINE SULFATE 4 MG: 2 INJECTION, SOLUTION INTRAMUSCULAR; INTRAVENOUS at 06:23

## 2019-06-30 RX ADMIN — SODIUM CHLORIDE 40 MG: 9 INJECTION, SOLUTION INTRAMUSCULAR; INTRAVENOUS; SUBCUTANEOUS at 20:24

## 2019-06-30 RX ADMIN — Medication 10 ML: at 14:17

## 2019-06-30 RX ADMIN — TIOTROPIUM BROMIDE 18 MCG: 18 CAPSULE ORAL; RESPIRATORY (INHALATION) at 08:34

## 2019-06-30 RX ADMIN — MIRTAZAPINE 15 MG: 15 TABLET, FILM COATED ORAL at 21:11

## 2019-06-30 RX ADMIN — SODIUM CHLORIDE 125 ML/HR: 900 INJECTION, SOLUTION INTRAVENOUS at 11:09

## 2019-06-30 RX ADMIN — AMLODIPINE BESYLATE 10 MG: 10 TABLET ORAL at 08:45

## 2019-06-30 RX ADMIN — OXYBUTYNIN CHLORIDE 5 MG: 5 TABLET, EXTENDED RELEASE ORAL at 08:46

## 2019-06-30 RX ADMIN — HYDROCODONE BITARTRATE AND ACETAMINOPHEN 2 TABLET: 5; 325 TABLET ORAL at 08:47

## 2019-06-30 NOTE — PROGRESS NOTES
Progress Note    Patient: Otis Gerard MRN: [id number]  SSN: [id number]    YOB: 1950  Age: 80 y.o. Sex: male      Admit Date: 6/29/2019    LOS: 1 day     Subjective:     Mr. Opal Dacosta is a 81 yo M with PMH of Asthma, HTN,GERD, HLD,CAD,recent PE (6/11/19) for which he was started on Xarelto, who presented to the ER on 6/29/19 with c/o R great toe pain for one day PTA. Labs were suspicious for gout. Pt also had CBC performed, which showed a drop in Hgb. Upon further questioning, pt admitted to possibly having black tarry stools in the last few days prior to presentation. He was admitted for suspected GI bleeding and gout flare. 6/30 - This morning patient c/o continued R toe pain. Marginally improved since admit. Notes pain medicine helps. He states he has not had a hx of gout and was never on allopurinol prior to presentation. He denies abdominal pain, denies diarrhea, denies BM this morning. Denies dizziness, chest pain or BARAKAT. Objective:     Vitals:    06/30/19 0104 06/30/19 0528 06/30/19 0751 06/30/19 0835   BP: 144/86 142/89 138/65    Pulse: 94 89 92    Resp: 18 18 18    Temp: 98.2 °F (36.8 °C) 99.3 °F (37.4 °C) 99.8 °F (37.7 °C)    SpO2: 91% 93% 91% 94%   Weight:       Height:            Intake and Output:  Current Shift: 06/30 0701 - 06/30 1900  In: -   Out: 200 [Urine:200]  Last three shifts: No intake/output data recorded. Physical Exam:   GENERAL: alert, cooperative, no distress, appears stated age  LUNG: clear to auscultation bilaterally  HEART: regular rate and rhythm, S1, S2 normal, no murmur, click, rub or gallop  ABDOMEN: soft, non-tender.  Bowel sounds normal. No masses,  no organomegaly  EXTREMITIES:  extremities normal, atraumatic, no cyanosis or edema, TTP R great toe  SKIN: no rash or abnormalities, no erythema of R great toe, skin warm over R great toe, but not increased compared to surrounding skin    Lab/Data Review:  BMP:   Lab Results   Component Value Date/Time     06/30/2019 03:05 AM    K 3.5 06/30/2019 03:05 AM     06/30/2019 03:05 AM    CO2 28 06/30/2019 03:05 AM    AGAP 9 06/30/2019 03:05 AM     (H) 06/30/2019 03:05 AM    BUN 14 06/30/2019 03:05 AM    CREA 1.23 06/30/2019 03:05 AM    GFRAA >60 06/30/2019 03:05 AM    GFRNA >60 06/30/2019 03:05 AM     CBC:   Lab Results   Component Value Date/Time    WBC 7.8 06/30/2019 03:05 AM    HGB 8.8 (L) 06/30/2019 07:11 AM    HCT 27.7 (L) 06/30/2019 07:11 AM     06/30/2019 03:05 AM          Assessment & Plan:     Principal Problem:    GI bleed - suspected UGIB. Pt NPO and awaiting GI eval. Awaiting repeat stool for heme occult. Pt denies abdominal pain. H/H decreased, but patient hemodynamically stable. Will cont to monitor. Xarelto stopped. Active Problems:    Hypertension - stable on Norvasc and HCTZ, but will be stopping HCTZ due to gout. Cont to monitor and may need ACEI added and PRN IV medication. CAD - cont Pravastatin      Pulmonary emboli - Xarelto currently held. Await GI eval.       Gout - stop allopurinol, was not on prior to this. Cont colchicine. Will stop HCTZ, although he is on such a low dose, unsure if it is contributing to gout.      Dispo: awaiting GI eval  DVT ppx: SCDs  Care d/w Dr. Didier Rubio By: Bridget Berman [place]     June 30, 2019

## 2019-06-30 NOTE — PROGRESS NOTES
Patient requests pain medication around the clock. Hourly rounds done, and call light within reach. Patient remains on clear liquid diet until NPO at midnight. Patient will have EGD tomorrow and has already received consent. Will give report to oncoming nurse.

## 2019-06-30 NOTE — PROGRESS NOTES
Problem: General Medical Care Plan  Goal: *Vital signs within specified parameters  Outcome: Progressing Towards Goal  Goal: *Labs within defined limits  Outcome: Progressing Towards Goal  Goal: *Absence of infection signs and symptoms  Outcome: Progressing Towards Goal  Goal: *Optimal pain control at patient's stated goal  Outcome: Progressing Towards Goal  Goal: *Skin integrity maintained  Outcome: Progressing Towards Goal  Goal: *Fluid volume balance  Outcome: Progressing Towards Goal  Goal: *Optimize nutritional status  Outcome: Progressing Towards Goal  Goal: *Anxiety reduced or absent  Outcome: Progressing Towards Goal  Goal: *Progressive mobility and function (eg: ADL's)  Outcome: Progressing Towards Goal     Problem: Falls - Risk of  Goal: *Absence of Falls  Description  Document Andrews Fall Risk and appropriate interventions in the flowsheet.   Outcome: Progressing Towards Goal     Problem: Patient Education: Go to Patient Education Activity  Goal: Patient/Family Education  Outcome: Progressing Towards Goal

## 2019-06-30 NOTE — PROGRESS NOTES
Problem: Falls - Risk of  Goal: *Absence of Falls  Description  Document Gabe Marinelli Fall Risk and appropriate interventions in the flowsheet.   Outcome: Progressing Towards Goal     Problem: Patient Education: Go to Patient Education Activity  Goal: Patient/Family Education  Outcome: Progressing Towards Goal

## 2019-06-30 NOTE — PROGRESS NOTES
TRANSFER - IN REPORT:    Verbal report received from Tiff Campbell RN(name) on [facility]  being received from ED(unit) for routine progression of care      Report consisted of patients Situation, Background, Assessment and   Recommendations(SBAR). Information from the following report(s) SBAR, Kardex and ED Summary was reviewed with the receiving nurse. Opportunity for questions and clarification was provided. Assessment completed upon patients arrival to unit and care assumed.

## 2019-06-30 NOTE — PROGRESS NOTES
Mr. Soheila Acuña c/o severe R great toe pain this shift. MD contacted and orders to increase pain medication received. Pt states he feel some relief with the medication increase. Hourly rounds performed; all needs met at this time. Bed in low/locked position and call light, personal items within reach.

## 2019-06-30 NOTE — CONSULTS
[facility] CONSULT NOTE     Primary GI Physician:  Has not been seen by GI in the past.  Has been followed by Dr. Amee Montoya with [facility] for his colonoscopies. [facility] now seeing patient this admission - Lexii Vega MD    Referring Physician:  Dr. Barrie Villanueva    Chief Complaint:  Dark Stools and Anemia after recent PE on Xarelto    Problem List:  Principal Problem:    GI bleed (6/29/2019)    Active Problems:    Accelerated hypertension (9/13/2013)      CAD (coronary artery disease) (9/15/2013)      Hypertension, benign, controlled (11/18/2015)      Coronary atherosclerosis of native coronary vessel (11/18/2015)      Pulmonary emboli ([address] (6/12/2019)      Gout (6/29/2019)    Mr. Clair Agrawal is a 80 y.o. male with extensive PMH as listed below including CAD s/p stent about 4 years ago on ASA 81 mg PO daily, recent PE from 6/11/19 started on Xarelto at that time, Prostate Cancer s/p surgery around 2016 at [facility], Benign colon polyps s/p partial right colectomy with Dr. Amee Montoya at [facility] around 11/2011, FMHx of Colon Cancer in Father and Paternal Grandfather, HTN, HLD, Asthma and GERD who is seen in consultation at the request of Dr. Barrie Villanueva for dark stools and anemia (Hg on last check was 8.8, on arrival it was noted to be 9.4, and Hg on 6/14 was 11.7). Plt 328. INR has not been checked. Vitals stable. 1. Melena  2. Non-transfused Acute Blood Loss Anemia  3. Recent PEs diagnosed on 6/11 on Xarelto    - d/dx includes PUD vs. Gastritis/ Duodenitis vs. Erosions vs. AVMs vs. Less likely Malignancy  - Monitor serial H/H  - Will add on to check INR  - Transfuse for Hg < 7, Plt < 50,000 and INR > 2.5  - Okay for clear liquid diet from GI standpoint today. - Keep patient NPO after MN.  - Will plan for an EGD tomorrow. - Agree with PPI IV BID.  - Endoscopy and risks explained to the patient.   Risks including reaction to sedation, cardiopulmonary events, infection, bleeding, perforation, requirement for surgery if complications should occur, death were explained to patient who expressed understanding and agreed to proceed with endoscopy. - Patient is up to date for his screening colonoscopies which have been done at [facility] with Dr. Clint Quinones. He will continue to follow with them as previously scheduled. Shilpi Silverman MD   [facility]      Subjective:     History of Present Illness:      Patient is a 80 y.o. male with extensive PMH as listed below including CAD s/p stent about 4 years ago on ASA 81 mg PO daily, recent PE from 6/11/19 started on Xarelto at that time, Prostate Cancer s/p surgery around 2006, Benign colon polyps s/p partial colectomy, FMHx of Colon Cancer in Father and Paternal Grandfather, HTN, HLD, Asthma and GERD who is seen in consultation at the request of Dr. Uriel Sutherland for dark stools and anemia. Patient was recently found to have small subsegmental PEs on 6/11/19 for which he was started on Xarelto. This was found after he had presented with worsening SOA. Note that the LE duplex was negative but the CTA demonstrated subsegmental PEs. Patient was also treated for COPD exacerbation during that hospitalization and he was discharged on 6/14/19. This time, patient came to the ED with complaints of 1 day history of right big toe swelling associated with pain. His uric acid levels were elevated and he was diagnosed with gout. Patient with started on Colcrys. He was noted to have a worsening anemia (Hg on last check was 8.8, on arrival it was noted to be 9.4, and Hg on 6/14 was 11.7)  and he reported dark stools so GI is being consulted to further evaluate. Patient tells me that he never paid attention to his stool color other than once he started taking the Xarelto, they just looked darker than usual.  He states that they were \"black and tarry\". He denies any abdominal pain. He denies any BRBPR and denies any hematemesis. He denies taking NSAIDs.   He denies ever having an EGD. On a separate note, he tells me that he is up to date with his colonoscopies and they have all been done at [facility]. On review of the [facility] records, he has been followed by Dr. Rohit Justice. He had a history of a tubular adenoma for which he had a lap right hemicolectomy with Dr. Moises Lai around 11/2011. He then had a colonoscopy on 8/6/2013 which showed a normal anastomosis. His most recent colonoscopy was on 2/6/18 for rectal bleeding by Dr. Rohit Justice (which I do not have full records of) but appears that a descending colon polyp was removed. Pathology from that polyp was a tubular adenoma. PMH:  Past Medical History:   Diagnosis Date    Arthritis     Asthma     albuterol prn (uses 1-2 times per day)    CAD (coronary artery disease) 9/15/2013    Chronic obstructive pulmonary disease ([address]     Coronary atherosclerosis of native coronary vessel 11/18/2015    Current smoker on some days     Dental disorder     Dyspepsia and other specified disorders of function of stomach     GERD (gastroesophageal reflux disease)     on med for control     GI bleed 6/29/2019    Hyperlipidemia other unsp dyslipidemia 11/18/2015    on med    Hypertension     on med for control     Hypertension, benign, controlled 11/18/2015    MI (myocardial infarction) ([address] 2013    NSTEMI    Multiple renal cysts     Other ill-defined conditions(799.89)     hernia, pt unsure of site (resolved with surery)    Prostate cancer ([address]     Pulmonary emboli ([address] 6/12/2019    Stroke ([address] 2011    TIA; no residual        PSH:  Past Surgical History:   Procedure Laterality Date    CARDIAC SURG PROCEDURE UNLIST  2013    stent    HX COLECTOMY      due to polyp that could not be excised    HX COLONOSCOPY  2018    HX HERNIA REPAIR      HX PROSTATECTOMY      HX PTCA      x 1       Allergies:   Allergies   Allergen Reactions    Lisinopril Swelling    Tramadol Rash and Itching       Home Medications:  Prior to Admission medications    Medication Sig Start Date End Date Taking? Authorizing Provider   aspirin delayed-release 81 mg tablet Take 1 Tab by mouth daily. 6/14/19  Yes Gianna Cano, DO   tiotropium (SPIRIVA) 18 mcg inhalation capsule Take 1 Cap by inhalation daily. 6/15/19  Yes Gianna Cano, DO   albuterol (PROVENTIL HFA, VENTOLIN HFA, PROAIR HFA) 90 mcg/actuation inhaler Take 2 Puffs by inhalation every six (6) hours as needed for Wheezing. Take every 4-6 hours for wheezing 6/14/19  Yes Gianna Cano, DO   mirtazapine (REMERON) 15 mg tablet Take 15 mg by mouth nightly. Yes Provider, Historical   acetaminophen (TYLENOL) 500 mg tablet Take 2 Tabs by mouth every six (6) hours. 11/22/18  Yes Annmarie Henry MD   hydroCHLOROthiazide (MICROZIDE) 12.5 mg capsule Take 12.5 mg by mouth daily. Yes Provider, Historical   oxybutynin chloride XL (DITROPAN XL) 5 mg CR tablet Take 5 mg by mouth daily. Take / use AM day of surgery  per anesthesia protocols. Yes Provider, Historical   omeprazole (PRILOSEC) 20 mg capsule Take 20 mg by mouth daily. Take / use AM day of surgery  per anesthesia protocols. Yes Provider, Historical   pravastatin (PRAVACHOL) 40 mg tablet Take 40 mg by mouth daily. Take / use AM day of surgery  per anesthesia protocols. Yes Provider, Historical   predniSONE (DELTASONE) 10 mg tablet Two tabs by mouth daily x 3 days then one tab by mouth daily x 3 days then stop 6/14/19   Vargas LEE, DO   ondansetron (ZOFRAN ODT) 8 mg disintegrating tablet Take 1 Tab by mouth every eight (8) hours as needed for Nausea. 11/21/18   Annmarie Henry MD   senna-docusate (PERICOLACE) 8.6-50 mg per tablet Take 2 Tabs by mouth daily. 11/22/18   Annmarie Henry MD   amLODIPine (NORVASC) 10 mg tablet Take 10 mg by mouth daily. Take / use AM day of surgery  per anesthesia protocols.     Provider, Historical       Hospital Medications:  Current Facility-Administered Medications   Medication Dose Route Frequency Provider Last Rate Last Dose    morphine injection 4 mg  4 mg IntraVENous Q4H PRN Champ Force Kelly Neumann MD   4 mg at 06/30/19 1044    HYDROcodone-acetaminophen ([facility]) 5-325 mg per tablet 2 Tab  2 Tab Oral Q4H PRN Richa Lancaster MD   2 Tab at 06/30/19 0847    0.9% sodium chloride infusion  125 mL/hr IntraVENous CONTINUOUS [place], [place]        albuterol (PROVENTIL VENTOLIN) nebulizer solution 2.5 mg  2.5 mg Nebulization Q4H PRN Linda Ring MD   2.5 mg at 06/29/19 2145    amLODIPine (NORVASC) tablet 10 mg  10 mg Oral DAILY Linda Ring MD   10 mg at 06/30/19 0845    mirtazapine (REMERON) tablet 15 mg  15 mg Oral QHS Linda Ring MD   15 mg at 06/29/19 2100    oxybutynin chloride XL (DITROPAN XL) tablet 5 mg  5 mg Oral DAILY Linda Ring MD   5 mg at 06/30/19 0846    pravastatin (PRAVACHOL) tablet 40 mg  40 mg Oral DAILY Linda Ring MD   40 mg at 06/30/19 0846    tiotropium (SPIRIVA) inhalation capsule 18 mcg  1 Cap Inhalation DAILY Linda Ring MD   18 mcg at 06/30/19 0834    sodium chloride (NS) flush 5-40 mL  5-40 mL IntraVENous Q8H Linda Ring MD   10 mL at 06/30/19 0542    sodium chloride (NS) flush 5-40 mL  5-40 mL IntraVENous PRN Linda Ring MD        acetaminophen (TYLENOL) tablet 650 mg  650 mg Oral Q4H PRN Linda Ring MD        naloxone [facility]) injection 0.4 mg  0.4 mg IntraVENous PRN Linda Ring MD        diphenhydrAMINE (BENADRYL) injection 12.5 mg  12.5 mg IntraVENous Q4H PRN Linda Ring MD        ondansetron (ZOFRAN) injection 4 mg  4 mg IntraVENous Q4H PRN Linda Ring MD        pantoprazole (PROTONIX) 40 mg in sodium chloride 0.9% 10 mL injection  40 mg IntraVENous Q12H Linda Ring MD   40 mg at 06/30/19 0848    colchicine tablet 0.6 mg  0.6 mg Oral DAILY Linda Ring MD   0.6 mg at 06/30/19 5880       Social History:  Social History     Tobacco Use    Smoking status: Current Some Day Smoker     Years: 50.00    Smokeless tobacco: Never Used   Substance Use Topics    Alcohol use: Yes     Alcohol/week: 1.8 oz     Types: 3 Cans of beer per week     Pt denies any history of drug use, blood transfusions, or tattoos. Family History:  Family History   Problem Relation Age of Onset    Stroke Mother     Diabetes Mother     Heart Disease Maternal Grandmother     Cancer Father        Review of Systems:  A detailed 10 system ROS is obtained, with pertinent positives as listed above. All others are negative. Diet:      Objective:     Physical Exam:  Vitals:  Visit Vitals  /65   Pulse 92   Temp 99.8 °F (37.7 °C)   Resp 18   Ht 6' 2\" (1.88 m)   Wt 88.5 kg (195 lb)   SpO2 94%   BMI 25.04 kg/m²     Gen:  Pt is alert, cooperative, no acute distress  Skin:  Extremities and face reveal no rashes. HEENT: Sclerae anicteric. Extra-occular muscles are intact. No oral ulcers. No abnormal pigmentation of the lips. The neck is supple. Cardiovascular: Regular rate and rhythm. No murmurs, gallops, or rubs. Respiratory:  Comfortable breathing with no accessory muscle use. Clear breath sounds anteriorly with no wheezes, rales, or rhonchi. GI:  Abdomen nondistended, soft, and nontender. Normal active bowel sounds. He does have multiple surgical scars noted from his reported surgeries. No enlargement of the liver or spleen. No masses palpable. Rectal:  Deferred  Musculoskeletal:  No pitting edema of the lower legs. Neurological:  Gross memory appears intact. Patient is alert and oriented. Psychiatric:  Mood appears appropriate with judgement intact. Lymphatic:  No cervical or supraclavicular adenopathy.     Laboratory:    Recent Labs     06/30/19  0711 06/30/19  0305 06/29/19  1922 06/29/19  1620 06/29/19  1612   WBC  --  7.8  --  7.8  --    HGB 8.8* 8.8* 9.5* 9.4*  --    HCT 27.7* 27.9* 29.5* 28.8*  --    PLT  --  328  --  357  --    MCV  --  87.5  --  86.5  --    NA  --  140  --   -- 140   K  --  3.5  --   --  4.7   CL  --  103  --   --  105   CO2  --  28  --   --  23   BUN  --  14  --   --  14         Ita Butcher MD  [facility]

## 2019-06-30 NOTE — PROGRESS NOTES
Informed consent signed for EGD in am, pt verbalized understanding. Educated pt on NPO status after MN.

## 2019-06-30 NOTE — PROGRESS NOTES
06/29/19 1955   Dual Skin Pressure Injury Assessment   Dual Skin Pressure Injury Assessment WDL   Second Care Provider (Based on Facility Policy) Vonnie RECIO   Skin Integumentary   Skin Integumentary (WDL) WDL   Skin Color Appropriate for ethnicity   Skin Condition/Temp Dry; Warm   Skin Integrity Intact   Turgor Non-tenting   Hair Growth Present   Varicosities Absent    Pressure  Injury Documentation No Pressure Injury Noted-Pressure Ulcer Prevention Initiated

## 2019-07-01 ENCOUNTER — ANESTHESIA (OUTPATIENT)
Dept: ENDOSCOPY | Age: 69
DRG: 377 | End: 2019-07-01
Payer: COMMERCIAL

## 2019-07-01 ENCOUNTER — ANESTHESIA EVENT (OUTPATIENT)
Dept: ENDOSCOPY | Age: 69
DRG: 377 | End: 2019-07-01
Payer: COMMERCIAL

## 2019-07-01 LAB
ABO + RH BLD: NORMAL
BLOOD GROUP ANTIBODIES SERPL: NORMAL
HCT VFR BLD AUTO: 25.6 % (ref 41.1–50.3)
HGB BLD-MCNC: 7.9 G/DL (ref 13.6–17.2)
SPECIMEN EXP DATE BLD: NORMAL

## 2019-07-01 PROCEDURE — 85018 HEMOGLOBIN: CPT

## 2019-07-01 PROCEDURE — 76060000031 HC ANESTHESIA FIRST 0.5 HR: Performed by: INTERNAL MEDICINE

## 2019-07-01 PROCEDURE — 36415 COLL VENOUS BLD VENIPUNCTURE: CPT

## 2019-07-01 PROCEDURE — 74011250636 HC RX REV CODE- 250/636: Performed by: FAMILY MEDICINE

## 2019-07-01 PROCEDURE — 65660000000 HC RM CCU STEPDOWN

## 2019-07-01 PROCEDURE — 96361 HYDRATE IV INFUSION ADD-ON: CPT

## 2019-07-01 PROCEDURE — 96376 TX/PRO/DX INJ SAME DRUG ADON: CPT

## 2019-07-01 PROCEDURE — 0DJ08ZZ INSPECTION OF UPPER INTESTINAL TRACT, VIA NATURAL OR ARTIFICIAL OPENING ENDOSCOPIC: ICD-10-PCS | Performed by: INTERNAL MEDICINE

## 2019-07-01 PROCEDURE — 74011250637 HC RX REV CODE- 250/637: Performed by: FAMILY MEDICINE

## 2019-07-01 PROCEDURE — 74011250636 HC RX REV CODE- 250/636: Performed by: INTERNAL MEDICINE

## 2019-07-01 PROCEDURE — C9113 INJ PANTOPRAZOLE SODIUM, VIA: HCPCS | Performed by: FAMILY MEDICINE

## 2019-07-01 PROCEDURE — 94640 AIRWAY INHALATION TREATMENT: CPT

## 2019-07-01 PROCEDURE — 99218 HC RM OBSERVATION: CPT

## 2019-07-01 PROCEDURE — 74011636637 HC RX REV CODE- 636/637: Performed by: INTERNAL MEDICINE

## 2019-07-01 PROCEDURE — 94760 N-INVAS EAR/PLS OXIMETRY 1: CPT

## 2019-07-01 PROCEDURE — 74011000250 HC RX REV CODE- 250: Performed by: FAMILY MEDICINE

## 2019-07-01 PROCEDURE — 86900 BLOOD TYPING SEROLOGIC ABO: CPT

## 2019-07-01 PROCEDURE — 74011250636 HC RX REV CODE- 250/636: Performed by: PHYSICIAN ASSISTANT

## 2019-07-01 PROCEDURE — 76040000025: Performed by: INTERNAL MEDICINE

## 2019-07-01 PROCEDURE — 77030020263 HC SOL INJ SOD CL0.9% LFCR 1000ML

## 2019-07-01 PROCEDURE — 74011250636 HC RX REV CODE- 250/636

## 2019-07-01 RX ORDER — SODIUM CHLORIDE, SODIUM LACTATE, POTASSIUM CHLORIDE, CALCIUM CHLORIDE 600; 310; 30; 20 MG/100ML; MG/100ML; MG/100ML; MG/100ML
1000 INJECTION, SOLUTION INTRAVENOUS CONTINUOUS
Status: DISCONTINUED | OUTPATIENT
Start: 2019-07-01 | End: 2019-07-01 | Stop reason: HOSPADM

## 2019-07-01 RX ORDER — PROPOFOL 10 MG/ML
INJECTION, EMULSION INTRAVENOUS
Status: DISCONTINUED | OUTPATIENT
Start: 2019-07-01 | End: 2019-07-01 | Stop reason: HOSPADM

## 2019-07-01 RX ORDER — IBUPROFEN 400 MG/1
400 TABLET ORAL
Status: DISCONTINUED | OUTPATIENT
Start: 2019-07-01 | End: 2019-07-01

## 2019-07-01 RX ORDER — PROPOFOL 10 MG/ML
INJECTION, EMULSION INTRAVENOUS AS NEEDED
Status: DISCONTINUED | OUTPATIENT
Start: 2019-07-01 | End: 2019-07-01 | Stop reason: HOSPADM

## 2019-07-01 RX ORDER — PREDNISONE 10 MG/1
20 TABLET ORAL
Status: DISCONTINUED | OUTPATIENT
Start: 2019-07-01 | End: 2019-07-02 | Stop reason: HOSPADM

## 2019-07-01 RX ORDER — LIDOCAINE HYDROCHLORIDE 20 MG/ML
INJECTION, SOLUTION EPIDURAL; INFILTRATION; INTRACAUDAL; PERINEURAL AS NEEDED
Status: DISCONTINUED | OUTPATIENT
Start: 2019-07-01 | End: 2019-07-01 | Stop reason: HOSPADM

## 2019-07-01 RX ADMIN — MORPHINE SULFATE 4 MG: 2 INJECTION, SOLUTION INTRAMUSCULAR; INTRAVENOUS at 09:09

## 2019-07-01 RX ADMIN — PRAVASTATIN SODIUM 40 MG: 20 TABLET ORAL at 09:08

## 2019-07-01 RX ADMIN — Medication 10 ML: at 06:09

## 2019-07-01 RX ADMIN — SODIUM CHLORIDE 40 MG: 9 INJECTION, SOLUTION INTRAMUSCULAR; INTRAVENOUS; SUBCUTANEOUS at 21:56

## 2019-07-01 RX ADMIN — HYDROCODONE BITARTRATE AND ACETAMINOPHEN 2 TABLET: 5; 325 TABLET ORAL at 06:31

## 2019-07-01 RX ADMIN — ALBUTEROL SULFATE 2.5 MG: 2.5 SOLUTION RESPIRATORY (INHALATION) at 20:10

## 2019-07-01 RX ADMIN — COLCHICINE 0.6 MG: 0.6 TABLET, FILM COATED ORAL at 09:08

## 2019-07-01 RX ADMIN — AMLODIPINE BESYLATE 10 MG: 10 TABLET ORAL at 09:08

## 2019-07-01 RX ADMIN — OXYBUTYNIN CHLORIDE 5 MG: 5 TABLET, EXTENDED RELEASE ORAL at 09:09

## 2019-07-01 RX ADMIN — TIOTROPIUM BROMIDE 18 MCG: 18 CAPSULE ORAL; RESPIRATORY (INHALATION) at 07:11

## 2019-07-01 RX ADMIN — PROPOFOL 30 MG: 10 INJECTION, EMULSION INTRAVENOUS at 12:14

## 2019-07-01 RX ADMIN — SODIUM CHLORIDE 125 ML/HR: 900 INJECTION, SOLUTION INTRAVENOUS at 09:12

## 2019-07-01 RX ADMIN — MORPHINE SULFATE 4 MG: 2 INJECTION, SOLUTION INTRAMUSCULAR; INTRAVENOUS at 04:12

## 2019-07-01 RX ADMIN — LIDOCAINE HYDROCHLORIDE 100 MG: 20 INJECTION, SOLUTION EPIDURAL; INFILTRATION; INTRACAUDAL; PERINEURAL at 12:12

## 2019-07-01 RX ADMIN — PROPOFOL 50 MG: 10 INJECTION, EMULSION INTRAVENOUS at 12:12

## 2019-07-01 RX ADMIN — PROPOFOL 160 MCG/KG/MIN: 10 INJECTION, EMULSION INTRAVENOUS at 12:14

## 2019-07-01 RX ADMIN — Medication 10 ML: at 14:29

## 2019-07-01 RX ADMIN — ACETAMINOPHEN 650 MG: 325 TABLET, FILM COATED ORAL at 17:04

## 2019-07-01 RX ADMIN — ALBUTEROL SULFATE 2.5 MG: 2.5 SOLUTION RESPIRATORY (INHALATION) at 07:10

## 2019-07-01 RX ADMIN — MORPHINE SULFATE 4 MG: 2 INJECTION, SOLUTION INTRAMUSCULAR; INTRAVENOUS at 13:50

## 2019-07-01 RX ADMIN — SODIUM CHLORIDE 125 ML/HR: 900 INJECTION, SOLUTION INTRAVENOUS at 04:12

## 2019-07-01 RX ADMIN — SODIUM CHLORIDE, SODIUM LACTATE, POTASSIUM CHLORIDE, AND CALCIUM CHLORIDE 1000 ML: 600; 310; 30; 20 INJECTION, SOLUTION INTRAVENOUS at 11:04

## 2019-07-01 RX ADMIN — MIRTAZAPINE 15 MG: 15 TABLET, FILM COATED ORAL at 21:56

## 2019-07-01 RX ADMIN — PROPOFOL 50 MG: 10 INJECTION, EMULSION INTRAVENOUS at 12:13

## 2019-07-01 RX ADMIN — Medication 10 ML: at 21:55

## 2019-07-01 RX ADMIN — SODIUM CHLORIDE 40 MG: 9 INJECTION, SOLUTION INTRAMUSCULAR; INTRAVENOUS; SUBCUTANEOUS at 09:09

## 2019-07-01 RX ADMIN — PREDNISONE 20 MG: 10 TABLET ORAL at 17:03

## 2019-07-01 NOTE — INTERVAL H&P NOTE
H&P Update: 
Jean Lam was seen and examined. History and physical has been reviewed. The patient has been examined.  There have been no significant clinical changes since the completion of the originally dated History and Physical.

## 2019-07-01 NOTE — ANESTHESIA POSTPROCEDURE EVALUATION
Procedure(s):  ESOPHAGOGASTRODUODENOSCOPY (EGD). total IV anesthesia    Anesthesia Post Evaluation        Patient location during evaluation: bedside  Patient participation: complete - patient participated  Level of consciousness: responsive to verbal stimuli  Pain management: satisfactory to patient  Airway patency: patent  Anesthetic complications: no  Cardiovascular status: hemodynamically stable  Respiratory status: spontaneous ventilation  Hydration status: stable        Vitals Value Taken Time   /116 7/1/2019 12:52 PM   Temp 36 °C (96.8 °F) 7/1/2019 12:23 PM   Pulse 103 7/1/2019 12:52 PM   Resp 16 7/1/2019 12:23 PM   SpO2 95 % 7/1/2019 12:52 PM   Vitals shown include unvalidated device data.

## 2019-07-01 NOTE — PROGRESS NOTES
Hourly rounds performed;all pt needs met. PRN pain medication administered x3 for complaints of aching pain to great rt toe. Pt alert and oriented this shift. Bath given before bedtime. Bed in low position and call light/ personal items within reach. Will continue to monitor and give bedside shift report to oncoming day shift nurse.

## 2019-07-01 NOTE — PROGRESS NOTES
Call light within reach. Vitals remain stable. Patient continues to report pain in right left toe. Hourly rounds performed will give report to oncoming nurse.

## 2019-07-01 NOTE — PROGRESS NOTES
Date 06/30/19 0700 - 07/01/19 0659 07/01/19 0700 - 07/02/19 0659   Shift 0550-54311859 1900-0659 24 Hour Total 3560-2637 5988-8979 24 Hour Total   INTAKE   P.O.         P. O.       I. V.(mL/kg/hr)  1424 1424        I.V.  1424 1424      Shift Total(mL/kg) 880(9.9) 1844(20.8) 2724(30.8)      OUTPUT   Urine(mL/kg/hr) 200(0.2)  200        Urine Voided 200  200        Urine Occurrence(s) 3 x 4 x 7 x      Stool           Stool Occurrence(s) 0 x  0 x      Shift Total(mL/kg) 200(2.3)  200(2.3)       9761 2292      Weight (kg) 88.5 88.5 88.5 88.5 88.5 88.5

## 2019-07-01 NOTE — PROGRESS NOTES
Pt returned to the floor s/p EGD. Alert and oriented x 4, states that the only pain he is having is in his foot from his gout. Pain medication administered per order. No other needs at this time, will continue to  manage.

## 2019-07-01 NOTE — PROGRESS NOTES
Pt with expiratory wheezing. Notified respiratory that breathing tx needed. Will continue to monitor.

## 2019-07-01 NOTE — ANESTHESIA PREPROCEDURE EVALUATION
Anesthetic History               Review of Systems / Medical History  Patient summary reviewed    Pulmonary    COPD      Smoker  Asthma        Neuro/Psych         TIA (? 2013)     Cardiovascular    Hypertension          Past MI (2013), CAD and cardiac stents (2013)    Exercise tolerance: >4 METS  Comments: NUCLEAR PERFUSION STUDY 11/2018: low risk scan, EF 63%      Recent PE 6/19 now on Xarelto - acutely held for possible GIB   GI/Hepatic/Renal     GERD: well controlled           Endo/Other        Arthritis and anemia     Other Findings   Comments: Possible GIB           Physical Exam    Airway  Mallampati: III  TM Distance: > 6 cm  Neck ROM: decreased range of motion   Mouth opening: Normal     Cardiovascular  Regular rate and rhythm,  S1 and S2 normal,  no murmur, click, rub, or gallop             Dental    Dentition: Full upper dentures     Pulmonary  Breath sounds clear to auscultation               Abdominal         Other Findings            Anesthetic Plan    ASA: 4  Anesthesia type: total IV anesthesia          Induction: Intravenous      PE last month with new prescription of Xarelto - now with Anemia and dark stools.  Appears to be well compensated from PE with no distress, hypoxia, or hemodynamic instability - will proceed with gentle sedation for case watching ventilation carefully

## 2019-07-01 NOTE — PROGRESS NOTES
Patient transported via stretcher by Lynnette Scott RN to GI lab pre-op. Consent in chart. PIV flushed and LR infusing. VSS and patient denies pain.

## 2019-07-01 NOTE — PROGRESS NOTES
Hospitalist Progress Note    Subjective:   Daily Progress Note: 7/1/2019 4:05 PM    Mr. Lulu Mcnally is a 79 yo AAM with PMH of Asthma, HTN,GERD, HLD,CAD,recent PE (6/11/19) for which he was started on Xarelto, who presented to the ER on 6/29/19 with c/o R great toe pain for one day PTA. Labs were suspicious for gout. Pt also had CBC performed, which showed a drop in Hgb. Upon further questioning, pt admitted to possibly having black tarry stools in the last few days prior to presentation. He was admitted for suspected GI bleeding and gout flare. He is on colchicine and states his right great toe is quite painful. EGD performed today and negative for acute bleed. Has had recent colonoscopy with no evidence of bleeding. Hg 7.9 from 8.8 yesterday but has been receiving NS continuously. Denies sob, chest pain.     Current Facility-Administered Medications   Medication Dose Route Frequency    predniSONE (DELTASONE) tablet 20 mg  20 mg Oral DAILY WITH BREAKFAST    [START ON 7/2/2019] rivaroxaban (XARELTO) tablet 20 mg  20 mg Oral DAILY WITH BREAKFAST    pantoprazole (PROTONIX) 40 mg in sodium chloride 0.9% 10 mL injection  40 mg IntraVENous Q12H    albuterol (PROVENTIL VENTOLIN) nebulizer solution 2.5 mg  2.5 mg Nebulization Q4H PRN    amLODIPine (NORVASC) tablet 10 mg  10 mg Oral DAILY    mirtazapine (REMERON) tablet 15 mg  15 mg Oral QHS    oxybutynin chloride XL (DITROPAN XL) tablet 5 mg  5 mg Oral DAILY    pravastatin (PRAVACHOL) tablet 40 mg  40 mg Oral DAILY    tiotropium (SPIRIVA) inhalation capsule 18 mcg  1 Cap Inhalation DAILY    sodium chloride (NS) flush 5-40 mL  5-40 mL IntraVENous Q8H    sodium chloride (NS) flush 5-40 mL  5-40 mL IntraVENous PRN    acetaminophen (TYLENOL) tablet 650 mg  650 mg Oral Q4H PRN    naloxone (NARCAN) injection 0.4 mg  0.4 mg IntraVENous PRN    diphenhydrAMINE (BENADRYL) injection 12.5 mg  12.5 mg IntraVENous Q4H PRN    ondansetron (ZOFRAN) injection 4 mg  4 mg IntraVENous Q4H PRN    colchicine tablet 0.6 mg  0.6 mg Oral DAILY        Review of Systems  A comprehensive review of systems was negative except for that written in the HPI. Objective:     Visit Vitals  /84 (BP 1 Location: Right arm, BP Patient Position: Head of bed elevated (Comment degrees)) Comment (BP Patient Position): 45   Pulse 94   Temp 99.2 °F (37.3 °C)   Resp 16   Ht 6' 2\" (1.88 m)   Wt 88.5 kg (195 lb)   SpO2 90%   BMI 25.04 kg/m²      O2 Device: Room air    Temp (24hrs), Av.6 °F (37 °C), Min:96.8 °F (36 °C), Max:99.3 °F (37.4 °C)      701 -  190  In: 250 [I.V.:250]  Out: 0   1901 -  0700  In: 3808 [P.O.:1300; I.V.:1424]  Out: 500 [Urine:500]    General: awake, alert, oriented, in good spirits  Eyes; non icteric, EOMI  Neck; supple  CV: RRR  Pulm; diminished in bases but non labored, no wheezing  Abd; soft, non tender, active BS  Ext: right great toe erythematous, warm and painful with range of motion; palpable pedal pulses    Additional comments:I reviewed the patient's new clinical lab test results. GI notes, EGD report    Data Review    Recent Results (from the past 24 hour(s))   HGB & HCT    Collection Time: 19  7:49 PM   Result Value Ref Range    HGB 8.5 (L) 13.6 - 17.2 g/dL    HCT 27.0 (L) 41.1 - 50.3 %   HGB & HCT    Collection Time: 19  5:23 AM   Result Value Ref Range    HGB 7.9 (L) 13.6 - 17.2 g/dL    HCT 25.6 (L) 41.1 - 50.3 %   TYPE & SCREEN    Collection Time: 19  9:54 AM   Result Value Ref Range    Crossmatch Expiration 2019     ABO/Rh(D) Raul Murphy POSITIVE     Antibody screen NEG          Assessment/Plan:     Principal Problem:    GI bleed (2019)  Drop in hg likely due to dilution from IVFs. Stop IVFs. EGD and recent colonoscopy negative. If hg continues to drop will need outpatient capsule study.  Advance diet as tolerated    Active Problems:    Accelerated hypertension (2013)      CAD (coronary artery disease) (9/15/2013)      Hypertension, benign, controlled (11/18/2015)      Coronary atherosclerosis of native coronary vessel (11/18/2015)      Pulmonary emboli (HCC) (6/12/2019)  Restart xarelto as PE occurred just last month. Gout (6/29/2019)  Stop narcotics. On colchicine.   Start prednisone 20 mg daily x 3 days    Home tomorrow    Care Plan discussed with: Patient/Family and

## 2019-07-01 NOTE — PROCEDURES
Esophagogastroduodenoscopy    DATE of PROCEDURE: 7/1/2019    INDICATION: ABLA    POSTPROCEDURE DIAGNOSIS: small hiatal hernia    MEDICATIONS ADMINISTERED: MAC anesthesia (see anesthesia report)    INSTRUMENT:    PROCEDURE:  After obtaining informed consent, the patient was placed in the left lateral position and sedated. The endoscope was advanced under direct vision without difficulty. The esophagus, stomach (including retroflexed views) and duodenum were evaluated. The patient was taken to the recovery area in stable condition. FINDINGS:  ESOPHAGUS: normal  STOMACH: no gastritis or blood  DUODENUM: Brunner's gland hyperplasia but no blood or AVMs or ulcers    Estimated blood loss: 0-minimal   Specimens obtained during procedure: none    PLAN:  No source for anemia. Apparently, he's already had a colonoscopy recently by Dr Britni Wells. Advance diet. Probably home tomorrow and I will consider outpatient capsule endoscopy if his insurance will cover it.

## 2019-07-01 NOTE — PROGRESS NOTES
.. TRANSFER - IN REPORT:    Verbal report received from  Ray given to Gabriela Kapadia RN  on [facility]  being received from GI lab. Report consisted of patients Situation, Background, Assessment and   Recommendations. Information from the following report  was reviewed with the receiving nurse. Opportunity for questions and clarification was provided. Assessment completed upon patients arrival to unit and care assumed.

## 2019-07-01 NOTE — PROGRESS NOTES
.. TRANSFER - OUT REPORT:    Verbal report given to Bennie  on [facility]  being transferred to GI Lab  Report consisted of patients Situation, Background, Assessment and   Recommendations(SBAR). Information from the following report(s)  was reviewed with the receiving nurse. Lines:   Peripheral IV 06/29/19 Left Forearm (Active)   Site Assessment Clean, dry, & intact 7/1/2019  3:01 AM   Phlebitis Assessment 0 7/1/2019  3:01 AM   Infiltration Assessment 0 7/1/2019  3:01 AM   Dressing Status Clean, dry, & intact 7/1/2019  3:01 AM   Dressing Type Tape;Transparent 7/1/2019  3:01 AM   Hub Color/Line Status Patent; Flushed 7/1/2019  3:01 AM        Opportunity for questions and clarification was provided.       Patient transported with stretcher patient not scheduled until 1:00pm.

## 2019-07-01 NOTE — ROUTINE PROCESS
TRANSFER - OUT REPORT: 
 
Verbal report given to [facility] RN(name) on [facility]  being transferred to 636(unit) for routine progression of care Report consisted of patients Situation, Background, Assessment and  
Recommendations(SBAR). Information from the following report(s) Procedure Summary was reviewed with the receiving nurse. Lines:  
Peripheral IV 06/29/19 Left Forearm (Active) Site Assessment Clean, dry, & intact 7/1/2019 10:57 AM  
Phlebitis Assessment 0 7/1/2019 10:57 AM  
Infiltration Assessment 0 7/1/2019 10:57 AM  
Dressing Status Clean, dry, & intact 7/1/2019 10:57 AM  
Dressing Type Transparent;Tape 7/1/2019 10:57 AM  
Hub Color/Line Status Pink; Infusing;Flushed 7/1/2019 10:57 AM  
  
 
Opportunity for questions and clarification was provided. Patient transported with: 
 [employer]

## 2019-07-02 VITALS
DIASTOLIC BLOOD PRESSURE: 84 MMHG | WEIGHT: 195 LBS | HEART RATE: 90 BPM | SYSTOLIC BLOOD PRESSURE: 145 MMHG | HEIGHT: 74 IN | RESPIRATION RATE: 18 BRPM | BODY MASS INDEX: 25.03 KG/M2 | TEMPERATURE: 98.3 F | OXYGEN SATURATION: 99 %

## 2019-07-02 PROCEDURE — 74011250636 HC RX REV CODE- 250/636: Performed by: FAMILY MEDICINE

## 2019-07-02 PROCEDURE — 99218 HC RM OBSERVATION: CPT

## 2019-07-02 PROCEDURE — 74011250637 HC RX REV CODE- 250/637: Performed by: INTERNAL MEDICINE

## 2019-07-02 PROCEDURE — C9113 INJ PANTOPRAZOLE SODIUM, VIA: HCPCS | Performed by: FAMILY MEDICINE

## 2019-07-02 PROCEDURE — 74011250637 HC RX REV CODE- 250/637: Performed by: FAMILY MEDICINE

## 2019-07-02 PROCEDURE — 94640 AIRWAY INHALATION TREATMENT: CPT

## 2019-07-02 PROCEDURE — 96376 TX/PRO/DX INJ SAME DRUG ADON: CPT

## 2019-07-02 PROCEDURE — 94760 N-INVAS EAR/PLS OXIMETRY 1: CPT

## 2019-07-02 PROCEDURE — 74011000250 HC RX REV CODE- 250: Performed by: FAMILY MEDICINE

## 2019-07-02 PROCEDURE — 74011636637 HC RX REV CODE- 636/637: Performed by: INTERNAL MEDICINE

## 2019-07-02 RX ORDER — PREDNISONE 10 MG/1
10 TABLET ORAL
Qty: 20 TAB | Refills: 0 | Status: SHIPPED | OUTPATIENT
Start: 2019-07-02 | End: 2019-07-08

## 2019-07-02 RX ORDER — PANTOPRAZOLE SODIUM 40 MG/1
40 TABLET, DELAYED RELEASE ORAL DAILY
Qty: 30 TAB | Refills: 1 | Status: SHIPPED | OUTPATIENT
Start: 2019-07-02

## 2019-07-02 RX ORDER — COLCHICINE 0.6 MG/1
0.6 TABLET ORAL DAILY
Qty: 5 TAB | Refills: 0 | Status: SHIPPED | OUTPATIENT
Start: 2019-07-02 | End: 2019-07-07

## 2019-07-02 RX ADMIN — PREDNISONE 20 MG: 10 TABLET ORAL at 09:21

## 2019-07-02 RX ADMIN — COLCHICINE 0.6 MG: 0.6 TABLET, FILM COATED ORAL at 09:18

## 2019-07-02 RX ADMIN — Medication 10 ML: at 05:32

## 2019-07-02 RX ADMIN — TIOTROPIUM BROMIDE 18 MCG: 18 CAPSULE ORAL; RESPIRATORY (INHALATION) at 09:00

## 2019-07-02 RX ADMIN — RIVAROXABAN 20 MG: 20 TABLET, FILM COATED ORAL at 09:21

## 2019-07-02 RX ADMIN — SODIUM CHLORIDE 40 MG: 9 INJECTION, SOLUTION INTRAMUSCULAR; INTRAVENOUS; SUBCUTANEOUS at 09:20

## 2019-07-02 RX ADMIN — PRAVASTATIN SODIUM 40 MG: 20 TABLET ORAL at 09:17

## 2019-07-02 RX ADMIN — AMLODIPINE BESYLATE 10 MG: 10 TABLET ORAL at 09:22

## 2019-07-02 RX ADMIN — OXYBUTYNIN CHLORIDE 5 MG: 5 TABLET, EXTENDED RELEASE ORAL at 09:17

## 2019-07-02 NOTE — PROGRESS NOTES
Problem: General Medical Care Plan  Goal: *Vital signs within specified parameters  Outcome: Progressing Towards Goal  Goal: *Skin integrity maintained  Outcome: Progressing Towards Goal  Goal: *Anxiety reduced or absent  Outcome: Progressing Towards Goal     Problem: Falls - Risk of  Goal: *Absence of Falls  Description  Document Ariella Spain Fall Risk and appropriate interventions in the flowsheet.   Outcome: Progressing Towards Goal     Problem: Patient Education: Go to Patient Education Activity  Goal: Patient/Family Education  Outcome: Progressing Towards Goal     Problem: Breathing Pattern - Ineffective  Goal: *Absence of hypoxia  Outcome: Progressing Towards Goal  Goal: *Use of effective breathing techniques  Outcome: Progressing Towards Goal

## 2019-07-02 NOTE — PROGRESS NOTES
Pt to discharge home today with no needs voiced. Care Management Interventions  PCP Verified by CM:  Yes  Transition of Care Consult (CM Consult): Discharge Planning  Current Support Network: Own Home  Confirm Follow Up Transport: Family  Plan discussed with Pt/Family/Caregiver: Yes  Freedom of Choice Offered: Yes  Discharge Location  Discharge Placement: Home

## 2019-07-02 NOTE — PROGRESS NOTES
Rounding done every hour. Patient resting in room. No signs or symptoms of distress. No changes in status. Patient denies any further needs at this time. Bed in low position and call light/ personal items within reach. Will continue to monitor and give bedside shift report to oncoming day shift RN.

## 2019-07-02 NOTE — PROGRESS NOTES
Discharge instructions and prescriptions given and reviewed with pt, verbalizes understanding, pt states he has \" a lot of running around to do today\" and is asking for crutches due to difficulty ambulating related to gout, explained to pt he would need to be evaluated by PT before crutches could be ordered, pt declined this, also offered to arrange home PT, pt declined this as well, pt calling for ride home, to call desk when ready to leave.

## 2019-07-02 NOTE — DISCHARGE SUMMARY
Hospitalist Discharge Summary     Admit Date:  2019  3:06 PM   Name:  Salvador Aldridge   Age:  80 y.o.  :  1950   MRN:  [id number]   PCP:  Winter Ahumada MD  Treatment Team: Attending Provider: Elizabeth Gould MD; Consulting Provider: Laurel Mast MD; Care Manager: Sada Sylvester LMSW; Utilization Review: Magalys Machado    Problem List for this Hospitalization:  Hospital Problems as of 2019 Date Reviewed: 2019          Codes Class Noted - Resolved POA    Gout ICD-10-CM: M10.9  ICD-9-CM: 274.9  2019 - Present Unknown        * (Principal) GI bleed ICD-10-CM: K92.2  ICD-9-CM: 578.9  2019 - Present Unknown        Pulmonary emboli (HCC) ICD-10-CM: I26.99  ICD-9-CM: 415.19  2019 - Present Yes        Hypertension, benign, controlled ICD-10-CM: I10  ICD-9-CM: 401.1  2015 - Present Yes        Coronary atherosclerosis of native coronary vessel ICD-10-CM: I25.10  ICD-9-CM: 414.01  2015 - Present Yes        CAD (coronary artery disease) (Chronic) ICD-10-CM: I25.10  ICD-9-CM: 414.00  9/15/2013 - Present Yes        Accelerated hypertension ICD-10-CM: I10  ICD-9-CM: 401.0  2013 - Present Yes                Admission HPI from 2019:    \" Please refer to HPI for more details \". Hospital Course:    a 79 yo AAM with PMH of Asthma, HTN,GERD, HLD,CAD,recent PE (19) for which he was started on Xarelto, who presented to the ER on 19 with c/o R great toe pain for one day. Labs were suspicious for gout. Pt also had CBC performed, which showed a drop in Hgb. Upon further questioning, pt admitted to possibly having black tarry stools in the last few days prior to presentation. He was admitted for suspected GI bleeding and gout flare. He is on colchicine and states his right great toe is quite painful. EGD performed today and negative for acute bleed. Has had recent colonoscopy with no evidence of bleeding. Patient Hb remained stable around 8.  GI recommended capsule endoscopy outpatient if further hb drop. Patient anemia due to ? GI bleeding vs AOCD. Patient is stable to be discharged home to follow with PCP to repeat Hb and outpatient gout flare management. Follow up instructions below. Plan was discussed with patient/careteam.  All questions answered. Patient was stable at time of discharge and was instructed to call or return if there are any concerns or recurrence of symptoms. Diagnostic Imaging/Tests: All Micro Results     None          Labs: Results:       BMP, Mg, Phos Recent Labs     06/30/19  0305 06/29/19  1612    140   K 3.5 4.7    105   CO2 28 23   AGAP 9 12   BUN 14 14   CREA 1.23 1.19   CA 8.9 9.4   * 92      CBC Recent Labs     07/01/19  0523 06/30/19  1949 06/30/19  1350  06/30/19  0305  06/29/19  1620   WBC  --   --   --   --  7.8  --  7.8   RBC  --   --   --   --  3.19*  --  3.33*   HGB 7.9* 8.5* 8.9*   < > 8.8*   < > 9.4*   HCT 25.6* 27.0* 28.1*   < > 27.9*   < > 28.8*   PLT  --   --   --   --  328  --  357   GRANS  --   --   --   --  77  --  71   LYMPH  --   --   --   --  12*  --  18   EOS  --   --   --   --  2  --  3   MONOS  --   --   --   --  8  --  7   BASOS  --   --   --   --  1  --  1   IG  --   --   --   --  0  --  1   ANEU  --   --   --   --  6.0  --  5.5   ABL  --   --   --   --  0.9  --  1.4   THUY  --   --   --   --  0.2  --  0.2   ABM  --   --   --   --  0.6  --  0.5   ABB  --   --   --   --  0.0  --  0.1   AIG  --   --   --   --  0.0  --  0.0    < > = values in this interval not displayed. LFT No results for input(s): SGOT, ALT, TBIL, AP, TP, ALB, GLOB, AGRAT, GPT in the last 72 hours.    Cardiac Testing Lab Results   Component Value Date/Time    BNP 9 (H) 06/11/2019 06:44 AM    BNP 51 02/28/2014 03:38 PM    BNP 21 09/13/2013 05:10 PM    Troponin-I, Qt. <0.02 (L) 05/05/2015 03:10 PM    Troponin-I, Qt. 2.21 (HH) 09/14/2013 10:13 AM    Troponin-I, Qt. 2.76 () 09/14/2013 06:35 AM      Coagulation Tests Lab Results   Component Value Date/Time    Prothrombin time 14.3 06/30/2019 11:35 AM    Prothrombin time 14.3 06/11/2019 08:39 PM    Prothrombin time 12.4 10/30/2018 12:25 PM    INR 1.1 06/30/2019 11:35 AM    INR 1.1 06/11/2019 08:39 PM    INR 0.9 10/30/2018 12:25 PM    aPTT 141.2 (H) 06/12/2019 03:37 AM    aPTT 25.5 06/11/2019 08:39 PM    aPTT 30.4 10/30/2018 12:25 PM      A1c Lab Results   Component Value Date/Time    Hemoglobin A1c 6.0 10/30/2018 12:25 PM      Lipid Panel Lab Results   Component Value Date/Time    Cholesterol, total 176 09/14/2013 06:35 AM    HDL Cholesterol 54 09/14/2013 06:35 AM    LDL, calculated 77.4 09/14/2013 06:35 AM    VLDL, calculated 44.6 (H) 09/14/2013 06:35 AM    Triglyceride 223 (H) 09/14/2013 06:35 AM    CHOL/HDL Ratio 3.3 09/14/2013 06:35 AM      Thyroid Panel No results found for: T4, T3U, TSH, TSHEXT, TSHEXT     Most Recent UA Lab Results   Component Value Date/Time    Color YELLOW 10/30/2018 12:25 PM    Appearance CLEAR 10/30/2018 12:25 PM    Specific gravity 1.017 10/30/2018 12:25 PM    pH (UA) 6.0 10/30/2018 12:25 PM    Protein NEGATIVE  10/30/2018 12:25 PM    Glucose NEGATIVE  10/30/2018 12:25 PM    Ketone NEGATIVE  10/30/2018 12:25 PM    Bilirubin NEGATIVE  10/30/2018 12:25 PM    Blood NEGATIVE  10/30/2018 12:25 PM    Urobilinogen 0.2 10/30/2018 12:25 PM    Nitrites NEGATIVE  10/30/2018 12:25 PM    Leukocyte Esterase NEGATIVE  10/30/2018 12:25 PM        Allergies   Allergen Reactions    Lisinopril Swelling    Tramadol Rash and Itching     Immunization History   Administered Date(s) Administered    Influenza Vaccine PF 09/15/2013    TB Skin Test (PPD) Intradermal 11/21/2018    TD Vaccine 07/15/2002       All Labs from Last 24 Hrs:  Recent Results (from the past 24 hour(s))   TYPE & SCREEN    Collection Time: 07/01/19  9:54 AM   Result Value Ref Range    Crossmatch Expiration 07/04/2019     ABO/Rh(D) Eleonora Ulrich POSITIVE     Antibody screen NEG        Discharge Exam:  Patient Vitals for the past 24 hrs:   Temp Pulse Resp BP SpO2   07/02/19 0814 98.3 °F (36.8 °C) 90 18 145/84 97 %   07/02/19 0531 98.5 °F (36.9 °C) 92 18 153/82 96 %   07/02/19 0001 98.7 °F (37.1 °C) 91 18 135/86 92 %   07/01/19 2010     99 %   07/01/19 1945 98.8 °F (37.1 °C) 89 16 140/88 91 %   07/01/19 1619 99.4 °F (37.4 °C) 91 15 129/74 91 %   07/01/19 1442 99.2 °F (37.3 °C) 94 16 140/84 90 %   07/01/19 1322  89 14 145/85 93 %   07/01/19 1306  89 14 159/85 95 %   07/01/19 1223 96.8 °F (36 °C) 82 16 106/64 95 %   07/01/19 1221  84  106/64 93 %   07/01/19 1056  90 20 141/87 96 %     Oxygen Therapy  O2 Sat (%): 97 % (07/02/19 0814)  Pulse via Oximetry: 92 beats per minute (07/01/19 2010)  O2 Device: Room air (07/01/19 2010)    Intake/Output Summary (Last 24 hours) at 7/2/2019 0903  Last data filed at 7/2/2019 0841  Gross per 24 hour   Intake 3050 ml   Output 1225 ml   Net 1825 ml       Patient reported pain R foot is improving. Denies bacl stools or any other complaints. General:    Well nourished. Alert. No distress. CV:   Regular rate and rhythm. No murmur, rub, or gallop. Lungs:  Clear to auscultation bilaterally. .  Abdomen: Soft, nontender,  Extremities: RLE with some tenderness at base of R big toe. No erythema or lesion noted  Neurologic: No gross focal deficits      Discharge Info:   Current Discharge Medication List      START taking these medications    Details   colchicine 0.6 mg tablet Take 1 Tab by mouth daily for 5 days. Qty: 5 Tab, Refills: 0      pantoprazole (PROTONIX) 40 mg tablet Take 1 Tab by mouth daily. Qty: 30 Tab, Refills: 1         CONTINUE these medications which have CHANGED    Details   rivaroxaban (XARELTO) 20 mg tab tablet Take 1 Tab by mouth daily (with breakfast). Cont medication as prescribed by PCP  Qty: 30 Tab, Refills: 0      predniSONE (DELTASONE) 10 mg tablet Take 10 mg by mouth daily (with breakfast).  Taper: 20 mg x 3 days, 10 mg x 3 days  Qty: 20 Tab, Refills: 0 CONTINUE these medications which have NOT CHANGED    Details   aspirin delayed-release 81 mg tablet Take 1 Tab by mouth daily. Qty: 30 Tab, Refills: 0      tiotropium (SPIRIVA) 18 mcg inhalation capsule Take 1 Cap by inhalation daily. Qty: 30 Cap, Refills: 0      albuterol (PROVENTIL HFA, VENTOLIN HFA, PROAIR HFA) 90 mcg/actuation inhaler Take 2 Puffs by inhalation every six (6) hours as needed for Wheezing. Take every 4-6 hours for wheezing  Qty: 1 Inhaler, Refills: 0      mirtazapine (REMERON) 15 mg tablet Take 15 mg by mouth nightly. acetaminophen (TYLENOL) 500 mg tablet Take 2 Tabs by mouth every six (6) hours. Qty: 120 Tab, Refills: 0      oxybutynin chloride XL (DITROPAN XL) 5 mg CR tablet Take 5 mg by mouth daily. Take / use AM day of surgery  per anesthesia protocols. pravastatin (PRAVACHOL) 40 mg tablet Take 40 mg by mouth daily. Take / use AM day of surgery  per anesthesia protocols. ondansetron (ZOFRAN ODT) 8 mg disintegrating tablet Take 1 Tab by mouth every eight (8) hours as needed for Nausea. Qty: 30 Tab, Refills: 0      senna-docusate (PERICOLACE) 8.6-50 mg per tablet Take 2 Tabs by mouth daily. Qty: 120 Tab, Refills: 0      amLODIPine (NORVASC) 10 mg tablet Take 10 mg by mouth daily. Take / use AM day of surgery  per anesthesia protocols. STOP taking these medications       hydroCHLOROthiazide (MICROZIDE) 12.5 mg capsule Comments:   Reason for Stopping:         omeprazole (PRILOSEC) 20 mg capsule Comments:   Reason for Stopping:                 Disposition: home  Activity: Activity as tolerated  Diet: Cardiac Diet    Follow-up Information     Follow up With Specialties Details Why Contact Info    Wesley Millard MD Internal Medicine   [address] [place] [address]  [phone]                  Signed:   Saurav Key MD

## 2019-07-02 NOTE — DISCHARGE INSTRUCTIONS
DISCHARGE SUMMARY from Nurse    PATIENT INSTRUCTIONS:    After general anesthesia or intravenous sedation, for 24 hours or while taking prescription Narcotics:  · Limit your activities  · Do not drive and operate hazardous machinery  · Do not make important personal or business decisions  · Do  not drink alcoholic beverages  · If you have not urinated within 8 hours after discharge, please contact your surgeon on call. Report the following to your surgeon:  · Excessive pain, swelling, redness or odor of or around the surgical area  · Temperature over 100.5  · Nausea and vomiting lasting longer than 4 hours or if unable to take medications  · Any signs of decreased circulation or nerve impairment to extremity: change in color, persistent  numbness, tingling, coldness or increase pain  · Any questions    What to do at Home:  Recommended activity: Activity as tolerated. If you experience any of the following symptoms Vomiting blood, blood in stools, extreme dizziness/fatigue, shortness of breath, or feeling faint, please follow up with your primary care provider. *  Please give a list of your current medications to your Primary Care Provider. *  Please update this list whenever your medications are discontinued, doses are      changed, or new medications (including over-the-counter products) are added. *  Please carry medication information at all times in case of emergency situations. These are general instructions for a healthy lifestyle:    No smoking/ No tobacco products/ Avoid exposure to second hand smoke  Surgeon General's Warning:  Quitting smoking now greatly reduces serious risk to your health.     Obesity, smoking, and sedentary lifestyle greatly increases your risk for illness    A healthy diet, regular physical exercise & weight monitoring are important for maintaining a healthy lifestyle    You may be retaining fluid if you have a history of heart failure or if you experience any of the following symptoms:  Weight gain of 3 pounds or more overnight or 5 pounds in a week, increased swelling in our hands or feet or shortness of breath while lying flat in bed. Please call your doctor as soon as you notice any of these symptoms; do not wait until your next office visit. The discharge information has been reviewed with the patient. The patient verbalized understanding. Discharge medications reviewed with the patient and appropriate educational materials and side effects teaching were provided.   ___________________________________________________________________________________________________________________________________

## 2019-07-03 ENCOUNTER — PATIENT OUTREACH (OUTPATIENT)
Dept: CASE MANAGEMENT | Age: 69
End: 2019-07-03

## 2019-07-03 NOTE — PROGRESS NOTES
This note will not be viewable in [address] 1st Attempt to contact patient for [facility] call, no answer, left  for returned call.  Will attempt to contact patient again within 24 hours

## 2019-07-05 ENCOUNTER — PATIENT OUTREACH (OUTPATIENT)
Dept: CASE MANAGEMENT | Age: 69
End: 2019-07-05

## 2019-07-05 NOTE — PROGRESS NOTES
This note will not be viewable in [address] 2nd attempt to contact patient for [facility] call, no answer, left  for returned call. Will attempt 3rd call within 5 business days.

## 2019-07-08 ENCOUNTER — PATIENT OUTREACH (OUTPATIENT)
Dept: CASE MANAGEMENT | Age: 69
End: 2019-07-08

## 2019-07-08 NOTE — PROGRESS NOTES
This note will not be viewable in [address] Date/Time of Call: 07/08/19 1254pm    What was the patient hospitalized for? GI Bleed   Consent for REGINALD IBRAHIM Call       Does the patient understand his/her diagnosis and/or treatment and what happened during the hospitalization? Called and spoke with patient, he agrees to call, and states that she is doing okay. Yes      Did the patient receive discharge instructions? Yes   CM Assessed Risk for Readmission:     Patient stated Risk for Readmission:  Patient is a high risk for readmission    No concerns are voiced at this time    Review any discharge instructions (see discharge instructions/AVS in [facility]). Ask patient if they understand these. Do they have any questions? reviewed DC instructions   Were home services ordered (nursing, PT, OT, ST, etc.)? No   If so, has the first visit occurred? If not, why? (Assist with coordination of services if necessary. )   NA   Was any DME ordered? No    If so, has it been received? If not, why?  (Assist patient in obtaining DME orders &/or equipment if necessary. ) NA   Complete a review of all medications (new, continued and discontinued meds per the D/C instructions and medication tab in [facility]). Medications reviewed     START taking:  colchicine 0.6 mg tablet  pantoprazole 40 mg tablet (PROTONIX)  CHANGE how you take:  predniSONE 10 mg tablet (DELTASONE)  rivaroxaban 20 mg Tab tablet (XARELTO)  STOP taking:  hydroCHLOROthiazide 12.5 mg capsule (MICROZIDE)  omeprazole 20 mg capsule (PRILOSEC)   Were all new prescriptions filled? If not, why?  (Assist patient in obtaining medications if necessary  escalate for CCM &/or SW if ongoing issues are verbalized by pt or anticipated)   Yes    Does the patient understand the purpose and dosing instructions for all medications?   (If patient has questions, provide explanation and education.)   Patient verbalizes understanding of medications   Does the patient have any problems in performing ADLs? (If patient is unable to perform ADLs  what is the limiting factor(s)? Do they have a support system that can assist? If no support system is present, discuss possible assistance that they may be able to obtain. Escalate for CCM/SW if ongoing issues are verbalized by pt or anticipated)   patient independent with ADLs    Does the patient have all follow-up appointments scheduled? 7 day f/up with PCP?   (f/up with PCP may be w/in 14 days if patient has a f/up with their specialist w/in 7 days)    7-14 day f/up with specialist?   (or per discharge instructions)    If f/up has not been made  what actions has the care coordinator made to accomplish this? Has transportation been arranged? yes       07/10/19 at 140pm         Pulmonary 07/08/19 at 850am      Reviewed appointment information with patient      Yes, no concerns   Any other questions or concerns expressed by the patient? No questions or concerns are voiced at this time. Care Coordinator contact information provided should any needs arise   Schedule next appointment with REGINALD IBRAHIM Coordinator or refer to RN Case Manager/ per the workflow guidelines. When is care coordinators next follow-up call scheduled? If referred for CCM  what RN care manager was the referral assigned?  Referral to CCM for 3 ED to 2 Admissions          NA Sherren Barre, RN CCM   CARLOS Call Completed By: Renella Cabot, [address] [place] [address] Coordinator

## 2019-07-09 ENCOUNTER — PATIENT OUTREACH (OUTPATIENT)
Dept: CASE MANAGEMENT | Age: 69
End: 2019-07-09

## 2019-07-09 NOTE — PROGRESS NOTES
Complex Case Management  Initial Assessment  Addendum to Connect [facility]      Referral Source & Reason REGINALD IBRAHIM referral for 3 ED visits that led to 2 hospital admissions in the last 30 days     Primary concerns per patient and/or pts family/caregiver Patient does not voice any concerns during phone visit today   Recent Hospitalization(s)/ED Visits Admitted on 6/11/19 for SOB and 6/26/19 for GI bleed   Current with Home Health? Agency? No   Social Needs:  Able to afford medications? Financial assessment? Access to care? Transportation? Patient is able to afford medications. No financial assessment done at this time. Patient has access to care and transportation. Nutritional Assessment  Appetite? Obesity? Failure to Thrive? Bowels ? Fair appetite. NPO at times during hospitalization r/t GI distress     Cognitive Assessment  History of dementia  Health literacy    Patient does not have a history of dementia. Voices understanding of health status. Mobility/Activity Assessment  Bed/chair bound? Make use of assistive devices? Does patient still drive? Patient is unable to drive. Plan/Interventions/Education  Follow up Appointments  Referrals Plan: follow up in 1-2 weeks  Education: Side effects of medications: xarelto  Smoking cessation-encourage  Discuss h/x arm pain   Spoke with patient today re: recent hospitalization. Discussed upcoming appointments. Plan is to call patient as needed and every 1-2 weeks. I left my contact information so that patient can call as needed. This note will not be viewable in [address]

## 2019-07-17 ENCOUNTER — HOSPITAL ENCOUNTER (EMERGENCY)
Age: 69
Discharge: HOME | End: 2019-07-17
Attending: EMERGENCY MEDICINE
Payer: COMMERCIAL

## 2019-07-17 VITALS
TEMPERATURE: 98.5 F | BODY MASS INDEX: 26.44 KG/M2 | HEIGHT: 74 IN | HEART RATE: 86 BPM | OXYGEN SATURATION: 99 % | DIASTOLIC BLOOD PRESSURE: 90 MMHG | RESPIRATION RATE: 16 BRPM | WEIGHT: 206 LBS | SYSTOLIC BLOOD PRESSURE: 149 MMHG

## 2019-07-17 DIAGNOSIS — M10.9 ACUTE GOUT OF LEFT FOOT, UNSPECIFIED CAUSE: Primary | ICD-10-CM

## 2019-07-17 PROCEDURE — 99283 EMERGENCY DEPT VISIT LOW MDM: CPT | Performed by: EMERGENCY MEDICINE

## 2019-07-17 PROCEDURE — 74011636637 HC RX REV CODE- 636/637: Performed by: EMERGENCY MEDICINE

## 2019-07-17 PROCEDURE — 74011250637 HC RX REV CODE- 250/637: Performed by: EMERGENCY MEDICINE

## 2019-07-17 PROCEDURE — A9270 NON-COVERED ITEM OR SERVICE: HCPCS | Performed by: EMERGENCY MEDICINE

## 2019-07-17 RX ORDER — HYDROCODONE BITARTRATE AND ACETAMINOPHEN 5; 325 MG/1; MG/1
1-2 TABLET ORAL
Qty: 19 TAB | Refills: 0 | Status: SHIPPED | OUTPATIENT
Start: 2019-07-17 | End: 2019-07-22

## 2019-07-17 RX ORDER — HYDROCODONE BITARTRATE AND ACETAMINOPHEN 10; 325 MG/1; MG/1
1 TABLET ORAL
Status: COMPLETED | OUTPATIENT
Start: 2019-07-17 | End: 2019-07-17

## 2019-07-17 RX ORDER — PREDNISONE 20 MG/1
40 TABLET ORAL DAILY
Qty: 10 TAB | Refills: 0 | Status: SHIPPED | OUTPATIENT
Start: 2019-07-17 | End: 2019-07-22

## 2019-07-17 RX ORDER — COLCHICINE 0.6 MG/1
0.6 TABLET ORAL 2 TIMES DAILY
Qty: 20 TAB | Refills: 0 | OUTPATIENT
Start: 2019-07-17 | End: 2020-01-17

## 2019-07-17 RX ORDER — COLCHICINE 0.6 MG/1
0.6 TABLET ORAL DAILY
Status: DISCONTINUED | OUTPATIENT
Start: 2019-07-17 | End: 2019-07-17 | Stop reason: HOSPADM

## 2019-07-17 RX ADMIN — HYDROCODONE BITARTRATE AND ACETAMINOPHEN 1 TABLET: 10; 325 TABLET ORAL at 08:12

## 2019-07-17 RX ADMIN — COLCHICINE 0.6 MG: 0.6 TABLET, FILM COATED ORAL at 08:11

## 2019-07-17 RX ADMIN — PREDNISONE 60 MG: 10 TABLET ORAL at 08:11

## 2019-07-17 NOTE — ED TRIAGE NOTES
Pt coming in for pain in his right foot. States he was just released earlier from the hospital and hasnt been able to get any of his medications filled. States he believes it is gout. Denies recent injury.

## 2019-07-17 NOTE — DISCHARGE INSTRUCTIONS
Call your doctor/follow up doctor to set up appointment for recheck visit  Do not drink alcohol or drive while taking the prescription pain medications  Return to ER for any worsening symptoms or new problems which may arise
Strong peripheral pulses

## 2019-07-17 NOTE — ED PROVIDER NOTES
60-year-old male with symptoms consistent with recurrent gout exacerbation    Patient states he was treated when he was hospitalized a little over a week ago, but his medications have run out    No new injuries    The history is provided by the patient. Foot Pain    This is a recurrent problem. The current episode started yesterday. The problem occurs constantly. The problem has been gradually worsening. The pain is present in the right toes. The quality of the pain is described as pounding and sharp. The pain is moderate. Associated symptoms include limited range of motion, stiffness and tingling. Pertinent negatives include no numbness, no back pain and no neck pain. The symptoms are aggravated by standing and palpation. He has tried nothing for the symptoms. There has been no history of extremity trauma. Family history is significant for gout.         Past Medical History:   Diagnosis Date    Arthritis     Asthma     albuterol prn (uses 1-2 times per day)    CAD (coronary artery disease) 9/15/2013    Chronic obstructive pulmonary disease ([address]     Coronary atherosclerosis of native coronary vessel 11/18/2015    Current smoker on some days     Dental disorder     Dyspepsia and other specified disorders of function of stomach     GERD (gastroesophageal reflux disease)     on med for control     GI bleed 6/29/2019    Hyperlipidemia other unsp dyslipidemia 11/18/2015    on med    Hypertension     on med for control     Hypertension, benign, controlled 11/18/2015    MI (myocardial infarction) ([address] 2013    NSTEMI    Multiple renal cysts     Other ill-defined conditions(799.89)     hernia, pt unsure of site (resolved with surery)    Prostate cancer ([address]     Pulmonary emboli ([address] 6/12/2019    Stroke ([address] 2011    TIA; no residual        Past Surgical History:   Procedure Laterality Date    CARDIAC SURG PROCEDURE UNLIST  2013    stent    HX COLECTOMY      due to polyp that could not be excised    HX COLONOSCOPY  2018    HX HERNIA REPAIR      HX PROSTATECTOMY      HX PTCA      x 1         Family History:   Problem Relation Age of Onset    Stroke Mother     Diabetes Mother     Heart Disease Maternal Grandmother     Cancer Father        Social History     Socioeconomic History    Marital status: SINGLE     Spouse name: Not on file    Number of children: Not on file    Years of education: Not on file    Highest education level: Not on file   Occupational History    Not on file   Social Needs    Financial resource strain: Not on file    Food insecurity:     Worry: Not on file     Inability: Not on file    Transportation needs:     Medical: Not on file     Non-medical: Not on file   Tobacco Use    Smoking status: Former Smoker     Years: 50.00     Types: Cigarettes     Last attempt to quit: 2019     Years since quittin.2    Smokeless tobacco: Never Used   Substance and Sexual Activity    Alcohol use: Yes     Alcohol/week: 3.0 standard drinks     Types: 3 Cans of beer per week    Drug use: No    Sexual activity: Not on file   Lifestyle    Physical activity:     Days per week: Not on file     Minutes per session: Not on file    Stress: Not on file   Relationships    Social connections:     Talks on phone: Not on file     Gets together: Not on file     Attends Gnosticism service: Not on file     Active member of club or organization: Not on file     Attends meetings of clubs or organizations: Not on file     Relationship status: Not on file    Intimate partner violence:     Fear of current or ex partner: Not on file     Emotionally abused: Not on file     Physically abused: Not on file     Forced sexual activity: Not on file   Other Topics Concern    Not on file   Social History Narrative    Not on file         ALLERGIES: Lisinopril and Tramadol    Review of Systems   Constitutional: Negative for activity change, chills, diaphoresis and fever.    HENT: Negative for dental problem, hearing loss, nosebleeds, rhinorrhea and sore throat. Eyes: Negative for pain, discharge, redness and visual disturbance. Respiratory: Negative for cough, chest tightness and shortness of breath. Cardiovascular: Negative for chest pain, palpitations and leg swelling. Gastrointestinal: Negative for abdominal pain, constipation, diarrhea, nausea and vomiting. Endocrine: Negative for cold intolerance, heat intolerance, polydipsia and polyuria. Genitourinary: Negative for dysuria and flank pain. Musculoskeletal: Positive for arthralgias, gait problem, joint swelling, myalgias and stiffness. Negative for back pain and neck pain. Skin: Negative for pallor and rash. Allergic/Immunologic: Negative for environmental allergies and food allergies. Neurological: Positive for tingling. Negative for dizziness, tremors, light-headedness, numbness and headaches. Hematological: Negative for adenopathy. Does not bruise/bleed easily. Psychiatric/Behavioral: Negative for confusion and dysphoric mood. The patient is not nervous/anxious and is not hyperactive. All other systems reviewed and are negative. Vitals:    07/17/19 0645 07/17/19 0814   BP: 117/79 149/90   Pulse: (!) 114 86   Resp: 18 16   Temp: 98.5 °F (36.9 °C)    SpO2: 98% 99%   Weight: 93.4 kg (206 lb)    Height: 6' 2\" (1.88 m)             Physical Exam   Constitutional: He is oriented to person, place, and time. He appears well-developed and well-nourished. He appears distressed. HENT:   Head: Normocephalic and atraumatic. Mouth/Throat: No oropharyngeal exudate. Eyes: Pupils are equal, round, and reactive to light. Conjunctivae and EOM are normal. No scleral icterus. Neck: Normal range of motion. Neck supple. No JVD present. No thyromegaly present. Cardiovascular: Normal rate, regular rhythm, normal heart sounds and intact distal pulses. Exam reveals no gallop and no friction rub. No murmur heard.   Pulmonary/Chest: Effort normal and breath sounds normal. No respiratory distress. He has no wheezes. Abdominal: There is no hepatosplenomegaly. Musculoskeletal: He exhibits no edema, tenderness or deformity. Feet:    Neurological: He is alert and oriented to person, place, and time. No cranial nerve deficit or sensory deficit. He exhibits normal muscle tone. Coordination normal.   Skin: Skin is warm and dry. Capillary refill takes less than 2 seconds. No rash noted. Psychiatric: He has a normal mood and affect. His behavior is normal. Judgment and thought content normal.   Nursing note and vitals reviewed. MDM  Number of Diagnoses or Management Options  Acute gout of left foot, unspecified cause: established and worsening  Diagnosis management comments: Recurrent episode of gout    Patient has been out of his medications    Treated with steroids, colchicine, Norco    Encouraged the patient to follow up with his primary care doctor since possible for recheck and maintenance therapy       Amount and/or Complexity of Data Reviewed  Tests in the medicine section of CPT®: ordered and reviewed  Review and summarize past medical records: yes    Risk of Complications, Morbidity, and/or Mortality  Presenting problems: moderate  Diagnostic procedures: low  Management options: low  General comments: Elements of this note have been dictated via voice recognition software. Text and phrases may be limited by the accuracy of the software. The chart has been reviewed, but errors may still be present.       Patient Progress  Patient progress: stable         Procedures

## 2019-07-18 ENCOUNTER — PATIENT OUTREACH (OUTPATIENT)
Dept: CASE MANAGEMENT | Age: 69
End: 2019-07-18

## 2019-07-26 NOTE — PROGRESS NOTES
Community Care Management  Follow up Outreach Note   Outreach type: Phone call Phone call:     Date/Time of Outreach: 7/18/19 @ 2:30pm     Reason for follow-up:   CCM for multiple co-morbidities, frequent ER visits   Disease specific complaints/issues:   No new issues/complaints     Patient progress towards goals set from last contact:   Needs reinforcement   Has patient attended any PCP or specialist follow-up appointments since last contact? What was outcome of appointment? When is next follow-up scheduled? 7/10/19 with IM  Outcome: no changes in treatment plan    Labs ordered    No follow up appointments scheduled at this time   Review medications. Any medication changes since last outreach? Does patient have any questions or issues related to their medications? Reviewed meds, no questions or concerns. Home health active? If yes  any issue? Progress? No   Referrals needed?  (SW, Diabetes education, HH, etc. ) No   Other issues/Miscellaneous? (Transportation, access to meals, ability to perform ADLs, adequate caregiver support, etc.)     No issues voiced. Next Outreach Scheduled: 1-2 weeks and as needed     Next Steps/Goals:   Discuss chronic conditions and ER visits. Encourage use of PCP vs. ER   Community Care Manager: Vince Canada RN         Spoke with patient today. He had an ER visit for foot pain. Reports that he has an aide to assist him with transportation, etc.   This note will not be viewable in [address]

## 2019-07-30 ENCOUNTER — PATIENT OUTREACH (OUTPATIENT)
Dept: CASE MANAGEMENT | Age: 69
End: 2019-07-30

## 2019-07-31 NOTE — PROGRESS NOTES
Community Care Management  Follow up Outreach Note   Outreach type: Phone call Phone call:     Date/Time of Outreach: 19 @ 4:30pm     Reason for follow-up:   CCM for multiple co-morbidities, frequent ER visits   Disease specific complaints/issues:   No new issues/complaints     Patient progress towards goals set from last contact:   Needs reinforcement   Has patient attended any PCP or specialist follow-up appointments since last contact? What was outcome of appointment? When is next follow-up scheduled? No appointments since last outreach. Upcomin19 with GI     Review medications. Any medication changes since last outreach? Does patient have any questions or issues related to their medications? Reviewed meds, no questions or concerns. Home health active? If yes  any issue? Progress? No   Referrals needed?  (SW, Diabetes education, HH, etc. ) No   Other issues/Miscellaneous? (Transportation, access to meals, ability to perform ADLs, adequate caregiver support, etc.)     No issues voiced. Next Outreach Scheduled: 1-2 weeks and as needed     Next Steps/Goals:   Discuss chronic conditions and ER visits. Encourage use of PCP vs. ER   Community Care Manager: Alyssa Stone RN         Spoke with patient today. Discussed his ER visit a couple weeks ago when had foot pain. It was his first attack of gout. He is aware of upcoming GI appointment and the purpose of appointment. Transportation has been arranged. This note will not be viewable in [address]

## 2019-07-31 NOTE — PROGRESS NOTES
Community Care Management  Follow up Outreach Note Outreach type: Phone call Phone call: 
  
Date/Time of Outreach: 19 @ 4:30pm  
 
Reason for follow-up: 
 CCM for multiple co-morbidities, frequent ER visits Disease specific complaints/issues: No new issues/complaints Patient progress towards goals set from last contact: 
 Needs reinforcement Has patient attended any PCP or specialist follow-up appointments since last contact? What was outcome of appointment? When is next follow-up scheduled? No appointments since last outreach. Upcomin19 with GI Review medications. Any medication changes since last outreach? Does patient have any questions or issues related to their medications? Reviewed meds, no questions or concerns. Home health active? If yes  any issue? Progress? No  
Referrals needed? 
(SW, Diabetes education, HH, etc. ) No  
Other issues/Miscellaneous? (Transportation, access to meals, ability to perform ADLs, adequate caregiver support, etc.) No issues voiced. Next Outreach Scheduled: 1-2 weeks and as needed Next Steps/Goals: 
 Discuss chronic conditions and ER visits. Encourage use of PCP vs. ER  
Community Care Manager: Graham Ron RN Spoke with patient today. Discussed his ER visit a couple weeks ago when had foot pain. It was his first attack of gout. He is aware of upcoming GI appointment and the purpose of appointment. Transportation has been arranged. This note will not be viewable in [address]

## 2019-08-21 ENCOUNTER — PATIENT OUTREACH (OUTPATIENT)
Dept: CASE MANAGEMENT | Age: 69
End: 2019-08-21

## 2019-08-22 NOTE — PROGRESS NOTES
Community Care Management  Follow up Outreach Note   Outreach type: Phone call Phone call:     Date/Time of Outreach: 19 @ 4:30pm     Reason for follow-up:   CCM for multiple co-morbidities, frequent ER visits   Disease specific complaints/issues:   No new issues/complaints     Patient progress towards goals set from last contact:   Needs reinforcement   Has patient attended any PCP or specialist follow-up appointments since last contact? What was outcome of appointment? When is next follow-up scheduled? No appointments since last outreach. Upcomin19 with GI. Outcome of appointment: waiting on test results     Review medications. Any medication changes since last outreach? Does patient have any questions or issues related to their medications? Reviewed meds, no questions or concerns. Home health active? If yes  any issue? Progress? No   Referrals needed?  (SW, Diabetes education, HH, etc. ) No   Other issues/Miscellaneous? (Transportation, access to meals, ability to perform ADLs, adequate caregiver support, etc.)     No issues voiced. Next Outreach Scheduled: 1-2 weeks and as needed     Next Steps/Goals:   Discuss chronic conditions and ER visits. Encourage use of PCP vs. ER   Community Care Manager: Ivette Reid RN         Spoke with patient today. He reports that he had an appointment with GI specialists and is waiting on results. Denies any s/sx GI bleed as noted in the past. Patient continues on blood thinners for PE. No ER visits since last outreach. This note will not be viewable in [address]

## 2019-09-04 ENCOUNTER — PATIENT OUTREACH (OUTPATIENT)
Dept: CASE MANAGEMENT | Age: 69
End: 2019-09-04

## 2019-09-05 NOTE — PROGRESS NOTES
Community Care Management  Follow up Outreach Note   Outreach type: Phone call Phone call:     Date/Time of Outreach: 19 @ 4:30pm     Reason for follow-up:   CCM for multiple co-morbidities, frequent ER visits   Disease specific complaints/issues:   No new issues/complaints     Patient progress towards goals set from last contact:   Needs reinforcement   Has patient attended any PCP or specialist follow-up appointments since last contact? What was outcome of appointment? When is next follow-up scheduled? No appointments since last outreach. Upcomin19 with surgeon  19-surgery for shoulder repair     Review medications. Any medication changes since last outreach? Does patient have any questions or issues related to their medications? Reviewed meds, no questions or concerns. Home health active? If yes  any issue? Progress? No   Referrals needed?  (SW, Diabetes education, HH, etc. ) No   Other issues/Miscellaneous? (Transportation, access to meals, ability to perform ADLs, adequate caregiver support, etc.)     No issues voiced. Next Outreach Scheduled: 1-2 weeks and as needed     Next Steps/Goals:   Discuss chronic conditions and ER visits. Encourage use of PCP vs. ER   Community Care Manager: oRlly Berman, HEMAL         Spoke with patient today. He was difficult to understand during our conversation today. He has an aide from [facility] who assists him with transportation and other support. I have provided my contact information. This note will not be viewable in [address]

## 2019-09-17 ENCOUNTER — PATIENT OUTREACH (OUTPATIENT)
Dept: CASE MANAGEMENT | Age: 69
End: 2019-09-17

## 2019-09-18 NOTE — PROGRESS NOTES
This note will not be viewable in [address] Community Care Management  Follow up Outreach Note   Outreach type: Phone call Phone call:     Date/Time of Outreach: 9/17/19 @ 12:30pm     Reason for follow-up:   CCM for multiple co-morbidities, frequent ER visits   Disease specific complaints/issues:   No new issues/complaints     Patient progress towards goals set from last contact:   Needs reinforcement   Has patient attended any PCP or specialist follow-up appointments since last contact? What was outcome of appointment? When is next follow-up scheduled? No appointments since last outreach. Upcoming: Surgery on 9/27/19-left shoulder arthroscopy       Review medications. Any medication changes since last outreach? Does patient have any questions or issues related to their medications? Reviewed meds, no questions or concerns. No medication changes since last outreach   Home health active? If yes  any issue? Progress? No   Referrals needed?  (SW, Diabetes education, HH, etc. ) No   Other issues/Miscellaneous? (Transportation, access to meals, ability to perform ADLs, adequate caregiver support, etc.)     Pain in L shoulder         Next Outreach Scheduled: 1-2 weeks and as needed     Next Steps/Goals:   Discuss chronic conditions and ER visits. Encourage use of PCP vs. ER. Plan: will call PCP/surgeon to see if patient can have pain medications. Community Care Manager: Richmond Vargas, RN         Spoke with patient today. Reports pain L shoulder. He is taking acetaminophen without relief. Patient has support from [address] [place]. Patient has my contact information so that he may call as needed.

## 2019-09-23 PROBLEM — Z01.818 PRE-OP EXAMINATION: Status: RESOLVED | Noted: 2018-11-20 | Resolved: 2019-09-23

## 2019-09-24 ENCOUNTER — PATIENT OUTREACH (OUTPATIENT)
Dept: CASE MANAGEMENT | Age: 69
End: 2019-09-24

## 2019-09-24 NOTE — PROGRESS NOTES
Community Care Management  Follow up Outreach Note   Outreach type: Phone call Phone call:     Date/Time of Outreach: 9/24/19 @ 12:30pm     Reason for follow-up:   CCM for multiple co-morbidities, frequent ER visits   Disease specific complaints/issues:   No new issues/complaints     Patient progress towards goals set from last contact: On track   Has patient attended any PCP or specialist follow-up appointments since last contact? What was outcome of appointment? When is next follow-up scheduled? No appointments since last outreach. Upcoming: Surgery on 9/27/19-left shoulder arthroscopy       Review medications. Any medication changes since last outreach? Does patient have any questions or issues related to their medications? Reviewed meds, no questions or concerns. No medication changes since last outreach   Home health active? If yes  any issue? Progress? No   Referrals needed?  (SW, Diabetes education, HH, etc. ) No   Other issues/Miscellaneous? (Transportation, access to meals, ability to perform ADLs, adequate caregiver support, etc.)     Pain in L shoulder         Next Outreach Scheduled: 1-2 weeks and as needed     Next Steps/Goals:   Discuss chronic conditions and ER visits. Encourage use of PCP vs. ER. Plan: will call PCP/surgeon to see if patient can have pain medications. Community Care Manager: Marin Ochoa RN         Patient called CM today concerning his upcoming surgery. He states that he has not heard anything about what he supposed to do. CM placed a call to POA and the medical assistant called CM back. She (ANGELA) reports that she will call the patient to speak with him about his surgery. This note will not be viewable in [address]

## 2019-10-19 ENCOUNTER — HOSPITAL ENCOUNTER (EMERGENCY)
Age: 69
Discharge: HOME | End: 2019-10-19
Attending: EMERGENCY MEDICINE
Payer: COMMERCIAL

## 2019-10-19 VITALS
HEART RATE: 92 BPM | DIASTOLIC BLOOD PRESSURE: 91 MMHG | SYSTOLIC BLOOD PRESSURE: 146 MMHG | OXYGEN SATURATION: 95 % | TEMPERATURE: 98.3 F | RESPIRATION RATE: 18 BRPM

## 2019-10-19 DIAGNOSIS — M10.9 ACUTE GOUT OF FOOT, UNSPECIFIED CAUSE, UNSPECIFIED LATERALITY: Primary | ICD-10-CM

## 2019-10-19 PROCEDURE — 74011250637 HC RX REV CODE- 250/637: Performed by: PHYSICIAN ASSISTANT

## 2019-10-19 PROCEDURE — 99283 EMERGENCY DEPT VISIT LOW MDM: CPT

## 2019-10-19 PROCEDURE — 99283 EMERGENCY DEPT VISIT LOW MDM: CPT | Performed by: PHYSICIAN ASSISTANT

## 2019-10-19 PROCEDURE — 96372 THER/PROPH/DIAG INJ SC/IM: CPT

## 2019-10-19 PROCEDURE — 74011250636 HC RX REV CODE- 250/636: Performed by: PHYSICIAN ASSISTANT

## 2019-10-19 PROCEDURE — 96372 THER/PROPH/DIAG INJ SC/IM: CPT | Performed by: PHYSICIAN ASSISTANT

## 2019-10-19 RX ORDER — DEXAMETHASONE SODIUM PHOSPHATE 100 MG/10ML
10 INJECTION INTRAMUSCULAR; INTRAVENOUS
Status: COMPLETED | OUTPATIENT
Start: 2019-10-19 | End: 2019-10-19

## 2019-10-19 RX ORDER — PREDNISONE 5 MG/1
TABLET ORAL
Qty: 21 TAB | Refills: 0 | OUTPATIENT
Start: 2019-10-19 | End: 2020-01-04

## 2019-10-19 RX ORDER — HYDROCODONE BITARTRATE AND ACETAMINOPHEN 5; 325 MG/1; MG/1
1 TABLET ORAL
Status: COMPLETED | OUTPATIENT
Start: 2019-10-19 | End: 2019-10-19

## 2019-10-19 RX ADMIN — HYDROCODONE BITARTRATE AND ACETAMINOPHEN 1 TABLET: 5; 325 TABLET ORAL at 09:18

## 2019-10-19 RX ADMIN — DEXAMETHASONE SODIUM PHOSPHATE 10 MG: 10 INJECTION INTRAMUSCULAR; INTRAVENOUS at 09:18

## 2019-10-19 NOTE — ED TRIAGE NOTES
Pt arrives via EMS from home. States bilateral foot pain due to gout. Hx of gout. Tramadol not helping pain per patient.

## 2019-10-19 NOTE — ED NOTES
I have reviewed discharge instructions with the patient. The patient verbalized understanding. Patient left ED via Discharge Method: wheelchair to Home with cousin. Opportunity for questions and clarification provided. Patient given 1 scripts. To continue your aftercare when you leave the hospital, you may receive an automated call from our care team to check in on how you are doing. This is a free service and part of our promise to provide the best care and service to meet your aftercare needs.  If you have questions, or wish to unsubscribe from this service please call [phone]. Thank you for Choosing our [facility] Emergency Department.

## 2019-10-19 NOTE — ED PROVIDER NOTES
Patient with history of gout complains of bilateral foot pain for the past few days, is unaware of any dietary restrictions he should be following gout, he denies any fever no trauma he is using tramadol for pain with minimal relief. No history of diabetes patient is on Xarelto for PE primary care at [facility]    The history is provided by the patient. Gout    This is a recurrent problem. The current episode started more than 2 days ago. The problem occurs constantly. The problem has not changed since onset. Pain location: bilateral feet Left greater than right. The quality of the pain is described as aching. The pain is at a severity of 8/10. The pain is moderate. Associated symptoms include stiffness. The symptoms are aggravated by activity and palpation. Treatments tried: ultram. The treatment provided no relief. There has been no history of extremity trauma. Family history is significant for gout.         Past Medical History:   Diagnosis Date    Arthritis     Asthma     albuterol prn (uses 1-2 times per day)    CAD (coronary artery disease) 9/15/2013    Chronic obstructive pulmonary disease ([address]     Coronary atherosclerosis of native coronary vessel 11/18/2015    Current smoker on some days     Dental disorder     Dyspepsia and other specified disorders of function of stomach     GERD (gastroesophageal reflux disease)     on med for control     GI bleed 6/29/2019    Hyperlipidemia other unsp dyslipidemia 11/18/2015    on med    Hypertension     on med for control     Hypertension, benign, controlled 11/18/2015    [facility]) 2013    NSTEMI    Multiple renal cysts     Other ill-defined conditions(799.89)     hernia, pt unsure of site (resolved with surery)    Prostate cancer ([address]     Pulmonary emboli ([address] 6/12/2019    Stroke ([address] 2011    TIA; no residual        Past Surgical History:   Procedure Laterality Date    CARDIAC SURG PROCEDURE UNLIST  2013    stent    HX COLECTOMY      due to polyp that could not be excised    HX COLONOSCOPY  2018    HX HERNIA REPAIR      HX PROSTATECTOMY      HX PTCA      x 1         Family History:   Problem Relation Age of Onset    Stroke Mother     Diabetes Mother     Heart Disease Maternal Grandmother     Cancer Father        Social History     Socioeconomic History    Marital status: SINGLE     Spouse name: Not on file    Number of children: Not on file    Years of education: Not on file    Highest education level: Not on file   Occupational History    Not on file   Social Needs    Financial resource strain: Not on file    Food insecurity:     Worry: Not on file     Inability: Not on file    Transportation needs:     Medical: Not on file     Non-medical: Not on file   Tobacco Use    Smoking status: Former Smoker     Years: 50.00     Types: Cigarettes     Last attempt to quit: 2019     Years since quittin.4    Smokeless tobacco: Never Used   Substance and Sexual Activity    Alcohol use: Yes     Alcohol/week: 3.0 standard drinks     Types: 3 Cans of beer per week    Drug use: No    Sexual activity: Not on file   Lifestyle    Physical activity:     Days per week: Not on file     Minutes per session: Not on file    Stress: Not on file   Relationships    Social connections:     Talks on phone: Not on file     Gets together: Not on file     Attends Congregation service: Not on file     Active member of club or organization: Not on file     Attends meetings of clubs or organizations: Not on file     Relationship status: Not on file    Intimate partner violence:     Fear of current or ex partner: Not on file     Emotionally abused: Not on file     Physically abused: Not on file     Forced sexual activity: Not on file   Other Topics Concern    Not on file   Social History Narrative    Not on file         ALLERGIES: Lisinopril and Tramadol    Review of Systems   Musculoskeletal: Positive for gout and stiffness.    All other systems reviewed and are negative. Vitals:    10/19/19 0858   BP: (!) 146/91   Pulse: 92   Resp: 18   Temp: 98.3 °F (36.8 °C)   SpO2: 95%            Physical Exam   Constitutional: He is oriented to person, place, and time. He appears well-developed and well-nourished. No distress. HENT:   Head: Normocephalic and atraumatic. Eyes: Pupils are equal, round, and reactive to light. EOM are normal.   Neck: Normal range of motion. Neck supple. Cardiovascular: Normal rate and regular rhythm. Pulmonary/Chest: Effort normal and breath sounds normal.   Abdominal: Soft. Bowel sounds are normal.   Musculoskeletal: Normal range of motion. He exhibits edema and tenderness. Bilateral feet with slight edema left greater than right, he has has intact pedal pulses, no open areas tenderness across the midfoot area on both feet, calves are soft   Neurological: He is alert and oriented to person, place, and time. Skin: Skin is warm. He is not diaphoretic. Psychiatric: He has a normal mood and affect. Nursing note and vitals reviewed.        MDM  Number of Diagnoses or Management Options  Diagnosis management comments: Gouty flare  Given Decadron injection in the ER and single dose of Norco  Give prescription for prednisone will avoid Indocin due to Xarelto  To patient to see primary care for recheck and follow purine-restricted diet       Amount and/or Complexity of Data Reviewed  Review and summarize past medical records: yes    Risk of Complications, Morbidity, and/or Mortality  Presenting problems: low  Diagnostic procedures: low  Management options: low    Patient Progress  Patient progress: improved         Procedures

## 2019-10-19 NOTE — DISCHARGE INSTRUCTIONS
Use meds as directed along with at home ultram, call your primary md office Monday to arrange follow up appt for recheck of feet, see hand out for foods to avoid when you  have gout

## 2019-11-24 ENCOUNTER — PATIENT OUTREACH (OUTPATIENT)
Dept: CASE MANAGEMENT | Age: 69
End: 2019-11-24

## 2020-01-04 ENCOUNTER — HOSPITAL ENCOUNTER (EMERGENCY)
Age: 70
Discharge: HOME | End: 2020-01-04
Attending: EMERGENCY MEDICINE
Payer: COMMERCIAL

## 2020-01-04 VITALS
TEMPERATURE: 99.1 F | HEART RATE: 111 BPM | SYSTOLIC BLOOD PRESSURE: 178 MMHG | OXYGEN SATURATION: 93 % | DIASTOLIC BLOOD PRESSURE: 92 MMHG | RESPIRATION RATE: 16 BRPM

## 2020-01-04 DIAGNOSIS — M10.9 ACUTE GOUT OF LEFT FOOT, UNSPECIFIED CAUSE: Primary | ICD-10-CM

## 2020-01-04 PROCEDURE — 99282 EMERGENCY DEPT VISIT SF MDM: CPT | Performed by: EMERGENCY MEDICINE

## 2020-01-04 RX ORDER — ALLOPURINOL 100 MG/1
100 TABLET ORAL DAILY
Qty: 30 TAB | Refills: 2 | Status: SHIPPED | OUTPATIENT
Start: 2020-01-04

## 2020-01-04 RX ORDER — PREDNISONE 5 MG/1
TABLET ORAL
Qty: 48 TAB | Refills: 0 | OUTPATIENT
Start: 2020-01-04 | End: 2021-03-29

## 2020-01-04 RX ORDER — HYDROCODONE BITARTRATE AND ACETAMINOPHEN 7.5; 325 MG/1; MG/1
1 TABLET ORAL
Qty: 12 TAB | Refills: 0 | Status: SHIPPED | OUTPATIENT
Start: 2020-01-04 | End: 2020-01-07

## 2020-01-04 NOTE — ED NOTES
I have reviewed discharge instructions with the patient. The patient verbalized understanding. Patient left ED via Discharge Method: ambulatory to Home with self. Pt declined d/c v/s. Opportunity for questions and clarification provided. Patient given 3 scripts. To continue your aftercare when you leave the hospital, you may receive an automated call from our care team to check in on how you are doing. This is a free service and part of our promise to provide the best care and service to meet your aftercare needs.  If you have questions, or wish to unsubscribe from this service please call [phone]. Thank you for Choosing our [facility] Emergency Department.

## 2020-01-04 NOTE — DISCHARGE INSTRUCTIONS
Patient Education        Gout: Care Instructions  Your Care Instructions    Gout is a form of arthritis caused by a buildup of uric acid crystals in a joint. It causes sudden attacks of pain, swelling, redness, and stiffness, usually in one joint, especially the big toe. Gout usually comes on without a cause. But it can be brought on by drinking alcohol (especially beer) or eating seafood and red meat. Taking certain medicines, such as diuretics or aspirin, also can bring on an attack of gout. Taking your medicines as prescribed and following up with your doctor regularly can help you avoid gout attacks in the future. Follow-up care is a key part of your treatment and safety. Be sure to make and go to all appointments, and call your doctor if you are having problems. It's also a good idea to know your test results and keep a list of the medicines you take. How can you care for yourself at home? · If the joint is swollen, put ice or a cold pack on the area for 10 to 20 minutes at a time. Put a thin cloth between the ice and your skin. · Prop up the sore limb on a pillow when you ice it or anytime you sit or lie down during the next 3 days. Try to keep it above the level of your heart. This will help reduce swelling. · Rest sore joints. Avoid activities that put weight or strain on the joints for a few days. Take short rest breaks from your regular activities during the day. · Take your medicines exactly as prescribed. Call your doctor if you think you are having a problem with your medicine. · Take pain medicines exactly as directed. ? If the doctor gave you a prescription medicine for pain, take it as prescribed. ? If you are not taking a prescription pain medicine, ask your doctor if you can take an over-the-counter medicine. · Eat less seafood and red meat. · Check with your doctor before drinking alcohol. · Losing weight, if you are overweight, may help reduce attacks of gout.  But do not go on a \"crash diet. \" Losing a lot of weight in a short amount of time can cause a gout attack. When should you call for help? Call your doctor now or seek immediate medical care if:    · You have a fever.     · The joint is so painful you cannot use it.     · You have sudden, unexplained swelling, redness, warmth, or severe pain in one or more joints.    Watch closely for changes in your health, and be sure to contact your doctor if:    · You have joint pain.     · Your symptoms get worse or are not improving after 2 or 3 days. Where can you learn more? Go to http://chris-kvng.info/. Enter O325 in the search box to learn more about \"Gout: Care Instructions. \"  Current as of: April 1, 2019  Content Version: 12.2  © 7495-7113 [employer]. Care instructions adapted under license by [employer] (which disclaims liability or warranty for this information). If you have questions about a medical condition or this instruction, always ask your healthcare professional. [address] any warranty or liability for your use of this information. Patient Education        Purine-Restricted Diet: Care Instructions  Your Care Instructions    Purines are substances that are found in some foods. Your body turns purines into uric acid. High levels of uric acid can cause gout, which is a form of arthritis that causes pain and inflammation in joints. You may be able to help control the amount of uric acid in your body by limiting high-purine foods in your diet. Follow-up care is a key part of your treatment and safety. Be sure to make and go to all appointments, and call your doctor if you are having problems. It's also a good idea to know your test results and keep a list of the medicines you take. How can you care for yourself at home? · Plan your meals and snacks around foods that are low in purines and are safe for you to eat.  These foods include:  ? Navi Guzman vegetables and tomatoes. ? Fruits. ? Whole-grain breads, rice, and cereals. ? Eggs, peanut butter, and nuts. ? Low-fat milk, cheese, and other milk products. ? Popcorn. ? Gelatin desserts, chocolate, cocoa, and cakes and sweets, in small amounts. · You can eat certain foods that are medium-high in purines, but eat them only once in a while. These foods include:  ? Legumes, such as dried beans and dried peas. You can have 1 cup cooked legumes each day. ? Asparagus, cauliflower, spinach, mushrooms, and green peas. ? Fish and seafood (other than very high-purine seafood). ? Oatmeal, wheat bran, and wheat germ. · Limit very high-purine foods, including:  ? Organ meats, such as liver, kidneys, sweetbreads, and brains. ? Meats, including mccracken, beef, pork, and lamb. ? Game meats and any other meats in large amounts. ? Anchovies, sardines, herring, mackerel, and scallops. ? Gravy. ? Beer. Where can you learn more? Go to http://chris-kvng.info/. Enter F448 in the search box to learn more about \"Purine-Restricted Diet: Care Instructions. \"  Current as of: November 7, 2018  Content Version: 12.2  © 0541-5093 [employer]. Care instructions adapted under license by [employer] (which disclaims liability or warranty for this information). If you have questions about a medical condition or this instruction, always ask your healthcare professional. [place] [address] any warranty or liability for your use of this information.

## 2020-01-04 NOTE — ED PROVIDER NOTES
The history is provided by the patient. Gout    This is a recurrent problem. The current episode started more than 2 days ago. The problem occurs constantly. The problem has been rapidly worsening. The pain is present in the left foot. The quality of the pain is described as aching, sharp and constant. The pain is at a severity of 8/10. Associated symptoms include limited range of motion and stiffness. Pertinent negatives include no numbness, no tingling, no itching, no back pain and no neck pain. The symptoms are aggravated by standing, palpation, movement and activity. He has tried rest for the symptoms. The treatment provided mild relief. There has been no history of extremity trauma. Family history is significant for gout.         Past Medical History:   Diagnosis Date    Arthritis     Asthma     albuterol prn (uses 1-2 times per day)    CAD (coronary artery disease) 9/15/2013    Chronic obstructive pulmonary disease ([address]     Coronary atherosclerosis of native coronary vessel 11/18/2015    Current smoker on some days     Dental disorder     Dyspepsia and other specified disorders of function of stomach     GERD (gastroesophageal reflux disease)     on med for control     GI bleed 6/29/2019    Hyperlipidemia other unsp dyslipidemia 11/18/2015    on med    Hypertension     on med for control     Hypertension, benign, controlled 11/18/2015    MI (myocardial infarction) ([address] 2013    NSTEMI    Multiple renal cysts     Other ill-defined conditions(799.89)     hernia, pt unsure of site (resolved with surery)    Prostate cancer ([address]     Pulmonary emboli ([address] 6/12/2019    Stroke ([address] 2011    TIA; no residual        Past Surgical History:   Procedure Laterality Date    CARDIAC SURG PROCEDURE UNLIST  2013    stent    HX COLECTOMY      due to polyp that could not be excised    HX COLONOSCOPY  2018    HX HERNIA REPAIR      HX PROSTATECTOMY      HX PTCA      x 1         Family History:   Problem Relation Age of Onset    Stroke Mother     Diabetes Mother     Heart Disease Maternal Grandmother     Cancer Father        Social History     Socioeconomic History    Marital status: SINGLE     Spouse name: Not on file    Number of children: Not on file    Years of education: Not on file    Highest education level: Not on file   Occupational History    Not on file   Social Needs    Financial resource strain: Not on file    Food insecurity:     Worry: Not on file     Inability: Not on file    Transportation needs:     Medical: Not on file     Non-medical: Not on file   Tobacco Use    Smoking status: Former Smoker     Years: 50.00     Types: Cigarettes     Last attempt to quit: 2019     Years since quittin.6    Smokeless tobacco: Never Used   Substance and Sexual Activity    Alcohol use: Yes     Alcohol/week: 3.0 standard drinks     Types: 3 Cans of beer per week    Drug use: No    Sexual activity: Not on file   Lifestyle    Physical activity:     Days per week: Not on file     Minutes per session: Not on file    Stress: Not on file   Relationships    Social connections:     Talks on phone: Not on file     Gets together: Not on file     Attends Baptism service: Not on file     Active member of club or organization: Not on file     Attends meetings of clubs or organizations: Not on file     Relationship status: Not on file    Intimate partner violence:     Fear of current or ex partner: Not on file     Emotionally abused: Not on file     Physically abused: Not on file     Forced sexual activity: Not on file   Other Topics Concern    Not on file   Social History Narrative    Not on file         ALLERGIES: Lisinopril and Tramadol    Review of Systems   Constitutional: Negative for chills and fever. Gastrointestinal: Negative for abdominal pain, nausea and vomiting. Musculoskeletal: Positive for arthralgias, gout, joint swelling and stiffness. Negative for back pain and neck pain. Skin: Negative for itching. Neurological: Negative for tingling and numbness. All other systems reviewed and are negative. Vitals:    01/04/20 1152   BP: (!) 178/92   Pulse: (!) 111   Resp: 16   Temp: 99.1 °F (37.3 °C)   SpO2: 93%            Physical Exam  Vitals signs and nursing note reviewed. Constitutional:       General: He is not in acute distress. HENT:      Head: Normocephalic and atraumatic. Eyes:      Extraocular Movements: Extraocular movements intact. Conjunctiva/sclera: Conjunctivae normal.      Pupils: Pupils are equal, round, and reactive to light. Cardiovascular:      Rate and Rhythm: Normal rate and regular rhythm. Pulses: Normal pulses. Heart sounds: No murmur. Pulmonary:      Effort: Pulmonary effort is normal.      Breath sounds: Normal breath sounds. Musculoskeletal:      Left foot: Tenderness and swelling present. No crepitus, deformity or laceration. Feet:    Neurological:      Mental Status: He is alert and oriented to person, place, and time. Sensory: Sensation is intact. Motor: Motor function is intact.    Psychiatric:         Mood and Affect: Mood normal.         Speech: Speech normal.          MDM  Number of Diagnoses or Management Options  Acute gout of left foot, unspecified cause: new and does not require workup     Amount and/or Complexity of Data Reviewed  Review and summarize past medical records: yes    Risk of Complications, Morbidity, and/or Mortality  Presenting problems: low  Diagnostic procedures: minimal  Management options: low    Patient Progress  Patient progress: stable         Procedures

## 2020-01-17 ENCOUNTER — HOSPITAL ENCOUNTER (EMERGENCY)
Age: 70
Discharge: HOME | End: 2020-01-17
Attending: EMERGENCY MEDICINE
Payer: COMMERCIAL

## 2020-01-17 VITALS
OXYGEN SATURATION: 96 % | TEMPERATURE: 98.7 F | WEIGHT: 212 LBS | SYSTOLIC BLOOD PRESSURE: 163 MMHG | DIASTOLIC BLOOD PRESSURE: 98 MMHG | BODY MASS INDEX: 27.21 KG/M2 | HEART RATE: 83 BPM | HEIGHT: 74 IN | RESPIRATION RATE: 16 BRPM

## 2020-01-17 DIAGNOSIS — M10.9 ACUTE GOUTY ARTHRITIS: Primary | ICD-10-CM

## 2020-01-17 PROCEDURE — 99283 EMERGENCY DEPT VISIT LOW MDM: CPT | Performed by: EMERGENCY MEDICINE

## 2020-01-17 PROCEDURE — 74011250637 HC RX REV CODE- 250/637: Performed by: EMERGENCY MEDICINE

## 2020-01-17 RX ORDER — COLCHICINE 0.6 MG/1
0.6 CAPSULE ORAL DAILY
Qty: 10 CAP | Refills: 0 | Status: ON HOLD | OUTPATIENT
Start: 2020-01-17 | End: 2021-05-31 | Stop reason: SDUPTHER

## 2020-01-17 RX ORDER — HYDROCODONE BITARTRATE AND ACETAMINOPHEN 7.5; 325 MG/1; MG/1
1 TABLET ORAL
Status: COMPLETED | OUTPATIENT
Start: 2020-01-17 | End: 2020-01-17

## 2020-01-17 RX ORDER — HYDROCODONE BITARTRATE AND ACETAMINOPHEN 5; 325 MG/1; MG/1
1 TABLET ORAL
Qty: 11 TAB | Refills: 0 | Status: SHIPPED | OUTPATIENT
Start: 2020-01-17 | End: 2020-01-20

## 2020-01-17 RX ADMIN — HYDROCODONE BITARTRATE AND ACETAMINOPHEN 1 TABLET: 7.5; 325 TABLET ORAL at 18:23

## 2020-01-17 NOTE — ED PROVIDER NOTES
Patient presents the ER complaining of pain and swelling in her right foot and toe. Patient reports a history of gout. States pain started in his big toe of his right foot. Denies any trauma. Has a history of gout. States pain is similar to previous episodes of gouty arthritis    The history is provided by the patient. Foot Pain    This is a new problem. The current episode started more than 2 days ago. The problem has not changed since onset. The pain is present in the right toes. The quality of the pain is described as aching. The pain is at a severity of 5/10. The pain is moderate. Associated symptoms include limited range of motion. Pertinent negatives include no numbness and no back pain.         Past Medical History:   Diagnosis Date    Arthritis     Asthma     albuterol prn (uses 1-2 times per day)    CAD (coronary artery disease) 9/15/2013    Chronic obstructive pulmonary disease ([address]     Coronary atherosclerosis of native coronary vessel 11/18/2015    Current smoker on some days     Dental disorder     Dyspepsia and other specified disorders of function of stomach     GERD (gastroesophageal reflux disease)     on med for control     GI bleed 6/29/2019    Hyperlipidemia other unsp dyslipidemia 11/18/2015    on med    Hypertension     on med for control     Hypertension, benign, controlled 11/18/2015    MI (myocardial infarction) ([address] 2013    NSTEMI    Multiple renal cysts     Other ill-defined conditions(799.89)     hernia, pt unsure of site (resolved with surery)    Prostate cancer ([address]     Pulmonary emboli ([address] 6/12/2019    Stroke ([address] 2011    TIA; no residual        Past Surgical History:   Procedure Laterality Date    CARDIAC SURG PROCEDURE UNLIST  2013    stent    HX COLECTOMY      due to polyp that could not be excised    HX COLONOSCOPY  2018    HX HERNIA REPAIR      HX PROSTATECTOMY      HX PTCA      x 1         Family History:   Problem Relation Age of Onset    Stroke Mother     Diabetes Mother     Heart Disease Maternal Grandmother     Cancer Father        Social History     Socioeconomic History    Marital status: SINGLE     Spouse name: Not on file    Number of children: Not on file    Years of education: Not on file    Highest education level: Not on file   Occupational History    Not on file   Social Needs    Financial resource strain: Not on file    Food insecurity:     Worry: Not on file     Inability: Not on file    Transportation needs:     Medical: Not on file     Non-medical: Not on file   Tobacco Use    Smoking status: Former Smoker     Years: 50.00     Types: Cigarettes     Last attempt to quit: 2019     Years since quittin.7    Smokeless tobacco: Never Used   Substance and Sexual Activity    Alcohol use: Yes     Alcohol/week: 3.0 standard drinks     Types: 3 Cans of beer per week    Drug use: No    Sexual activity: Not on file   Lifestyle    Physical activity:     Days per week: Not on file     Minutes per session: Not on file    Stress: Not on file   Relationships    Social connections:     Talks on phone: Not on file     Gets together: Not on file     Attends Spiritism service: Not on file     Active member of club or organization: Not on file     Attends meetings of clubs or organizations: Not on file     Relationship status: Not on file    Intimate partner violence:     Fear of current or ex partner: Not on file     Emotionally abused: Not on file     Physically abused: Not on file     Forced sexual activity: Not on file   Other Topics Concern    Not on file   Social History Narrative    Not on file         ALLERGIES: Lisinopril and Tramadol    Review of Systems   Constitutional: Negative for fatigue and fever. HENT: Negative for congestion and dental problem. Eyes: Negative for photophobia, redness and visual disturbance. Respiratory: Negative for chest tightness. Cardiovascular: Negative for leg swelling. Gastrointestinal: Negative for abdominal pain. Genitourinary: Negative for flank pain, frequency and urgency. Musculoskeletal: Negative for back pain and gait problem. Skin: Negative for color change and pallor. Neurological: Negative for light-headedness and numbness. Hematological: Negative for adenopathy. Psychiatric/Behavioral: Negative for behavioral problems and confusion. All other systems reviewed and are negative. Vitals:    01/17/20 1803   BP: (!) 163/98   Pulse: 86   Resp: 16   Temp: 98.7 °F (37.1 °C)   SpO2: 96%   Weight: 96.2 kg (212 lb)   Height: 6' 2\" (1.88 m)            Physical Exam  Vitals signs and nursing note reviewed. Constitutional:       Appearance: Normal appearance. Neck:      Musculoskeletal: Normal range of motion and neck supple. Cardiovascular:      Rate and Rhythm: Normal rate and regular rhythm. Pulses: Normal pulses. Heart sounds: Normal heart sounds. Musculoskeletal:        Feet:    Skin:     General: Skin is warm and dry. Capillary Refill: Capillary refill takes less than 2 seconds. Coloration: Skin is not jaundiced or pale. Neurological:      Mental Status: He is alert. MDM  Number of Diagnoses or Management Options  Diagnosis management comments: History of gouty arthritis. Will treat here. Currently on allopurinol. Will place on pain medicine and colchicine    Risk of Complications, Morbidity, and/or Mortality  Presenting problems: low  Diagnostic procedures: low  Management options: low    Patient Progress  Patient progress: stable         Procedures             Voice dictation software was used during the making of this note. This software is not perfect and grammatical and other typographical errors may be present. This note has been proofread, but may still contain errors.   Soraida Landers MD; 1/17/2020 @6:22 PM   ===================================================================

## 2020-01-17 NOTE — ED TRIAGE NOTES
Pt states he has a hx of gout and was here 2 weeks ago for gout in the left foot but now in the right foot. No significant redness. Mild swelling.

## 2020-01-17 NOTE — DISCHARGE INSTRUCTIONS
Take medications as prescribed  You need to call and arrange follow-up with your primary care physician  You have been given a list of foods to avoid  Avoid alcohol intake as well  Return to the ER for any new or worsening symptoms    Gout: Care Instructions  Your Care Instructions    Gout is a form of arthritis caused by a buildup of uric acid crystals in a joint. It causes sudden attacks of pain, swelling, redness, and stiffness, usually in one joint, especially the big toe. Gout usually comes on without a cause. But it can be brought on by drinking alcohol (especially beer) or eating seafood and red meat. Taking certain medicines, such as diuretics or aspirin, also can bring on an attack of gout. Taking your medicines as prescribed and following up with your doctor regularly can help you avoid gout attacks in the future. Follow-up care is a key part of your treatment and safety. Be sure to make and go to all appointments, and call your doctor if you are having problems. It's also a good idea to know your test results and keep a list of the medicines you take. How can you care for yourself at home? · If the joint is swollen, put ice or a cold pack on the area for 10 to 20 minutes at a time. Put a thin cloth between the ice and your skin. · Prop up the sore limb on a pillow when you ice it or anytime you sit or lie down during the next 3 days. Try to keep it above the level of your heart. This will help reduce swelling. · Rest sore joints. Avoid activities that put weight or strain on the joints for a few days. Take short rest breaks from your regular activities during the day. · Take your medicines exactly as prescribed. Call your doctor if you think you are having a problem with your medicine. · Take pain medicines exactly as directed. ? If the doctor gave you a prescription medicine for pain, take it as prescribed.   ? If you are not taking a prescription pain medicine, ask your doctor if you can take an over-the-counter medicine. · Eat less seafood and red meat. · Check with your doctor before drinking alcohol. · Losing weight, if you are overweight, may help reduce attacks of gout. But do not go on a [employer]. \" Losing a lot of weight in a short amount of time can cause a gout attack. When should you call for help? Call your doctor now or seek immediate medical care if:    · You have a fever.     · The joint is so painful you cannot use it.     · You have sudden, unexplained swelling, redness, warmth, or severe pain in one or more joints.    Watch closely for changes in your health, and be sure to contact your doctor if:    · You have joint pain.     · Your symptoms get worse or are not improving after 2 or 3 days. Where can you learn more? Go to http://chris-kvng.info/. Enter B463 in the search box to learn more about \"Gout: Care Instructions. \"  Current as of: April 1, 2019  Content Version: 12.2  © 4211-1163 [employer]. Care instructions adapted under license by [employer] (which disclaims liability or warranty for this information). If you have questions about a medical condition or this instruction, always ask your healthcare professional. [address] any warranty or liability for your use of this information. Patient Education        Purine-Restricted Diet: Care Instructions  Your Care Instructions    Purines are substances that are found in some foods. Your body turns purines into uric acid. High levels of uric acid can cause gout, which is a form of arthritis that causes pain and inflammation in joints. You may be able to help control the amount of uric acid in your body by limiting high-purine foods in your diet. Follow-up care is a key part of your treatment and safety. Be sure to make and go to all appointments, and call your doctor if you are having problems.  It's also a good idea to know your test results and keep a list of the medicines you take. How can you care for yourself at home? · Plan your meals and snacks around foods that are low in purines and are safe for you to eat. These foods include:  ? Green vegetables and tomatoes. ? Fruits. ? Whole-grain breads, rice, and cereals. ? Eggs, peanut butter, and nuts. ? Low-fat milk, cheese, and other milk products. ? Popcorn. ? Gelatin desserts, chocolate, cocoa, and cakes and sweets, in small amounts. · You can eat certain foods that are medium-high in purines, but eat them only once in a while. These foods include:  ? Legumes, such as dried beans and dried peas. You can have 1 cup cooked legumes each day. ? Asparagus, cauliflower, spinach, mushrooms, and green peas. ? Fish and seafood (other than very high-purine seafood). ? Oatmeal, wheat bran, and wheat germ. · Limit very high-purine foods, including:  ? Organ meats, such as liver, kidneys, sweetbreads, and brains. ? Meats, including mccracken, beef, pork, and lamb. ? Game meats and any other meats in large amounts. ? Anchovies, sardines, herring, mackerel, and scallops. ? Gravy. ? Beer. Where can you learn more? Go to http://chris-kvng.info/. Enter F448 in the search box to learn more about \"Purine-Restricted Diet: Care Instructions. \"  Current as of: November 7, 2018  Content Version: 12.2  © 5584-7946 [employer]. Care instructions adapted under license by [email or web address] (which disclaims liability or warranty for this information). If you have questions about a medical condition or this instruction, always ask your healthcare professional. [address] any warranty or liability for your use of this information.

## 2020-01-17 NOTE — ED NOTES
I have reviewed discharge instructions with the patient. The patient verbalized understanding. Patient left ED via Discharge Method: ambulatory to Home with friend    Opportunity for questions and clarification provided. Patient given 2 scripts. To continue your aftercare when you leave the hospital, you may receive an automated call from our care team to check in on how you are doing. This is a free service and part of our promise to provide the best care and service to meet your aftercare needs.  If you have questions, or wish to unsubscribe from this service please call [phone]. Thank you for Choosing our [facility].

## 2020-06-12 ENCOUNTER — HOSPITAL ENCOUNTER (EMERGENCY)
Age: 70
Discharge: HOME | End: 2020-06-12
Attending: EMERGENCY MEDICINE
Payer: COMMERCIAL

## 2020-06-12 VITALS
HEART RATE: 96 BPM | RESPIRATION RATE: 18 BRPM | TEMPERATURE: 97.8 F | WEIGHT: 212 LBS | OXYGEN SATURATION: 98 % | DIASTOLIC BLOOD PRESSURE: 90 MMHG | HEIGHT: 74 IN | BODY MASS INDEX: 27.21 KG/M2 | SYSTOLIC BLOOD PRESSURE: 164 MMHG

## 2020-06-12 DIAGNOSIS — M25.512 LEFT SHOULDER PAIN, UNSPECIFIED CHRONICITY: Primary | ICD-10-CM

## 2020-06-12 PROCEDURE — 99283 EMERGENCY DEPT VISIT LOW MDM: CPT

## 2020-06-12 PROCEDURE — 74011250637 HC RX REV CODE- 250/637: Performed by: NURSE PRACTITIONER

## 2020-06-12 PROCEDURE — 74011000250 HC RX REV CODE- 250: Performed by: NURSE PRACTITIONER

## 2020-06-12 RX ORDER — DICLOFENAC SODIUM 10 MG/G
2 GEL TOPICAL 3 TIMES DAILY
Qty: 1 EACH | Refills: 0 | Status: SHIPPED | OUTPATIENT
Start: 2020-06-12 | End: 2020-06-19

## 2020-06-12 RX ORDER — HYDROCODONE BITARTRATE AND ACETAMINOPHEN 7.5; 325 MG/1; MG/1
1 TABLET ORAL
Status: COMPLETED | OUTPATIENT
Start: 2020-06-12 | End: 2020-06-12

## 2020-06-12 RX ORDER — LIDOCAINE 4 G/100G
PATCH TOPICAL
Qty: 5 PATCH | Refills: 0 | Status: SHIPPED | OUTPATIENT
Start: 2020-06-12 | End: 2021-07-20

## 2020-06-12 RX ORDER — LIDOCAINE 4 G/100G
1 PATCH TOPICAL
Status: DISCONTINUED | OUTPATIENT
Start: 2020-06-12 | End: 2020-06-12 | Stop reason: HOSPADM

## 2020-06-12 RX ORDER — ACETAMINOPHEN AND CODEINE PHOSPHATE 300; 30 MG/1; MG/1
1 TABLET ORAL
Qty: 18 TAB | Refills: 0 | Status: SHIPPED | OUTPATIENT
Start: 2020-06-12 | End: 2020-06-15

## 2020-06-12 RX ADMIN — HYDROCODONE BITARTRATE AND ACETAMINOPHEN 1 TABLET: 7.5; 325 TABLET ORAL at 12:42

## 2020-06-12 NOTE — DISCHARGE INSTRUCTIONS
Patient Education        Shoulder Stretches: Exercises  Introduction  Here are some examples of exercises for you to try. The exercises may be suggested for a condition or for rehabilitation. Start each exercise slowly. Ease off the exercises if you start to have pain. You will be told when to start these exercises and which ones will work best for you. How to do the exercises  Shoulder stretch   1.  a doorway and place one arm against the door frame. Your elbow should be a little higher than your shoulder. 2. Relax your shoulders as you lean forward, allowing your chest and shoulder muscles to stretch. You can also turn your body slightly away from your arm to stretch the muscles even more. 3. Hold for 15 to 30 seconds. 4. Repeat 2 to 4 times with each arm. Shoulder and chest stretch   1. Shoulder and chest stretch  2. While sitting, relax your upper body so you slump slightly in your chair. 3. As you breathe in, straighten your back and open your arms out to the sides. 4. Gently pull your shoulder blades back and downward. 5. Hold for 15 to 30 seconds as your breathe normally. 6. Repeat 2 to 4 times. Overhead stretch   1. Reach up over your head with both arms. 2. Hold for 15 to 30 seconds. 3. Repeat 2 to 4 times. Follow-up care is a key part of your treatment and safety. Be sure to make and go to all appointments, and call your doctor if you are having problems. It's also a good idea to know your test results and keep a list of the medicines you take. Where can you learn more? Go to http://chris-kvng.info/  Enter S254 in the search box to learn more about \"Shoulder Stretches: Exercises. \"  Current as of: March 2, 2020               Content Version: 12.5  © 0788-7121 [employer]. Care instructions adapted under license by [employer] (which disclaims liability or warranty for this information).  If you have questions about a medical condition or this instruction, always ask your healthcare professional. [place] [address] any warranty or liability for your use of this information. home with family    Use ice maikel to ease inflammation, as well complete stretches twice daily. Follow with Dr Jesus Chavez on Monday.

## 2020-06-12 NOTE — ED PROVIDER NOTES
76 y/o m to ed with severe left shoulder pain. Had been scheduled to have repair last year, then suddenly developed a PE. Was admitted to hospital.  Sudhakar Silence on blood thinners. Surgery was put off until later due to same. However, shoulder has been aggravated lately but yesterday became severe. Can not identify his particular mechanism of injury that changed the pain, however today with severe pain, worse with movement, crepitus present. Guarding. No cp, palpitations, nausea vomiting or diarrhea, sob, wheezing. No sweats. Pain localized to shoulder only.            Past Medical History:   Diagnosis Date    Arthritis     Asthma     albuterol prn (uses 1-2 times per day)    CAD (coronary artery disease) 9/15/2013    Chronic obstructive pulmonary disease ([address]     Coronary atherosclerosis of native coronary vessel 11/18/2015    Current smoker on some days     Dental disorder     Dyspepsia and other specified disorders of function of stomach     GERD (gastroesophageal reflux disease)     on med for control     GI bleed 6/29/2019    Hyperlipidemia other unsp dyslipidemia 11/18/2015    on med    Hypertension     on med for control     Hypertension, benign, controlled 11/18/2015    MI (myocardial infarction) ([address] 2013    NSTEMI    Multiple renal cysts     Other ill-defined conditions(799.89)     hernia, pt unsure of site (resolved with surery)    Prostate cancer ([address]     Pulmonary emboli ([address] 6/12/2019    Stroke ([address] 2011    TIA; no residual        Past Surgical History:   Procedure Laterality Date    CARDIAC SURG PROCEDURE UNLIST  2013    stent    HX COLECTOMY      due to polyp that could not be excised    HX COLONOSCOPY  2018    HX HERNIA REPAIR      HX PROSTATECTOMY      HX PTCA      x 1         Family History:   Problem Relation Age of Onset    Stroke Mother     Diabetes Mother     Heart Disease Maternal Grandmother     Cancer Father        Social History     Socioeconomic History  Marital status: SINGLE     Spouse name: Not on file    Number of children: Not on file    Years of education: Not on file    Highest education level: Not on file   Occupational History    Not on file   Social Needs    Financial resource strain: Not on file    Food insecurity     Worry: Not on file     Inability: Not on file    Transportation needs     Medical: Not on file     Non-medical: Not on file   Tobacco Use    Smoking status: Former Smoker     Years: 50.00     Types: Cigarettes     Last attempt to quit: 2019     Years since quittin.1    Smokeless tobacco: Never Used   Substance and Sexual Activity    Alcohol use: Yes     Alcohol/week: 3.0 standard drinks     Types: 3 Cans of beer per week    Drug use: No    Sexual activity: Not on file   Lifestyle    Physical activity     Days per week: Not on file     Minutes per session: Not on file    Stress: Not on file   Relationships    Social connections     Talks on phone: Not on file     Gets together: Not on file     Attends [employer] service: Not on file     Active member of club or organization: Not on file     Attends meetings of clubs or organizations: Not on file     Relationship status: Not on file    Intimate partner violence     Fear of current or ex partner: Not on file     Emotionally abused: Not on file     Physically abused: Not on file     Forced sexual activity: Not on file   Other Topics Concern    Not on file   Social History Narrative    Not on file         ALLERGIES: Lisinopril and Tramadol    Review of Systems   Constitutional: Negative for chills and diaphoresis. HENT: Negative for hearing loss and nosebleeds. Eyes: Negative for discharge and redness. Respiratory: Negative for cough, shortness of breath and wheezing. Cardiovascular: Negative for chest pain and palpitations. Gastrointestinal: Negative for nausea and vomiting. Musculoskeletal: Positive for arthralgias and myalgias.    Skin: Negative for color change and rash. Neurological: Negative for syncope and facial asymmetry. Psychiatric/Behavioral: Negative for confusion and decreased concentration. Vitals:    06/12/20 1116   BP: (!) 177/94   Pulse: 97   Resp: 22   Temp: 97.3 °F (36.3 °C)   SpO2: 97%   Weight: 96.2 kg (212 lb)   Height: 6' 2\" (1.88 m)            Physical Exam  Vitals signs and nursing note reviewed. Constitutional:       Appearance: Normal appearance. HENT:      Head: Normocephalic and atraumatic. Nose: Nose normal.      Mouth/Throat:      Mouth: Mucous membranes are moist.   Eyes:      Pupils: Pupils are equal, round, and reactive to light. Neck:      Musculoskeletal: Normal range of motion. Cardiovascular:      Rate and Rhythm: Normal rate and regular rhythm. Pulmonary:      Effort: Pulmonary effort is normal.      Breath sounds: Normal breath sounds. Musculoskeletal:         General: Tenderness present. Arms:    Skin:     General: Skin is warm and dry. Capillary Refill: Capillary refill takes less than 2 seconds. Neurological:      General: No focal deficit present. Mental Status: He is alert and oriented to person, place, and time. Psychiatric:         Mood and Affect: Mood normal.         Behavior: Behavior normal.         Thought Content: Thought content normal.         Judgment: Judgment normal.          MDM  Number of Diagnoses or Management Options  Diagnosis management comments: Will provide pain control, sling in ed. Pt has in hand an appointment card for Dr Kirby Villarreal for Monday.   Will provide pain control until then    Risk of Complications, Morbidity, and/or Mortality  Presenting problems: minimal  Diagnostic procedures: minimal  Management options: minimal    Patient Progress  Patient progress: stable         Procedures

## 2020-06-12 NOTE — ED TRIAGE NOTES
Pt ambulatory to triage without complications and wearing mask. Pt states he has chronic left shoulder pain and issues and he had scheduled the surgery but cancelled it because he went on blood thinners. Pt states he has been in pain since then and has been up all night because of the pain. Pt denies falls, injuries, fever, cough, SOB, cp, or exposure to COVID.

## 2020-06-12 NOTE — ED NOTES
I have reviewed discharge instructions with the patient. The patient verbalized understanding. Patient left ED via Discharge Method: ambulatory to Home with friend. Opportunity for questions and clarification provided. Patient given 3 scripts. To continue your aftercare when you leave the hospital, you may receive an automated call from our care team to check in on how you are doing. This is a free service and part of our promise to provide the best care and service to meet your aftercare needs.  If you have questions, or wish to unsubscribe from this service please call [phone]. Thank you for Choosing our [facility].

## 2020-06-13 ENCOUNTER — PATIENT OUTREACH (OUTPATIENT)
Dept: CASE MANAGEMENT | Age: 70
End: 2020-06-13

## 2020-06-13 ENCOUNTER — APPOINTMENT (OUTPATIENT)
Dept: GENERAL RADIOLOGY | Age: 70
End: 2020-06-13
Attending: EMERGENCY MEDICINE
Payer: COMMERCIAL

## 2020-06-13 ENCOUNTER — HOSPITAL ENCOUNTER (EMERGENCY)
Age: 70
Discharge: HOME | End: 2020-06-13
Attending: EMERGENCY MEDICINE
Payer: COMMERCIAL

## 2020-06-13 VITALS
HEIGHT: 74 IN | SYSTOLIC BLOOD PRESSURE: 142 MMHG | DIASTOLIC BLOOD PRESSURE: 84 MMHG | TEMPERATURE: 99.1 F | WEIGHT: 212 LBS | HEART RATE: 92 BPM | RESPIRATION RATE: 16 BRPM | BODY MASS INDEX: 27.21 KG/M2 | OXYGEN SATURATION: 98 %

## 2020-06-13 DIAGNOSIS — M10.9 ACUTE GOUT OF RIGHT KNEE, UNSPECIFIED CAUSE: Primary | ICD-10-CM

## 2020-06-13 DIAGNOSIS — J44.9 CHRONIC OBSTRUCTIVE PULMONARY DISEASE, UNSPECIFIED COPD TYPE (HCC): ICD-10-CM

## 2020-06-13 LAB
ALBUMIN SERPL-MCNC: 3.6 G/DL (ref 3.2–4.6)
ALBUMIN/GLOB SERPL: 0.8 {RATIO} (ref 1.2–3.5)
ALP SERPL-CCNC: 163 U/L (ref 50–136)
ALT SERPL-CCNC: 65 U/L (ref 12–65)
ANION GAP SERPL CALC-SCNC: 9 MMOL/L (ref 7–16)
AST SERPL-CCNC: 35 U/L (ref 15–37)
BILIRUB SERPL-MCNC: 0.8 MG/DL (ref 0.2–1.1)
BUN SERPL-MCNC: 14 MG/DL (ref 8–23)
CALCIUM SERPL-MCNC: 8.7 MG/DL (ref 8.3–10.4)
CHLORIDE SERPL-SCNC: 102 MMOL/L (ref 98–107)
CO2 SERPL-SCNC: 26 MMOL/L (ref 21–32)
CREAT SERPL-MCNC: 1.28 MG/DL (ref 0.8–1.5)
ERYTHROCYTE [DISTWIDTH] IN BLOOD BY AUTOMATED COUNT: 14.8 % (ref 11.9–14.6)
GLOBULIN SER CALC-MCNC: 4.4 G/DL (ref 2.3–3.5)
GLUCOSE SERPL-MCNC: 131 MG/DL (ref 65–100)
HCT VFR BLD AUTO: 38.5 % (ref 41.1–50.3)
HGB BLD-MCNC: 12.3 G/DL (ref 13.6–17.2)
MCH RBC QN AUTO: 27.7 PG (ref 26.1–32.9)
MCHC RBC AUTO-ENTMCNC: 31.9 G/DL (ref 31.4–35)
MCV RBC AUTO: 86.7 FL (ref 79.6–97.8)
NRBC # BLD: 0 K/UL (ref 0–0.2)
PLATELET # BLD AUTO: 294 K/UL (ref 150–450)
PMV BLD AUTO: 9.7 FL (ref 9.4–12.3)
POTASSIUM SERPL-SCNC: 2.8 MMOL/L (ref 3.5–5.1)
PROT SERPL-MCNC: 8 G/DL (ref 6.3–8.2)
RBC # BLD AUTO: 4.44 M/UL (ref 4.23–5.6)
SODIUM SERPL-SCNC: 137 MMOL/L (ref 136–145)
URATE SERPL-MCNC: 12.8 MG/DL (ref 2.6–6)
WBC # BLD AUTO: 8.5 K/UL (ref 4.3–11.1)

## 2020-06-13 PROCEDURE — 99284 EMERGENCY DEPT VISIT MOD MDM: CPT

## 2020-06-13 PROCEDURE — 80053 COMPREHEN METABOLIC PANEL: CPT

## 2020-06-13 PROCEDURE — 73562 X-RAY EXAM OF KNEE 3: CPT

## 2020-06-13 PROCEDURE — 93005 ELECTROCARDIOGRAM TRACING: CPT | Performed by: EMERGENCY MEDICINE

## 2020-06-13 PROCEDURE — 85027 COMPLETE CBC AUTOMATED: CPT

## 2020-06-13 PROCEDURE — 74011250637 HC RX REV CODE- 250/637: Performed by: EMERGENCY MEDICINE

## 2020-06-13 PROCEDURE — 84550 ASSAY OF BLOOD/URIC ACID: CPT

## 2020-06-13 PROCEDURE — 71046 X-RAY EXAM CHEST 2 VIEWS: CPT

## 2020-06-13 RX ORDER — HYDROCODONE BITARTRATE AND ACETAMINOPHEN 5; 325 MG/1; MG/1
1 TABLET ORAL ONCE
Status: COMPLETED | OUTPATIENT
Start: 2020-06-13 | End: 2020-06-13

## 2020-06-13 RX ORDER — INDOMETHACIN 25 MG/1
25 CAPSULE ORAL 3 TIMES DAILY
Qty: 10 CAP | Refills: 0 | Status: SHIPPED | OUTPATIENT
Start: 2020-06-13 | End: 2020-06-16

## 2020-06-13 RX ORDER — HYDROCODONE BITARTRATE AND ACETAMINOPHEN 5; 325 MG/1; MG/1
1 TABLET ORAL
Qty: 10 TAB | Refills: 0 | Status: SHIPPED | OUTPATIENT
Start: 2020-06-13 | End: 2020-06-18

## 2020-06-13 RX ORDER — COLCHICINE 0.6 MG/1
0.6 TABLET ORAL DAILY
Qty: 10 TAB | Refills: 0 | Status: SHIPPED | OUTPATIENT
Start: 2020-06-13 | End: 2020-06-23

## 2020-06-13 RX ADMIN — HYDROCODONE BITARTRATE AND ACETAMINOPHEN 1 TABLET: 5; 325 TABLET ORAL at 20:36

## 2020-06-13 NOTE — ED TRIAGE NOTES
Pt complaint of R leg pain that started yesterday. Pt states is is NOT having any shortness of breath more than normal due to COPD. Pt denies falling.

## 2020-06-13 NOTE — ED PROVIDER NOTES
Patient has a history of coronary artery disease, hypertension, gout and COPD. He states he has been short of breath all week. He has been using his inhaler at home with some improvement in his symptoms. He denies any fever or known sick contacts. He has a history of gout, states he has been having pain in his right foot and his right knee since yesterday. This has gotten progressively worse. He has had gout in his foot before but not in his right knee. He denies any fever, denies any obvious precipitating event. He has not taken any medicine for his symptoms. The pain is sharp severe at times, worse with bearing weight or trying to walk. He states he has been walking with the use of a walker since yesterday because of the pain.            Past Medical History:   Diagnosis Date    Arthritis     Asthma     albuterol prn (uses 1-2 times per day)    CAD (coronary artery disease) 9/15/2013    Chronic obstructive pulmonary disease ([address]     Coronary atherosclerosis of native coronary vessel 11/18/2015    Current smoker on some days     Dental disorder     Dyspepsia and other specified disorders of function of stomach     GERD (gastroesophageal reflux disease)     on med for control     GI bleed 6/29/2019    Hyperlipidemia other unsp dyslipidemia 11/18/2015    on med    Hypertension     on med for control     Hypertension, benign, controlled 11/18/2015    MI (myocardial infarction) ([address] 2013    NSTEMI    Multiple renal cysts     Other ill-defined conditions(799.89)     hernia, pt unsure of site (resolved with surery)    Prostate cancer ([address]     Pulmonary emboli ([address] 6/12/2019    Stroke ([address] 2011    TIA; no residual        Past Surgical History:   Procedure Laterality Date    CARDIAC SURG PROCEDURE UNLIST  2013    stent    HX COLECTOMY      due to polyp that could not be excised    HX COLONOSCOPY  2018    HX HERNIA REPAIR      HX PROSTATECTOMY      HX PTCA      x 1         Family History:   Problem Relation Age of Onset    Stroke Mother     Diabetes Mother     Heart Disease Maternal Grandmother     Cancer Father        Social History     Socioeconomic History    Marital status: SINGLE     Spouse name: Not on file    Number of children: Not on file    Years of education: Not on file    Highest education level: Not on file   Occupational History    Not on file   Social Needs    Financial resource strain: Not on file    Food insecurity     Worry: Not on file     Inability: Not on file    Transportation needs     Medical: Not on file     Non-medical: Not on file   Tobacco Use    Smoking status: Former Smoker     Years: 50.00     Types: Cigarettes     Last attempt to quit: 2019     Years since quittin.1    Smokeless tobacco: Never Used   Substance and Sexual Activity    Alcohol use: Yes     Alcohol/week: 3.0 standard drinks     Types: 3 Cans of beer per week    Drug use: No    Sexual activity: Not on file   Lifestyle    Physical activity     Days per week: Not on file     Minutes per session: Not on file    Stress: Not on file   Relationships    Social connections     Talks on phone: Not on file     Gets together: Not on file     Attends Confucianism service: Not on file     Active member of club or organization: Not on file     Attends meetings of clubs or organizations: Not on file     Relationship status: Not on file    Intimate partner violence     Fear of current or ex partner: Not on file     Emotionally abused: Not on file     Physically abused: Not on file     Forced sexual activity: Not on file   Other Topics Concern    Not on file   Social History Narrative    Not on file         ALLERGIES: Lisinopril and Tramadol    Review of Systems   Constitutional: Negative for chills and fever. Gastrointestinal: Negative for nausea and vomiting. All other systems reviewed and are negative.       Vitals:    20 1810   BP: 136/88   Pulse: (!) 103   Resp: 20   Temp: 100.1 °F (37.8 °C)   SpO2: 93%   Weight: 96.2 kg (212 lb)   Height: 6' 2\" (1.88 m)            Physical Exam  Vitals signs and nursing note reviewed. Constitutional:       Appearance: Normal appearance. He is well-developed. HENT:      Head: Normocephalic and atraumatic. Eyes:      Conjunctiva/sclera: Conjunctivae normal.      Pupils: Pupils are equal, round, and reactive to light. Neck:      Musculoskeletal: Normal range of motion and neck supple. Cardiovascular:      Rate and Rhythm: Normal rate and regular rhythm. Heart sounds: Normal heart sounds. No murmur. Pulmonary:      Effort: Pulmonary effort is normal. No respiratory distress. Breath sounds: No wheezing. Musculoskeletal: Normal range of motion. General: Tenderness present. Right lower leg: No edema. Left lower leg: No edema. Comments: Right knee discomfort with minimal range of motion. Slight swelling and warmth noted. No rash or induration noted. Skin:     General: Skin is warm and dry. Neurological:      Mental Status: He is alert and oriented to person, place, and time. MDM  Number of Diagnoses or Management Options  Acute gout of right knee, unspecified cause: new and does not require workup  Chronic obstructive pulmonary disease, unspecified COPD type [facility]):   Diagnosis management comments: 9:58 PM discussed results with patient, likelihood of right knee gout. He has no elevated white count and does have an elevated uric acid. Discussed possibility of a septic joint which I highly doubt to given his overall presentation and history of gout. He was told to return if his symptoms worsen.        Amount and/or Complexity of Data Reviewed  Clinical lab tests: reviewed and ordered  Tests in the radiology section of CPT®: ordered and reviewed    Risk of Complications, Morbidity, and/or Mortality  Presenting problems: moderate  Diagnostic procedures: moderate  Management options: moderate    Patient Progress  Patient progress: improved         Procedures

## 2020-06-13 NOTE — PROGRESS NOTES
Patient contacted regarding recent discharge and COVID-19 risk. Care Transition Nurse/ Ambulatory Care Manager contacted the patient by telephone to perform post discharge assessment. Verified name and  with patient as identifiers. Patient has following risk factors of: COPD. CTN/ACM reviewed discharge instructions, medical action plan and red flags related to discharge diagnosis. Reviewed and educated them on any new and changed medications related to discharge diagnosis. Advised obtaining a 90-day supply of all daily and as-needed medications. Education provided regarding infection prevention, and signs and symptoms of COVID-19 and when to seek medical attention with patient who verbalized understanding. Discussed exposure protocols and quarantine from [address] you at higher risk for severe illness  and given an opportunity for questions and concerns. The patient agrees to contact the COVID-19 hotline [phone] or PCP office for questions related to their healthcare. CTN/ACM provided contact information for future reference. From CDC: Are you at higher risk for severe illness?  Wash your hands often.  Avoid close contact (6 feet, which is about two arm lengths) with people who are sick.  Put distance between yourself and other people if COVID-19 is spreading in your community.  Clean and disinfect frequently touched surfaces.  Avoid all cruise travel and non-essential air travel.  Call your healthcare professional if you have concerns about COVID-19 and your underlying condition or if you are sick. For more information on steps you can take to protect yourself, see CDC's How to Protect Yourself      Patient/family/caregiver given information for Gary Rivera and agrees to enroll no      Plan for follow-up call in 7-14 days based on severity of symptoms and risk factors.

## 2020-06-13 NOTE — ED TRIAGE NOTES
Pt arrives via EMS from home with c/o right leg and right knee pain and SOB. 12 lead unreamarkable. Lung sounds clear. Pt VSS in route. Pt denies cough. Pt waiting on stretcher for bed.

## 2020-06-14 LAB
ATRIAL RATE: 101 BPM
CALCULATED P AXIS, ECG09: 70 DEGREES
CALCULATED R AXIS, ECG10: -38 DEGREES
CALCULATED T AXIS, ECG11: 50 DEGREES
DIAGNOSIS, 93000: NORMAL
P-R INTERVAL, ECG05: 150 MS
Q-T INTERVAL, ECG07: 312 MS
QRS DURATION, ECG06: 80 MS
QTC CALCULATION (BEZET), ECG08: 404 MS
VENTRICULAR RATE, ECG03: 101 BPM

## 2020-06-14 NOTE — DISCHARGE INSTRUCTIONS
Follow-up with your doctor, call the office to make an appointment. Return the emergency department if your symptoms worsen.

## 2020-06-14 NOTE — ED NOTES
I have reviewed discharge instructions with the patient. The patient verbalized understanding. Patient left ED via Discharge Method: wheelchair to Home with family. Opportunity for questions and clarification provided. Patient given 3 scripts. To continue your aftercare when you leave the hospital, you may receive an automated call from our care team to check in on how you are doing. This is a free service and part of our promise to provide the best care and service to meet your aftercare needs.  If you have questions, or wish to unsubscribe from this service please call [phone]. Thank you for Choosing our [facility] Emergency Department.

## 2020-06-15 ENCOUNTER — PATIENT OUTREACH (OUTPATIENT)
Dept: CASE MANAGEMENT | Age: 70
End: 2020-06-15

## 2020-06-15 NOTE — PROGRESS NOTES
Patient contacted regarding recent discharge and COVID-19 risk. Discussed COVID-19 related testing which was not done at this time. Test results were not done. Care Transition Nurse/ Ambulatory Care Manager contacted the patient by telephone to perform post discharge assessment. Verified name and  with patient as identifiers. Patient has following risk factors of: COPD and HTN. CTN/ACM reviewed discharge instructions, medical action plan and red flags related to discharge diagnosis. Reviewed and educated them on any new and changed medications related to discharge diagnosis. Advised obtaining a 90-day supply of all daily and as-needed medications. Education provided regarding infection prevention, and signs and symptoms of COVID-19 and when to seek medical attention with patient who verbalized understanding. Discussed exposure protocols and quarantine from [address] you at higher risk for severe illness  and given an opportunity for questions and concerns. The patient agrees to contact the COVID-19 hotline [phone] or PCP office for questions related to their healthcare. CTN/ACM provided contact information for future reference. From CDC: Are you at higher risk for severe illness?  Wash your hands often.  Avoid close contact (6 feet, which is about two arm lengths) with people who are sick.  Put distance between yourself and other people if COVID-19 is spreading in your community.  Clean and disinfect frequently touched surfaces.  Avoid all cruise travel and non-essential air travel.  Call your healthcare professional if you have concerns about COVID-19 and your underlying condition or if you are sick.     For more information on steps you can take to protect yourself, see CDC's How to Protect Yourself      Patient/family/caregiver given information for Gary Rivera and agrees to enroll no  Patient's preferred e-mail:  NA  Patient's preferred phone number: NA  Based on Loop alert triggers, patient will be contacted by nurse care manager for worsening symptoms. Plan for follow-up call in 7-14 days based on severity of symptoms and risk factors. Encouraged f/u w/ PCP at [facility].

## 2020-06-30 ENCOUNTER — PATIENT OUTREACH (OUTPATIENT)
Dept: CASE MANAGEMENT | Age: 70
End: 2020-06-30

## 2020-06-30 NOTE — PROGRESS NOTES
Patient resolved from Transition of Care episode on 6/30/20. Patient/family has been provided the following resources and education related to COVID-19:                         Signs, symptoms and red flags related to COVID-19            CDC exposure and quarantine guidelines            [facility] exposure contact - [phone]            Contact for their local Department of Health                 Patient currently reports that the following symptoms have improved:  no new symptoms. No further outreach scheduled with this CTN/ACM/LPN/HC/ MA. Episode of Care resolved. Patient has this CTN/ACM/LPN/HC/MA contact information if future needs arise.

## 2021-03-29 ENCOUNTER — HOSPITAL ENCOUNTER (EMERGENCY)
Age: 71
Discharge: HOME | End: 2021-03-29
Attending: EMERGENCY MEDICINE
Payer: COMMERCIAL

## 2021-03-29 ENCOUNTER — APPOINTMENT (OUTPATIENT)
Dept: GENERAL RADIOLOGY | Age: 71
End: 2021-03-29
Attending: EMERGENCY MEDICINE
Payer: COMMERCIAL

## 2021-03-29 VITALS
BODY MASS INDEX: 28.23 KG/M2 | RESPIRATION RATE: 20 BRPM | HEART RATE: 68 BPM | HEIGHT: 74 IN | WEIGHT: 220 LBS | OXYGEN SATURATION: 97 % | TEMPERATURE: 99.3 F | DIASTOLIC BLOOD PRESSURE: 99 MMHG | SYSTOLIC BLOOD PRESSURE: 171 MMHG

## 2021-03-29 DIAGNOSIS — J44.1 COPD EXACERBATION (HCC): Primary | ICD-10-CM

## 2021-03-29 DIAGNOSIS — J06.9 UPPER RESPIRATORY TRACT INFECTION, UNSPECIFIED TYPE: ICD-10-CM

## 2021-03-29 LAB
ALBUMIN SERPL-MCNC: 3.5 G/DL (ref 3.2–4.6)
ALBUMIN/GLOB SERPL: 0.8 {RATIO} (ref 1.2–3.5)
ALP SERPL-CCNC: 149 U/L (ref 50–136)
ALT SERPL-CCNC: 51 U/L (ref 12–65)
ANION GAP SERPL CALC-SCNC: 6 MMOL/L (ref 7–16)
AST SERPL-CCNC: 30 U/L (ref 15–37)
ATRIAL RATE: 85 BPM
BASOPHILS # BLD: 0.1 K/UL (ref 0–0.2)
BASOPHILS NFR BLD: 1 % (ref 0–2)
BILIRUB SERPL-MCNC: 0.4 MG/DL (ref 0.2–1.1)
BNP SERPL-MCNC: 72 PG/ML (ref 5–125)
BUN SERPL-MCNC: 10 MG/DL (ref 8–23)
CALCIUM SERPL-MCNC: 9.3 MG/DL (ref 8.3–10.4)
CALCULATED P AXIS, ECG09: 75 DEGREES
CALCULATED R AXIS, ECG10: -34 DEGREES
CALCULATED T AXIS, ECG11: 35 DEGREES
CHLORIDE SERPL-SCNC: 110 MMOL/L (ref 98–107)
CO2 SERPL-SCNC: 25 MMOL/L (ref 21–32)
COVID-19 RAPID TEST, COVR: NOT DETECTED
CREAT SERPL-MCNC: 1.3 MG/DL (ref 0.8–1.5)
DIAGNOSIS, 93000: NORMAL
DIFFERENTIAL METHOD BLD: ABNORMAL
EOSINOPHIL # BLD: 0.5 K/UL (ref 0–0.8)
EOSINOPHIL NFR BLD: 9 % (ref 0.5–7.8)
ERYTHROCYTE [DISTWIDTH] IN BLOOD BY AUTOMATED COUNT: 13.3 % (ref 11.9–14.6)
GLOBULIN SER CALC-MCNC: 4.2 G/DL (ref 2.3–3.5)
GLUCOSE SERPL-MCNC: 122 MG/DL (ref 65–100)
HCT VFR BLD AUTO: 45.8 % (ref 41.1–50.3)
HGB BLD-MCNC: 14.2 G/DL (ref 13.6–17.2)
IMM GRANULOCYTES # BLD AUTO: 0 K/UL (ref 0–0.5)
IMM GRANULOCYTES NFR BLD AUTO: 0 % (ref 0–5)
LYMPHOCYTES # BLD: 1.4 K/UL (ref 0.5–4.6)
LYMPHOCYTES NFR BLD: 27 % (ref 13–44)
MCH RBC QN AUTO: 27.4 PG (ref 26.1–32.9)
MCHC RBC AUTO-ENTMCNC: 31 G/DL (ref 31.4–35)
MCV RBC AUTO: 88.2 FL (ref 79.6–97.8)
MONOCYTES # BLD: 0.4 K/UL (ref 0.1–1.3)
MONOCYTES NFR BLD: 8 % (ref 4–12)
NEUTS SEG # BLD: 2.9 K/UL (ref 1.7–8.2)
NEUTS SEG NFR BLD: 55 % (ref 43–78)
NRBC # BLD: 0 K/UL (ref 0–0.2)
P-R INTERVAL, ECG05: 160 MS
PLATELET # BLD AUTO: 330 K/UL (ref 150–450)
PMV BLD AUTO: 9.9 FL (ref 9.4–12.3)
POTASSIUM SERPL-SCNC: 4.2 MMOL/L (ref 3.5–5.1)
PROT SERPL-MCNC: 7.7 G/DL (ref 6.3–8.2)
Q-T INTERVAL, ECG07: 382 MS
QRS DURATION, ECG06: 76 MS
QTC CALCULATION (BEZET), ECG08: 454 MS
RBC # BLD AUTO: 5.19 M/UL (ref 4.23–5.6)
SARS-COV-2, COV2: NOT DETECTED
SODIUM SERPL-SCNC: 141 MMOL/L (ref 136–145)
SOURCE, COVRS: NORMAL
SPECIMEN SOURCE, FCOV2M: NORMAL
VENTRICULAR RATE, ECG03: 85 BPM
WBC # BLD AUTO: 5.3 K/UL (ref 4.3–11.1)

## 2021-03-29 PROCEDURE — 99284 EMERGENCY DEPT VISIT MOD MDM: CPT

## 2021-03-29 PROCEDURE — 94640 AIRWAY INHALATION TREATMENT: CPT

## 2021-03-29 PROCEDURE — 96374 THER/PROPH/DIAG INJ IV PUSH: CPT

## 2021-03-29 PROCEDURE — 74011000250 HC RX REV CODE- 250: Performed by: EMERGENCY MEDICINE

## 2021-03-29 PROCEDURE — 93005 ELECTROCARDIOGRAM TRACING: CPT | Performed by: EMERGENCY MEDICINE

## 2021-03-29 PROCEDURE — 83880 ASSAY OF NATRIURETIC PEPTIDE: CPT

## 2021-03-29 PROCEDURE — 85025 COMPLETE CBC W/AUTO DIFF WBC: CPT

## 2021-03-29 PROCEDURE — 71045 X-RAY EXAM CHEST 1 VIEW: CPT

## 2021-03-29 PROCEDURE — 80053 COMPREHEN METABOLIC PANEL: CPT

## 2021-03-29 PROCEDURE — 74011250636 HC RX REV CODE- 250/636: Performed by: EMERGENCY MEDICINE

## 2021-03-29 PROCEDURE — U0005 INFEC AGEN DETEC AMPLI PROBE: HCPCS

## 2021-03-29 PROCEDURE — 87635 SARS-COV-2 COVID-19 AMP PRB: CPT

## 2021-03-29 RX ORDER — ALBUTEROL SULFATE 0.83 MG/ML
2.5 SOLUTION RESPIRATORY (INHALATION)
Qty: 120 NEBULE | Refills: 2 | Status: SHIPPED | OUTPATIENT
Start: 2021-03-29 | End: 2022-05-03 | Stop reason: SDUPTHER

## 2021-03-29 RX ORDER — PREDNISONE 20 MG/1
60 TABLET ORAL DAILY
Qty: 12 TAB | Refills: 0 | Status: SHIPPED | OUTPATIENT
Start: 2021-03-29 | End: 2021-04-02

## 2021-03-29 RX ORDER — DOXYCYCLINE HYCLATE 100 MG
100 TABLET ORAL 2 TIMES DAILY
Qty: 14 TAB | Refills: 0 | Status: SHIPPED | OUTPATIENT
Start: 2021-03-29 | End: 2021-04-05

## 2021-03-29 RX ORDER — BENZONATATE 100 MG/1
100 CAPSULE ORAL
Qty: 21 CAP | Refills: 0 | Status: SHIPPED | OUTPATIENT
Start: 2021-03-29 | End: 2021-03-29 | Stop reason: SDUPTHER

## 2021-03-29 RX ORDER — ALBUTEROL SULFATE 90 UG/1
2 AEROSOL, METERED RESPIRATORY (INHALATION)
Qty: 1 INHALER | Refills: 2 | Status: SHIPPED | OUTPATIENT
Start: 2021-03-29 | End: 2021-05-31

## 2021-03-29 RX ORDER — IPRATROPIUM BROMIDE AND ALBUTEROL SULFATE 2.5; .5 MG/3ML; MG/3ML
3 SOLUTION RESPIRATORY (INHALATION)
Status: COMPLETED | OUTPATIENT
Start: 2021-03-29 | End: 2021-03-29

## 2021-03-29 RX ORDER — BENZONATATE 100 MG/1
100 CAPSULE ORAL
Qty: 21 CAP | Refills: 0 | Status: SHIPPED | OUTPATIENT
Start: 2021-03-29 | End: 2021-04-05

## 2021-03-29 RX ADMIN — IPRATROPIUM BROMIDE AND ALBUTEROL SULFATE 3 ML: .5; 3 SOLUTION RESPIRATORY (INHALATION) at 08:43

## 2021-03-29 RX ADMIN — METHYLPREDNISOLONE SODIUM SUCCINATE 125 MG: 125 INJECTION, POWDER, FOR SOLUTION INTRAMUSCULAR; INTRAVENOUS at 09:37

## 2021-03-29 NOTE — ED NOTES
I have reviewed discharge instructions with the patient. The patient verbalized understanding. Patient left ED via Discharge Method: ambulatory to Home with cousin  Opportunity for questions and clarification provided. Patient given 5 scripts. Patient sent home with pulse ox   MD Veliz ok with patient BP. Patient states he has high blood pressure and this is normal      Patient walked to waiting room, waiting for cousin for pickup      To continue your aftercare when you leave the hospital, you may receive an automated call from our care team to check in on how you are doing. This is a free service and part of our promise to provide the best care and service to meet your aftercare needs.  If you have questions, or wish to unsubscribe from this service please call [phone]. Thank you for Choosing our [facility] Emergency Department.

## 2021-03-29 NOTE — ED TRIAGE NOTES
Pt arrives per EMS from home for complaints of breathing difficulties. RR 26 and  Inially 90% on RA with wheezing. EMS gave 2 albuterol treatments. Hx of ashtma and HTN. Breathing gets worse with any exertion. PT states he is out of his breathing medications including his rescue inhalers.

## 2021-03-29 NOTE — DISCHARGE INSTRUCTIONS
Take prednisone as directed. Take doxycycline, Tessalon Perles directed. Use albuterol inhaler as directed. Return to ED if symptoms worsen or progress in any way. [place] O2 saturation and if desat to less than 88% on room air return to ED. Schedule close follow-up with primary care physician.

## 2021-03-29 NOTE — ED PROVIDER NOTES
71-year-old male with history of CAD, COPD, hypertension, TIA presents with complaint of worsening shortness of breath, productive cough with yellow/clear sputum, wheezing, congestion over the past several days. Patient states that he recently ran out of his inhalers. States that he quit smoking 2 years ago. Denies chest pain, abdominal pain, nausea, vomiting or fever, chills, abdominal pain. The history is provided by the patient. No  was used. Respiratory Distress  Associated symptoms include rhinorrhea, cough and wheezing. Pertinent negatives include no fever, no headaches, no sore throat, no chest pain, no vomiting, no abdominal pain, no rash and no leg swelling.         Past Medical History:   Diagnosis Date    Arthritis     Asthma     albuterol prn (uses 1-2 times per day)    CAD (coronary artery disease) 9/15/2013    Chronic obstructive pulmonary disease ([address]     Coronary atherosclerosis of native coronary vessel 11/18/2015    Current smoker on some days     Dental disorder     Dyspepsia and other specified disorders of function of stomach     GERD (gastroesophageal reflux disease)     on med for control     GI bleed 6/29/2019    Hyperlipidemia other unsp dyslipidemia 11/18/2015    on med    Hypertension     on med for control     Hypertension, benign, controlled 11/18/2015    MI (myocardial infarction) ([address] 2013    NSTEMI    Multiple renal cysts     Other ill-defined conditions(799.89)     hernia, pt unsure of site (resolved with surery)    Prostate cancer ([address]     Pulmonary emboli ([address] 6/12/2019    Stroke ([address] 2011    TIA; no residual        Past Surgical History:   Procedure Laterality Date    CARDIAC SURG PROCEDURE UNLIST  2013    stent    HX COLONOSCOPY  2018    HX HERNIA REPAIR      HX PROSTATECTOMY      HX PTCA      x 1    HX TOTAL COLECTOMY      due to polyp that could not be excised         Family History:   Problem Relation Age of Onset    Stroke Mother     Diabetes Mother     Heart Disease Maternal Grandmother     Cancer Father        Social History     Socioeconomic History    Marital status: SINGLE     Spouse name: Not on file    Number of children: Not on file    Years of education: Not on file    Highest education level: Not on file   Occupational History    Not on file   Social Needs    Financial resource strain: Not on file    Food insecurity     Worry: Not on file     Inability: Not on file    Transportation needs     Medical: Not on file     Non-medical: Not on file   Tobacco Use    Smoking status: Former Smoker     Years: 50.00     Types: Cigarettes     Quit date: 2019     Years since quittin.9    Smokeless tobacco: Never Used   Substance and Sexual Activity    Alcohol use: Yes     Alcohol/week: 3.0 standard drinks     Types: 3 Cans of beer per week    Drug use: No    Sexual activity: Not on file   Lifestyle    Physical activity     Days per week: Not on file     Minutes per session: Not on file    Stress: Not on file   Relationships    Social connections     Talks on phone: Not on file     Gets together: Not on file     Attends Taoist service: Not on file     Active member of club or organization: Not on file     Attends meetings of clubs or organizations: Not on file     Relationship status: Not on file    Intimate partner violence     Fear of current or ex partner: Not on file     Emotionally abused: Not on file     Physically abused: Not on file     Forced sexual activity: Not on file   Other Topics Concern    Not on file   Social History Narrative    Not on file         ALLERGIES: Lisinopril and Tramadol    Review of Systems   Constitutional: Negative for chills, diaphoresis, fatigue and fever. HENT: Positive for congestion and rhinorrhea. Negative for sore throat, trouble swallowing and voice change. Respiratory: Positive for cough, shortness of breath and wheezing.     Cardiovascular: Negative for chest pain, palpitations and leg swelling. Gastrointestinal: Negative for abdominal pain, diarrhea, nausea and vomiting. Genitourinary: Negative for dysuria and flank pain. Musculoskeletal: Negative for arthralgias, myalgias and neck stiffness. Skin: Negative for color change and rash. Neurological: Negative for dizziness, syncope, weakness, light-headedness and headaches. Vitals:    03/29/21 0824   BP: (!) 154/84   Pulse: 86   Resp: 28   Temp: 99.3 °F (37.4 °C)   SpO2: 95%   Weight: 99.8 kg (220 lb)   Height: 6' 2\" (1.88 m)            Physical Exam  Vitals signs and nursing note reviewed. Constitutional:       Appearance: Normal appearance. HENT:      Mouth/Throat:      Mouth: Mucous membranes are moist.   Eyes:      Extraocular Movements: Extraocular movements intact. Pupils: Pupils are equal, round, and reactive to light. Cardiovascular:      Rate and Rhythm: Normal rate. Pulses: Normal pulses. Pulmonary:      Comments: Tachypneic. Coarse breath sounds bilaterally. Abdominal:      General: Bowel sounds are normal.      Palpations: Abdomen is soft. Tenderness: There is no abdominal tenderness. There is no guarding or rebound. Comments: Soft, nontender, nondistended. No rebound or guarding. Musculoskeletal: Normal range of motion. Right lower leg: No edema. Left lower leg: No edema. Comments: No lower extremity edema. No calf tenderness. Neurological:      General: No focal deficit present. Mental Status: He is alert and oriented to person, place, and time. Motor: No weakness. Comments: No focal deficits. MDM  Number of Diagnoses or Management Options  COPD exacerbation ([facility]): new and requires workup  Upper respiratory tract infection, unspecified type: new and requires workup  Diagnosis management comments: Patient presents with complaint of congestion, wheezing, shortness of breath.   Patient states out of home medications. Vital signs stable. Patient received DuoNeb, Solumedrol. Patient with improvement in wheezing. Repeat exam with clear lungs all station bilaterally. O2 sats 90% room air. Patient ambulated in place for greater than 2 minutes with O2 sats remaining 97%. Chest x-ray clear. Labs unremarkable. Will discharge home with albuterol and inhaler, neb meds, prednisone taper, Tessalon Perles, doxycycline. Instructed follow-up PCP and given strict return precautions. Will discharge home with pulse oximeter. Rapid COVID-19 negative. Amount and/or Complexity of Data Reviewed  Clinical lab tests: ordered and reviewed  Tests in the radiology section of CPT®: ordered and reviewed  Tests in the medicine section of CPT®: ordered and reviewed  Review and summarize past medical records: yes  Discuss the patient with other providers: yes  Independent visualization of images, tracings, or specimens: yes    Risk of Complications, Morbidity, and/or Mortality  Presenting problems: moderate  Diagnostic procedures: moderate  Management options: moderate    Patient Progress  Patient progress: stable    ED Course as of Mar 29 1113   Mon Mar 29, 2021   0928 CXR IMPRESSION:  Negative for acute change. [DF]      ED Course User Index  [DF] Rick Taylor MD       EKG    Date/Time: 3/29/2021 8:32 AM  Performed by: Rick Taylor MD  Authorized by:  Rick Taylor MD     ECG reviewed by ED Physician in the absence of a cardiologist: yes    Rate:     ECG rate:  85    ECG rate assessment: normal    Rhythm:     Rhythm: sinus rhythm    Ectopy:     Ectopy: none    QRS:     QRS axis:  Left    QRS intervals:  Normal  Conduction:     Conduction: normal    ST segments:     ST segments:  Normal  T waves:     T waves: normal          Results Include:    Recent Results (from the past 24 hour(s))   CBC WITH AUTOMATED DIFF    Collection Time: 03/29/21  8:27 AM   Result Value Ref Range    WBC 5.3 4.3 - 11.1 K/uL    RBC 5.19 4.23 - 5.6 M/uL    HGB 14.2 13.6 - 17.2 g/dL    HCT 45.8 41.1 - 50.3 %    MCV 88.2 79.6 - 97.8 FL    MCH 27.4 26.1 - 32.9 PG    MCHC 31.0 (L) 31.4 - 35.0 g/dL    RDW 13.3 11.9 - 14.6 %    PLATELET 650 209 - 195 K/uL    MPV 9.9 9.4 - 12.3 FL    ABSOLUTE NRBC 0.00 0.0 - 0.2 K/uL    DF AUTOMATED      NEUTROPHILS 55 43 - 78 %    LYMPHOCYTES 27 13 - 44 %    MONOCYTES 8 4.0 - 12.0 %    EOSINOPHILS 9 (H) 0.5 - 7.8 %    BASOPHILS 1 0.0 - 2.0 %    IMMATURE GRANULOCYTES 0 0.0 - 5.0 %    ABS. NEUTROPHILS 2.9 1.7 - 8.2 K/UL    ABS. LYMPHOCYTES 1.4 0.5 - 4.6 K/UL    ABS. MONOCYTES 0.4 0.1 - 1.3 K/UL    ABS. EOSINOPHILS 0.5 0.0 - 0.8 K/UL    ABS. BASOPHILS 0.1 0.0 - 0.2 K/UL    ABS. IMM. GRANS. 0.0 0.0 - 0.5 K/UL   METABOLIC PANEL, COMPREHENSIVE    Collection Time: 03/29/21  8:27 AM   Result Value Ref Range    Sodium 141 136 - 145 mmol/L    Potassium 4.2 3.5 - 5.1 mmol/L    Chloride 110 (H) 98 - 107 mmol/L    CO2 25 21 - 32 mmol/L    Anion gap 6 (L) 7 - 16 mmol/L    Glucose 122 (H) 65 - 100 mg/dL    BUN 10 8 - 23 MG/DL    Creatinine 1.30 0.8 - 1.5 MG/DL    GFR est AA >60 >60 ml/min/1.73m2    GFR est non-AA 58 (L) >60 ml/min/1.73m2    Calcium 9.3 8.3 - 10.4 MG/DL    Bilirubin, total 0.4 0.2 - 1.1 MG/DL    ALT (SGPT) 51 12 - 65 U/L    AST (SGOT) 30 15 - 37 U/L    Alk.  phosphatase 149 (H) 50 - 136 U/L    Protein, total 7.7 6.3 - 8.2 g/dL    Albumin 3.5 3.2 - 4.6 g/dL    Globulin 4.2 (H) 2.3 - 3.5 g/dL    A-G Ratio 0.8 (L) 1.2 - 3.5     NT-PRO BNP    Collection Time: 03/29/21  8:27 AM   Result Value Ref Range    NT pro-BNP 72 5 - 125 PG/ML   EKG, 12 LEAD, INITIAL    Collection Time: 03/29/21  8:28 AM   Result Value Ref Range    Ventricular Rate 85 BPM    Atrial Rate 85 BPM    P-R Interval 160 ms    QRS Duration 76 ms    Q-T Interval 382 ms    QTC Calculation (Bezet) 454 ms    Calculated P Axis 75 degrees    Calculated R Axis -34 degrees    Calculated T Axis 35 degrees    Diagnosis       !! AGE AND GENDER SPECIFIC ECG ANALYSIS !! Sinus rhythm with Fusion complexes and Possible Premature atrial complexes   with Aberrant conduction  Left axis deviation  Abnormal ECG  When compared with ECG of 13-JUN-2020 18:13,  Fusion complexes are now Present  Premature ventricular complexes are no longer Present  Aberrant conduction is now Present  QT has lengthened     COVID-19 RAPID TEST    Collection Time: 03/29/21  8:42 AM   Result Value Ref Range    Specimen source NASAL      COVID-19 rapid test Not detected NOTD                      Sushant Denny MD; 3/29/2021 @8:34 AM Voice dictation software was used during the making of this note. This software is not perfect and grammatical and other typographical errors may be present.   This note has not been proofread for errors.  ===================================================================

## 2021-04-01 PROBLEM — M79.89 LEG SWELLING: Status: ACTIVE | Noted: 2021-04-01

## 2021-05-07 ENCOUNTER — TRANSCRIBE ORDER (OUTPATIENT)
Dept: SCHEDULING | Age: 71
End: 2021-05-07

## 2021-05-20 ENCOUNTER — HOSPITAL ENCOUNTER (INPATIENT)
Age: 71
LOS: 11 days | Discharge: HOME | DRG: 439 | End: 2021-05-31
Attending: EMERGENCY MEDICINE | Admitting: FAMILY MEDICINE
Payer: COMMERCIAL

## 2021-05-20 ENCOUNTER — APPOINTMENT (OUTPATIENT)
Dept: CT IMAGING | Age: 71
DRG: 439 | End: 2021-05-20
Attending: EMERGENCY MEDICINE
Payer: COMMERCIAL

## 2021-05-20 ENCOUNTER — HOSPITAL ENCOUNTER (OUTPATIENT)
Dept: CT IMAGING | Age: 71
Discharge: HOME | DRG: 439 | End: 2021-05-20
Attending: INTERNAL MEDICINE
Payer: COMMERCIAL

## 2021-05-20 DIAGNOSIS — M10.9 ACUTE GOUTY ARTHRITIS: ICD-10-CM

## 2021-05-20 DIAGNOSIS — K85.20 ALCOHOL-INDUCED ACUTE PANCREATITIS, UNSPECIFIED COMPLICATION STATUS: Primary | ICD-10-CM

## 2021-05-20 DIAGNOSIS — J44.1 COPD EXACERBATION (HCC): ICD-10-CM

## 2021-05-20 LAB
ALBUMIN SERPL-MCNC: 3.4 G/DL (ref 3.2–4.6)
ALBUMIN/GLOB SERPL: 0.8 {RATIO} (ref 1.2–3.5)
ALP SERPL-CCNC: 158 U/L (ref 50–136)
ALT SERPL-CCNC: 73 U/L (ref 12–65)
ANION GAP SERPL CALC-SCNC: 5 MMOL/L (ref 7–16)
AST SERPL-CCNC: 78 U/L (ref 15–37)
BASOPHILS # BLD: 0 K/UL (ref 0–0.2)
BASOPHILS NFR BLD: 0 % (ref 0–2)
BILIRUB SERPL-MCNC: 0.4 MG/DL (ref 0.2–1.1)
BUN SERPL-MCNC: 12 MG/DL (ref 8–23)
CALCIUM SERPL-MCNC: 9.2 MG/DL (ref 8.3–10.4)
CHLORIDE SERPL-SCNC: 113 MMOL/L (ref 98–107)
CO2 SERPL-SCNC: 24 MMOL/L (ref 21–32)
CREAT SERPL-MCNC: 1.09 MG/DL (ref 0.8–1.5)
DIFFERENTIAL METHOD BLD: ABNORMAL
EOSINOPHIL # BLD: 0.1 K/UL (ref 0–0.8)
EOSINOPHIL NFR BLD: 2 % (ref 0.5–7.8)
ERYTHROCYTE [DISTWIDTH] IN BLOOD BY AUTOMATED COUNT: 15.1 % (ref 11.9–14.6)
GLOBULIN SER CALC-MCNC: 4.2 G/DL (ref 2.3–3.5)
GLUCOSE SERPL-MCNC: 122 MG/DL (ref 65–100)
HAV IGM SER QL: NONREACTIVE
HBV CORE IGM SER QL: NONREACTIVE
HBV SURFACE AG SER QL: NONREACTIVE
HCT VFR BLD AUTO: 39.3 % (ref 41.1–50.3)
HCV AB SER QL: NONREACTIVE
HGB BLD-MCNC: 12.9 G/DL (ref 13.6–17.2)
IMM GRANULOCYTES # BLD AUTO: 0 K/UL (ref 0–0.5)
IMM GRANULOCYTES NFR BLD AUTO: 0 % (ref 0–5)
LACTATE SERPL-SCNC: 0.8 MMOL/L (ref 0.4–2)
LACTATE SERPL-SCNC: 1.3 MMOL/L (ref 0.4–2)
LIPASE SERPL-CCNC: ABNORMAL U/L (ref 73–393)
LYMPHOCYTES # BLD: 1.3 K/UL (ref 0.5–4.6)
LYMPHOCYTES NFR BLD: 19 % (ref 13–44)
MCH RBC QN AUTO: 27.8 PG (ref 26.1–32.9)
MCHC RBC AUTO-ENTMCNC: 32.8 G/DL (ref 31.4–35)
MCV RBC AUTO: 84.7 FL (ref 79.6–97.8)
MONOCYTES # BLD: 0.7 K/UL (ref 0.1–1.3)
MONOCYTES NFR BLD: 10 % (ref 4–12)
NEUTS SEG # BLD: 4.6 K/UL (ref 1.7–8.2)
NEUTS SEG NFR BLD: 68 % (ref 43–78)
NRBC # BLD: 0 K/UL (ref 0–0.2)
PLATELET # BLD AUTO: 279 K/UL (ref 150–450)
PMV BLD AUTO: 9.8 FL (ref 9.4–12.3)
POTASSIUM SERPL-SCNC: 3.8 MMOL/L (ref 3.5–5.1)
PROT SERPL-MCNC: 7.6 G/DL (ref 6.3–8.2)
RBC # BLD AUTO: 4.64 M/UL (ref 4.23–5.6)
SODIUM SERPL-SCNC: 142 MMOL/L (ref 136–145)
TRIGL SERPL-MCNC: 299 MG/DL (ref 35–150)
WBC # BLD AUTO: 6.7 K/UL (ref 4.3–11.1)

## 2021-05-20 PROCEDURE — 74011250636 HC RX REV CODE- 250/636: Performed by: EMERGENCY MEDICINE

## 2021-05-20 PROCEDURE — 85025 COMPLETE CBC W/AUTO DIFF WBC: CPT

## 2021-05-20 PROCEDURE — 96375 TX/PRO/DX INJ NEW DRUG ADDON: CPT

## 2021-05-20 PROCEDURE — 83690 ASSAY OF LIPASE: CPT

## 2021-05-20 PROCEDURE — 65270000029 HC RM PRIVATE

## 2021-05-20 PROCEDURE — 71250 CT THORAX DX C-: CPT

## 2021-05-20 PROCEDURE — 94640 AIRWAY INHALATION TREATMENT: CPT

## 2021-05-20 PROCEDURE — 2709999900 HC NON-CHARGEABLE SUPPLY

## 2021-05-20 PROCEDURE — 74011250636 HC RX REV CODE- 250/636: Performed by: FAMILY MEDICINE

## 2021-05-20 PROCEDURE — 74011000636 HC RX REV CODE- 636: Performed by: EMERGENCY MEDICINE

## 2021-05-20 PROCEDURE — 83605 ASSAY OF LACTIC ACID: CPT

## 2021-05-20 PROCEDURE — 99282 EMERGENCY DEPT VISIT SF MDM: CPT

## 2021-05-20 PROCEDURE — 84478 ASSAY OF TRIGLYCERIDES: CPT

## 2021-05-20 PROCEDURE — 74177 CT ABD & PELVIS W/CONTRAST: CPT

## 2021-05-20 PROCEDURE — 96374 THER/PROPH/DIAG INJ IV PUSH: CPT

## 2021-05-20 PROCEDURE — 74011000258 HC RX REV CODE- 258: Performed by: EMERGENCY MEDICINE

## 2021-05-20 PROCEDURE — 80053 COMPREHEN METABOLIC PANEL: CPT

## 2021-05-20 PROCEDURE — 77030013140 HC MSK NEB VYRM -A

## 2021-05-20 PROCEDURE — 80074 ACUTE HEPATITIS PANEL: CPT

## 2021-05-20 PROCEDURE — 74011000250 HC RX REV CODE- 250: Performed by: FAMILY MEDICINE

## 2021-05-20 PROCEDURE — 36415 COLL VENOUS BLD VENIPUNCTURE: CPT

## 2021-05-20 PROCEDURE — 94664 DEMO&/EVAL PT USE INHALER: CPT

## 2021-05-20 RX ORDER — IPRATROPIUM BROMIDE AND ALBUTEROL SULFATE 2.5; .5 MG/3ML; MG/3ML
3 SOLUTION RESPIRATORY (INHALATION)
Status: DISCONTINUED | OUTPATIENT
Start: 2021-05-20 | End: 2021-05-23

## 2021-05-20 RX ORDER — POLYETHYLENE GLYCOL 3350 17 G/17G
17 POWDER, FOR SOLUTION ORAL DAILY PRN
Status: DISCONTINUED | OUTPATIENT
Start: 2021-05-20 | End: 2021-05-25

## 2021-05-20 RX ORDER — LORAZEPAM 2 MG/ML
1 INJECTION INTRAMUSCULAR
Status: DISCONTINUED | OUTPATIENT
Start: 2021-05-20 | End: 2021-05-30

## 2021-05-20 RX ORDER — SODIUM CHLORIDE 0.9 % (FLUSH) 0.9 %
10 SYRINGE (ML) INJECTION
Status: COMPLETED | OUTPATIENT
Start: 2021-05-20 | End: 2021-05-20

## 2021-05-20 RX ORDER — HYDRALAZINE HYDROCHLORIDE 20 MG/ML
20 INJECTION INTRAMUSCULAR; INTRAVENOUS
Status: DISCONTINUED | OUTPATIENT
Start: 2021-05-20 | End: 2021-05-22

## 2021-05-20 RX ORDER — ONDANSETRON 2 MG/ML
4 INJECTION INTRAMUSCULAR; INTRAVENOUS
Status: DISCONTINUED | OUTPATIENT
Start: 2021-05-20 | End: 2021-05-31 | Stop reason: HOSPADM

## 2021-05-20 RX ORDER — MORPHINE SULFATE 10 MG/ML
6 INJECTION, SOLUTION INTRAMUSCULAR; INTRAVENOUS
Status: COMPLETED | OUTPATIENT
Start: 2021-05-20 | End: 2021-05-20

## 2021-05-20 RX ORDER — ACETAMINOPHEN 650 MG/1
650 SUPPOSITORY RECTAL
Status: DISCONTINUED | OUTPATIENT
Start: 2021-05-20 | End: 2021-05-31 | Stop reason: HOSPADM

## 2021-05-20 RX ORDER — HYDROMORPHONE HYDROCHLORIDE 1 MG/ML
0.5 INJECTION, SOLUTION INTRAMUSCULAR; INTRAVENOUS; SUBCUTANEOUS ONCE
Status: COMPLETED | OUTPATIENT
Start: 2021-05-20 | End: 2021-05-20

## 2021-05-20 RX ORDER — ACETAMINOPHEN 325 MG/1
650 TABLET ORAL
Status: DISCONTINUED | OUTPATIENT
Start: 2021-05-20 | End: 2021-05-31 | Stop reason: HOSPADM

## 2021-05-20 RX ORDER — ONDANSETRON 2 MG/ML
4 INJECTION INTRAMUSCULAR; INTRAVENOUS
Status: COMPLETED | OUTPATIENT
Start: 2021-05-20 | End: 2021-05-20

## 2021-05-20 RX ORDER — ALBUTEROL SULFATE 0.83 MG/ML
2.5 SOLUTION RESPIRATORY (INHALATION)
Status: DISCONTINUED | OUTPATIENT
Start: 2021-05-20 | End: 2021-05-23

## 2021-05-20 RX ORDER — SODIUM CHLORIDE 0.9 % (FLUSH) 0.9 %
5-10 SYRINGE (ML) INJECTION AS NEEDED
Status: DISCONTINUED | OUTPATIENT
Start: 2021-05-20 | End: 2021-05-31 | Stop reason: HOSPADM

## 2021-05-20 RX ORDER — SODIUM CHLORIDE 0.9 % (FLUSH) 0.9 %
5-10 SYRINGE (ML) INJECTION EVERY 8 HOURS
Status: DISCONTINUED | OUTPATIENT
Start: 2021-05-20 | End: 2021-05-31 | Stop reason: HOSPADM

## 2021-05-20 RX ORDER — BUDESONIDE 0.5 MG/2ML
500 INHALANT ORAL
Status: DISCONTINUED | OUTPATIENT
Start: 2021-05-20 | End: 2021-05-31 | Stop reason: HOSPADM

## 2021-05-20 RX ORDER — BUDESONIDE 0.5 MG/2ML
500 INHALANT ORAL 2 TIMES DAILY
Status: DISCONTINUED | OUTPATIENT
Start: 2021-05-20 | End: 2021-05-20

## 2021-05-20 RX ORDER — SODIUM CHLORIDE, SODIUM LACTATE, POTASSIUM CHLORIDE, CALCIUM CHLORIDE 600; 310; 30; 20 MG/100ML; MG/100ML; MG/100ML; MG/100ML
100 INJECTION, SOLUTION INTRAVENOUS CONTINUOUS
Status: DISCONTINUED | OUTPATIENT
Start: 2021-05-20 | End: 2021-05-25

## 2021-05-20 RX ORDER — HYDROMORPHONE HYDROCHLORIDE 1 MG/ML
1 INJECTION, SOLUTION INTRAMUSCULAR; INTRAVENOUS; SUBCUTANEOUS
Status: DISCONTINUED | OUTPATIENT
Start: 2021-05-20 | End: 2021-05-26

## 2021-05-20 RX ADMIN — Medication 10 ML: at 16:49

## 2021-05-20 RX ADMIN — HYDROMORPHONE HYDROCHLORIDE 0.5 MG: 1 INJECTION, SOLUTION INTRAMUSCULAR; INTRAVENOUS; SUBCUTANEOUS at 18:05

## 2021-05-20 RX ADMIN — BUDESONIDE 500 MCG: 0.5 INHALANT RESPIRATORY (INHALATION) at 19:17

## 2021-05-20 RX ADMIN — HYDROMORPHONE HYDROCHLORIDE 1 MG: 1 INJECTION, SOLUTION INTRAMUSCULAR; INTRAVENOUS; SUBCUTANEOUS at 23:12

## 2021-05-20 RX ADMIN — IPRATROPIUM BROMIDE AND ALBUTEROL SULFATE 3 ML: .5; 3 SOLUTION RESPIRATORY (INHALATION) at 19:18

## 2021-05-20 RX ADMIN — Medication 10 ML: at 19:48

## 2021-05-20 RX ADMIN — ONDANSETRON 4 MG: 2 INJECTION INTRAMUSCULAR; INTRAVENOUS at 15:48

## 2021-05-20 RX ADMIN — HYDRALAZINE HYDROCHLORIDE 20 MG: 20 INJECTION, SOLUTION INTRAMUSCULAR; INTRAVENOUS at 19:44

## 2021-05-20 RX ADMIN — MORPHINE SULFATE 6 MG: 10 INJECTION INTRAVENOUS at 15:49

## 2021-05-20 RX ADMIN — IOPAMIDOL 100 ML: 755 INJECTION, SOLUTION INTRAVENOUS at 16:49

## 2021-05-20 RX ADMIN — HYDROMORPHONE HYDROCHLORIDE 1 MG: 1 INJECTION, SOLUTION INTRAMUSCULAR; INTRAVENOUS; SUBCUTANEOUS at 20:04

## 2021-05-20 RX ADMIN — SODIUM CHLORIDE, SODIUM LACTATE, POTASSIUM CHLORIDE, AND CALCIUM CHLORIDE 100 ML/HR: 600; 310; 30; 20 INJECTION, SOLUTION INTRAVENOUS at 19:49

## 2021-05-20 RX ADMIN — SODIUM CHLORIDE 100 ML: 900 INJECTION, SOLUTION INTRAVENOUS at 16:49

## 2021-05-20 RX ADMIN — FOLIC ACID: 5 INJECTION, SOLUTION INTRAMUSCULAR; INTRAVENOUS; SUBCUTANEOUS at 19:55

## 2021-05-20 NOTE — PROGRESS NOTES
TRANSFER - IN REPORT:    Verbal report received from [address] (name) on Lynn Jj  being received from ER (unit) for routine progression of care      Report consisted of patients Situation, Background, Assessment and   Recommendations(SBAR). Information from the following report(s) SBAR, Kardex, ED Summary, Intake/Output, MAR and Recent Results was reviewed with the receiving nurse. Opportunity for questions and clarification was provided. Assessment completed upon patients arrival to unit and care assumed.

## 2021-05-20 NOTE — ED TRIAGE NOTES
Pt c/o of upper abdominal pain for the last hour. Denies n/v/d. Denies hematuria or burning with urination.

## 2021-05-20 NOTE — ED PROVIDER NOTES
Patient presents to the ER complaint of abdominal pain. Patient reports acute onset of upper epigastric abdominal pain earlier today. Describes it as a severe pain. Reports nausea without vomiting. Denies fevers or chills. Denies any hematemesis or melena    The history is provided by the patient. Abdominal Pain   This is a new problem. The current episode started yesterday. The problem has not changed since onset. The pain is located in the generalized abdominal region and epigastric region. The quality of the pain is aching and cramping. The pain is at a severity of 7/10. The pain is moderate. Associated symptoms include nausea. Pertinent negatives include no hematochezia, no vomiting, no constipation, no dysuria, no hematuria, no myalgias, no chest pain, no testicular pain and no back pain. Past workup includes no CT scan, no surgery. His past medical history is significant for GERD. His past medical history does not include gallstones, ulcerative colitis, irritable bowel syndrome, diverticulitis or small bowel obstruction.         Past Medical History:   Diagnosis Date    Arthritis     Asthma     albuterol prn (uses 1-2 times per day)    CAD (coronary artery disease) 9/15/2013    Chronic obstructive pulmonary disease ([address]     Coronary atherosclerosis of native coronary vessel 11/18/2015    Current smoker on some days     Dental disorder     Dyspepsia and other specified disorders of function of stomach     GERD (gastroesophageal reflux disease)     on med for control     GI bleed 6/29/2019    Hyperlipidemia other unsp dyslipidemia 11/18/2015    on med    Hypertension     on med for control     Hypertension, benign, controlled 11/18/2015    [facility]) 2013    NSTEMI    Multiple renal cysts     Other ill-defined conditions(799.89)     hernia, pt unsure of site (resolved with surery)    Prostate cancer ([address]     Pulmonary emboli ([address] 6/12/2019    Stroke ([address] 2011 TIA; no residual        Past Surgical History:   Procedure Laterality Date    HX COLONOSCOPY  2018    HX HERNIA REPAIR      HX PROSTATECTOMY      HX PTCA      x 1    HX TOTAL COLECTOMY      due to polyp that could not be excised    [place] CARDIAC SURG PROCEDURE UNLIST  2013    stent         Family History:   Problem Relation Age of Onset    Stroke Mother     Diabetes Mother     Heart Disease Maternal Grandmother     Cancer Father        Social History     Socioeconomic History    Marital status: SINGLE     Spouse name: Not on file    Number of children: Not on file    Years of education: Not on file    Highest education level: Not on file   Occupational History    Not on file   Tobacco Use    Smoking status: Former Smoker     Years: 50.00     Types: Cigarettes     Quit date: 2019     Years since quittin.0    Smokeless tobacco: Never Used   Substance and Sexual Activity    Alcohol use: Yes     Alcohol/week: 3.0 standard drinks     Types: 3 Cans of beer per week    Drug use: No    Sexual activity: Not on file   Other Topics Concern    Not on file   Social History Narrative    Not on file     Social Determinants of Health     Financial Resource Strain:     Difficulty of Paying Living Expenses:    Food Insecurity:     Worried About Running Out of Food in the Last Year:     [address] in the Last Year:    Transportation Needs:     Lack of Transportation (Medical):      Lack of Transportation (Non-Medical):    Physical Activity:     Days of Exercise per Week:     Minutes of Exercise per Session:    Stress:     Feeling of Stress :    Social Connections:     Frequency of Communication with Friends and Family:     Frequency of Social Gatherings with Friends and Family:     Attends Orthodox Services:     Active Member of Clubs or Organizations:     Attends Club or Organization Meetings:     Marital Status:    Intimate Partner Violence:     Fear of Current or Ex-Partner:     Emotionally Abused:     Physically Abused:     Sexually Abused: ALLERGIES: Lisinopril and Tramadol    Review of Systems   Constitutional: Negative for fatigue and unexpected weight change. HENT: Negative for congestion, dental problem, trouble swallowing and voice change. Eyes: Negative for photophobia and redness. Respiratory: Negative for chest tightness, shortness of breath and stridor. Cardiovascular: Negative for chest pain and leg swelling. Gastrointestinal: Positive for abdominal pain and nausea. Negative for constipation, hematochezia and vomiting. Endocrine: Negative for polyphagia and polyuria. Genitourinary: Negative for difficulty urinating, dysuria, hematuria and testicular pain. Musculoskeletal: Negative for back pain and myalgias. Skin: Negative for color change and pallor. Neurological: Negative for seizures and facial asymmetry. Hematological: Negative for adenopathy. Psychiatric/Behavioral: Negative for behavioral problems, confusion and self-injury. The patient is not nervous/anxious. All other systems reviewed and are negative. Vitals:    05/20/21 1342   BP: (!) 200/123   Pulse: 67   Resp: 16   Temp: 98.7 °F (37.1 °C)   SpO2: 96%   Weight: 101.6 kg (224 lb)   Height: 6' (1.829 m)            Physical Exam  Vitals and nursing note reviewed. Constitutional:       General: He is not in acute distress. Appearance: Normal appearance. He is not ill-appearing. HENT:      Head: Normocephalic and atraumatic. Right Ear: External ear normal.      Left Ear: External ear normal.      Nose: Nose normal. No congestion or rhinorrhea. Mouth/Throat:      Mouth: Mucous membranes are moist.      Pharynx: No oropharyngeal exudate. Eyes:      General:         Right eye: No discharge. Left eye: No discharge. Extraocular Movements: Extraocular movements intact. Pupils: Pupils are equal, round, and reactive to light.    Neck:      Vascular: No carotid bruit. Cardiovascular:      Rate and Rhythm: Normal rate and regular rhythm. Pulses: Normal pulses. Heart sounds: Normal heart sounds. Pulmonary:      Effort: No respiratory distress. Breath sounds: No stridor. No wheezing. Abdominal:      General: Abdomen is flat. Palpations: There is no mass. Tenderness: There is abdominal tenderness in the epigastric area. There is no guarding. Hernia: No hernia is present. Musculoskeletal:         General: No swelling, tenderness, deformity or signs of injury. Normal range of motion. Cervical back: Normal range of motion and neck supple. No rigidity or tenderness. Skin:     General: Skin is warm. Capillary Refill: Capillary refill takes less than 2 seconds. Coloration: Skin is not pale. Findings: No bruising or erythema. Neurological:      General: No focal deficit present. Mental Status: He is alert and oriented to person, place, and time. Cranial Nerves: No cranial nerve deficit. Sensory: No sensory deficit. Motor: No weakness. Psychiatric:         Mood and Affect: Mood normal.         Behavior: Behavior normal.          MDM  Number of Diagnoses or Management Options  Alcohol-induced acute pancreatitis, unspecified complication status  Diagnosis management comments: Differential diagnosis is broad includes gastritis, pancreatitis. 4:11 PM  Normal white blood cell count.   Chemistry panel significant for mildly elevated LFTs, lipase elevated at 24,000    5:02 PM  Plan to discuss case with hospitalist for admission  CT scan of abdomen pelvis consistent with acute pancreatitis       Amount and/or Complexity of Data Reviewed  Clinical lab tests: ordered and reviewed  Tests in the radiology section of CPT®: ordered and reviewed  Discuss the patient with other providers: yes  Independent visualization of images, tracings, or specimens: yes    Risk of Complications, Morbidity, and/or Mortality  Presenting problems: moderate  Diagnostic procedures: moderate  Management options: high  General comments: High risk due to use of parenteral narcotic medication           Procedures               Results Include:    Recent Results (from the past 24 hour(s))   CBC WITH AUTOMATED DIFF    Collection Time: 05/20/21  1:45 PM   Result Value Ref Range    WBC 6.7 4.3 - 11.1 K/uL    RBC 4.64 4.23 - 5.6 M/uL    HGB 12.9 (L) 13.6 - 17.2 g/dL    HCT 39.3 (L) 41.1 - 50.3 %    MCV 84.7 79.6 - 97.8 FL    MCH 27.8 26.1 - 32.9 PG    MCHC 32.8 31.4 - 35.0 g/dL    RDW 15.1 (H) 11.9 - 14.6 %    PLATELET 863 728 - 686 K/uL    MPV 9.8 9.4 - 12.3 FL    ABSOLUTE NRBC 0.00 0.0 - 0.2 K/uL    DF AUTOMATED      NEUTROPHILS 68 43 - 78 %    LYMPHOCYTES 19 13 - 44 %    MONOCYTES 10 4.0 - 12.0 %    EOSINOPHILS 2 0.5 - 7.8 %    BASOPHILS 0 0.0 - 2.0 %    IMMATURE GRANULOCYTES 0 0.0 - 5.0 %    ABS. NEUTROPHILS 4.6 1.7 - 8.2 K/UL    ABS. LYMPHOCYTES 1.3 0.5 - 4.6 K/UL    ABS. MONOCYTES 0.7 0.1 - 1.3 K/UL    ABS. EOSINOPHILS 0.1 0.0 - 0.8 K/UL    ABS. BASOPHILS 0.0 0.0 - 0.2 K/UL    ABS. IMM. GRANS. 0.0 0.0 - 0.5 K/UL   METABOLIC PANEL, COMPREHENSIVE    Collection Time: 05/20/21  1:45 PM   Result Value Ref Range    Sodium 142 136 - 145 mmol/L    Potassium 3.8 3.5 - 5.1 mmol/L    Chloride 113 (H) 98 - 107 mmol/L    CO2 24 21 - 32 mmol/L    Anion gap 5 (L) 7 - 16 mmol/L    Glucose 122 (H) 65 - 100 mg/dL    BUN 12 8 - 23 MG/DL    Creatinine 1.09 0.8 - 1.5 MG/DL    GFR est AA >60 >60 ml/min/1.73m2    GFR est non-AA >60 >60 ml/min/1.73m2    Calcium 9.2 8.3 - 10.4 MG/DL    Bilirubin, total 0.4 0.2 - 1.1 MG/DL    ALT (SGPT) 73 (H) 12 - 65 U/L    AST (SGOT) 78 (H) 15 - 37 U/L    Alk.  phosphatase 158 (H) 50 - 136 U/L    Protein, total 7.6 6.3 - 8.2 g/dL    Albumin 3.4 3.2 - 4.6 g/dL    Globulin 4.2 (H) 2.3 - 3.5 g/dL    A-G Ratio 0.8 (L) 1.2 - 3.5     LIPASE    Collection Time: 05/20/21  1:45 PM   Result Value Ref Range    Lipase 24,724 (H) 73 - 393 U/L   LACTIC ACID    Collection Time: 05/20/21  1:45 PM   Result Value Ref Range    Lactic acid 1.3 0.4 - 2.0 MMOL/L     Voice dictation software was used during the making of this note. This software is not perfect and grammatical and other typographical errors may be present. This note has been proofread, but may still contain errors.   Santiago Jiménez MD; 5/20/2021 @4:12 PM   ===================================================================

## 2021-05-20 NOTE — H&P
Hospitalist Admission History and Physical     NAME:  Linnea Grimes   Age:  72 y.o.  :   1950   MRN:   [id number]  PCP: Abisai Francis MD  Consulting MD:  Treatment Team: Attending Provider: Shan Renner DO; Primary Nurse: Blayne Lundberg RN    Chief Complaint   Patient presents with    Abdominal Pain         HPI:   Patient is a 72 y.o. male who presented to the ED for cc epigastric pain that is acute starting today. Hx of ETOH abuse drinking 1/2 pint of liquor a day at least, asthma, HLD, HTN, multiple pulmonary nodules, CKD stage II, prostate cancer followed by [facility] s/p prostatectomy, PE in 2019 on Xarelto,    Vitals - Elevated BP    Labs- total bili 0.4, ALT 73, AST 78, Alk phos 158, lipase 24,724    CT abdomen pelvis with contrast showed findings consistent with pancreatitis.    Past Medical History:   Diagnosis Date    Alcoholic pancreatitis 3/76/6275    Arthritis     Asthma     albuterol prn (uses 1-2 times per day)    CAD (coronary artery disease) 9/15/2013    Chronic obstructive pulmonary disease ([address]     Coronary atherosclerosis of native coronary vessel 2015    Current smoker on some days     Dental disorder     Dyspepsia and other specified disorders of function of stomach     GERD (gastroesophageal reflux disease)     on med for control     GI bleed 2019    Hyperlipidemia other unsp dyslipidemia 2015    on med    Hypertension     on med for control     Hypertension, benign, controlled 2015    [facility])     NSTEMI    Multiple renal cysts     Other ill-defined conditions(799.89)     hernia, pt unsure of site (resolved with surery)    Prostate cancer ([address]     Pulmonary emboli ([address] 2019    Stroke ([address] 2011    TIA; no residual         Past Surgical History:   Procedure Laterality Date    HX COLONOSCOPY      HX HERNIA REPAIR      HX PROSTATECTOMY      HX PTCA      x 1    HX TOTAL COLECTOMY      due to polyp that could not be excised    [place] CARDIAC SUR[place] PROCEDURE UNLIST  2013    stent        Family History   Problem Relation Age of Onset    Stroke Mother     Diabetes Mother     Heart Disease Maternal Grandmother     Cancer Father        Social History     Social History Narrative    Not on file        Social History     Tobacco Use    Smoking status: Former Smoker     Years: 50.00     Types: Cigarettes     Quit date: 2019     Years since quittin.0    Smokeless tobacco: Never Used   Substance Use Topics    Alcohol use: Yes     Alcohol/week: 3.0 standard drinks     Types: 3 Cans of beer per week        Social History     Substance and Sexual Activity   Drug Use No         Allergies   Allergen Reactions    Lisinopril Swelling    Tramadol Rash and Itching       Prior to Admission medications    Medication Sig Start Date End Date Taking? Authorizing Provider   budesonide (Pulmicort) 0.5 mg/2 mL nbsp 2 mL by Nebulization route two (2) times a day. 21   Omar Thorne MD   albuterol-ipratropium (DUO-NEB) 2.5 mg-0.5 mg/3 ml nebu 3 mL by Nebulization route four (4) times daily. 21   Omar Thorne MD   albuterol (PROVENTIL HFA, VENTOLIN HFA, PROAIR HFA) 90 mcg/actuation inhaler Take 2 Puffs by inhalation every six (6) hours as needed for Wheezing. Take every 4-6 hours for wheezing 3/29/21   Triston Wei MD   albuterol (PROVENTIL VENTOLIN) 2.5 mg /3 mL (0.083 %) nebu 3 mL by Nebulization route every six (6) hours as needed for Other. 3/29/21   Triston Wei MD   lidocaine 4 % patch Apply to shoulder at night and remove in the morning 20   Ayla Evans NP   Xarelto 20 mg tab tablet TAKE 1 TABLET BY MOUTH DAILY WITH BREAKFAST 20   Rohini Rodney NP   colchicine (MITIGARE) 0.6 mg capsule Take 1 Cap by mouth daily. 20   Anniece Alpers, MD   allopurinol (ZYLOPRIM) 100 mg tablet Take 1 Tab by mouth daily.  20   Meghan Aldana MD   umeclidinium (INCRUSE ELLIPTA) 62.5 mcg/actuation inhaler Take 1 Puff by inhalation daily. 11/1/19   María David NP   tiotropium (SPIRIVA) 18 mcg inhalation capsule Take 1 Cap by inhalation daily. 10/30/19   María David NP   pantoprazole (PROTONIX) 40 mg tablet Take 1 Tab by mouth daily. 7/2/19   Carlus Sandifer, MD   aspirin delayed-release 81 mg tablet Take 1 Tab by mouth daily. 6/14/19   Trell LEE DO   mirtazapine (REMERON) 15 mg tablet Take 15 mg by mouth nightly. Provider, Historical   ondansetron (ZOFRAN ODT) 8 mg disintegrating tablet Take 1 Tab by mouth every eight (8) hours as needed for Nausea. 11/21/18   Moira Zee MD   senna-docusate (PERICOLACE) 8.6-50 mg per tablet Take 2 Tabs by mouth daily. 11/22/18   Moira Zee MD   oxybutynin chloride XL (DITROPAN XL) 5 mg CR tablet Take 5 mg by mouth daily. Take / use AM day of surgery  per anesthesia protocols. Provider, Historical   pravastatin (PRAVACHOL) 40 mg tablet Take 40 mg by mouth daily. Take / use AM day of surgery  per anesthesia protocols. Provider, Historical   amLODIPine (NORVASC) 10 mg tablet Take 10 mg by mouth daily. Take / use AM day of surgery  per anesthesia protocols. Provider, Historical           Review of Systems    Constitutional:  NAD  Eyes:  no change in visual acuity, no photophobia  Ears, nose, mouth, throat, and face: no  Odynphagia, dysphagia, no thrush or exudate, negative for chronic sinus congestion, recurrent headaches  Respiratory: negative for SOB, hemoptysis or cough  Cardiovascular: negative for CP, palpitations, or PND  Gastrointestinal:epigastric pain.  Poor oral intake   Genitourinary: no urgency, frequency, or dysuria, no nocturia  Integument/breast: negative for skin rash or skin lesions  Hematologic/lymphatic: negative for known bleeding disorder  Musculoskeletal:negative for joint pain or joint tenderness  Neurological: negative for lightheadedness, syncope or presyncopal events, no seizure or CVA history  Behavioral/Psych: negative for depression or chronic anxiety,   Endocrine: negative for polydyspia, polyuria or intolerance to heat or cold  Allergic/Immunologic: negative for chronic allergic rhinitis, or known connective tissue disorder      Objective:     Visit Vitals  BP (!) 200/123 (BP 1 Location: Right arm, BP Patient Position: At rest)   Pulse 67   Temp 98.7 °F (37.1 °C)   Resp 16   Ht 6' (1.829 m)   Wt 101.6 kg (224 lb)   SpO2 96%   BMI 30.38 kg/m²        No intake/output data recorded. No intake/output data recorded. Data Review:   Recent Results (from the past 24 hour(s))   CBC WITH AUTOMATED DIFF    Collection Time: 05/20/21  1:45 PM   Result Value Ref Range    WBC 6.7 4.3 - 11.1 K/uL    RBC 4.64 4.23 - 5.6 M/uL    HGB 12.9 (L) 13.6 - 17.2 g/dL    HCT 39.3 (L) 41.1 - 50.3 %    MCV 84.7 79.6 - 97.8 FL    MCH 27.8 26.1 - 32.9 PG    MCHC 32.8 31.4 - 35.0 g/dL    RDW 15.1 (H) 11.9 - 14.6 %    PLATELET 682 500 - 843 K/uL    MPV 9.8 9.4 - 12.3 FL    ABSOLUTE NRBC 0.00 0.0 - 0.2 K/uL    DF AUTOMATED      NEUTROPHILS 68 43 - 78 %    LYMPHOCYTES 19 13 - 44 %    MONOCYTES 10 4.0 - 12.0 %    EOSINOPHILS 2 0.5 - 7.8 %    BASOPHILS 0 0.0 - 2.0 %    IMMATURE GRANULOCYTES 0 0.0 - 5.0 %    ABS. NEUTROPHILS 4.6 1.7 - 8.2 K/UL    ABS. LYMPHOCYTES 1.3 0.5 - 4.6 K/UL    ABS. MONOCYTES 0.7 0.1 - 1.3 K/UL    ABS. EOSINOPHILS 0.1 0.0 - 0.8 K/UL    ABS. BASOPHILS 0.0 0.0 - 0.2 K/UL    ABS. IMM.  GRANS. 0.0 0.0 - 0.5 K/UL   METABOLIC PANEL, COMPREHENSIVE    Collection Time: 05/20/21  1:45 PM   Result Value Ref Range    Sodium 142 136 - 145 mmol/L    Potassium 3.8 3.5 - 5.1 mmol/L    Chloride 113 (H) 98 - 107 mmol/L    CO2 24 21 - 32 mmol/L    Anion gap 5 (L) 7 - 16 mmol/L    Glucose 122 (H) 65 - 100 mg/dL    BUN 12 8 - 23 MG/DL    Creatinine 1.09 0.8 - 1.5 MG/DL    GFR est AA >60 >60 ml/min/1.73m2    GFR est non-AA >60 >60 ml/min/1.73m2    Calcium 9.2 8.3 - 10.4 MG/DL    Bilirubin, total 0.4 0.2 - 1.1 MG/DL    ALT (SGPT) 73 (H) 12 - 65 U/L    AST (SGOT) 78 (H) 15 - 37 U/L    Alk. phosphatase 158 (H) 50 - 136 U/L    Protein, total 7.6 6.3 - 8.2 g/dL    Albumin 3.4 3.2 - 4.6 g/dL    Globulin 4.2 (H) 2.3 - 3.5 g/dL    A-G Ratio 0.8 (L) 1.2 - 3.5     LIPASE    Collection Time: 05/20/21  1:45 PM   Result Value Ref Range    Lipase 24,724 (H) 73 - 393 U/L   LACTIC ACID    Collection Time: 05/20/21  1:45 PM   Result Value Ref Range    Lactic acid 1.3 0.4 - 2.0 MMOL/L       Physical Exam:     General:  Alert, cooperative, obvious discomfort. Eyes:  Conjunctivae/corneas clear. Ears:  Normal TMs and external ear canals both ears. Nose: Nares normal. Septum midline. Mouth/Throat: Lips, mucosa, and tongue normal. Teeth and gums normal.   Neck:  no JVD. Back:   deferred   Lungs:   Clear to auscultation bilaterally. Heart:  Regular rate and rhythm, S1, S2 normal   Abdomen:   Mild distention, tenderness to epigastric area with no rebound tenderness. Slow BS   Extremities: Extremities normal, atraumatic, no cyanosis or edema. Pulses: 2+ and symmetric all extremities. Skin: Skin color, texture, turgor normal. No rashes or lesions   Lymph nodes: Cervical, supraclavicular, and axillary nodes normal.   Neurologic: CNII-XII intact     Assessment and Plan     Principal Problem:    Alcoholic pancreatitis (9/78/3473)    Active Problems:    Accelerated hypertension (9/13/2013)      CAD (coronary artery disease) (9/15/2013)      Hyperlipidemia other unsp dyslipidemia (11/18/2015)      Pulmonary emboli (HCC) (6/12/2019)      Multiple pulmonary nodules (6/12/2019)      Gout (8/84/3363)    Alcoholic pancreatitis - No masses on CT. Check triglyceride levels. No admitted injury. PRN dilaudid. NPO except meds. LR. Elevated LFTs- Check hepatitis panel. ETOH abuse - banana bag. PRN ativan. Hx of PE - can continue Xarelto    CAD, gout, HTN, HLD - holding oral medications. PRN hydralazine. Asthma - duoneb q 6hr. Pulmicort.      PPI    Wishes to be DNR    DVT prophylaxis - Xarelto  Signed By: Myrtle Castillo DO   May 20, 2021

## 2021-05-21 LAB
ANION GAP SERPL CALC-SCNC: 8 MMOL/L (ref 7–16)
BUN SERPL-MCNC: 16 MG/DL (ref 8–23)
CALCIUM SERPL-MCNC: 8.9 MG/DL (ref 8.3–10.4)
CHLORIDE SERPL-SCNC: 114 MMOL/L (ref 98–107)
CO2 SERPL-SCNC: 22 MMOL/L (ref 21–32)
CREAT SERPL-MCNC: 0.97 MG/DL (ref 0.8–1.5)
ERYTHROCYTE [DISTWIDTH] IN BLOOD BY AUTOMATED COUNT: 15 % (ref 11.9–14.6)
GLUCOSE SERPL-MCNC: 117 MG/DL (ref 65–100)
HCT VFR BLD AUTO: 41.2 % (ref 41.1–50.3)
HGB BLD-MCNC: 13 G/DL (ref 13.6–17.2)
MCH RBC QN AUTO: 27.4 PG (ref 26.1–32.9)
MCHC RBC AUTO-ENTMCNC: 31.6 G/DL (ref 31.4–35)
MCV RBC AUTO: 86.9 FL (ref 79.6–97.8)
NRBC # BLD: 0 K/UL (ref 0–0.2)
PLATELET # BLD AUTO: 254 K/UL (ref 150–450)
PMV BLD AUTO: 9.8 FL (ref 9.4–12.3)
POTASSIUM SERPL-SCNC: 4.1 MMOL/L (ref 3.5–5.1)
RBC # BLD AUTO: 4.74 M/UL (ref 4.23–5.6)
SODIUM SERPL-SCNC: 144 MMOL/L (ref 136–145)
WBC # BLD AUTO: 8.2 K/UL (ref 4.3–11.1)

## 2021-05-21 PROCEDURE — C9113 INJ PANTOPRAZOLE SODIUM, VIA: HCPCS | Performed by: FAMILY MEDICINE

## 2021-05-21 PROCEDURE — 36415 COLL VENOUS BLD VENIPUNCTURE: CPT

## 2021-05-21 PROCEDURE — 94760 N-INVAS EAR/PLS OXIMETRY 1: CPT

## 2021-05-21 PROCEDURE — 74011250636 HC RX REV CODE- 250/636: Performed by: FAMILY MEDICINE

## 2021-05-21 PROCEDURE — 85027 COMPLETE CBC AUTOMATED: CPT

## 2021-05-21 PROCEDURE — 80048 BASIC METABOLIC PNL TOTAL CA: CPT

## 2021-05-21 PROCEDURE — 2709999900 HC NON-CHARGEABLE SUPPLY

## 2021-05-21 PROCEDURE — 74011000250 HC RX REV CODE- 250: Performed by: FAMILY MEDICINE

## 2021-05-21 PROCEDURE — 74011250637 HC RX REV CODE- 250/637: Performed by: FAMILY MEDICINE

## 2021-05-21 PROCEDURE — 65270000029 HC RM PRIVATE

## 2021-05-21 PROCEDURE — 94640 AIRWAY INHALATION TREATMENT: CPT

## 2021-05-21 RX ORDER — LABETALOL HYDROCHLORIDE 5 MG/ML
20 INJECTION, SOLUTION INTRAVENOUS
Status: DISCONTINUED | OUTPATIENT
Start: 2021-05-21 | End: 2021-05-22

## 2021-05-21 RX ADMIN — HYDROMORPHONE HYDROCHLORIDE 1 MG: 1 INJECTION, SOLUTION INTRAMUSCULAR; INTRAVENOUS; SUBCUTANEOUS at 23:30

## 2021-05-21 RX ADMIN — RIVAROXABAN 20 MG: 20 TABLET, FILM COATED ORAL at 08:54

## 2021-05-21 RX ADMIN — FOLIC ACID: 5 INJECTION, SOLUTION INTRAMUSCULAR; INTRAVENOUS; SUBCUTANEOUS at 19:14

## 2021-05-21 RX ADMIN — HYDROMORPHONE HYDROCHLORIDE 1 MG: 1 INJECTION, SOLUTION INTRAMUSCULAR; INTRAVENOUS; SUBCUTANEOUS at 05:09

## 2021-05-21 RX ADMIN — HYDROMORPHONE HYDROCHLORIDE 1 MG: 1 INJECTION, SOLUTION INTRAMUSCULAR; INTRAVENOUS; SUBCUTANEOUS at 12:35

## 2021-05-21 RX ADMIN — IPRATROPIUM BROMIDE AND ALBUTEROL SULFATE 3 ML: .5; 3 SOLUTION RESPIRATORY (INHALATION) at 15:15

## 2021-05-21 RX ADMIN — BUDESONIDE 500 MCG: 0.5 INHALANT RESPIRATORY (INHALATION) at 20:24

## 2021-05-21 RX ADMIN — HYDROMORPHONE HYDROCHLORIDE 1 MG: 1 INJECTION, SOLUTION INTRAMUSCULAR; INTRAVENOUS; SUBCUTANEOUS at 08:54

## 2021-05-21 RX ADMIN — IPRATROPIUM BROMIDE AND ALBUTEROL SULFATE 3 ML: .5; 3 SOLUTION RESPIRATORY (INHALATION) at 08:06

## 2021-05-21 RX ADMIN — HYDROMORPHONE HYDROCHLORIDE 1 MG: 1 INJECTION, SOLUTION INTRAMUSCULAR; INTRAVENOUS; SUBCUTANEOUS at 15:39

## 2021-05-21 RX ADMIN — HYDROMORPHONE HYDROCHLORIDE 1 MG: 1 INJECTION, SOLUTION INTRAMUSCULAR; INTRAVENOUS; SUBCUTANEOUS at 19:14

## 2021-05-21 RX ADMIN — HYDRALAZINE HYDROCHLORIDE 20 MG: 20 INJECTION, SOLUTION INTRAMUSCULAR; INTRAVENOUS at 20:34

## 2021-05-21 RX ADMIN — BUDESONIDE 500 MCG: 0.5 INHALANT RESPIRATORY (INHALATION) at 08:07

## 2021-05-21 RX ADMIN — PANTOPRAZOLE SODIUM 40 MG: 40 INJECTION, POWDER, FOR SOLUTION INTRAVENOUS at 08:54

## 2021-05-21 RX ADMIN — HYDROMORPHONE HYDROCHLORIDE 1 MG: 1 INJECTION, SOLUTION INTRAMUSCULAR; INTRAVENOUS; SUBCUTANEOUS at 02:12

## 2021-05-21 RX ADMIN — LORAZEPAM 1 MG: 2 INJECTION INTRAMUSCULAR; INTRAVENOUS at 21:11

## 2021-05-21 RX ADMIN — Medication 10 ML: at 23:30

## 2021-05-21 RX ADMIN — SODIUM CHLORIDE, SODIUM LACTATE, POTASSIUM CHLORIDE, AND CALCIUM CHLORIDE 100 ML/HR: 600; 310; 30; 20 INJECTION, SOLUTION INTRAVENOUS at 14:40

## 2021-05-21 RX ADMIN — IPRATROPIUM BROMIDE AND ALBUTEROL SULFATE 3 ML: .5; 3 SOLUTION RESPIRATORY (INHALATION) at 20:24

## 2021-05-21 NOTE — PROGRESS NOTES
Problem: Falls - Risk of  Goal: *Absence of Falls  Description: Document Fabiola Pedro Fall Risk and appropriate interventions in the flowsheet.   Outcome: Progressing Towards Goal  Note: Fall Risk Interventions:  Mobility Interventions: Communicate number of staff needed for ambulation/transfer, Patient to call before getting OOB         Medication Interventions: Patient to call before getting OOB, Teach patient to arise slowly    Elimination Interventions: Call light in reach, Patient to call for help with toileting needs              Problem: Patient Education: Go to Patient Education Activity  Goal: Patient/Family Education  Outcome: Progressing Towards Goal

## 2021-05-21 NOTE — PROGRESS NOTES
05/20/21 1923   Dual Skin Pressure Injury Assessment   Dual Skin Pressure Injury Assessment WDL   Second Care Provider (Based on [address] [place] [address]) mike RECIO   Skin Integumentary   Skin Integumentary (WDL) WDL    Pressure  Injury Documentation No Pressure Injury Noted-Pressure Ulcer Prevention Initiated   Wound Prevention and Protection Methods   Orientation of Wound Prevention Posterior   Location of Wound Prevention Sacrum/Coccyx   Dressing Present  No   Wound Offloading (Prevention Methods) Bed, pressure reduction mattress

## 2021-05-21 NOTE — PROGRESS NOTES
END OF SHIFT NOTE:    INTAKE/OUTPUT  05/20 0701 - 05/21 0700  In: -   Out: 150 [Urine:150]  Voiding: YES  Catheter: NO  Drain:              Flatus: Patient does have flatus present. Stool:  0 occurrences. Characteristics:       Emesis: 0 occurrences. Characteristics:        VITAL SIGNS  Patient Vitals for the past 12 hrs:   Temp Pulse Resp BP SpO2   05/21/21 1542 98.6 °F (37 °C) 91 19 (!) 172/113 92 %   05/21/21 1515     96 %   05/21/21 1110 98.1 °F (36.7 °C) 77 18 (!) 173/103 91 %   05/21/21 0807     95 %       Pain Assessment  Pain Intensity 1: 8 (05/21/21 1914)  Pain Location 1: Abdomen  Pain Intervention(s) 1: Medication (see MAR)  Patient Stated Pain Goal: 0    Ambulating  No    Shift report given to oncoming nurse at the bedside.     Librado Brown RN

## 2021-05-21 NOTE — PROGRESS NOTES
END OF SHIFT NOTE:    INTAKE/OUTPUT  05/20 0701 - 05/21 0700  In: -   Out: 150 [Urine:150]  Voiding: YES  Catheter: NO  Drain:              Flatus: Patient does have flatus present. Stool:  0 occurrences. Characteristics:       Emesis: 0 occurrences. Characteristics:        VITAL SIGNS  Patient Vitals for the past 12 hrs:   Temp Pulse Resp BP SpO2   05/21/21 0509  76  (!) 166/98    05/20/21 2344 97.7 °F (36.5 °C) 74 21 (!) 176/102 92 %   05/20/21 2308  71  (!) 188/98    05/20/21 2152 97.5 °F (36.4 °C) 71 20 (!) 163/88 94 %   05/20/21 2051  67  (!) 151/91    05/20/21 2015  68  121/74    05/20/21 1944  66  (!) 216/116    05/20/21 1918     95 %   05/20/21 1915 98.2 °F (36.8 °C) 66 16 (!) 211/110 96 %       Pain Assessment  Pain Intensity 1: 0 (05/21/21 0605)  Pain Location 1: Abdomen  Pain Intervention(s) 1: Medication (see MAR)  Patient Stated Pain Goal: 0    Ambulating  No    Shift report given to oncoming nurse at the bedside.     Melanie Kirkland RN

## 2021-05-21 NOTE — PROGRESS NOTES
Care Management Interventions  PCP Verified by CM: Yes (Dr. Pao Feldman)  Mode of Transport at Discharge: Self  Transition of Care Consult (CM Consult): Discharge Planning, Other (CLTC aide)  Discharge Durable Medical Equipment: No  Physical Therapy Consult: No  Occupational Therapy Consult: No  Speech Therapy Consult: No  Current Support Network: Own Home, Lives Alone, Family Lives Nearby  Confirm Follow Up Transport: Family  The Patient and/or Patient Representative was Provided with a Choice of Provider and Agrees with the Discharge Plan?: Yes  Freedom of Choice List was Provided with Basic Dialogue that Supports the Patient's Individualized Plan of Care/Goals, Treatment Preferences and Shares the Quality Data Associated with the Providers?: Yes   Resource Information Provided?: No  Discharge Location  Discharge Placement: Home    CM met with patient in room. Patient alert and orient and verified all demographic information to be correct. Patient stated he lives alone in a first floor apartment. Patient stated he has a walker, cane, BSC but does not use them right now. Patient stated he is independent with ADL's but does have a CLTC aide that assists him during the week. Patient stated his aide takes him to his appointments as he does not have a license. Patient stated he uses [address] Patient stated he has never used [place] or been to [facility] and would like to return home at discharge. PT/OT have not been consulted at this time. CM will continue to follow patient during hospitalization for discharge planning and needs. Please consult or notify CM of new needs.

## 2021-05-21 NOTE — PROGRESS NOTES
Problem: Falls - Risk of  Goal: *Absence of Falls  Description: Document Wayne Bangn Fall Risk and appropriate interventions in the flowsheet.   Outcome: Progressing Towards Goal  Note: Fall Risk Interventions:  Mobility Interventions: Patient to call before getting OOB, PT Consult for mobility concerns         Medication Interventions: Evaluate medications/consider consulting pharmacy, Patient to call before getting OOB, Teach patient to arise slowly    Elimination Interventions: Bed/chair exit alarm, Call light in reach, Patient to call for help with toileting needs, Urinal in reach              Problem: Patient Education: Go to Patient Education Activity  Goal: Patient/Family Education  Outcome: Progressing Towards Goal

## 2021-05-21 NOTE — PROGRESS NOTES
[facility] Progress Note     Name:  Mateus Membreno  Age:70 y.o. Sex:male   :  1950    MRN:  [id number]     Admit Date:  2021    Reason for Admission:  Alcoholic pancreatitis [facility] Course/Interval history:     Patient is a 72 y.o. male who presented to the ED for cc epigastric pain that is acute starting today. Hx of ETOH abuse drinking 1/2 pint of liquor a day at least, asthma, HLD, HTN, multiple pulmonary nodules, CKD stage II, prostate cancer followed by [facility] s/p prostatectomy, PE in 2019 on Xarelto. CT abdomen pelvis with contrast showed findings consistent with pancreatitis. Lipase significantly elevated. Subjective (21):    Mr. Dinesh Leal complains of diffuse abdominal discomfort. He denies CP/SOB. Educated him on the importance of no food for now and that we are keeping him hydrated with IVF. He says he understands. He does admit to consuming alcohol but says \"not every day\". Review of Systems: 14 point review of systems is otherwise negative with the exception of the elements mentioned above. Assessment & Plan     Principal Problem:  Alcoholic pancreatitis - No masses on CT. Check triglyceride levels. No admitted injury. PRN dilaudid. NPO. LR.    May/21: Lipase 24,724   - Cont NPO, IVF hydration   - Trend a.m. labs     Active Problems:  Accelerated hypertension (2013)  May/21: Improved from admission   - Continue PRN     Hyperlipidemia other unsp dyslipidemia (2015)  May/21: Trig 299     Multiple pulmonary nodules (2019)     Elevated LFTs- Check hepatitis panel  May/21: Hep panel negative     EtOH abuse - banana bag. PRN Ativan     Hx of PE - can continue Xarelto     Hx CAD, gout, HTN, HLD - holding oral medications. PRN hydralazine.      Asthma - duoneb q 6hr. Pulmicort.        Diet:  DIET NPO  DVT PPx: rivaroxaban  Code status: DNR  Disposition/Expected LOS: 3-4 midnights      Objective:     Patient Vitals for the past 24 hrs:   Temp Pulse Resp BP SpO2   05/21/21 0509  76  (!) 166/98    05/20/21 2344 97.7 °F (36.5 °C) 74 21 (!) 176/102 92 %   05/20/21 2308  71  (!) 188/98    05/20/21 2152 97.5 °F (36.4 °C) 71 20 (!) 163/88 94 %   05/20/21 2051  67  (!) 151/91    05/20/21 2015  68  121/74    05/20/21 1944  66  (!) 216/116    05/20/21 1918     95 %   05/20/21 1915 98.2 °F (36.8 °C) 66 16 (!) 211/110 96 %   05/20/21 1810  72  (!) 197/104 94 %   05/20/21 1600  67  (!) 191/91 96 %   05/20/21 1342 98.7 °F (37.1 °C) 67 16 (!) 200/123 96 %     Oxygen Therapy  O2 Sat (%): 92 % (05/20/21 2344)  Pulse via Oximetry: 68 beats per minute (05/20/21 1918)  O2 Device: None (Room air) (05/20/21 1918)    Body mass index is 30.38 kg/m². Physical Exam:   General:  Mild distress, speaking in full sentences, no use of accessory muscles   Lungs:  Clear to auscultation anteriorly  CV:  Regular rate and rhythm with normal S1 and S2   Abdomen:  Firm, distended, normoactive bowel sounds   Extremities:  No cyanosis clubbing or edema   Neuro:   Nonfocal, A&O x3   Psych:  Normal affect     Data Review:  I have reviewed all labs, meds, and studies from the last 24 hours:    Labs:    Recent Results (from the past 24 hour(s))   CBC WITH AUTOMATED DIFF    Collection Time: 05/20/21  1:45 PM   Result Value Ref Range    WBC 6.7 4.3 - 11.1 K/uL    RBC 4.64 4.23 - 5.6 M/uL    HGB 12.9 (L) 13.6 - 17.2 g/dL    HCT 39.3 (L) 41.1 - 50.3 %    MCV 84.7 79.6 - 97.8 FL    MCH 27.8 26.1 - 32.9 PG    MCHC 32.8 31.4 - 35.0 g/dL    RDW 15.1 (H) 11.9 - 14.6 %    PLATELET 451 112 - 423 K/uL    MPV 9.8 9.4 - 12.3 FL    ABSOLUTE NRBC 0.00 0.0 - 0.2 K/uL    DF AUTOMATED      NEUTROPHILS 68 43 - 78 %    LYMPHOCYTES 19 13 - 44 %    MONOCYTES 10 4.0 - 12.0 %    EOSINOPHILS 2 0.5 - 7.8 %    BASOPHILS 0 0.0 - 2.0 %    IMMATURE GRANULOCYTES 0 0.0 - 5.0 %    ABS. NEUTROPHILS 4.6 1.7 - 8.2 K/UL    ABS. LYMPHOCYTES 1.3 0.5 - 4.6 K/UL    ABS.  MONOCYTES 0.7 0.1 - 1.3 K/UL ABS. EOSINOPHILS 0.1 0.0 - 0.8 K/UL    ABS. BASOPHILS 0.0 0.0 - 0.2 K/UL    ABS. IMM. GRANS. 0.0 0.0 - 0.5 K/UL   METABOLIC PANEL, COMPREHENSIVE    Collection Time: 05/20/21  1:45 PM   Result Value Ref Range    Sodium 142 136 - 145 mmol/L    Potassium 3.8 3.5 - 5.1 mmol/L    Chloride 113 (H) 98 - 107 mmol/L    CO2 24 21 - 32 mmol/L    Anion gap 5 (L) 7 - 16 mmol/L    Glucose 122 (H) 65 - 100 mg/dL    BUN 12 8 - 23 MG/DL    Creatinine 1.09 0.8 - 1.5 MG/DL    GFR est AA >60 >60 ml/min/1.73m2    GFR est non-AA >60 >60 ml/min/1.73m2    Calcium 9.2 8.3 - 10.4 MG/DL    Bilirubin, total 0.4 0.2 - 1.1 MG/DL    ALT (SGPT) 73 (H) 12 - 65 U/L    AST (SGOT) 78 (H) 15 - 37 U/L    Alk.  phosphatase 158 (H) 50 - 136 U/L    Protein, total 7.6 6.3 - 8.2 g/dL    Albumin 3.4 3.2 - 4.6 g/dL    Globulin 4.2 (H) 2.3 - 3.5 g/dL    A-G Ratio 0.8 (L) 1.2 - 3.5     LIPASE    Collection Time: 05/20/21  1:45 PM   Result Value Ref Range    Lipase 24,724 (H) 73 - 393 U/L   LACTIC ACID    Collection Time: 05/20/21  1:45 PM   Result Value Ref Range    Lactic acid 1.3 0.4 - 2.0 MMOL/L   TRIGLYCERIDE    Collection Time: 05/20/21  1:45 PM   Result Value Ref Range    Triglyceride 299 (H) 35 - 150 MG/DL   HEPATITIS PANEL, ACUTE    Collection Time: 05/20/21  1:45 PM   Result Value Ref Range    Hepatitis A, IgM NONREACTIVE NR      Hepatitis B core, IgM NONREACTIVE NR      Hep B Surface Ag NONREACTIVE NR      Hepatitis C virus Ab NONREACTIVE NR     LACTIC ACID    Collection Time: 05/20/21  7:53 PM   Result Value Ref Range    Lactic acid 0.8 0.4 - 2.0 MMOL/L   METABOLIC PANEL, BASIC    Collection Time: 05/21/21  4:36 AM   Result Value Ref Range    Sodium 144 136 - 145 mmol/L    Potassium 4.1 3.5 - 5.1 mmol/L    Chloride 114 (H) 98 - 107 mmol/L    CO2 22 21 - 32 mmol/L    Anion gap 8 7 - 16 mmol/L    Glucose 117 (H) 65 - 100 mg/dL    BUN 16 8 - 23 MG/DL    Creatinine 0.97 0.8 - 1.5 MG/DL    GFR est AA >60 >60 ml/min/1.73m2    GFR est non-AA >60 >60 ml/min/1.73m2    Calcium 8.9 8.3 - 10.4 MG/DL   CBC W/O DIFF    Collection Time: 05/21/21  4:36 AM   Result Value Ref Range    WBC 8.2 4.3 - 11.1 K/uL    RBC 4.74 4.23 - 5.6 M/uL    HGB 13.0 (L) 13.6 - 17.2 g/dL    HCT 41.2 41.1 - 50.3 %    MCV 86.9 79.6 - 97.8 FL    MCH 27.4 26.1 - 32.9 PG    MCHC 31.6 31.4 - 35.0 g/dL    RDW 15.0 (H) 11.9 - 14.6 %    PLATELET 988 440 - 774 K/uL    MPV 9.8 9.4 - 12.3 FL    ABSOLUTE NRBC 0.00 0.0 - 0.2 K/uL       All Micro Results     None          EKG Results     None          Other Studies:  CT CHEST WO CONT    Result Date: 5/20/2021  CT OF THE CHEST WITH INTRAVENOUS CONTRAST. INDICATION: Shortness breath. COPD exacerbation. Abdominal pain. COMPARISON: June 2019. TECHNIQUE:   5 mm axial scans from the apices through the diaphragms without contrast. Radiation dose reduction techniques were used for this study. Our CT scanners use one or more of the following:  Automated exposure control, adjustment of the mA and or kV according to patient size, iterative reconstruction. FINDINGS: LUNGS: No pulmonary nodules. Minimal atelectasis at the bases. No airspace disease. AIRWAYS: Trachea and proximal bronchi grossly patent. PLEURA: No effusion or thickening or calcifications. LYMPH NODES: No enlarged axillary, hilar or mediastinal lymph nodes. HEART: Normal size. CORONARIES: Mild  calcifications. UPPER ABDOMEN: Moderate hiatal hernia. . SKELETAL/CHEST WALL: DJD, otherwise no gross bony lesions. Bilateral renal cysts. Low-attenuation of the liver. Stranding densities surrounding the pancreas suspicious for acute pancreatitis. 1) Stranding densities near the pancreas suspicious for acute pancreatitis although incompletely evaluated. 2) No acute abnormality of alignment.  3) Additional findings include: Coronary atherosclerosis, moderate hiatal hernia, bilateral renal cysts and fatty infiltration of the liver     CT ABD PELV W CONT    Result Date: 5/20/2021  HISTORY:  Upper abdominal pain, one hour duration. COMPARISON: None EXAM: CT abdomen and pelvis with iv contrast TECHNIQUE: Thin section axial CT was performed from the lung bases through the symphysis pubis during uneventful rapid bolus intravenous administration of 125 mL of Isovue 370. Oral contrast was not administered. Radiation dose reduction techniques were used for this study. Our CT scanners use one or all of the following: Automated exposure control, adjustment of the mA and/or kV according to patient size, use of iterative reconstruction. FINDINGS: There is a hiatal hernia. CT abdomen: There is a subtle area of low density seen peripherally within the right posterior lobe of the liver, uncertain etiology or significance. The spleen enhances homogeneously without discrete lesions. There is no biliary ductal dilatation. The gallbladder and adrenal glands are normal. There is peripancreatic fat stranding with small fluid seen in the left paracolic gutter. The kidneys enhance symmetrically. Bilateral renal cysts are present. Bowel loops in the upper abdomen are normal. No definite upper abdominal lymphadenopathy seen. CT pelvis: The patient is status post right hemicolectomy. The bladder and rectum are normal. No pelvic adenopathy seen. No free air or free fluid seen within the pelvis. Bone window evaluation demonstrates no aggressive osseous lesions. Findings compatible with pancreatitis.        Current Meds:   Current Facility-Administered Medications   Medication Dose Route Frequency    sodium chloride (NS) flush 5-10 mL  5-10 mL IntraVENous Q8H    sodium chloride (NS) flush 5-10 mL  5-10 mL IntraVENous PRN    acetaminophen (TYLENOL) tablet 650 mg  650 mg Oral Q6H PRN    Or    acetaminophen (TYLENOL) suppository 650 mg  650 mg Rectal Q6H PRN    polyethylene glycol (MIRALAX) packet 17 g  17 g Oral DAILY PRN    ondansetron (ZOFRAN) injection 4 mg  4 mg IntraVENous Q6H PRN    pantoprazole (PROTONIX) 40 mg in 0.9% sodium chloride 10 mL injection  40 mg IntraVENous DAILY    albuterol (PROVENTIL VENTOLIN) nebulizer solution 2.5 mg  2.5 mg Nebulization Q6H PRN    albuterol-ipratropium (DUO-NEB) 2.5 MG-0.5 MG/3 ML  3 mL Nebulization Q6H RT    rivaroxaban (XARELTO) tablet 20 mg  20 mg Oral DAILY WITH BREAKFAST    HYDROmorphone (DILAUDID) injection 1 mg  1 mg IntraVENous Q3H PRN    0.9% sodium chloride 1,000 mL with mvi (adult no. 4 with vit K) 10 mL, thiamine 097 mg, folic acid 1 mg infusion   IntraVENous Q24H    lactated Ringers infusion  100 mL/hr IntraVENous CONTINUOUS    hydrALAZINE (APRESOLINE) 20 mg/mL injection 20 mg  20 mg IntraVENous Q6H PRN    LORazepam (ATIVAN) injection 1 mg  1 mg IntraVENous Q2H PRN    budesonide (PULMICORT) 500 mcg/2 ml nebulizer suspension  500 mcg Nebulization BID RT       Problem List:  Hospital Problems as of 5/21/2021 Date Reviewed: 10/30/2019        Codes Class Noted - Resolved POA    * (Principal) Alcoholic pancreatitis XWF-06-JL: K85.20  ICD-9-CM: 002.1  5/20/2021 - Present Unknown        Gout ICD-10-CM: M10.9  ICD-9-CM: 274.9  6/29/2019 - Present Yes        Pulmonary emboli ([address] ICD-10-CM: I26.99  ICD-9-CM: 415.19  6/12/2019 - Present Yes        Multiple pulmonary nodules ICD-10-CM: R91.8  ICD-9-CM: 793.19  6/12/2019 - Present Yes        Hyperlipidemia other unsp dyslipidemia ICD-10-CM: E78.5  ICD-9-CM: 272.4  11/18/2015 - Present Yes        CAD (coronary artery disease) (Chronic) ICD-10-CM: I25.10  ICD-9-CM: 414.00  9/15/2013 - Present Yes        Accelerated hypertension ICD-10-CM: I10  ICD-9-CM: 401.0  9/13/2013 - Present Yes               Part of this note was written by using a voice dictation software and the note has been proof read but may still contain some grammatical/other typographical errors.     Signed By: eMron Almanza NP   [facility]    May 21, 2021  5:15 PM

## 2021-05-22 ENCOUNTER — APPOINTMENT (OUTPATIENT)
Dept: GENERAL RADIOLOGY | Age: 71
DRG: 439 | End: 2021-05-22
Attending: NURSE PRACTITIONER
Payer: COMMERCIAL

## 2021-05-22 LAB
ANION GAP SERPL CALC-SCNC: 10 MMOL/L (ref 7–16)
BUN SERPL-MCNC: 14 MG/DL (ref 8–23)
CALCIUM SERPL-MCNC: 8.6 MG/DL (ref 8.3–10.4)
CHLORIDE SERPL-SCNC: 115 MMOL/L (ref 98–107)
CO2 SERPL-SCNC: 20 MMOL/L (ref 21–32)
CREAT SERPL-MCNC: 0.89 MG/DL (ref 0.8–1.5)
GLUCOSE SERPL-MCNC: 110 MG/DL (ref 65–100)
LIPASE SERPL-CCNC: 1426 U/L (ref 73–393)
POTASSIUM SERPL-SCNC: 3.5 MMOL/L (ref 3.5–5.1)
SODIUM SERPL-SCNC: 145 MMOL/L (ref 136–145)

## 2021-05-22 PROCEDURE — 94640 AIRWAY INHALATION TREATMENT: CPT

## 2021-05-22 PROCEDURE — 71045 X-RAY EXAM CHEST 1 VIEW: CPT

## 2021-05-22 PROCEDURE — 80048 BASIC METABOLIC PNL TOTAL CA: CPT

## 2021-05-22 PROCEDURE — 77030040361 HC SLV COMPR DVT MDII -B

## 2021-05-22 PROCEDURE — 74011250637 HC RX REV CODE- 250/637: Performed by: HOSPITALIST

## 2021-05-22 PROCEDURE — 94760 N-INVAS EAR/PLS OXIMETRY 1: CPT

## 2021-05-22 PROCEDURE — 2709999900 HC NON-CHARGEABLE SUPPLY

## 2021-05-22 PROCEDURE — 74011250636 HC RX REV CODE- 250/636: Performed by: FAMILY MEDICINE

## 2021-05-22 PROCEDURE — 77010033678 HC OXYGEN DAILY

## 2021-05-22 PROCEDURE — 65270000029 HC RM PRIVATE

## 2021-05-22 PROCEDURE — 36415 COLL VENOUS BLD VENIPUNCTURE: CPT

## 2021-05-22 PROCEDURE — 74011000250 HC RX REV CODE- 250: Performed by: FAMILY MEDICINE

## 2021-05-22 PROCEDURE — C9113 INJ PANTOPRAZOLE SODIUM, VIA: HCPCS | Performed by: FAMILY MEDICINE

## 2021-05-22 PROCEDURE — 83690 ASSAY OF LIPASE: CPT

## 2021-05-22 PROCEDURE — 74011250637 HC RX REV CODE- 250/637: Performed by: FAMILY MEDICINE

## 2021-05-22 RX ORDER — LABETALOL HYDROCHLORIDE 5 MG/ML
10 INJECTION, SOLUTION INTRAVENOUS
Status: DISCONTINUED | OUTPATIENT
Start: 2021-05-22 | End: 2021-05-31 | Stop reason: HOSPADM

## 2021-05-22 RX ORDER — HYDRALAZINE HYDROCHLORIDE 25 MG/1
25 TABLET, FILM COATED ORAL
Status: DISCONTINUED | OUTPATIENT
Start: 2021-05-22 | End: 2021-05-31 | Stop reason: HOSPADM

## 2021-05-22 RX ORDER — GUAIFENESIN 600 MG/1
600 TABLET, EXTENDED RELEASE ORAL EVERY 12 HOURS
Status: DISCONTINUED | OUTPATIENT
Start: 2021-05-22 | End: 2021-05-31 | Stop reason: HOSPADM

## 2021-05-22 RX ORDER — AMLODIPINE BESYLATE 10 MG/1
10 TABLET ORAL DAILY
Status: DISCONTINUED | OUTPATIENT
Start: 2021-05-22 | End: 2021-05-31 | Stop reason: HOSPADM

## 2021-05-22 RX ADMIN — RIVAROXABAN 20 MG: 20 TABLET, FILM COATED ORAL at 08:47

## 2021-05-22 RX ADMIN — HYDRALAZINE HYDROCHLORIDE 25 MG: 25 TABLET, FILM COATED ORAL at 19:46

## 2021-05-22 RX ADMIN — SODIUM CHLORIDE, SODIUM LACTATE, POTASSIUM CHLORIDE, AND CALCIUM CHLORIDE 100 ML/HR: 600; 310; 30; 20 INJECTION, SOLUTION INTRAVENOUS at 04:17

## 2021-05-22 RX ADMIN — LABETALOL HYDROCHLORIDE 10 MG: 5 INJECTION INTRAVENOUS at 14:33

## 2021-05-22 RX ADMIN — HYDROMORPHONE HYDROCHLORIDE 1 MG: 1 INJECTION, SOLUTION INTRAMUSCULAR; INTRAVENOUS; SUBCUTANEOUS at 17:24

## 2021-05-22 RX ADMIN — LORAZEPAM 1 MG: 2 INJECTION INTRAMUSCULAR; INTRAVENOUS at 19:14

## 2021-05-22 RX ADMIN — ALBUTEROL SULFATE 2.5 MG: 2.5 SOLUTION RESPIRATORY (INHALATION) at 16:07

## 2021-05-22 RX ADMIN — GUAIFENESIN 600 MG: 600 TABLET ORAL at 20:37

## 2021-05-22 RX ADMIN — LORAZEPAM 1 MG: 2 INJECTION INTRAMUSCULAR; INTRAVENOUS at 09:52

## 2021-05-22 RX ADMIN — IPRATROPIUM BROMIDE AND ALBUTEROL SULFATE 3 ML: .5; 3 SOLUTION RESPIRATORY (INHALATION) at 13:00

## 2021-05-22 RX ADMIN — HYDROMORPHONE HYDROCHLORIDE 1 MG: 1 INJECTION, SOLUTION INTRAMUSCULAR; INTRAVENOUS; SUBCUTANEOUS at 12:37

## 2021-05-22 RX ADMIN — IPRATROPIUM BROMIDE AND ALBUTEROL SULFATE 3 ML: .5; 3 SOLUTION RESPIRATORY (INHALATION) at 19:53

## 2021-05-22 RX ADMIN — HYDRALAZINE HYDROCHLORIDE 20 MG: 20 INJECTION, SOLUTION INTRAMUSCULAR; INTRAVENOUS at 12:37

## 2021-05-22 RX ADMIN — IPRATROPIUM BROMIDE AND ALBUTEROL SULFATE 3 ML: .5; 3 SOLUTION RESPIRATORY (INHALATION) at 01:42

## 2021-05-22 RX ADMIN — SODIUM CHLORIDE, SODIUM LACTATE, POTASSIUM CHLORIDE, AND CALCIUM CHLORIDE 100 ML/HR: 600; 310; 30; 20 INJECTION, SOLUTION INTRAVENOUS at 08:46

## 2021-05-22 RX ADMIN — LABETALOL HYDROCHLORIDE 10 MG: 5 INJECTION INTRAVENOUS at 23:14

## 2021-05-22 RX ADMIN — HYDROMORPHONE HYDROCHLORIDE 1 MG: 1 INJECTION, SOLUTION INTRAMUSCULAR; INTRAVENOUS; SUBCUTANEOUS at 23:14

## 2021-05-22 RX ADMIN — ALBUTEROL SULFATE 2.5 MG: 2.5 SOLUTION RESPIRATORY (INHALATION) at 23:14

## 2021-05-22 RX ADMIN — LORAZEPAM 1 MG: 2 INJECTION INTRAMUSCULAR; INTRAVENOUS at 14:32

## 2021-05-22 RX ADMIN — PANTOPRAZOLE SODIUM 40 MG: 40 INJECTION, POWDER, FOR SOLUTION INTRAVENOUS at 08:47

## 2021-05-22 RX ADMIN — HYDROMORPHONE HYDROCHLORIDE 1 MG: 1 INJECTION, SOLUTION INTRAMUSCULAR; INTRAVENOUS; SUBCUTANEOUS at 08:47

## 2021-05-22 RX ADMIN — AMLODIPINE BESYLATE 10 MG: 10 TABLET ORAL at 18:07

## 2021-05-22 RX ADMIN — HYDROMORPHONE HYDROCHLORIDE 1 MG: 1 INJECTION, SOLUTION INTRAMUSCULAR; INTRAVENOUS; SUBCUTANEOUS at 02:49

## 2021-05-22 RX ADMIN — BUDESONIDE 500 MCG: 0.5 INHALANT RESPIRATORY (INHALATION) at 07:35

## 2021-05-22 RX ADMIN — FOLIC ACID: 5 INJECTION, SOLUTION INTRAMUSCULAR; INTRAVENOUS; SUBCUTANEOUS at 17:17

## 2021-05-22 RX ADMIN — LABETALOL HYDROCHLORIDE 10 MG: 5 INJECTION INTRAVENOUS at 01:56

## 2021-05-22 RX ADMIN — LORAZEPAM 1 MG: 2 INJECTION INTRAMUSCULAR; INTRAVENOUS at 02:55

## 2021-05-22 RX ADMIN — BUDESONIDE 500 MCG: 0.5 INHALANT RESPIRATORY (INHALATION) at 19:53

## 2021-05-22 RX ADMIN — IPRATROPIUM BROMIDE AND ALBUTEROL SULFATE 3 ML: .5; 3 SOLUTION RESPIRATORY (INHALATION) at 07:35

## 2021-05-22 NOTE — ROUTINE PROCESS
END OF SHIFT NOTE: 
 
INTAKE/OUTPUT 
05/21 0701 - 05/22 0700 In: 6264 [I.V.:3248] Out: 500 [Urine:500] Voiding: YES Catheter: NO 
Drain:   
 
 
 
 
 
Flatus: Patient does not have flatus present. Stool:  0 occurrences. Characteristics: 
  
 
Emesis: 0 occurrences. Characteristics: VITAL SIGNS Patient Vitals for the past 12 hrs: 
 Temp Pulse Resp BP SpO2  
05/22/21 1946    (!) 181/104   
05/22/21 1940  (!) 103   96 % 05/22/21 1914 100.1 °F (37.8 °C) (!) 10 21 (!) 181/104 96 % 05/22/21 1734  (!) 105  (!) 182/111   
05/22/21 1608     96 % 05/22/21 1557  (!) 108  (!) 184/107   
05/22/21 1548 97.9 °F (36.6 °C) (!) 109 24 (!) 192/119 93 % 05/22/21 1408  (!) 101  (!) 166/101   
05/22/21 1301     96 % 05/22/21 1124 97.9 °F (36.6 °C) 91 19 (!) 181/103 96 % 05/22/21 0957  96  (!) 172/96  Pain Assessment Pain Intensity 1: 7 (05/22/21 1724) Pain Location 1: Abdomen Pain Intervention(s) 1: Medication (see MAR) Patient Stated Pain Goal: 0 Ambulating Yes Shift report given to oncoming nurse at the bedside.  
 
Juan A Wiley RN

## 2021-05-22 NOTE — PROGRESS NOTES
Pt with increased SOB and wheezing. O2 maintained > 90% on RA. Pt reports feeling SOB and says he feels like he's \"suffocating\". RT called to give PRN albuterol. /107 and . Varinder Ashley NP notified and verbal orders received to order stat CXR. Will monitor. 1735:  NP updated with CXR results.

## 2021-05-22 NOTE — PROGRESS NOTES
Reported to Dr. Sherry Purcell, pt's continued elevated BP after having received ineffective Hydralazine 20 mg IVP earlier p.m.

## 2021-05-22 NOTE — PROGRESS NOTES
[facility] Progress Note    INTERVAL HISTORY  / Subjective:    Pt is a 73 yo male with pmh PE on Xarelto, prostate cancer s/p prostatectomy, HTN who presented to ER 05/20 with acute RUQ/epigastric pain x 24 hours. Lipase and CT abd confirm pancreatitis. Pt admits to daily ETOH. He was made NPO and started on IV fluids including banana bag daily. Today he is D 2 of hospitalization, he reports some improvement in abd pain. Yesterday he required PRN Diladuid about every 3 hours. Today has spread this out to 5 hours. Assessment / Plan:    ETOH pancreatitis  D 2 NPO - okay to start ice chips per pts request,   Cont IV fluids  Daily lipase and CMP   Trigs elevated but not likely contributing, should be addressed by PCP     ETOH abuse  Educated pt on importance of cessation  Watch for withdrawal - PRN ativan available     Chief Complaint : Abdominal Pain        Objective:  Visit Vitals  BP (!) 173/100   Pulse 86   Temp 98.5 °F (36.9 °C)   Resp 20   Ht 6' (1.829 m)   Wt 101.6 kg (224 lb)   SpO2 95%   BMI 30.38 kg/m²                 Physical Exam:  General: No acute distress, speaking in full sentences, no use of accessory muscles   HEENT: Pupils equal and reactive to light and accommodation, oropharynx is clear   Neck: Supple, no lymphadenopathy, no JVD   Lungs: Clear to auscultation bilaterally   Cardiovascular: Regular rate and rhythm with normal S1 and S2   Abdomen: Soft, nontender, nondistended, normoactive bowel sounds , RUQ and epigastric tenderness   Extremities: No cyanosis clubbing or edema   Neuro: Nonfocal, A&O x3   Psych: Normal affect     Intake and Output:  Date 05/21/21 0700 - 05/22/21 0659 05/22/21 0700 - 05/23/21 0659   Shift 1171-3765 6992-2649 24 Hour Total 6170-2575 0260-9181 24 Hour Total   INTAKE   P.O. 0 0 0        P. O. 0 0 0      I. V.(mL/kg/hr)  3291(4.6) 0667(0.2)        I.V.  3248 3248      Shift Total(mL/kg) 0(0) 0529(40) 8515(66)      OUTPUT   Urine(mL/kg/hr) 350(0.3) 150(0.1) 500(0.2) 150  150     Urine Voided 350 150 500 150  150     Urine Occurrence(s)  1 x 1 x 1 x  1 x   Shift Total(mL/kg) 350(3.4) 150(1.5) 500(4.9) 150(1.5)  150(1.5)   NET -350 3098 2748 -150  -150   Weight (kg) 101.6 101.6 101.6 101.6 101.6 101.6       LAB:  Admission on 05/20/2021   Component Date Value    WBC 05/20/2021 6.7     RBC 05/20/2021 4.64     HGB 05/20/2021 12.9*    HCT 05/20/2021 39.3*    MCV 05/20/2021 84.7     MCH 05/20/2021 27.8     MCHC 05/20/2021 32.8     RDW 05/20/2021 15.1*    PLATELET 70/11/9521 571     MPV 05/20/2021 9.8     ABSOLUTE NRBC 05/20/2021 0.00     DF 05/20/2021 AUTOMATED     NEUTROPHILS 05/20/2021 68     LYMPHOCYTES 05/20/2021 19     MONOCYTES 05/20/2021 10     EOSINOPHILS 05/20/2021 2     BASOPHILS 05/20/2021 0     IMMATURE GRANULOCYTES 05/20/2021 0     ABS. NEUTROPHILS 05/20/2021 4.6     ABS. LYMPHOCYTES 05/20/2021 1.3     ABS. MONOCYTES 05/20/2021 0.7     ABS. EOSINOPHILS 05/20/2021 0.1     ABS. BASOPHILS 05/20/2021 0.0     ABS. IMM. GRANS. 05/20/2021 0.0     Sodium 05/20/2021 142     Potassium 05/20/2021 3.8     Chloride 05/20/2021 113*    CO2 05/20/2021 24     Anion gap 05/20/2021 5*    Glucose 05/20/2021 122*    BUN 05/20/2021 12     Creatinine 05/20/2021 1.09     GFR est AA 05/20/2021 >60     GFR est non-AA 05/20/2021 >60     Calcium 05/20/2021 9.2     Bilirubin, total 05/20/2021 0.4     ALT (SGPT) 05/20/2021 73*    AST (SGOT) 05/20/2021 78*    Alk.  phosphatase 05/20/2021 158*    Protein, total 05/20/2021 7.6     Albumin 05/20/2021 3.4     Globulin 05/20/2021 4.2*    A-G Ratio 05/20/2021 0.8*    Lipase 05/20/2021 24,724*    Lactic acid 05/20/2021 1.3     Lactic acid 05/20/2021 0.8     Triglyceride 05/20/2021 299*    Hepatitis A, IgM 05/20/2021 NONREACTIVE     Hepatitis B core, IgM 05/20/2021 NONREACTIVE     Hep B Surface Ag 05/20/2021 NONREACTIVE     Hepatitis C virus Ab 05/20/2021 NONREACTIVE     Sodium 05/21/2021 144     Potassium 05/21/2021 4.1     Chloride 05/21/2021 114*    CO2 05/21/2021 22     Anion gap 05/21/2021 8     Glucose 05/21/2021 117*    BUN 05/21/2021 16     Creatinine 05/21/2021 0.97     GFR est AA 05/21/2021 >60     GFR est non-AA 05/21/2021 >60     Calcium 05/21/2021 8.9     WBC 05/21/2021 8.2     RBC 05/21/2021 4.74     HGB 05/21/2021 13.0*    HCT 05/21/2021 41.2     MCV 05/21/2021 86.9     MCH 05/21/2021 27.4     MCHC 05/21/2021 31.6     RDW 05/21/2021 15.0*    PLATELET 70/92/4166 723     MPV 05/21/2021 9.8     ABSOLUTE NRBC 05/21/2021 0.00     Sodium 05/22/2021 145     Potassium 05/22/2021 3.5     Chloride 05/22/2021 115*    CO2 05/22/2021 20*    Anion gap 05/22/2021 10     Glucose 05/22/2021 110*    BUN 05/22/2021 14     Creatinine 05/22/2021 0.89     GFR est AA 05/22/2021 >60     GFR est non-AA 05/22/2021 >60     Calcium 05/22/2021 8.6     Lipase 05/22/2021 1,426*       IMAGING:  CT CHEST WO CONT    Result Date: 5/20/2021  1) Stranding densities near the pancreas suspicious for acute pancreatitis although incompletely evaluated. 2) No acute abnormality of alignment. 3) Additional findings include: Coronary atherosclerosis, moderate hiatal hernia, bilateral renal cysts and fatty infiltration of the liver     CT ABD PELV W CONT    Result Date: 5/20/2021  Findings compatible with pancreatitis. EKG:  No results found for this or any previous visit.           Afshan Gifford NP  5/22/2021 9:02 AM

## 2021-05-22 NOTE — PROGRESS NOTES
Awake with c/o returned abdom pain; Dilaudid 1 mg given IVP slowly. Reminded to call for asst to be out of bed, agreed.

## 2021-05-22 NOTE — PROGRESS NOTES
Problem: Falls - Risk of  Goal: *Absence of Falls  Description: Document Ziggy Watson Fall Risk and appropriate interventions in the flowsheet.   Outcome: Progressing Towards Goal  Note: Fall Risk Interventions:  Mobility Interventions: Communicate number of staff needed for ambulation/transfer, Patient to call before getting OOB         Medication Interventions: Patient to call before getting OOB, Teach patient to arise slowly    Elimination Interventions: Call light in reach, Patient to call for help with toileting needs, Urinal in reach              Problem: Patient Education: Go to Patient Education Activity  Goal: Patient/Family Education  Outcome: Progressing Towards Goal

## 2021-05-23 ENCOUNTER — APPOINTMENT (OUTPATIENT)
Dept: GENERAL RADIOLOGY | Age: 71
DRG: 439 | End: 2021-05-23
Attending: FAMILY MEDICINE
Payer: COMMERCIAL

## 2021-05-23 LAB
ANION GAP SERPL CALC-SCNC: 8 MMOL/L (ref 7–16)
BASOPHILS # BLD: 0 K/UL (ref 0–0.2)
BASOPHILS NFR BLD: 0 % (ref 0–2)
BUN SERPL-MCNC: 15 MG/DL (ref 8–23)
CALCIUM SERPL-MCNC: 8.7 MG/DL (ref 8.3–10.4)
CHLORIDE SERPL-SCNC: 114 MMOL/L (ref 98–107)
CO2 SERPL-SCNC: 24 MMOL/L (ref 21–32)
CREAT SERPL-MCNC: 0.84 MG/DL (ref 0.8–1.5)
DIFFERENTIAL METHOD BLD: ABNORMAL
EOSINOPHIL # BLD: 0.1 K/UL (ref 0–0.8)
EOSINOPHIL NFR BLD: 1 % (ref 0.5–7.8)
ERYTHROCYTE [DISTWIDTH] IN BLOOD BY AUTOMATED COUNT: 15.9 % (ref 11.9–14.6)
GLUCOSE SERPL-MCNC: 105 MG/DL (ref 65–100)
HCT VFR BLD AUTO: 35 % (ref 41.1–50.3)
HGB BLD-MCNC: 10.9 G/DL (ref 13.6–17.2)
IMM GRANULOCYTES # BLD AUTO: 0.1 K/UL (ref 0–0.5)
IMM GRANULOCYTES NFR BLD AUTO: 1 % (ref 0–5)
LIPASE SERPL-CCNC: 264 U/L (ref 73–393)
LYMPHOCYTES # BLD: 0.8 K/UL (ref 0.5–4.6)
LYMPHOCYTES NFR BLD: 8 % (ref 13–44)
MCH RBC QN AUTO: 27.7 PG (ref 26.1–32.9)
MCHC RBC AUTO-ENTMCNC: 31.1 G/DL (ref 31.4–35)
MCV RBC AUTO: 88.8 FL (ref 79.6–97.8)
MONOCYTES # BLD: 0.7 K/UL (ref 0.1–1.3)
MONOCYTES NFR BLD: 6 % (ref 4–12)
NEUTS SEG # BLD: 9.1 K/UL (ref 1.7–8.2)
NEUTS SEG NFR BLD: 85 % (ref 43–78)
NRBC # BLD: 0 K/UL (ref 0–0.2)
PLATELET # BLD AUTO: 196 K/UL (ref 150–450)
PMV BLD AUTO: 10.5 FL (ref 9.4–12.3)
POTASSIUM SERPL-SCNC: 3.6 MMOL/L (ref 3.5–5.1)
RBC # BLD AUTO: 3.94 M/UL (ref 4.23–5.6)
SODIUM SERPL-SCNC: 146 MMOL/L (ref 138–145)
WBC # BLD AUTO: 10.8 K/UL (ref 4.3–11.1)

## 2021-05-23 PROCEDURE — 74018 RADEX ABDOMEN 1 VIEW: CPT

## 2021-05-23 PROCEDURE — 74011250637 HC RX REV CODE- 250/637: Performed by: HOSPITALIST

## 2021-05-23 PROCEDURE — 94760 N-INVAS EAR/PLS OXIMETRY 1: CPT

## 2021-05-23 PROCEDURE — 94640 AIRWAY INHALATION TREATMENT: CPT

## 2021-05-23 PROCEDURE — 74011250637 HC RX REV CODE- 250/637: Performed by: FAMILY MEDICINE

## 2021-05-23 PROCEDURE — 85025 COMPLETE CBC W/AUTO DIFF WBC: CPT

## 2021-05-23 PROCEDURE — 80048 BASIC METABOLIC PNL TOTAL CA: CPT

## 2021-05-23 PROCEDURE — 2709999900 HC NON-CHARGEABLE SUPPLY

## 2021-05-23 PROCEDURE — 65270000029 HC RM PRIVATE

## 2021-05-23 PROCEDURE — 74011000250 HC RX REV CODE- 250: Performed by: INTERNAL MEDICINE

## 2021-05-23 PROCEDURE — 74011000250 HC RX REV CODE- 250: Performed by: FAMILY MEDICINE

## 2021-05-23 PROCEDURE — 74011250636 HC RX REV CODE- 250/636: Performed by: FAMILY MEDICINE

## 2021-05-23 PROCEDURE — C9113 INJ PANTOPRAZOLE SODIUM, VIA: HCPCS | Performed by: FAMILY MEDICINE

## 2021-05-23 PROCEDURE — 83690 ASSAY OF LIPASE: CPT

## 2021-05-23 PROCEDURE — 36415 COLL VENOUS BLD VENIPUNCTURE: CPT

## 2021-05-23 RX ORDER — IPRATROPIUM BROMIDE AND ALBUTEROL SULFATE 2.5; .5 MG/3ML; MG/3ML
3 SOLUTION RESPIRATORY (INHALATION)
Status: DISCONTINUED | OUTPATIENT
Start: 2021-05-23 | End: 2021-05-23

## 2021-05-23 RX ORDER — IPRATROPIUM BROMIDE AND ALBUTEROL SULFATE 2.5; .5 MG/3ML; MG/3ML
3 SOLUTION RESPIRATORY (INHALATION)
Status: DISCONTINUED | OUTPATIENT
Start: 2021-05-23 | End: 2021-05-31 | Stop reason: HOSPADM

## 2021-05-23 RX ORDER — ALBUTEROL SULFATE 0.83 MG/ML
2.5 SOLUTION RESPIRATORY (INHALATION)
Status: DISCONTINUED | OUTPATIENT
Start: 2021-05-23 | End: 2021-05-31 | Stop reason: HOSPADM

## 2021-05-23 RX ADMIN — IPRATROPIUM BROMIDE AND ALBUTEROL SULFATE 3 ML: .5; 3 SOLUTION RESPIRATORY (INHALATION) at 15:53

## 2021-05-23 RX ADMIN — IPRATROPIUM BROMIDE AND ALBUTEROL SULFATE 3 ML: .5; 3 SOLUTION RESPIRATORY (INHALATION) at 20:41

## 2021-05-23 RX ADMIN — HYDROMORPHONE HYDROCHLORIDE 1 MG: 1 INJECTION, SOLUTION INTRAMUSCULAR; INTRAVENOUS; SUBCUTANEOUS at 19:48

## 2021-05-23 RX ADMIN — PANTOPRAZOLE SODIUM 40 MG: 40 INJECTION, POWDER, FOR SOLUTION INTRAVENOUS at 08:29

## 2021-05-23 RX ADMIN — FOLIC ACID: 5 INJECTION, SOLUTION INTRAMUSCULAR; INTRAVENOUS; SUBCUTANEOUS at 18:16

## 2021-05-23 RX ADMIN — GUAIFENESIN 600 MG: 600 TABLET ORAL at 08:29

## 2021-05-23 RX ADMIN — AMLODIPINE BESYLATE 10 MG: 10 TABLET ORAL at 08:29

## 2021-05-23 RX ADMIN — HYDRALAZINE HYDROCHLORIDE 25 MG: 25 TABLET, FILM COATED ORAL at 19:47

## 2021-05-23 RX ADMIN — GUAIFENESIN 600 MG: 600 TABLET ORAL at 20:52

## 2021-05-23 RX ADMIN — IPRATROPIUM BROMIDE AND ALBUTEROL SULFATE 3 ML: .5; 3 SOLUTION RESPIRATORY (INHALATION) at 02:02

## 2021-05-23 RX ADMIN — RIVAROXABAN 20 MG: 20 TABLET, FILM COATED ORAL at 08:30

## 2021-05-23 RX ADMIN — BUDESONIDE 500 MCG: 0.5 INHALANT RESPIRATORY (INHALATION) at 20:41

## 2021-05-23 RX ADMIN — IPRATROPIUM BROMIDE AND ALBUTEROL SULFATE 3 ML: .5; 3 SOLUTION RESPIRATORY (INHALATION) at 07:11

## 2021-05-23 RX ADMIN — LORAZEPAM 1 MG: 2 INJECTION INTRAMUSCULAR; INTRAVENOUS at 18:23

## 2021-05-23 RX ADMIN — ALBUTEROL SULFATE 2.5 MG: 2.5 SOLUTION RESPIRATORY (INHALATION) at 11:23

## 2021-05-23 RX ADMIN — BUDESONIDE 500 MCG: 0.5 INHALANT RESPIRATORY (INHALATION) at 07:12

## 2021-05-23 RX ADMIN — HYDROMORPHONE HYDROCHLORIDE 1 MG: 1 INJECTION, SOLUTION INTRAMUSCULAR; INTRAVENOUS; SUBCUTANEOUS at 03:23

## 2021-05-23 RX ADMIN — HYDROMORPHONE HYDROCHLORIDE 1 MG: 1 INJECTION, SOLUTION INTRAMUSCULAR; INTRAVENOUS; SUBCUTANEOUS at 08:30

## 2021-05-23 RX ADMIN — SODIUM CHLORIDE, SODIUM LACTATE, POTASSIUM CHLORIDE, AND CALCIUM CHLORIDE 100 ML/HR: 600; 310; 30; 20 INJECTION, SOLUTION INTRAVENOUS at 12:31

## 2021-05-23 RX ADMIN — LABETALOL HYDROCHLORIDE 10 MG: 5 INJECTION INTRAVENOUS at 23:49

## 2021-05-23 RX ADMIN — HYDRALAZINE HYDROCHLORIDE 25 MG: 25 TABLET, FILM COATED ORAL at 12:19

## 2021-05-23 RX ADMIN — LORAZEPAM 1 MG: 2 INJECTION INTRAMUSCULAR; INTRAVENOUS at 23:50

## 2021-05-23 NOTE — PROGRESS NOTES
[facility]ist Progress Note     Name:  oRsalio Ortez  Age:70 y.o. Sex:male   :  1950    MRN:  [id number]     Admit Date:  2021    Reason for Admission:  Alcoholic pancreatitis [facility] Course/Interval history:       Pt is a 73 yo male with pmh PE on Xarelto, prostate cancer s/p prostatectomy, HTN who presented to ER  with acute RUQ/epigastric pain x 24 hours. Lipase and CT abd confirm pancreatitis. Pt admits to daily ETOH. He was made NPO and started on IV fluids including banana bag daily. Subjective (21):     today pt asking to advance diet to CLD. Reports feeling generally \"worn out\" today. Denies n/v. Still with some abd pain. Denies CP, SOB. Review of Systems: 14 point review of systems is otherwise negative with the exception of the elements mentioned above. Assessment & Plan     ETOH pancreatitis  NPO - okay to start ice chips per pts request,   Cont IV fluids  Daily lipase and CMP   Trigs elevated but not likely contributing, should be addressed by PCP    will advance to [facility] today. No need to trend lipase.   Continue IVF.      ETOH abuse  Educated pt on importance of cessation  Watch for withdrawal - PRN ativan available     Hx PE   continue xarelto    HTN   norvasc          Diet:  DIET CLEAR LIQUID  DVT PPx: xarelto  Code status: DNR  Disposition/Expected LOS: DC home when stable, likely 2 days              Objective:     Patient Vitals for the past 24 hrs:   Temp Pulse Resp BP SpO2   21 0716 98.3 °F (36.8 °C) 89 17 (!) 172/94 97 %   21 0712     93 %   21 0321 99.1 °F (37.3 °C) 96 21 (!) 151/88 97 %   21 0202     98 %   21 0014  84  (!) 163/101    21 2314     95 %   21 2307 99.7 °F (37.6 °C) 97 20 (!) 201/117 94 %   218    (!) 153/105    213     97 %   21 1946    (!) 181/104    21  (!) 103   96 %   21 100.1 °F (37.8 °C) 100 21 (!) 181/104 96 %   05/22/21 1734  (!) 105  (!) 182/111    05/22/21 1608     96 %   05/22/21 1557  (!) 108  (!) 184/107    05/22/21 1548 97.9 °F (36.6 °C) (!) 109 24 (!) 192/119 93 %   05/22/21 1408  (!) 101  (!) 166/101    05/22/21 1301     96 %   05/22/21 1124 97.9 °F (36.6 °C) 91 19 (!) 181/103 96 %   05/22/21 0957  96  (!) 172/96      Oxygen Therapy  O2 Sat (%): 97 % (05/23/21 0716)  Pulse via Oximetry: 96 beats per minute (05/23/21 0712)  O2 Device: None (Room air) (05/23/21 0712)  O2 Flow Rate (L/min): 2 l/min (05/23/21 0202)    Body mass index is 30.38 kg/m². Physical Exam:   General: No acute distress, speaking in full sentences, no use of accessory muscles   HEENT: Pupils equal and reactive to light and accommodation, oropharynx is clear   Neck: Supple, no lymphadenopathy, no JVD   Lungs: Clear to auscultation bilaterally. Resp even and nonlabored   Cardiovascular: Regular rate and rhythm with normal S1 and S2 without murmurs rubs gallops.    Abdomen: Soft, mild epigastric tenderness on palpation, nondistended, normoactive bowel sounds   Extremities: No cyanosis clubbing or edema   Neuro: Nonfocal, A&O x3   Psych: Normal affect     Data Review:  I have reviewed all labs, meds, and studies from the last 24 hours:    Labs:    Recent Results (from the past 24 hour(s))   CBC WITH AUTOMATED DIFF    Collection Time: 05/23/21  5:12 AM   Result Value Ref Range    WBC 10.8 4.3 - 11.1 K/uL    RBC 3.94 (L) 4.23 - 5.6 M/uL    HGB 10.9 (L) 13.6 - 17.2 g/dL    HCT 35.0 (L) 41.1 - 50.3 %    MCV 88.8 79.6 - 97.8 FL    MCH 27.7 26.1 - 32.9 PG    MCHC 31.1 (L) 31.4 - 35.0 g/dL    RDW 15.9 (H) 11.9 - 14.6 %    PLATELET 190 481 - 138 K/uL    MPV 10.5 9.4 - 12.3 FL    ABSOLUTE NRBC 0.00 0.0 - 0.2 K/uL    DF AUTOMATED      NEUTROPHILS 85 (H) 43 - 78 %    LYMPHOCYTES 8 (L) 13 - 44 %    MONOCYTES 6 4.0 - 12.0 %    EOSINOPHILS 1 0.5 - 7.8 %    BASOPHILS 0 0.0 - 2.0 %    IMMATURE GRANULOCYTES 1 0.0 - 5.0 %    ABS. NEUTROPHILS 9.1 (H) 1.7 - 8.2 K/UL    ABS. LYMPHOCYTES 0.8 0.5 - 4.6 K/UL    ABS. MONOCYTES 0.7 0.1 - 1.3 K/UL    ABS. EOSINOPHILS 0.1 0.0 - 0.8 K/UL    ABS. BASOPHILS 0.0 0.0 - 0.2 K/UL    ABS. IMM. GRANS. 0.1 0.0 - 0.5 K/UL   METABOLIC PANEL, BASIC    Collection Time: 05/23/21  5:12 AM   Result Value Ref Range    Sodium 146 (H) 138 - 145 mmol/L    Potassium 3.6 3.5 - 5.1 mmol/L    Chloride 114 (H) 98 - 107 mmol/L    CO2 24 21 - 32 mmol/L    Anion gap 8 7 - 16 mmol/L    Glucose 105 (H) 65 - 100 mg/dL    BUN 15 8 - 23 MG/DL    Creatinine 0.84 0.8 - 1.5 MG/DL    GFR est AA >60 >60 ml/min/1.73m2    GFR est non-AA >60 >60 ml/min/1.73m2    Calcium 8.7 8.3 - 10.4 MG/DL   LIPASE    Collection Time: 05/23/21  5:12 AM   Result Value Ref Range    Lipase 264 73 - 393 U/L       All Micro Results     None          EKG Results     None          Other Studies:  XR CHEST SNGL V    Result Date: 5/22/2021  EXAM: XR CHEST SNGL V INDICATION: SOB COMPARISON: 3/29/2021 FINDINGS: A portable AP radiograph of the chest was obtained at 1652 hours. The patient is on a cardiac monitor. The lungs are clear. The cardiac and mediastinal contours and pulmonary vascularity are normal.  The bones and soft tissues are grossly within normal limits.      Normal chest.      Current Meds:   Current Facility-Administered Medications   Medication Dose Route Frequency    labetaloL (NORMODYNE;TRANDATE) injection 10 mg  10 mg IntraVENous Q6H PRN    amLODIPine (NORVASC) tablet 10 mg  10 mg Oral DAILY    hydrALAZINE (APRESOLINE) tablet 25 mg  25 mg Oral QID PRN    guaiFENesin ER (MUCINEX) tablet 600 mg  600 mg Oral Q12H    sodium chloride (NS) flush 5-10 mL  5-10 mL IntraVENous Q8H    sodium chloride (NS) flush 5-10 mL  5-10 mL IntraVENous PRN    acetaminophen (TYLENOL) tablet 650 mg  650 mg Oral Q6H PRN    Or    acetaminophen (TYLENOL) suppository 650 mg  650 mg Rectal Q6H PRN    polyethylene glycol (MIRALAX) packet 17 g  17 g Oral DAILY PRN    ondansetron (ZOFRAN) injection 4 mg  4 mg IntraVENous Q6H PRN    pantoprazole (PROTONIX) 40 mg in 0.9% sodium chloride 10 mL injection  40 mg IntraVENous DAILY    albuterol (PROVENTIL VENTOLIN) nebulizer solution 2.5 mg  2.5 mg Nebulization Q6H PRN    albuterol-ipratropium (DUO-NEB) 2.5 MG-0.5 MG/3 ML  3 mL Nebulization Q6H RT    rivaroxaban (XARELTO) tablet 20 mg  20 mg Oral DAILY WITH BREAKFAST    HYDROmorphone (DILAUDID) injection 1 mg  1 mg IntraVENous Q3H PRN    0.9% sodium chloride 1,000 mL with mvi (adult no. 4 with vit K) 10 mL, thiamine 839 mg, folic acid 1 mg infusion   IntraVENous Q24H    lactated Ringers infusion  100 mL/hr IntraVENous CONTINUOUS    LORazepam (ATIVAN) injection 1 mg  1 mg IntraVENous Q2H PRN    budesonide (PULMICORT) 500 mcg/2 ml nebulizer suspension  500 mcg Nebulization BID RT       Problem List:  Hospital Problems as of 5/23/2021 Date Reviewed: 10/30/2019        Codes Class Noted - Resolved POA    * (Principal) Alcoholic pancreatitis QGX-97-IJ: K85.20  ICD-9-CM: 218.5  5/20/2021 - Present Unknown        Gout ICD-10-CM: M10.9  ICD-9-CM: 274.9  6/29/2019 - Present Yes        Pulmonary emboli ([address] ICD-10-CM: I26.99  ICD-9-CM: 415.19  6/12/2019 - Present Yes        Multiple pulmonary nodules ICD-10-CM: R91.8  ICD-9-CM: 793.19  6/12/2019 - Present Yes        Hyperlipidemia other unsp dyslipidemia ICD-10-CM: E78.5  ICD-9-CM: 272.4  11/18/2015 - Present Yes        CAD (coronary artery disease) (Chronic) ICD-10-CM: I25.10  ICD-9-CM: 414.00  9/15/2013 - Present Yes        Accelerated hypertension ICD-10-CM: I10  ICD-9-CM: 401.0  9/13/2013 - Present Yes               Notes, labs, VS, diagnostic testing reviewed  Case discussed with pt, care team, Dr. Santos Dave By: Salma Victor NP   [facility] Service    May 23, 2021  5:15 PM

## 2021-05-23 NOTE — PROGRESS NOTES
Problem: Falls - Risk of  Goal: *Absence of Falls  Description: Document Rosana Mayer Fall Risk and appropriate interventions in the flowsheet. Outcome: Progressing Towards Goal  Note: Fall Risk Interventions:  Mobility Interventions: Communicate number of staff needed for ambulation/transfer, Patient to call before getting OOB         Medication Interventions: Patient to call before getting OOB, Teach patient to arise slowly    Elimination Interventions: Call light in reach, Patient to call for help with toileting needs, Urinal in reach              Problem: Patient Education: Go to Patient Education Activity  Goal: Patient/Family Education  Outcome: Progressing Towards Goal     Problem: Pressure Injury - Risk of  Goal: *Prevention of pressure injury  Description: Document Yovany Scale and appropriate interventions in the flowsheet.   Outcome: Progressing Towards Goal  Note: Pressure Injury Interventions:  Sensory Interventions: Assess changes in LOC    Moisture Interventions: Absorbent underpads    Activity Interventions: Increase time out of bed, Pressure redistribution bed/mattress(bed type), PT/OT evaluation    Mobility Interventions: Pressure redistribution bed/mattress (bed type), PT/OT evaluation    Nutrition Interventions: Document food/fluid/supplement intake, Offer support with meals,snacks and hydration                     Problem: Patient Education: Go to Patient Education Activity  Goal: Patient/Family Education  Outcome: Progressing Towards Goal

## 2021-05-23 NOTE — PROGRESS NOTES
END OF SHIFT NOTE:    INTAKE/OUTPUT  05/22 0701 - 05/23 0700  In: 1525 [I.V.:1525]  Out: 975 [Urine:975]  Voiding: YES  Catheter: NO  Drain:              Flatus: Patient does have flatus present. Stool:  0 occurrences. Characteristics:       Emesis: 0 occurrences. Characteristics:        VITAL SIGNS  Patient Vitals for the past 12 hrs:   Temp Pulse Resp BP SpO2   05/23/21 0321 99.1 °F (37.3 °C) 96 21 (!) 151/88 97 %   05/23/21 0202     98 %   05/23/21 0014  84  (!) 163/101    05/22/21 2314     95 %   05/22/21 2307 99.7 °F (37.6 °C) 97 20 (!) 201/117 94 %   05/22/21 2038    (!) 153/105    05/22/21 1953     97 %   05/22/21 1946    (!) 181/104    05/22/21 1940  (!) 103   96 %   05/22/21 1914 100.1 °F (37.8 °C) 100 21 (!) 181/104 96 %   05/22/21 1734  (!) 105  (!) 182/111        Pain Assessment  Pain Intensity 1: 7 (05/23/21 0323)  Pain Location 1: Abdomen  Pain Intervention(s) 1: Medication (see MAR)  Patient Stated Pain Goal: 0    Ambulating  Yes    Shift report given to oncoming nurse at the bedside.     Dami Armando RN

## 2021-05-24 LAB
ANION GAP SERPL CALC-SCNC: 6 MMOL/L (ref 7–16)
BASOPHILS # BLD: 0 K/UL (ref 0–0.2)
BASOPHILS NFR BLD: 0 % (ref 0–2)
BUN SERPL-MCNC: 12 MG/DL (ref 8–23)
CALCIUM SERPL-MCNC: 8.6 MG/DL (ref 8.3–10.4)
CHLORIDE SERPL-SCNC: 113 MMOL/L (ref 98–107)
CO2 SERPL-SCNC: 23 MMOL/L (ref 21–32)
CREAT SERPL-MCNC: 0.73 MG/DL (ref 0.8–1.5)
DIFFERENTIAL METHOD BLD: ABNORMAL
EOSINOPHIL # BLD: 0.1 K/UL (ref 0–0.8)
EOSINOPHIL NFR BLD: 1 % (ref 0.5–7.8)
ERYTHROCYTE [DISTWIDTH] IN BLOOD BY AUTOMATED COUNT: 15.9 % (ref 11.9–14.6)
GLUCOSE SERPL-MCNC: 101 MG/DL (ref 65–100)
HCT VFR BLD AUTO: 34.5 % (ref 41.1–50.3)
HGB BLD-MCNC: 10.8 G/DL (ref 13.6–17.2)
IMM GRANULOCYTES # BLD AUTO: 0.1 K/UL (ref 0–0.5)
IMM GRANULOCYTES NFR BLD AUTO: 1 % (ref 0–5)
LYMPHOCYTES # BLD: 0.7 K/UL (ref 0.5–4.6)
LYMPHOCYTES NFR BLD: 8 % (ref 13–44)
MCH RBC QN AUTO: 27.6 PG (ref 26.1–32.9)
MCHC RBC AUTO-ENTMCNC: 31.3 G/DL (ref 31.4–35)
MCV RBC AUTO: 88 FL (ref 79.6–97.8)
MONOCYTES # BLD: 0.7 K/UL (ref 0.1–1.3)
MONOCYTES NFR BLD: 7 % (ref 4–12)
NEUTS SEG # BLD: 7.6 K/UL (ref 1.7–8.2)
NEUTS SEG NFR BLD: 83 % (ref 43–78)
NRBC # BLD: 0 K/UL (ref 0–0.2)
PLATELET # BLD AUTO: 200 K/UL (ref 150–450)
PMV BLD AUTO: 10.2 FL (ref 9.4–12.3)
POTASSIUM SERPL-SCNC: 3.1 MMOL/L (ref 3.5–5.1)
RBC # BLD AUTO: 3.92 M/UL (ref 4.23–5.6)
SODIUM SERPL-SCNC: 142 MMOL/L (ref 136–145)
WBC # BLD AUTO: 9.2 K/UL (ref 4.3–11.1)

## 2021-05-24 PROCEDURE — 94760 N-INVAS EAR/PLS OXIMETRY 1: CPT

## 2021-05-24 PROCEDURE — 36415 COLL VENOUS BLD VENIPUNCTURE: CPT

## 2021-05-24 PROCEDURE — 65270000029 HC RM PRIVATE

## 2021-05-24 PROCEDURE — 2709999900 HC NON-CHARGEABLE SUPPLY

## 2021-05-24 PROCEDURE — 74011250637 HC RX REV CODE- 250/637: Performed by: FAMILY MEDICINE

## 2021-05-24 PROCEDURE — C9113 INJ PANTOPRAZOLE SODIUM, VIA: HCPCS | Performed by: FAMILY MEDICINE

## 2021-05-24 PROCEDURE — 74011250637 HC RX REV CODE- 250/637: Performed by: HOSPITALIST

## 2021-05-24 PROCEDURE — 77010033678 HC OXYGEN DAILY

## 2021-05-24 PROCEDURE — 74011000250 HC RX REV CODE- 250: Performed by: INTERNAL MEDICINE

## 2021-05-24 PROCEDURE — 74011000250 HC RX REV CODE- 250: Performed by: FAMILY MEDICINE

## 2021-05-24 PROCEDURE — 94640 AIRWAY INHALATION TREATMENT: CPT

## 2021-05-24 PROCEDURE — 85025 COMPLETE CBC W/AUTO DIFF WBC: CPT

## 2021-05-24 PROCEDURE — 74011250636 HC RX REV CODE- 250/636: Performed by: FAMILY MEDICINE

## 2021-05-24 PROCEDURE — 80048 BASIC METABOLIC PNL TOTAL CA: CPT

## 2021-05-24 PROCEDURE — 74011250636 HC RX REV CODE- 250/636: Performed by: NURSE PRACTITIONER

## 2021-05-24 RX ORDER — LANOLIN ALCOHOL/MO/W.PET/CERES
100 CREAM (GRAM) TOPICAL DAILY
Status: DISCONTINUED | OUTPATIENT
Start: 2021-05-25 | End: 2021-05-31 | Stop reason: HOSPADM

## 2021-05-24 RX ORDER — POTASSIUM CHLORIDE 14.9 MG/ML
20 INJECTION INTRAVENOUS
Status: COMPLETED | OUTPATIENT
Start: 2021-05-24 | End: 2021-05-24

## 2021-05-24 RX ORDER — FOLIC ACID 1 MG/1
1 TABLET ORAL DAILY
Status: DISCONTINUED | OUTPATIENT
Start: 2021-05-25 | End: 2021-05-31 | Stop reason: HOSPADM

## 2021-05-24 RX ADMIN — GUAIFENESIN 600 MG: 600 TABLET ORAL at 08:50

## 2021-05-24 RX ADMIN — HYDRALAZINE HYDROCHLORIDE 25 MG: 25 TABLET, FILM COATED ORAL at 13:11

## 2021-05-24 RX ADMIN — GUAIFENESIN 600 MG: 600 TABLET ORAL at 20:49

## 2021-05-24 RX ADMIN — SODIUM CHLORIDE, SODIUM LACTATE, POTASSIUM CHLORIDE, AND CALCIUM CHLORIDE 100 ML/HR: 600; 310; 30; 20 INJECTION, SOLUTION INTRAVENOUS at 22:12

## 2021-05-24 RX ADMIN — BUDESONIDE 500 MCG: 0.5 INHALANT RESPIRATORY (INHALATION) at 18:27

## 2021-05-24 RX ADMIN — IPRATROPIUM BROMIDE AND ALBUTEROL SULFATE 3 ML: .5; 3 SOLUTION RESPIRATORY (INHALATION) at 07:42

## 2021-05-24 RX ADMIN — Medication 10 ML: at 13:15

## 2021-05-24 RX ADMIN — HYDROMORPHONE HYDROCHLORIDE 1 MG: 1 INJECTION, SOLUTION INTRAMUSCULAR; INTRAVENOUS; SUBCUTANEOUS at 22:40

## 2021-05-24 RX ADMIN — HYDROMORPHONE HYDROCHLORIDE 1 MG: 1 INJECTION, SOLUTION INTRAMUSCULAR; INTRAVENOUS; SUBCUTANEOUS at 18:29

## 2021-05-24 RX ADMIN — IPRATROPIUM BROMIDE AND ALBUTEROL SULFATE 3 ML: .5; 3 SOLUTION RESPIRATORY (INHALATION) at 18:27

## 2021-05-24 RX ADMIN — RIVAROXABAN 20 MG: 20 TABLET, FILM COATED ORAL at 08:51

## 2021-05-24 RX ADMIN — AMLODIPINE BESYLATE 10 MG: 10 TABLET ORAL at 08:51

## 2021-05-24 RX ADMIN — LORAZEPAM 1 MG: 2 INJECTION INTRAMUSCULAR; INTRAVENOUS at 13:12

## 2021-05-24 RX ADMIN — POTASSIUM CHLORIDE 20 MEQ: 14.9 INJECTION, SOLUTION INTRAVENOUS at 12:27

## 2021-05-24 RX ADMIN — POTASSIUM CHLORIDE 20 MEQ: 14.9 INJECTION, SOLUTION INTRAVENOUS at 08:50

## 2021-05-24 RX ADMIN — PANTOPRAZOLE SODIUM 40 MG: 40 INJECTION, POWDER, FOR SOLUTION INTRAVENOUS at 08:51

## 2021-05-24 RX ADMIN — POTASSIUM CHLORIDE 20 MEQ: 14.9 INJECTION, SOLUTION INTRAVENOUS at 17:30

## 2021-05-24 RX ADMIN — IPRATROPIUM BROMIDE AND ALBUTEROL SULFATE 3 ML: .5; 3 SOLUTION RESPIRATORY (INHALATION) at 02:00

## 2021-05-24 RX ADMIN — BUDESONIDE 500 MCG: 0.5 INHALANT RESPIRATORY (INHALATION) at 07:42

## 2021-05-24 RX ADMIN — HYDROMORPHONE HYDROCHLORIDE 1 MG: 1 INJECTION, SOLUTION INTRAMUSCULAR; INTRAVENOUS; SUBCUTANEOUS at 05:04

## 2021-05-24 RX ADMIN — IPRATROPIUM BROMIDE AND ALBUTEROL SULFATE 3 ML: .5; 3 SOLUTION RESPIRATORY (INHALATION) at 14:08

## 2021-05-24 NOTE — PROGRESS NOTES
END OF SHIFT NOTE:    INTAKE/OUTPUT  05/23 0701 - 05/24 0700  In: 1480 [P.O.:50; I.V.:1430]  Out: 525 [Urine:525]  Voiding: YES  Catheter: NO  Drain:              Flatus: Patient does have flatus present. Stool:  2 occurrences. Characteristics:  Stool Assessment  Stool Color: Brown  Stool Appearance: Loose, Watery  Stool Amount: Medium  Stool Source/Status: Rectum    Emesis: 0 occurrences. Characteristics:        VITAL SIGNS  Patient Vitals for the past 12 hrs:   Temp Pulse Resp BP SpO2   05/24/21 0406 98.6 °F (37 °C) 97 16 (!) 165/100 94 %   05/24/21 0101  88  (!) 149/93    05/24/21 0027     94 %   05/23/21 2344 99.4 °F (37.4 °C) 92 16 (!) 183/102 94 %   05/23/21 2043     96 %   05/23/21 1947 100 °F (37.8 °C) (!) 103 16 (!) 169/100 95 %       Pain Assessment  Pain Intensity 1: 8 (05/23/21 1947)  Pain Location 1: Abdomen  Pain Intervention(s) 1: Medication (see MAR)  Patient Stated Pain Goal: 0    Ambulating  Yes    Shift report given to oncoming nurse at the bedside.     Sunil Trammell RN

## 2021-05-24 NOTE — PROGRESS NOTES
Hospitalist Progress Note    Subjective:   Daily Progress Note: 5/24/2021 7:11 AM    Patient presented to ER 5/20 with complaints of sudden onset, severe upper abdominal pain x approx one hour along with nausea, no vomiting. History of alcohol abuse, drinking at least 1/2 pint of liquor daily. Found with lipase of 24,724. CT with findings of pancreatitis. Has slowly progressed to tolerating clear liquids on 5/23.      5/24:  Up in chair, reports feeling much better. Denies pain or nausea at this time, requesting diet advance. Will increase to full liquids for lunch. Reports normal BM this am     Assessment/Plan:   ALCOHOLIC PANCREATITIS:  HISTORY OF PRIOR EPISODES:     5/23:  Advanced to clear liquid diet. Unable to tolerate   5/23: Worsening abdominal pain and distention. KUB without focal obstruction or dilation. 5/24:  Improved immensely, wants diet increase. Increased to full liquids. ALCOHOL ABUSE:  DRINKS 1/2 PINT OF LIQUOR DAILY:     5/25:  No signs of DT or withdrawal symptoms, continue banana bag daily. continue serial labs    Continue prn ativan    5/24:  HYPOKALEMIA:  K+: 3.1   Replace and recheck     ACCELERATED HYPERTENSION:     5/24:  Remains slightly elevated on norvasc 10 mg daily. Continue prn apresoline, labetalol     CAD WITH HISTORY OF MI:  Noted     GERD:  Continue protonix    HISTORY OF PROSTATE CANCER:  S/P PROSTAATECTOMY:  Noted     ASTHMA/COPD:  Stable, no current exacerbation, continue pulmicort and duoneb     TOBACCO ABUSE:  QUIT 2 YEARS AGO:  Noted     HYPERLIPIDEMIA:  Holding home pravachol    HISTORY OF PULMONARY EMBOLI 2019:  Continue xaralto    MULTIPLE PULMONARY NODULES:  Noted     HISTORY OF STROKE:  No residual     HISTORY OF GOUT:  Holding home colchicine, no flare     HOME TOMORROW OR THE NEXT DAY IF CONTINUES TO IMPROVE AND ABLE TO TOLERATE DIET.       Current Facility-Administered Medications   Medication Dose Route Frequency    albuterol-ipratropium (DUO-NEB) 2.5 MG-0.5 MG/3 ML  3 mL Nebulization Q6H RT    albuterol (PROVENTIL VENTOLIN) nebulizer solution 2.5 mg  2.5 mg Nebulization Q4H PRN    labetaloL (NORMODYNE;TRANDATE) injection 10 mg  10 mg IntraVENous Q6H PRN    amLODIPine (NORVASC) tablet 10 mg  10 mg Oral DAILY    hydrALAZINE (APRESOLINE) tablet 25 mg  25 mg Oral QID PRN    guaiFENesin ER (MUCINEX) tablet 600 mg  600 mg Oral Q12H    sodium chloride (NS) flush 5-10 mL  5-10 mL IntraVENous Q8H    sodium chloride (NS) flush 5-10 mL  5-10 mL IntraVENous PRN    acetaminophen (TYLENOL) tablet 650 mg  650 mg Oral Q6H PRN    Or    acetaminophen (TYLENOL) suppository 650 mg  650 mg Rectal Q6H PRN    polyethylene glycol (MIRALAX) packet 17 g  17 g Oral DAILY PRN    ondansetron (ZOFRAN) injection 4 mg  4 mg IntraVENous Q6H PRN    pantoprazole (PROTONIX) 40 mg in 0.9% sodium chloride 10 mL injection  40 mg IntraVENous DAILY    rivaroxaban (XARELTO) tablet 20 mg  20 mg Oral DAILY WITH BREAKFAST    HYDROmorphone (DILAUDID) injection 1 mg  1 mg IntraVENous Q3H PRN    0.9% sodium chloride 1,000 mL with mvi (adult no. 4 with vit K) 10 mL, thiamine 896 mg, folic acid 1 mg infusion   IntraVENous Q24H    lactated Ringers infusion  100 mL/hr IntraVENous CONTINUOUS    LORazepam (ATIVAN) injection 1 mg  1 mg IntraVENous Q2H PRN    budesonide (PULMICORT) 500 mcg/2 ml nebulizer suspension  500 mcg Nebulization BID RT      Review of Systems  A comprehensive review of systems was negative except for that written in the HPI.     Objective:     Visit Vitals  BP (!) 150/92   Pulse 97   Temp 98.6 °F (37 °C)   Resp 16   Ht 6' (1.829 m)   Wt 101.6 kg (224 lb)   SpO2 94%   BMI 30.38 kg/m²    O2 Flow Rate (L/min): 2 l/min O2 Device: None (Room air)    Temp (24hrs), Av.8 °F (37.1 °C), Min:98 °F (36.7 °C), Max:100 °F (37.8 °C)    1901 -  0700  In: 7124 [P.O.:50; I.V.:3940]  Out: 1100 [Urine:1100]    General appearance: Up in chair, oriented and alert, cooperative. Denies pain or nausea at present. Head: Normocephalic, without obvious abnormality, atraumatic  Throat: Lips, mucosa, and tongue normal. Teeth and gums normal  Neck: supple, symmetrical, trachea midline, and no JVD  Lungs: clear to auscultation bilaterally  Heart: regular rate and rhythm, S1, S2 normal, no murmur, click, rub or gallop  Abdomen: Protuberant, soft, non-tender. Bowel sounds normal. No masses,  no organomegaly  Extremities: All extremities normal, atraumatic, no cyanosis or edema  Skin: Skin color, texture, turgor normal. No rashes or lesions  Neurologic: Grossly normal    Additional comments: Notes,orders, test results, vitals reviewed    Data Review  Recent Results (from the past 24 hour(s))   CBC WITH AUTOMATED DIFF    Collection Time: 05/24/21  4:51 AM   Result Value Ref Range    WBC 9.2 4.3 - 11.1 K/uL    RBC 3.92 (L) 4.23 - 5.6 M/uL    HGB 10.8 (L) 13.6 - 17.2 g/dL    HCT 34.5 (L) 41.1 - 50.3 %    MCV 88.0 79.6 - 97.8 FL    MCH 27.6 26.1 - 32.9 PG    MCHC 31.3 (L) 31.4 - 35.0 g/dL    RDW 15.9 (H) 11.9 - 14.6 %    PLATELET 663 769 - 265 K/uL    MPV 10.2 9.4 - 12.3 FL    ABSOLUTE NRBC 0.00 0.0 - 0.2 K/uL    DF AUTOMATED      NEUTROPHILS 83 (H) 43 - 78 %    LYMPHOCYTES 8 (L) 13 - 44 %    MONOCYTES 7 4.0 - 12.0 %    EOSINOPHILS 1 0.5 - 7.8 %    BASOPHILS 0 0.0 - 2.0 %    IMMATURE GRANULOCYTES 1 0.0 - 5.0 %    ABS. NEUTROPHILS 7.6 1.7 - 8.2 K/UL    ABS. LYMPHOCYTES 0.7 0.5 - 4.6 K/UL    ABS. MONOCYTES 0.7 0.1 - 1.3 K/UL    ABS. EOSINOPHILS 0.1 0.0 - 0.8 K/UL    ABS. BASOPHILS 0.0 0.0 - 0.2 K/UL    ABS. IMM.  GRANS. 0.1 0.0 - 0.5 K/UL   METABOLIC PANEL, BASIC    Collection Time: 05/24/21  4:51 AM   Result Value Ref Range    Sodium 142 136 - 145 mmol/L    Potassium 3.1 (L) 3.5 - 5.1 mmol/L    Chloride 113 (H) 98 - 107 mmol/L    CO2 23 21 - 32 mmol/L    Anion gap 6 (L) 7 - 16 mmol/L    Glucose 101 (H) 65 - 100 mg/dL    BUN 12 8 - 23 MG/DL    Creatinine 0.73 (L) 0.8 - 1.5 MG/DL    GFR est AA >60 >60 ml/min/1.73m2    GFR est non-AA >60 >60 ml/min/1.73m2    Calcium 8.6 8.3 - 10.4 MG/DL      5/20:  CT ABDOMEN AND PELVIS:  Findings compatible with pancreatitis. 5/22:  CXR:  Normal chest.    5/24:  KUB:  Mildly prominent small bowel gas, without evidence of focal  obstruction or dilatation by size criterion. A very mild is not excluded.       Care Plan discussed with: Patient and Nurse    Signed By: Tammy Crowe NP     May 24, 2021

## 2021-05-24 NOTE — PROGRESS NOTES
Notified Hospitalist of worsening abd distention, pain and tightness. Per day shift report was started on clears today but was unable to tolerate. New orders received for NPO diet and KUB. 2042: Notified hospitalist of KUB results: Mildly prominent small bowel gas, without evidence of focal obstruction or dilatation by size criterion. A very mild is not excluded. No new orders at this time.

## 2021-05-24 NOTE — PROGRESS NOTES
Chart screened by  for discharge planning. Patient diet to be advanced today. Patient to discharge home when medically stable. CM will continue to follow patient during hospitalization for discharge planning and needs. Please consult or notify  if any new issues arise.

## 2021-05-25 ENCOUNTER — APPOINTMENT (OUTPATIENT)
Dept: GENERAL RADIOLOGY | Age: 71
DRG: 439 | End: 2021-05-25
Attending: NURSE PRACTITIONER
Payer: COMMERCIAL

## 2021-05-25 LAB
ALBUMIN SERPL-MCNC: 2.2 G/DL (ref 3.2–4.6)
ALBUMIN/GLOB SERPL: 0.6 {RATIO} (ref 1.2–3.5)
ALP SERPL-CCNC: 124 U/L (ref 50–136)
ALT SERPL-CCNC: 30 U/L (ref 12–65)
ANION GAP SERPL CALC-SCNC: 8 MMOL/L (ref 7–16)
AST SERPL-CCNC: 24 U/L (ref 15–37)
BASOPHILS # BLD: 0 K/UL (ref 0–0.2)
BASOPHILS NFR BLD: 0 % (ref 0–2)
BILIRUB SERPL-MCNC: 0.6 MG/DL (ref 0.2–1.1)
BUN SERPL-MCNC: 10 MG/DL (ref 8–23)
CALCIUM SERPL-MCNC: 8.7 MG/DL (ref 8.3–10.4)
CHLORIDE SERPL-SCNC: 110 MMOL/L (ref 98–107)
CO2 SERPL-SCNC: 24 MMOL/L (ref 21–32)
CREAT SERPL-MCNC: 0.74 MG/DL (ref 0.8–1.5)
DIFFERENTIAL METHOD BLD: ABNORMAL
EOSINOPHIL # BLD: 0.1 K/UL (ref 0–0.8)
EOSINOPHIL NFR BLD: 2 % (ref 0.5–7.8)
ERYTHROCYTE [DISTWIDTH] IN BLOOD BY AUTOMATED COUNT: 15.8 % (ref 11.9–14.6)
GLOBULIN SER CALC-MCNC: 3.8 G/DL (ref 2.3–3.5)
GLUCOSE SERPL-MCNC: 95 MG/DL (ref 65–100)
HCT VFR BLD AUTO: 32.1 % (ref 41.1–50.3)
HGB BLD-MCNC: 9.9 G/DL (ref 13.6–17.2)
IMM GRANULOCYTES # BLD AUTO: 0 K/UL (ref 0–0.5)
IMM GRANULOCYTES NFR BLD AUTO: 0 % (ref 0–5)
LIPASE SERPL-CCNC: 81 U/L (ref 73–393)
LYMPHOCYTES # BLD: 0.7 K/UL (ref 0.5–4.6)
LYMPHOCYTES NFR BLD: 11 % (ref 13–44)
MAGNESIUM SERPL-MCNC: 1.6 MG/DL (ref 1.8–2.4)
MCH RBC QN AUTO: 27 PG (ref 26.1–32.9)
MCHC RBC AUTO-ENTMCNC: 30.8 G/DL (ref 31.4–35)
MCV RBC AUTO: 87.5 FL (ref 79.6–97.8)
MONOCYTES # BLD: 0.7 K/UL (ref 0.1–1.3)
MONOCYTES NFR BLD: 11 % (ref 4–12)
NEUTS SEG # BLD: 5.2 K/UL (ref 1.7–8.2)
NEUTS SEG NFR BLD: 76 % (ref 43–78)
NRBC # BLD: 0 K/UL (ref 0–0.2)
PLATELET # BLD AUTO: 208 K/UL (ref 150–450)
PMV BLD AUTO: 10.5 FL (ref 9.4–12.3)
POTASSIUM SERPL-SCNC: 3.3 MMOL/L (ref 3.5–5.1)
PROT SERPL-MCNC: 6 G/DL (ref 6.3–8.2)
RBC # BLD AUTO: 3.67 M/UL (ref 4.23–5.6)
SODIUM SERPL-SCNC: 142 MMOL/L (ref 138–145)
WBC # BLD AUTO: 6.8 K/UL (ref 4.3–11.1)

## 2021-05-25 PROCEDURE — 74011250637 HC RX REV CODE- 250/637: Performed by: FAMILY MEDICINE

## 2021-05-25 PROCEDURE — 83690 ASSAY OF LIPASE: CPT

## 2021-05-25 PROCEDURE — 74011250637 HC RX REV CODE- 250/637: Performed by: HOSPITALIST

## 2021-05-25 PROCEDURE — 83735 ASSAY OF MAGNESIUM: CPT

## 2021-05-25 PROCEDURE — C9113 INJ PANTOPRAZOLE SODIUM, VIA: HCPCS | Performed by: FAMILY MEDICINE

## 2021-05-25 PROCEDURE — 2709999900 HC NON-CHARGEABLE SUPPLY

## 2021-05-25 PROCEDURE — 36415 COLL VENOUS BLD VENIPUNCTURE: CPT

## 2021-05-25 PROCEDURE — 74011250636 HC RX REV CODE- 250/636: Performed by: FAMILY MEDICINE

## 2021-05-25 PROCEDURE — 80053 COMPREHEN METABOLIC PANEL: CPT

## 2021-05-25 PROCEDURE — 74011000250 HC RX REV CODE- 250: Performed by: FAMILY MEDICINE

## 2021-05-25 PROCEDURE — 94760 N-INVAS EAR/PLS OXIMETRY 1: CPT

## 2021-05-25 PROCEDURE — 74011250636 HC RX REV CODE- 250/636: Performed by: NURSE PRACTITIONER

## 2021-05-25 PROCEDURE — 74018 RADEX ABDOMEN 1 VIEW: CPT

## 2021-05-25 PROCEDURE — 94640 AIRWAY INHALATION TREATMENT: CPT

## 2021-05-25 PROCEDURE — 85025 COMPLETE CBC W/AUTO DIFF WBC: CPT

## 2021-05-25 PROCEDURE — 74011250637 HC RX REV CODE- 250/637: Performed by: NURSE PRACTITIONER

## 2021-05-25 PROCEDURE — 65270000029 HC RM PRIVATE

## 2021-05-25 PROCEDURE — 74011000250 HC RX REV CODE- 250: Performed by: INTERNAL MEDICINE

## 2021-05-25 PROCEDURE — 77010033678 HC OXYGEN DAILY

## 2021-05-25 RX ORDER — SODIUM CHLORIDE 9 MG/ML
75 INJECTION, SOLUTION INTRAVENOUS CONTINUOUS
Status: DISCONTINUED | OUTPATIENT
Start: 2021-05-25 | End: 2021-05-31 | Stop reason: HOSPADM

## 2021-05-25 RX ORDER — PSEUDOEPHED/ACETAMINOPHEN/CPM 30-500-2MG
2 TABLET ORAL
Status: DISCONTINUED | OUTPATIENT
Start: 2021-05-25 | End: 2021-05-25

## 2021-05-25 RX ORDER — SIMETHICONE 80 MG
40 TABLET,CHEWABLE ORAL
Status: DISCONTINUED | OUTPATIENT
Start: 2021-05-25 | End: 2021-05-31 | Stop reason: HOSPADM

## 2021-05-25 RX ORDER — MAGNESIUM SULFATE HEPTAHYDRATE 40 MG/ML
2 INJECTION, SOLUTION INTRAVENOUS ONCE
Status: COMPLETED | OUTPATIENT
Start: 2021-05-25 | End: 2021-05-25

## 2021-05-25 RX ORDER — POTASSIUM CHLORIDE 20 MEQ/1
20 TABLET, EXTENDED RELEASE ORAL
Status: DISCONTINUED | OUTPATIENT
Start: 2021-05-25 | End: 2021-05-25

## 2021-05-25 RX ORDER — POTASSIUM CHLORIDE 20 MEQ/1
40 TABLET, EXTENDED RELEASE ORAL
Status: COMPLETED | OUTPATIENT
Start: 2021-05-25 | End: 2021-05-25

## 2021-05-25 RX ORDER — POTASSIUM CHLORIDE 14.9 MG/ML
20 INJECTION INTRAVENOUS
Status: DISCONTINUED | OUTPATIENT
Start: 2021-05-25 | End: 2021-05-25

## 2021-05-25 RX ORDER — PANTOPRAZOLE SODIUM 40 MG/1
40 TABLET, DELAYED RELEASE ORAL DAILY
Status: DISCONTINUED | OUTPATIENT
Start: 2021-05-26 | End: 2021-05-27

## 2021-05-25 RX ADMIN — SODIUM CHLORIDE 125 ML/HR: 900 INJECTION, SOLUTION INTRAVENOUS at 11:16

## 2021-05-25 RX ADMIN — IPRATROPIUM BROMIDE AND ALBUTEROL SULFATE 3 ML: .5; 3 SOLUTION RESPIRATORY (INHALATION) at 07:52

## 2021-05-25 RX ADMIN — Medication 1 TABLET: at 08:02

## 2021-05-25 RX ADMIN — HYDROMORPHONE HYDROCHLORIDE 1 MG: 1 INJECTION, SOLUTION INTRAMUSCULAR; INTRAVENOUS; SUBCUTANEOUS at 16:29

## 2021-05-25 RX ADMIN — ALBUTEROL SULFATE 2.5 MG: 2.5 SOLUTION RESPIRATORY (INHALATION) at 06:03

## 2021-05-25 RX ADMIN — GUAIFENESIN 600 MG: 600 TABLET ORAL at 08:01

## 2021-05-25 RX ADMIN — POTASSIUM CHLORIDE 40 MEQ: 1500 TABLET, EXTENDED RELEASE ORAL at 11:57

## 2021-05-25 RX ADMIN — BUDESONIDE 500 MCG: 0.5 INHALANT RESPIRATORY (INHALATION) at 07:52

## 2021-05-25 RX ADMIN — Medication 10 ML: at 14:04

## 2021-05-25 RX ADMIN — IPRATROPIUM BROMIDE AND ALBUTEROL SULFATE 3 ML: .5; 3 SOLUTION RESPIRATORY (INHALATION) at 01:34

## 2021-05-25 RX ADMIN — SODIUM CHLORIDE 125 ML/HR: 900 INJECTION, SOLUTION INTRAVENOUS at 20:52

## 2021-05-25 RX ADMIN — RIVAROXABAN 20 MG: 20 TABLET, FILM COATED ORAL at 08:02

## 2021-05-25 RX ADMIN — MAGNESIUM SULFATE HEPTAHYDRATE 2 G: 40 INJECTION, SOLUTION INTRAVENOUS at 11:16

## 2021-05-25 RX ADMIN — SODIUM CHLORIDE, SODIUM LACTATE, POTASSIUM CHLORIDE, AND CALCIUM CHLORIDE 100 ML/HR: 600; 310; 30; 20 INJECTION, SOLUTION INTRAVENOUS at 07:58

## 2021-05-25 RX ADMIN — FOLIC ACID 1 MG: 1 TABLET ORAL at 08:01

## 2021-05-25 RX ADMIN — LORAZEPAM 1 MG: 2 INJECTION INTRAMUSCULAR; INTRAVENOUS at 18:35

## 2021-05-25 RX ADMIN — IPRATROPIUM BROMIDE AND ALBUTEROL SULFATE 3 ML: .5; 3 SOLUTION RESPIRATORY (INHALATION) at 19:25

## 2021-05-25 RX ADMIN — Medication 100 MG: at 08:01

## 2021-05-25 RX ADMIN — GUAIFENESIN 600 MG: 600 TABLET ORAL at 20:50

## 2021-05-25 RX ADMIN — SIMETHICONE 40 MG: 80 TABLET, CHEWABLE ORAL at 23:37

## 2021-05-25 RX ADMIN — PANTOPRAZOLE SODIUM 40 MG: 40 INJECTION, POWDER, FOR SOLUTION INTRAVENOUS at 08:03

## 2021-05-25 RX ADMIN — HYDROMORPHONE HYDROCHLORIDE 1 MG: 1 INJECTION, SOLUTION INTRAMUSCULAR; INTRAVENOUS; SUBCUTANEOUS at 23:06

## 2021-05-25 RX ADMIN — HYDROMORPHONE HYDROCHLORIDE 1 MG: 1 INJECTION, SOLUTION INTRAMUSCULAR; INTRAVENOUS; SUBCUTANEOUS at 07:57

## 2021-05-25 RX ADMIN — IPRATROPIUM BROMIDE AND ALBUTEROL SULFATE 3 ML: .5; 3 SOLUTION RESPIRATORY (INHALATION) at 14:00

## 2021-05-25 RX ADMIN — AMLODIPINE BESYLATE 10 MG: 10 TABLET ORAL at 08:03

## 2021-05-25 RX ADMIN — BUDESONIDE 500 MCG: 0.5 INHALANT RESPIRATORY (INHALATION) at 19:25

## 2021-05-25 NOTE — PROGRESS NOTES
Hospitalist Progress Note    Subjective:   Daily Progress Note: 5/25/2021 11:01 AM     Patient presented to ER 5/20 with complaints of sudden onset, severe upper abdominal pain x approx one hour along with nausea, no vomiting. History of alcohol abuse, drinking at least 1/2 pint of liquor daily, can not remember when last drink was. Poor historian. . Found with lipase of 24,724. CT with findings of pancreatitis. Has slowly progressed to tolerating clear liquids on 5/23.     5/24:  Up in chair, reports feeling much better. Denies pain or nausea at this time, requesting diet advance. Will increase to full liquids for lunch. Reports normal BM this am.    5/25:  Had planned for discharge if patient could tolerate GI soft diet, however, KUB indicates worsening ileus. Patient denies nausea, abdominal pain. Only pain is right upper quad and epigastric. Wants to eat. Active bowel sounds in all quads. Spoke with surgery, recommend continue GI soft diet and re-eval in am.  Patient informed. Passing flatus. Intermittent diarrhea for six months. Last colo with polyps. Prior colectomy for polyp that scope could not reach. No problems since. Has not investigated current diarrhea. K+ down to 3.1, Mag 1.6. Lipase down to 81. Assessment/Plan:   ALCOHOLIC PANCREATITIS:  HISTORY OF PRIOR EPISODES:                5/23:  Advanced to clear liquid diet. Unable to tolerate              5/23: Worsening abdominal pain and distention. KUB without                        focal obstruction or dilation. 5/24:  Improved immensely, wants diet increase. Increased to full                  Liquids              5/25:  Diet increased to GI soft, found with worsening ileus without   Symptoms. Backed down to full liquids.   Spoke with surgery, recs to continue diet, recheck in am.        ALCOHOL ABUSE:  DRINKS 1/2 PINT OF LIQUOR DAILY:                5/25:  No signs of DT or withdrawal symptoms, continue po vitamins                          continue serial labs                      Continue prn ativan     5/24:  HYPOKALEMIA:  K+: 3.1              Replace and recheck           5/25:  Mag 1.6, K+: 3.3     ACCELERATED HYPERTENSION:                5/24:  Remains slightly elevated on norvasc 10 mg daily. Continue prn apresoline, labetalol      CAD WITH HISTORY OF MI:  Noted      GERD:  Continue protonix     HISTORY OF PROSTATE CANCER:  S/P PROSTAATECTOMY:  Noted      ASTHMA/COPD:  Stable, no current exacerbation, continue pulmicort and duoneb      TOBACCO ABUSE:  QUIT 2 YEARS AGO:  Noted      HYPERLIPIDEMIA:  Holding home pravachol     HISTORY OF PULMONARY EMBOLI 2019:  Continue xaralto     MULTIPLE PULMONARY NODULES:  Noted      HISTORY OF STROKE:  No residual      HISTORY OF GOUT:  Holding home colchicine, no flare      HOME TOMORROW OR THE NEXT DAY IF CONTINUES TO IMPROVE, ILEUS IMPROVED, AND ABLE TO TOLERATE DIET.        Current Facility-Administered Medications   Medication Dose Route Frequency    magnesium sulfate 2 g/50 ml IVPB (premix or compounded)  2 g IntraVENous ONCE    0.9% sodium chloride infusion  125 mL/hr IntraVENous CONTINUOUS    [Held by provider] multivitamin, tx-iron-ca-min (THERA-M w/ IRON) tablet 1 Tablet  1 Tablet Oral DAILY    [Held by provider] thiamine HCL (B-1) tablet 100 mg  100 mg Oral DAILY    [Held by provider] folic acid (FOLVITE) tablet 1 mg  1 mg Oral DAILY    albuterol-ipratropium (DUO-NEB) 2.5 MG-0.5 MG/3 ML  3 mL Nebulization Q6H RT    albuterol (PROVENTIL VENTOLIN) nebulizer solution 2.5 mg  2.5 mg Nebulization Q4H PRN    labetaloL (NORMODYNE;TRANDATE) injection 10 mg  10 mg IntraVENous Q6H PRN    amLODIPine (NORVASC) tablet 10 mg  10 mg Oral DAILY    hydrALAZINE (APRESOLINE) tablet 25 mg  25 mg Oral QID PRN    guaiFENesin ER (MUCINEX) tablet 600 mg  600 mg Oral Q12H    sodium chloride (NS) flush 5-10 mL  5-10 mL IntraVENous Q8H    sodium chloride (NS) flush 5-10 mL  5-10 mL IntraVENous PRN    acetaminophen (TYLENOL) tablet 650 mg  650 mg Oral Q6H PRN    Or    acetaminophen (TYLENOL) suppository 650 mg  650 mg Rectal Q6H PRN    ondansetron (ZOFRAN) injection 4 mg  4 mg IntraVENous Q6H PRN    pantoprazole (PROTONIX) 40 mg in 0.9% sodium chloride 10 mL injection  40 mg IntraVENous DAILY    rivaroxaban (XARELTO) tablet 20 mg  20 mg Oral DAILY WITH BREAKFAST    HYDROmorphone (DILAUDID) injection 1 mg  1 mg IntraVENous Q3H PRN    LORazepam (ATIVAN) injection 1 mg  1 mg IntraVENous Q2H PRN    budesonide (PULMICORT) 500 mcg/2 ml nebulizer suspension  500 mcg Nebulization BID RT        Review of Systems  A comprehensive review of systems was negative except for that written in the HPI. Objective:     Visit Vitals  BP (!) 156/93 Comment: rn notified    Pulse 85   Temp 98.4 °F (36.9 °C)   Resp 17   Ht 6' (1.829 m)   Wt 101.6 kg (224 lb)   SpO2 97%   BMI 30.38 kg/m²    O2 Flow Rate (L/min): 2 l/min O2 Device: Nasal cannula    Temp (24hrs), Av.9 °F (37.2 °C), Min:98.4 °F (36.9 °C), Max:99.4 °F (37.4 °C)    701 - 1900  In: 120 [P.O.:120]  Out: -   1901 -  0700  In: 466 [P.O.:480; I.V.:180]  Out: 675 [Urine:675]    General appearance: Up in chair, oriented and alert, cooperative. Denies pain or nausea at present. Abdomen remains distended. Eating well. Head: Normocephalic, without obvious abnormality, atraumatic  Throat: Lips, mucosa, and tongue normal. Teeth and gums normal  Neck: supple, symmetrical, trachea midline, and no JVD  Lungs: clear to auscultation bilaterally  Heart: regular rate and rhythm, S1, S2 normal, no murmur, click, rub or gallop  Abdomen: Distended, protuberant, soft, non-tender. Bowel sounds normal. No masses,  no organomegaly. Watery stool x 2. Extremities:  All extremities normal, atraumatic, no cyanosis or edema  Skin: Skin color, texture, turgor normal. No rashes or lesions  Neurologic: Grossly normal     Additional comments: Notes,orders, test results, vitals reviewed    Data Review  Recent Results (from the past 24 hour(s))   CBC WITH AUTOMATED DIFF    Collection Time: 05/25/21  3:56 AM   Result Value Ref Range    WBC 6.8 4.3 - 11.1 K/uL    RBC 3.67 (L) 4.23 - 5.6 M/uL    HGB 9.9 (L) 13.6 - 17.2 g/dL    HCT 32.1 (L) 41.1 - 50.3 %    MCV 87.5 79.6 - 97.8 FL    MCH 27.0 26.1 - 32.9 PG    MCHC 30.8 (L) 31.4 - 35.0 g/dL    RDW 15.8 (H) 11.9 - 14.6 %    PLATELET 823 798 - 960 K/uL    MPV 10.5 9.4 - 12.3 FL    ABSOLUTE NRBC 0.00 0.0 - 0.2 K/uL    DF AUTOMATED      NEUTROPHILS 76 43 - 78 %    LYMPHOCYTES 11 (L) 13 - 44 %    MONOCYTES 11 4.0 - 12.0 %    EOSINOPHILS 2 0.5 - 7.8 %    BASOPHILS 0 0.0 - 2.0 %    IMMATURE GRANULOCYTES 0 0.0 - 5.0 %    ABS. NEUTROPHILS 5.2 1.7 - 8.2 K/UL    ABS. LYMPHOCYTES 0.7 0.5 - 4.6 K/UL    ABS. MONOCYTES 0.7 0.1 - 1.3 K/UL    ABS. EOSINOPHILS 0.1 0.0 - 0.8 K/UL    ABS. BASOPHILS 0.0 0.0 - 0.2 K/UL    ABS. IMM. GRANS. 0.0 0.0 - 0.5 K/UL   LIPASE    Collection Time: 05/25/21  3:56 AM   Result Value Ref Range    Lipase 81 73 - 393 U/L   MAGNESIUM    Collection Time: 05/25/21  3:56 AM   Result Value Ref Range    Magnesium 1.6 (L) 1.8 - 2.4 mg/dL   METABOLIC PANEL, COMPREHENSIVE    Collection Time: 05/25/21  3:56 AM   Result Value Ref Range    Sodium 142 138 - 145 mmol/L    Potassium 3.3 (L) 3.5 - 5.1 mmol/L    Chloride 110 (H) 98 - 107 mmol/L    CO2 24 21 - 32 mmol/L    Anion gap 8 7 - 16 mmol/L    Glucose 95 65 - 100 mg/dL    BUN 10 8 - 23 MG/DL    Creatinine 0.74 (L) 0.8 - 1.5 MG/DL    GFR est AA >60 >60 ml/min/1.73m2    GFR est non-AA >60 >60 ml/min/1.73m2    Calcium 8.7 8.3 - 10.4 MG/DL    Bilirubin, total 0.6 0.2 - 1.1 MG/DL    ALT (SGPT) 30 12 - 65 U/L    AST (SGOT) 24 15 - 37 U/L    Alk.  phosphatase 124 50 - 136 U/L    Protein, total 6.0 (L) 6.3 - 8.2 g/dL    Albumin 2.2 (L) 3.2 - 4.6 g/dL    Globulin 3.8 (H) 2.3 - 3.5 g/dL    A-G Ratio 0.6 (L) 1.2 - 3.5         5/20:  CT ABDOMEN AND PELVIS:  Findings compatible with pancreatitis.     5/22:  CXR:  Normal chest.     5/24:  KUB:  Mildly prominent small bowel gas, without evidence of focal  obstruction or dilatation by size criterion. A very mild is not excluded.        5/25: KUB: Dilated small bowel loops have increased to 4 cm. Left hip prosthesis  is present.     IMPRESSION:  Progressive ileus.      Care Plan discussed with: Patient, surgery, and Nurse    Signed By: Evens Barksdale NP     May 25, 2021

## 2021-05-25 NOTE — PROGRESS NOTES
Problem: Falls - Risk of  Goal: *Absence of Falls  Description: Document Tiffanie Arevalo Fall Risk and appropriate interventions in the flowsheet. Outcome: Progressing Towards Goal  Note: Fall Risk Interventions:  Mobility Interventions: Patient to call before getting OOB    Mentation Interventions: Door open when patient unattended, More frequent rounding, Toileting rounds, Update white board    Medication Interventions: Teach patient to arise slowly    Elimination Interventions: Call light in reach, Patient to call for help with toileting needs, Urinal in reach              Problem: Pressure Injury - Risk of  Goal: *Prevention of pressure injury  Description: Document Yovany Scale and appropriate interventions in the flowsheet.   Outcome: Progressing Towards Goal  Note: Pressure Injury Interventions:  Sensory Interventions: Assess need for specialty bed, Check visual cues for pain, Float heels, Keep linens dry and wrinkle-free, Minimize linen layers, Pressure redistribution bed/mattress (bed type)    Moisture Interventions: Absorbent underpads, Apply protective barrier, creams and emollients, Check for incontinence Q2 hours and as needed, Internal/External urinary devices, Limit adult briefs    Activity Interventions: Increase time out of bed, Pressure redistribution bed/mattress(bed type), PT/OT evaluation    Mobility Interventions: HOB 30 degrees or less, Pressure redistribution bed/mattress (bed type), PT/OT evaluation    Nutrition Interventions: Document food/fluid/supplement intake

## 2021-05-25 NOTE — PROGRESS NOTES
END OF SHIFT NOTE:    INTAKE/OUTPUT  05/24 0701 - 05/25 0700  In: 480 [P.O.:480]  Out: 400 [Urine:400]  Voiding: YES  Catheter: NO  Drain:              Flatus: Patient does have flatus present. Stool:  0 occurrences. Characteristics:  Stool Assessment  Stool Color: Brown  Stool Appearance: Watery, Loose, Formed (Some formed pieces)  Stool Amount: Medium  Stool Source/Status: Rectum    Emesis: 0 occurrences. Characteristics:        VITAL SIGNS  Patient Vitals for the past 12 hrs:   Temp Pulse Resp BP SpO2   05/25/21 0603     99 %   05/25/21 0317 99.1 °F (37.3 °C) 95 17 (!) 164/94 98 %   05/25/21 0134     98 %   05/24/21 2329 99.4 °F (37.4 °C) 86 16 (!) 148/85 100 %   05/24/21 1919 98.5 °F (36.9 °C) 89 16 (!) 154/86 100 %   05/24/21 1828     99 %       Pain Assessment  Pain Intensity 1: 0 (05/24/21 2340)  Pain Location 1: Abdomen  Pain Intervention(s) 1: Medication (see MAR)  Patient Stated Pain Goal: 0    Ambulating  No    Shift report given to oncoming nurse at the bedside.     Lela Nayak RN

## 2021-05-26 ENCOUNTER — APPOINTMENT (OUTPATIENT)
Dept: GENERAL RADIOLOGY | Age: 71
DRG: 439 | End: 2021-05-26
Attending: NURSE PRACTITIONER
Payer: COMMERCIAL

## 2021-05-26 LAB
ALBUMIN SERPL-MCNC: 2.2 G/DL (ref 3.2–4.6)
ALBUMIN/GLOB SERPL: 0.6 {RATIO} (ref 1.2–3.5)
ALP SERPL-CCNC: 123 U/L (ref 50–136)
ALT SERPL-CCNC: 30 U/L (ref 12–65)
ANION GAP SERPL CALC-SCNC: 7 MMOL/L (ref 7–16)
AST SERPL-CCNC: 21 U/L (ref 15–37)
BASOPHILS # BLD: 0 K/UL (ref 0–0.2)
BASOPHILS NFR BLD: 0 % (ref 0–2)
BILIRUB SERPL-MCNC: 0.5 MG/DL (ref 0.2–1.1)
BUN SERPL-MCNC: 8 MG/DL (ref 8–23)
CALCIUM SERPL-MCNC: 8.6 MG/DL (ref 8.3–10.4)
CHLORIDE SERPL-SCNC: 111 MMOL/L (ref 98–107)
CO2 SERPL-SCNC: 23 MMOL/L (ref 21–32)
CREAT SERPL-MCNC: 0.72 MG/DL (ref 0.8–1.5)
DIFFERENTIAL METHOD BLD: ABNORMAL
EOSINOPHIL # BLD: 0.1 K/UL (ref 0–0.8)
EOSINOPHIL NFR BLD: 1 % (ref 0.5–7.8)
ERYTHROCYTE [DISTWIDTH] IN BLOOD BY AUTOMATED COUNT: 15.3 % (ref 11.9–14.6)
GLOBULIN SER CALC-MCNC: 3.7 G/DL (ref 2.3–3.5)
GLUCOSE SERPL-MCNC: 108 MG/DL (ref 65–100)
HCT VFR BLD AUTO: 31.6 % (ref 41.1–50.3)
HGB BLD-MCNC: 10.1 G/DL (ref 13.6–17.2)
IMM GRANULOCYTES # BLD AUTO: 0.1 K/UL (ref 0–0.5)
IMM GRANULOCYTES NFR BLD AUTO: 1 % (ref 0–5)
LACTATE SERPL-SCNC: 1.1 MMOL/L (ref 0.4–2)
LIPASE SERPL-CCNC: 84 U/L (ref 73–393)
LYMPHOCYTES # BLD: 0.7 K/UL (ref 0.5–4.6)
LYMPHOCYTES NFR BLD: 11 % (ref 13–44)
MAGNESIUM SERPL-MCNC: 1.8 MG/DL (ref 1.8–2.4)
MCH RBC QN AUTO: 27.2 PG (ref 26.1–32.9)
MCHC RBC AUTO-ENTMCNC: 32 G/DL (ref 31.4–35)
MCV RBC AUTO: 84.9 FL (ref 79.6–97.8)
MONOCYTES # BLD: 0.9 K/UL (ref 0.1–1.3)
MONOCYTES NFR BLD: 14 % (ref 4–12)
NEUTS SEG # BLD: 4.7 K/UL (ref 1.7–8.2)
NEUTS SEG NFR BLD: 73 % (ref 43–78)
NRBC # BLD: 0 K/UL (ref 0–0.2)
PLATELET # BLD AUTO: 237 K/UL (ref 150–450)
PMV BLD AUTO: 10.9 FL (ref 9.4–12.3)
POTASSIUM SERPL-SCNC: 3.4 MMOL/L (ref 3.5–5.1)
PROT SERPL-MCNC: 5.9 G/DL (ref 6.3–8.2)
RBC # BLD AUTO: 3.72 M/UL (ref 4.23–5.6)
SODIUM SERPL-SCNC: 141 MMOL/L (ref 136–145)
WBC # BLD AUTO: 6.4 K/UL (ref 4.3–11.1)

## 2021-05-26 PROCEDURE — 94760 N-INVAS EAR/PLS OXIMETRY 1: CPT

## 2021-05-26 PROCEDURE — 97161 PT EVAL LOW COMPLEX 20 MIN: CPT

## 2021-05-26 PROCEDURE — 83605 ASSAY OF LACTIC ACID: CPT

## 2021-05-26 PROCEDURE — 83690 ASSAY OF LIPASE: CPT

## 2021-05-26 PROCEDURE — 97530 THERAPEUTIC ACTIVITIES: CPT

## 2021-05-26 PROCEDURE — 74011000250 HC RX REV CODE- 250: Performed by: INTERNAL MEDICINE

## 2021-05-26 PROCEDURE — 74011000250 HC RX REV CODE- 250: Performed by: FAMILY MEDICINE

## 2021-05-26 PROCEDURE — 80053 COMPREHEN METABOLIC PANEL: CPT

## 2021-05-26 PROCEDURE — 74011250637 HC RX REV CODE- 250/637: Performed by: FAMILY MEDICINE

## 2021-05-26 PROCEDURE — 94640 AIRWAY INHALATION TREATMENT: CPT

## 2021-05-26 PROCEDURE — 36415 COLL VENOUS BLD VENIPUNCTURE: CPT

## 2021-05-26 PROCEDURE — 74011250636 HC RX REV CODE- 250/636: Performed by: NURSE PRACTITIONER

## 2021-05-26 PROCEDURE — 2709999900 HC NON-CHARGEABLE SUPPLY

## 2021-05-26 PROCEDURE — 74011250637 HC RX REV CODE- 250/637: Performed by: HOSPITALIST

## 2021-05-26 PROCEDURE — 74011250637 HC RX REV CODE- 250/637: Performed by: INTERNAL MEDICINE

## 2021-05-26 PROCEDURE — 74011250636 HC RX REV CODE- 250/636: Performed by: FAMILY MEDICINE

## 2021-05-26 PROCEDURE — 83735 ASSAY OF MAGNESIUM: CPT

## 2021-05-26 PROCEDURE — 74011250637 HC RX REV CODE- 250/637: Performed by: NURSE PRACTITIONER

## 2021-05-26 PROCEDURE — 74018 RADEX ABDOMEN 1 VIEW: CPT

## 2021-05-26 PROCEDURE — 65270000029 HC RM PRIVATE

## 2021-05-26 PROCEDURE — 85025 COMPLETE CBC W/AUTO DIFF WBC: CPT

## 2021-05-26 RX ORDER — POTASSIUM CHLORIDE 14.9 MG/ML
20 INJECTION INTRAVENOUS
Status: DISCONTINUED | OUTPATIENT
Start: 2021-05-26 | End: 2021-05-26

## 2021-05-26 RX ORDER — POTASSIUM CHLORIDE 14.9 MG/ML
20 INJECTION INTRAVENOUS ONCE
Status: COMPLETED | OUTPATIENT
Start: 2021-05-26 | End: 2021-05-26

## 2021-05-26 RX ORDER — MAGNESIUM SULFATE HEPTAHYDRATE 40 MG/ML
2 INJECTION, SOLUTION INTRAVENOUS ONCE
Status: COMPLETED | OUTPATIENT
Start: 2021-05-26 | End: 2021-05-26

## 2021-05-26 RX ORDER — POTASSIUM CHLORIDE 20 MEQ/1
40 TABLET, EXTENDED RELEASE ORAL
Status: DISCONTINUED | OUTPATIENT
Start: 2021-05-26 | End: 2021-05-26

## 2021-05-26 RX ORDER — MORPHINE SULFATE 2 MG/ML
1 INJECTION, SOLUTION INTRAMUSCULAR; INTRAVENOUS ONCE
Status: COMPLETED | OUTPATIENT
Start: 2021-05-27 | End: 2021-05-26

## 2021-05-26 RX ADMIN — IPRATROPIUM BROMIDE AND ALBUTEROL SULFATE 3 ML: .5; 3 SOLUTION RESPIRATORY (INHALATION) at 20:00

## 2021-05-26 RX ADMIN — BUDESONIDE 500 MCG: 0.5 INHALANT RESPIRATORY (INHALATION) at 20:00

## 2021-05-26 RX ADMIN — SODIUM CHLORIDE 125 ML/HR: 900 INJECTION, SOLUTION INTRAVENOUS at 12:12

## 2021-05-26 RX ADMIN — GUAIFENESIN 600 MG: 600 TABLET ORAL at 20:53

## 2021-05-26 RX ADMIN — ALBUTEROL SULFATE 2.5 MG: 2.5 SOLUTION RESPIRATORY (INHALATION) at 05:23

## 2021-05-26 RX ADMIN — IPRATROPIUM BROMIDE AND ALBUTEROL SULFATE 3 ML: .5; 3 SOLUTION RESPIRATORY (INHALATION) at 13:01

## 2021-05-26 RX ADMIN — AMLODIPINE BESYLATE 10 MG: 10 TABLET ORAL at 08:32

## 2021-05-26 RX ADMIN — MORPHINE SULFATE 1 MG: 2 INJECTION, SOLUTION INTRAMUSCULAR; INTRAVENOUS at 23:55

## 2021-05-26 RX ADMIN — Medication 1 TABLET: at 08:32

## 2021-05-26 RX ADMIN — BUDESONIDE 500 MCG: 0.5 INHALANT RESPIRATORY (INHALATION) at 07:33

## 2021-05-26 RX ADMIN — POTASSIUM CHLORIDE 20 MEQ: 14.9 INJECTION, SOLUTION INTRAVENOUS at 18:02

## 2021-05-26 RX ADMIN — SIMETHICONE 40 MG: 80 TABLET, CHEWABLE ORAL at 23:55

## 2021-05-26 RX ADMIN — SODIUM CHLORIDE 125 ML/HR: 900 INJECTION, SOLUTION INTRAVENOUS at 05:24

## 2021-05-26 RX ADMIN — IPRATROPIUM BROMIDE AND ALBUTEROL SULFATE 3 ML: .5; 3 SOLUTION RESPIRATORY (INHALATION) at 07:33

## 2021-05-26 RX ADMIN — LORAZEPAM 1 MG: 2 INJECTION INTRAMUSCULAR; INTRAVENOUS at 20:53

## 2021-05-26 RX ADMIN — POTASSIUM CHLORIDE 20 MEQ: 14.9 INJECTION, SOLUTION INTRAVENOUS at 16:28

## 2021-05-26 RX ADMIN — LORAZEPAM 1 MG: 2 INJECTION INTRAMUSCULAR; INTRAVENOUS at 17:59

## 2021-05-26 RX ADMIN — Medication 100 MG: at 08:32

## 2021-05-26 RX ADMIN — FOLIC ACID 1 MG: 1 TABLET ORAL at 08:31

## 2021-05-26 RX ADMIN — GUAIFENESIN 600 MG: 600 TABLET ORAL at 08:32

## 2021-05-26 RX ADMIN — PANTOPRAZOLE SODIUM 40 MG: 40 TABLET, DELAYED RELEASE ORAL at 08:31

## 2021-05-26 RX ADMIN — Medication 10 ML: at 22:03

## 2021-05-26 RX ADMIN — MAGNESIUM SULFATE HEPTAHYDRATE 2 G: 40 INJECTION, SOLUTION INTRAVENOUS at 16:27

## 2021-05-26 RX ADMIN — Medication 10 ML: at 14:00

## 2021-05-26 RX ADMIN — HYDROMORPHONE HYDROCHLORIDE 1 MG: 1 INJECTION, SOLUTION INTRAMUSCULAR; INTRAVENOUS; SUBCUTANEOUS at 12:13

## 2021-05-26 RX ADMIN — RIVAROXABAN 20 MG: 20 TABLET, FILM COATED ORAL at 08:32

## 2021-05-26 RX ADMIN — IPRATROPIUM BROMIDE AND ALBUTEROL SULFATE 3 ML: .5; 3 SOLUTION RESPIRATORY (INHALATION) at 01:31

## 2021-05-26 NOTE — PROGRESS NOTES
END OF SHIFT NOTE:    INTAKE/OUTPUT  05/25 0701 - 05/26 0700  In: 6457 [P.O.:420; I.V.:5007]  Out: 1225 [Urine:1225]  Voiding: YES  Catheter: NO  Drain:              Flatus: Patient does have flatus present. Stool:  0 occurrences. Characteristics:  Stool Assessment  Stool Color: Yulissa Hotter, Yellow  Stool Appearance: Loose (little pellets)  Stool Amount: Medium  Stool Source/Status: Rectum    Emesis: 0 occurrences. Characteristics:        VITAL SIGNS  Patient Vitals for the past 12 hrs:   Temp Pulse Resp BP SpO2   05/26/21 0523     93 %   05/26/21 0331 98.6 °F (37 °C) 88 18 (!) 162/87 95 %   05/26/21 0131     97 %   05/25/21 2245 98 °F (36.7 °C) 62 18 (!) 161/96 99 %   05/25/21 2132  91  (!) 159/91    05/25/21 1930 98.6 °F (37 °C) 99 18 (!) 180/117 100 %   05/25/21 1925     94 %       Pain Assessment  Pain Intensity 1: 0 (05/26/21 0005)  Pain Location 1: Abdomen  Pain Intervention(s) 1: Medication (see MAR)  Patient Stated Pain Goal: 0    Ambulating  Yes, up to use the urinal.       Shift report given to oncoming nurse at the bedside.     Neptali Cohen RN

## 2021-05-26 NOTE — PROGRESS NOTES
Chart screened by  for discharge planning. Patient noted to have worsening ileus per NP notes from 5/25/2021. CM placed order for PT as patient has been here for 5 days and needs to ambulate. CM will continue to follow patient during hospitalization for discharge planning and needs. Please consult  if any new issues arise.

## 2021-05-26 NOTE — PROGRESS NOTES
END OF SHIFT NOTE:    Hourly rounds conducted. INTAKE/OUTPUT  05/24 0701 - 05/25 0700  In: 480 [P.O.:480]  Out: 500 [Urine:500]  Voiding: YES  Catheter: NO  Drain:              Flatus: Patient does have flatus present. Stool:  2 occurrences. Characteristics:  Stool Assessment  Stool Color: Brown  Stool Appearance: Loose, Watery (per pt)  Stool Amount: Medium  Stool Source/Status: Rectum    Emesis: 0 occurrences. Characteristics:        VITAL SIGNS  Patient Vitals for the past 12 hrs:   Temp Pulse Resp BP SpO2   05/25/21 1930 98.6 °F (37 °C) 99 18 (!) 180/117 100 %   05/25/21 1925     94 %   05/25/21 1537 99.4 °F (37.4 °C) 89 17 (!) 152/92 97 %   05/25/21 1400     94 %   05/25/21 1140 98.7 °F (37.1 °C) 89 19 (!) 168/85 97 %       Pain Assessment  Pain Intensity 1: 0 (05/25/21 1728)  Pain Location 1: Abdomen  Pain Intervention(s) 1: Medication (see MAR)  Patient Stated Pain Goal: 0    Ambulating  Yes    Shift report given to oncoming nurse at the bedside.     [facility]

## 2021-05-26 NOTE — PROGRESS NOTES
PHYSICAL THERAPY ASSESSMENT: Initial Assessment and Discharge PT Treatment Day # 1      Chaka Almaguer is a 72 y.o. male   PRIMARY DIAGNOSIS: Alcoholic pancreatitis  Alcoholic pancreatitis [A72.40]       Reason for Referral:    ICD-10: Treatment Diagnosis: Other abnormalities of gait and mobility (R26.89)  INPATIENT: Payor: [employer] MMP / Plan: [place] [email or web address] MMP / Product Type: Managed Care Medicare /     ASSESSMENT:     REHAB RECOMMENDATIONS:   Recommendation to date pending progress:  Setting:   No further skilled therapy   Equipment:    To Be Determined     PRIOR LEVEL OF FUNCTION:  (Prior to Hospitalization) INITIAL/CURRENT LEVEL OF FUNCTION:  (Most Recently Demonstrated)   Bed Mobility:   Independent  Sit to Stand:   Independent  Transfers:   Independent  Gait/Mobility:   Independent Bed Mobility:   Modified Independent  Sit to Stand:   Modified Independent  Transfers:   Not tested  Gait/Mobility:   Standby Assistance     ASSESSMENT:  Mr. Irwin Varghese is a 72year old male admitted with epigastric pain. He has a PMH that includes ETOH abuse, asthma, HLD, HTN and multiple pulmonary nodules. Pt demonstrates independence within his room and ambulates 250' with SBA. He occasionally reaches for the rails in the hallway and when asked about stability states, \"oh, I'm just lazy. \" He demonstrates slight lateral trunk sway and decreased gait speed throughout but reports that this is his baseline. Pt demonstrates functional strength and sensation and doesn't demonstrate any deficits in bed mobility or transfers. Pt is not in need of skilled acute physical therapy at this time and will bed DC from caseload. SUBJECTIVE:   Mr. Irwin Varghese states, \"Sure, we can go for a walk. \"    SOCIAL HISTORY/LIVING ENVIRONMENT: Pt lives alone in an apartment with a level entry, no falls, use of RW or SPC with gout flare up, amb short distances, independent with all ADLs  Home Environment: Apartment  # Steps to Enter: 0  One/Two Story Residence: One story  Living Alone: Yes  Support Systems: Family member(s)  OBJECTIVE:     PAIN: VITAL SIGNS: LINES/DRAINS:   Pre Treatment: Pain Screen  Pain Scale 1: Numeric (0 - 10)  Pain Intensity 1: 0  Post Treatment: 0   IV  O2 Device: None (Room air)     GROSS EVALUATION:   Within Functional Limits Abnormal/ Functional Abnormal/ Non-Functional (see comments) Not Tested Comments:   AROM [x] [] [] []    PROM [] [] [] []    Strength [] [x] [] [] Grossly 4-/5 in BLE   Balance [x] [] [] []    Posture [x] [] [] []    Sensation [x] [] [] []    Coordination [] [] [] []    Tone [] [] [] []    Edema [] [] [] []    Activity Tolerance [] [x] [] [] SOB, states this is due to asthma    [] [] [] []      COGNITION/  PERCEPTION: Intact Impaired   (see comments) Comments:   Orientation [x] []    Vision [x] []    Hearing [x] []    Command Following [x] []    Safety Awareness [x] []     [] []      MOBILITY: I Mod I S SBA CGA Min Mod Max Total  NT x2 Comments:   Bed Mobility    Rolling [] [] [] [] [] [] [] [] [] [x] []    Supine to Sit [] [x] [] [] [] [] [] [] [] [] []    Scooting [] [] [] [] [] [] [] [] [] [x] []    Sit to Supine [] [] [] [] [] [] [] [] [] [x] []    Transfers    Sit to Stand [] [x] [] [] [] [] [] [] [] [] []    Bed to Chair [] [] [] [] [] [] [] [] [] [x] []    Stand to Sit [] [x] [] [] [] [] [] [] [] [] []    I=Independent, Mod I=Modified Independent, S=Supervision, SBA=Standby Assistance, CGA=Contact Guard Assistance,   Min=Minimal Assistance, Mod=Moderate Assistance, Max=Maximal Assistance, Total=Total Assistance, NT=Not Tested  GAIT: I Mod I S SBA CGA Min Mod Max Total  NT x2 Comments:   Level of Assistance [] [] [] [x] [] [] [] [] [] [] []    Distance 250'    DME None    Gait Quality Decreased gait speed, slight lateral trunk sway    Weightbearing Status N/A     I=Independent, Mod I=Modified Independent, S=Supervision, SBA=Standby Assistance, CGA=Contact Guard Assistance, Min=Minimal Assistance, Mod=Moderate Assistance, Max=Maximal Assistance, Total=Total Assistance, NT=Not Tested    [address] [place] [id number] AM-PAC [place] Form       How much difficulty does the patient currently have. .. Unable A Lot A Little None   1. Turning over in bed (including adjusting bedclothes, sheets and blankets)? [] 1   [] 2   [] 3   [x] 4   2. Sitting down on and standing up from a chair with arms ( e.g., wheelchair, bedside commode, etc.)   [] 1   [] 2   [] 3   [x] 4   3. Moving from lying on back to sitting on the side of the bed? [] 1   [] 2   [] 3   [x] 4   How much help from another person does the patient currently need. .. Total A Lot A Little None   4. Moving to and from a bed to a chair (including a wheelchair)? [] 1   [] 2   [] 3   [x] 4   5. Need to walk in hospital room? [] 1   [] 2   [] 3   [x] 4   6. Climbing 3-5 steps with a railing? [x] 1   [] 2   [] 3   [] 4   © 2007, Trustees of [address] [place] [address], under license to [employer]. All rights reserved     Score:  Initial: 21 Most Recent: X (Date: -- )    Interpretation of Tool:  Represents activities that are increasingly more difficult (i.e. Bed mobility, Transfers, Gait). PLAN:   FREQUENCY/DURATION: PT Plan of Care:  (no continued therapy needed)     PROBLEM LIST:   (Skilled intervention is medically necessary to address:)  1. none   INTERVENTIONS PLANNED:   (Benefits and precautions of physical therapy have been discussed with the patient.)  1. none     TREATMENT:     EVALUATION: Low Complexity : (Untimed Charge)    TREATMENT:   ($$ Therapeutic Activity: 8-22 mins    )  Therapeutic Activity (8 Minutes): Therapeutic activity included Supine to Sit, Transfer Training, Ambulation on level ground, Sitting balance  and Standing balance to improve functional Mobility, Strength and Activity tolerance.     TREATMENT GRID:  N/A    AFTER TREATMENT POSITION/PRECAUTIONS:  Chair, Needs within reach and RN notified    INTERDISCIPLINARY COLLABORATION:  RN/PCT and PT/PTA    TOTAL TREATMENT DURATION:  PT Patient Time In/Time Out  Time In: 4202  Time Out: [address]

## 2021-05-26 NOTE — PROGRESS NOTES
Problem: Falls - Risk of  Goal: *Absence of Falls  Description: Document [place] Broad Fall Risk and appropriate interventions in the flowsheet. Outcome: Progressing Towards Goal  Note: Fall Risk Interventions:  Mobility Interventions: Communicate number of staff needed for ambulation/transfer, Patient to call before getting OOB    Mentation Interventions: Adequate sleep, hydration, pain control, Increase mobility, More frequent rounding    Medication Interventions: Evaluate medications/consider consulting pharmacy, Patient to call before getting OOB, Teach patient to arise slowly    Elimination Interventions: Call light in reach, Patient to call for help with toileting needs, Urinal in reach, Toileting schedule/hourly rounds              Problem: Pressure Injury - Risk of  Goal: *Prevention of pressure injury  Description: Document Oyvany Scale and appropriate interventions in the flowsheet. Outcome: Progressing Towards Goal  Note: Pressure Injury Interventions:  Sensory Interventions: Check visual cues for pain, Minimize linen layers, Keep linens dry and wrinkle-free, Pressure redistribution bed/mattress (bed type), Turn and reposition approx.  every two hours (pillows and wedges if needed)    Moisture Interventions: Absorbent underpads, Limit adult briefs, Minimize layers    Activity Interventions: Increase time out of bed, Pressure redistribution bed/mattress(bed type), PT/OT evaluation    Mobility Interventions: HOB 30 degrees or less, Pressure redistribution bed/mattress (bed type), PT/OT evaluation    Nutrition Interventions: Document food/fluid/supplement intake    Friction and Shear Interventions: HOB 30 degrees or less, Minimize layers

## 2021-05-27 ENCOUNTER — APPOINTMENT (OUTPATIENT)
Dept: GENERAL RADIOLOGY | Age: 71
DRG: 439 | End: 2021-05-27
Attending: NURSE PRACTITIONER
Payer: COMMERCIAL

## 2021-05-27 LAB
ALBUMIN SERPL-MCNC: 2.4 G/DL (ref 3.2–4.6)
ALBUMIN/GLOB SERPL: 0.6 {RATIO} (ref 1.2–3.5)
ALP SERPL-CCNC: 137 U/L (ref 50–136)
ALT SERPL-CCNC: 33 U/L (ref 12–65)
ANION GAP SERPL CALC-SCNC: 8 MMOL/L (ref 7–16)
AST SERPL-CCNC: 23 U/L (ref 15–37)
BASOPHILS # BLD: 0 K/UL (ref 0–0.2)
BASOPHILS NFR BLD: 0 % (ref 0–2)
BILIRUB SERPL-MCNC: 0.5 MG/DL (ref 0.2–1.1)
BUN SERPL-MCNC: 8 MG/DL (ref 8–23)
CALCIUM SERPL-MCNC: 8.6 MG/DL (ref 8.3–10.4)
CHLORIDE SERPL-SCNC: 109 MMOL/L (ref 98–107)
CO2 SERPL-SCNC: 22 MMOL/L (ref 21–32)
CREAT SERPL-MCNC: 0.71 MG/DL (ref 0.8–1.5)
DIFFERENTIAL METHOD BLD: ABNORMAL
EOSINOPHIL # BLD: 0.1 K/UL (ref 0–0.8)
EOSINOPHIL NFR BLD: 2 % (ref 0.5–7.8)
ERYTHROCYTE [DISTWIDTH] IN BLOOD BY AUTOMATED COUNT: 15.4 % (ref 11.9–14.6)
GLOBULIN SER CALC-MCNC: 3.7 G/DL (ref 2.3–3.5)
GLUCOSE SERPL-MCNC: 100 MG/DL (ref 65–100)
HCT VFR BLD AUTO: 33 % (ref 41.1–50.3)
HGB BLD-MCNC: 10.4 G/DL (ref 13.6–17.2)
IMM GRANULOCYTES # BLD AUTO: 0 K/UL (ref 0–0.5)
IMM GRANULOCYTES NFR BLD AUTO: 1 % (ref 0–5)
LYMPHOCYTES # BLD: 0.7 K/UL (ref 0.5–4.6)
LYMPHOCYTES NFR BLD: 11 % (ref 13–44)
MCH RBC QN AUTO: 27.3 PG (ref 26.1–32.9)
MCHC RBC AUTO-ENTMCNC: 31.5 G/DL (ref 31.4–35)
MCV RBC AUTO: 86.6 FL (ref 79.6–97.8)
MONOCYTES # BLD: 0.9 K/UL (ref 0.1–1.3)
MONOCYTES NFR BLD: 14 % (ref 4–12)
NEUTS SEG # BLD: 4.4 K/UL (ref 1.7–8.2)
NEUTS SEG NFR BLD: 72 % (ref 43–78)
NRBC # BLD: 0 K/UL (ref 0–0.2)
PLATELET # BLD AUTO: 279 K/UL (ref 150–450)
PMV BLD AUTO: 10.8 FL (ref 9.4–12.3)
POTASSIUM SERPL-SCNC: 3.7 MMOL/L (ref 3.5–5.1)
PROT SERPL-MCNC: 6.1 G/DL (ref 6.3–8.2)
RBC # BLD AUTO: 3.81 M/UL (ref 4.23–5.6)
SODIUM SERPL-SCNC: 139 MMOL/L (ref 138–145)
WBC # BLD AUTO: 6 K/UL (ref 4.3–11.1)

## 2021-05-27 PROCEDURE — 74011250636 HC RX REV CODE- 250/636: Performed by: FAMILY MEDICINE

## 2021-05-27 PROCEDURE — 77030008771 HC TU NG SALEM SUMP -A

## 2021-05-27 PROCEDURE — 74018 RADEX ABDOMEN 1 VIEW: CPT

## 2021-05-27 PROCEDURE — 74011250637 HC RX REV CODE- 250/637: Performed by: NURSE PRACTITIONER

## 2021-05-27 PROCEDURE — 74011250637 HC RX REV CODE- 250/637: Performed by: INTERNAL MEDICINE

## 2021-05-27 PROCEDURE — 85025 COMPLETE CBC W/AUTO DIFF WBC: CPT

## 2021-05-27 PROCEDURE — 74011250637 HC RX REV CODE- 250/637: Performed by: FAMILY MEDICINE

## 2021-05-27 PROCEDURE — 74011250636 HC RX REV CODE- 250/636: Performed by: NURSE PRACTITIONER

## 2021-05-27 PROCEDURE — 74011000250 HC RX REV CODE- 250: Performed by: FAMILY MEDICINE

## 2021-05-27 PROCEDURE — 94760 N-INVAS EAR/PLS OXIMETRY 1: CPT

## 2021-05-27 PROCEDURE — 2709999900 HC NON-CHARGEABLE SUPPLY

## 2021-05-27 PROCEDURE — 74011250637 HC RX REV CODE- 250/637: Performed by: HOSPITALIST

## 2021-05-27 PROCEDURE — 74011000250 HC RX REV CODE- 250: Performed by: NURSE PRACTITIONER

## 2021-05-27 PROCEDURE — 80053 COMPREHEN METABOLIC PANEL: CPT

## 2021-05-27 PROCEDURE — 65270000029 HC RM PRIVATE

## 2021-05-27 PROCEDURE — 36415 COLL VENOUS BLD VENIPUNCTURE: CPT

## 2021-05-27 PROCEDURE — 94640 AIRWAY INHALATION TREATMENT: CPT

## 2021-05-27 PROCEDURE — 74011000250 HC RX REV CODE- 250: Performed by: INTERNAL MEDICINE

## 2021-05-27 RX ADMIN — RIVAROXABAN 20 MG: 20 TABLET, FILM COATED ORAL at 09:26

## 2021-05-27 RX ADMIN — SODIUM CHLORIDE 125 ML/HR: 900 INJECTION, SOLUTION INTRAVENOUS at 17:58

## 2021-05-27 RX ADMIN — IPRATROPIUM BROMIDE AND ALBUTEROL SULFATE 3 ML: .5; 3 SOLUTION RESPIRATORY (INHALATION) at 08:39

## 2021-05-27 RX ADMIN — PANTOPRAZOLE SODIUM 40 MG: 40 TABLET, DELAYED RELEASE ORAL at 09:26

## 2021-05-27 RX ADMIN — AMLODIPINE BESYLATE 10 MG: 10 TABLET ORAL at 09:26

## 2021-05-27 RX ADMIN — GUAIFENESIN 600 MG: 600 TABLET ORAL at 21:26

## 2021-05-27 RX ADMIN — GUAIFENESIN 600 MG: 600 TABLET ORAL at 09:26

## 2021-05-27 RX ADMIN — SODIUM CHLORIDE 125 ML/HR: 900 INJECTION, SOLUTION INTRAVENOUS at 16:41

## 2021-05-27 RX ADMIN — Medication 10 ML: at 21:32

## 2021-05-27 RX ADMIN — BUDESONIDE 500 MCG: 0.5 INHALANT RESPIRATORY (INHALATION) at 08:39

## 2021-05-27 RX ADMIN — BUDESONIDE 500 MCG: 0.5 INHALANT RESPIRATORY (INHALATION) at 19:44

## 2021-05-27 RX ADMIN — FAMOTIDINE 20 MG: 10 INJECTION INTRAVENOUS at 15:00

## 2021-05-27 RX ADMIN — Medication 10 ML: at 05:34

## 2021-05-27 RX ADMIN — Medication 10 ML: at 13:34

## 2021-05-27 RX ADMIN — PHENOL 1 SPRAY: 1.5 LIQUID ORAL at 21:26

## 2021-05-27 RX ADMIN — Medication 1 TABLET: at 09:26

## 2021-05-27 RX ADMIN — FAMOTIDINE 20 MG: 10 INJECTION INTRAVENOUS at 21:12

## 2021-05-27 RX ADMIN — LORAZEPAM 1 MG: 2 INJECTION INTRAMUSCULAR; INTRAVENOUS at 16:35

## 2021-05-27 RX ADMIN — FOLIC ACID 1 MG: 1 TABLET ORAL at 09:26

## 2021-05-27 RX ADMIN — IPRATROPIUM BROMIDE AND ALBUTEROL SULFATE 3 ML: .5; 3 SOLUTION RESPIRATORY (INHALATION) at 19:44

## 2021-05-27 RX ADMIN — ALBUTEROL SULFATE 2.5 MG: 2.5 SOLUTION RESPIRATORY (INHALATION) at 06:24

## 2021-05-27 RX ADMIN — Medication 100 MG: at 09:26

## 2021-05-27 RX ADMIN — IPRATROPIUM BROMIDE AND ALBUTEROL SULFATE 3 ML: .5; 3 SOLUTION RESPIRATORY (INHALATION) at 13:42

## 2021-05-27 NOTE — PROGRESS NOTES
Pt is very restless. Pain is up. Dilaudid stopped per MD order. Ativan 1mg IV given for increased restlessness and anxiety.   Encouraged to get out into the gilliam and ambulate

## 2021-05-27 NOTE — PROGRESS NOTES
END OF SHIFT NOTE:     Hourly rounds conducted. NG tube placed. INTAKE/OUTPUT  05/26 0701 - 05/27 0700  In: 560 [P.O.:560]  Out: 1300 [Urine:1300]  Voiding: YES  Catheter: NO  Drain:              Flatus: Patient does have flatus present. Stool:  4 occurrences. Characteristics:  Stool Assessment  Stool Color: Brown  Stool Appearance: Loose  Stool Amount: Small  Stool Source/Status: Rectum    Emesis: 1 occurrences. Characteristics:       Color: Yellow      VITAL SIGNS  Patient Vitals for the past 12 hrs:   Temp Pulse Resp BP SpO2   05/27/21 1500 98.5 °F (36.9 °C) 97 18 136/87 94 %   05/27/21 1342     96 %   05/27/21 1110 97.4 °F (36.3 °C) 89 18 (!) 168/72 96 %   05/27/21 0839     96 %   05/27/21 0715 97.9 °F (36.6 °C) 85 18 (!) 160/95 94 %       Pain Assessment  Pain Intensity 1: 0 (05/27/21 0850)  Pain Location 1: Abdomen  Pain Intervention(s) 1: Medication (see MAR)  Patient Stated Pain Goal: 0    Ambulating  Yes    Shift report given to oncoming nurse at the bedside.     Florecita Hopson

## 2021-05-27 NOTE — PROGRESS NOTES
This RN attempted to place NG tube twice unsuccessfully. Another RN tried to place NG tube, but pt was coughing, vomiting, and crying for it to be taken out. Provider notified. Pt has multiple loose brown stools.

## 2021-05-27 NOTE — PROGRESS NOTES
[facility]ist Progress Note     Name:  Dana Yoder  Age:70 y.o. Sex:male   :  1950    MRN:  [id number]     Admit Date:  2021    Reason for Admission:  Alcoholic pancreatitis [R02.95]    Assessment & Plan   ALCOHOLIC PANCREATITIS:  HISTORY OF PRIOR EPISODES:                :  Advanced to clear liquid diet.  Unable to tolerate              :  Worsening abdominal pain and distention.  KUB without                        focal obstruction or dilation.               :  Improved immensely, wants diet increase.  Increased to full                  [id number]              3/90:  Diet increased to GI soft, found with worsening ileus without                 Symptoms. Backed down to full liquids.  Spoke with surgery, recs to continue diet, recheck in am.                  21 Repeat KUB today showing unchanged small bowel distention. Surgery formerly consulted. Patient made NPO with lactic ordered. Get patient up and moving with repeat KUB in a.m. Narcotics discontinued due to SE of decreased gut motility    21 Lipase 84 21     ? Ileus  21 Spoke with surgery with recs for surgical intervention at this time. ?2/2 pancreatitis. Patient noted with increased abdominal distention today. Repeat KUB today showing multiple dilated small bowel loops. NGT to wall suction. Repeat KUB in a.m. Holding Xarelto for now.  Surgery if needed    ALCOHOL ABUSE:  DRINKS 1/2 PINT OF LIQUOR DAILY:                :  No signs of DT or withdrawal symptoms, continue po                         vitamins                          continue serial labs                         Continue prn ativan  21 No withdrawal symptoms noted    21 Liver enzymes WNL    : Kylee Crumb:  K+: 3.1              Replace and recheck              :  Mag 1.6, K+: 3.3   21 Mg 1.8 K+ 3.4 Replete and recheck in a.m.    21 Resolved     ACCELERATED HYPERTENSION:                :  Remains slightly elevated on norvasc 10 mg daily.             Continue prn apresoline, labetalol   5/26/21 /90; continue prn antihypertensives and Norvasc   5/27/21 continue Norvasc and prn labetalol      CAD WITH HISTORY OF MI:  Noted      GERD:  Continue protonix     HISTORY OF PROSTATE CANCER:  S/P PROSTAATECTOMY:  Noted      ASTHMA/COPD:  Stable, no current exacerbation, continue pulmicort and duoneb      TOBACCO ABUSE:  QUIT 2 YEARS AGO:  Noted      HYPERLIPIDEMIA:  Holding home pravachol     HISTORY OF PULMONARY EMBOLI 2019:  Continue xarelto     MULTIPLE PULMONARY NODULES:  Noted      HISTORY OF STROKE:  No residual      HISTORY OF GOUT:  Holding home colchicine, no flare                    Diet:  DIET NPO  DVT PPx: scds  GI Ppx: pepcid  Code: DNR    Dispo/Discharge Planning: Dispo pending    Hospital Course/Subjective:   Patient presented to ER 5/20 with complaints of sudden onset, severe upper abdominal pain x approx one hour along with nausea, no vomiting.  History of alcohol abuse, drinking at least 1/2 pint of liquor daily, can not remember when last drink was.  Poor historian.  . Found with lipase of 24,724.  CT with findings of pancreatitis.   Has slowly progressed to tolerating clear liquids on 5/23.          Subjective/24 hr Events (05/27/21):  5/24: Beatriz Peterson in chair, reports feeling much better.  Denies pain or nausea at this time, requesting diet advance.  Will increase to full liquids for lunch.  Reports normal BM this am.    5/25:  Had planned for discharge if patient could tolerate GI soft diet, however, KUB indicates worsening ileus.  Patient denies nausea, abdominal pain.  Only pain is right upper quad and epigastric.  Wants to eat.  Active bowel sounds in all quads.  Spoke with surgery, recommend continue GI soft diet and re-eval in am.  Patient informed.  Passing flatus.  Intermittent diarrhea for six months.  Last colo with polyps.  Prior colectomy for polyp that scope could not reach.  No problems since.  LXK not investigated current diarrhea.  K+ down to 3.1, Mag 1.6. Lipase down to 81.   5/26/21 Patient reports he is unable to tolerate full liquids. Reporting GI upset. Also reports he is unaware as to why he got pancreatitis. Circumstances explained and patient stated he will try to stop drinking stating he does not need help with cessation. Repots some continued generlized abdominal pain that has improved since admit  5/27/21 Patient denies passing flatus today. No BM since admit. He denied abdominal pain but per nursing he is noted to moan/groan throughout shift and complains of belly pain     Review of Systems: 14 point review of systems is otherwise negative with the exception of the elements mentioned above. Objective:     Patient Vitals for the past 24 hrs:   Temp Pulse Resp BP SpO2   05/27/21 1110 97.4 °F (36.3 °C) 89 18 (!) 168/72 96 %   05/27/21 0839     96 %   05/27/21 0715 97.9 °F (36.6 °C) 85 18 (!) 160/95 94 %   05/27/21 0625     96 %   05/27/21 0349 99 °F (37.2 °C) (!) 104 22 (!) 147/99 95 %   05/26/21 2337 97.9 °F (36.6 °C) 95 24 (!) 154/93 95 %   05/26/21 1937     95 %   05/26/21 1930 99.5 °F (37.5 °C) (!) 105 20 (!) 142/92 96 %   05/26/21 1520 98.2 °F (36.8 °C) 99 18 (!) 152/93 96 %   05/26/21 1306     94 %     Oxygen Therapy  O2 Sat (%): 96 % (05/27/21 1110)  Pulse via Oximetry: 89 beats per minute (05/27/21 0839)  O2 Device: None (Room air) (05/27/21 0839)  Skin Assessment: Clean, dry, & intact (05/26/21 2240)  Skin Protection for O2 Device: N/A (05/27/21 0625)  O2 Flow Rate (L/min): 0 l/min (05/25/21 1400)  FIO2 (%): 21 % (05/27/21 0625)    Body mass index is 30.38 kg/m².     Physical Exam:   General: No acute distress, speaking in full sentences, no use of accessory muscles   HEENT: Pupils equal;  oropharynx is clear   Neck: no JVD   Lungs: Clear to auscultation bilaterally   Cardiovascular: Regular rate and rhythm with normal S1 and S2   Abdomen: Soft, tender, distended, normoactive bowel sounds   Extremities: No cyanosis clubbing or edema   Neuro: Nonfocal, A&O x3   Psych: Normal affect     Data Review:  I have reviewed all labs, meds, and studies from the last 24 hours:    Labs:    Recent Results (from the past 24 hour(s))   LACTIC ACID    Collection Time: 05/26/21  2:21 PM   Result Value Ref Range    Lactic acid 1.1 0.4 - 2.0 MMOL/L   CBC WITH AUTOMATED DIFF    Collection Time: 05/27/21  4:19 AM   Result Value Ref Range    WBC 6.0 4.3 - 11.1 K/uL    RBC 3.81 (L) 4.23 - 5.6 M/uL    HGB 10.4 (L) 13.6 - 17.2 g/dL    HCT 33.0 (L) 41.1 - 50.3 %    MCV 86.6 79.6 - 97.8 FL    MCH 27.3 26.1 - 32.9 PG    MCHC 31.5 31.4 - 35.0 g/dL    RDW 15.4 (H) 11.9 - 14.6 %    PLATELET 638 725 - 413 K/uL    MPV 10.8 9.4 - 12.3 FL    ABSOLUTE NRBC 0.00 0.0 - 0.2 K/uL    DF AUTOMATED      NEUTROPHILS 72 43 - 78 %    LYMPHOCYTES 11 (L) 13 - 44 %    MONOCYTES 14 (H) 4.0 - 12.0 %    EOSINOPHILS 2 0.5 - 7.8 %    BASOPHILS 0 0.0 - 2.0 %    IMMATURE GRANULOCYTES 1 0.0 - 5.0 %    ABS. NEUTROPHILS 4.4 1.7 - 8.2 K/UL    ABS. LYMPHOCYTES 0.7 0.5 - 4.6 K/UL    ABS. MONOCYTES 0.9 0.1 - 1.3 K/UL    ABS. EOSINOPHILS 0.1 0.0 - 0.8 K/UL    ABS. BASOPHILS 0.0 0.0 - 0.2 K/UL    ABS. IMM. GRANS. 0.0 0.0 - 0.5 K/UL   METABOLIC PANEL, COMPREHENSIVE    Collection Time: 05/27/21  4:19 AM   Result Value Ref Range    Sodium 139 138 - 145 mmol/L    Potassium 3.7 3.5 - 5.1 mmol/L    Chloride 109 (H) 98 - 107 mmol/L    CO2 22 21 - 32 mmol/L    Anion gap 8 7 - 16 mmol/L    Glucose 100 65 - 100 mg/dL    BUN 8 8 - 23 MG/DL    Creatinine 0.71 (L) 0.8 - 1.5 MG/DL    GFR est AA >60 >60 ml/min/1.73m2    GFR est non-AA >60 >60 ml/min/1.73m2    Calcium 8.6 8.3 - 10.4 MG/DL    Bilirubin, total 0.5 0.2 - 1.1 MG/DL    ALT (SGPT) 33 12 - 65 U/L    AST (SGOT) 23 15 - 37 U/L    Alk.  phosphatase 137 (H) 50 - 136 U/L    Protein, total 6.1 (L) 6.3 - 8.2 g/dL    Albumin 2.4 (L) 3.2 - 4.6 g/dL    Globulin 3.7 (H) 2.3 - 3.5 g/dL    A-G Ratio 0.6 (L) 1.2 - 3.5         All Micro Results     None          EKG Results     None          Other Studies:  CT CHEST WO CONT    Result Date: 5/20/2021  CT OF THE CHEST WITH INTRAVENOUS CONTRAST. INDICATION: Shortness breath. COPD exacerbation. Abdominal pain. COMPARISON: June 2019. TECHNIQUE:   5 mm axial scans from the apices through the diaphragms without contrast. Radiation dose reduction techniques were used for this study. Our CT scanners use one or more of the following:  Automated exposure control, adjustment of the mA and or kV according to patient size, iterative reconstruction. FINDINGS: LUNGS: No pulmonary nodules. Minimal atelectasis at the bases. No airspace disease. AIRWAYS: Trachea and proximal bronchi grossly patent. PLEURA: No effusion or thickening or calcifications. LYMPH NODES: No enlarged axillary, hilar or mediastinal lymph nodes. HEART: Normal size. CORONARIES: Mild  calcifications. UPPER ABDOMEN: Moderate hiatal hernia. . SKELETAL/CHEST WALL: DJD, otherwise no gross bony lesions. Bilateral renal cysts. Low-attenuation of the liver. Stranding densities surrounding the pancreas suspicious for acute pancreatitis. 1) Stranding densities near the pancreas suspicious for acute pancreatitis although incompletely evaluated. 2) No acute abnormality of alignment. 3) Additional findings include: Coronary atherosclerosis, moderate hiatal hernia, bilateral renal cysts and fatty infiltration of the liver     CT ABD PELV W CONT    Result Date: 5/20/2021  HISTORY:  Upper abdominal pain, one hour duration. COMPARISON: None EXAM: CT abdomen and pelvis with iv contrast TECHNIQUE: Thin section axial CT was performed from the lung bases through the symphysis pubis during uneventful rapid bolus intravenous administration of 125 mL of Isovue 370. Oral contrast was not administered. Radiation dose reduction techniques were used for this study.   Our CT scanners use one or all of the following: Automated exposure control, adjustment of the mA and/or kV according to patient size, use of iterative reconstruction. FINDINGS: There is a hiatal hernia. CT abdomen: There is a subtle area of low density seen peripherally within the right posterior lobe of the liver, uncertain etiology or significance. The spleen enhances homogeneously without discrete lesions. There is no biliary ductal dilatation. The gallbladder and adrenal glands are normal. There is peripancreatic fat stranding with small fluid seen in the left paracolic gutter. The kidneys enhance symmetrically. Bilateral renal cysts are present. Bowel loops in the upper abdomen are normal. No definite upper abdominal lymphadenopathy seen. CT pelvis: The patient is status post right hemicolectomy. The bladder and rectum are normal. No pelvic adenopathy seen. No free air or free fluid seen within the pelvis. Bone window evaluation demonstrates no aggressive osseous lesions. Findings compatible with pancreatitis.           Current Meds:   Current Facility-Administered Medications   Medication Dose Route Frequency    0.9% sodium chloride infusion  125 mL/hr IntraVENous CONTINUOUS    pantoprazole (PROTONIX) tablet 40 mg  40 mg Oral DAILY    simethicone (MYLICON) tablet 40 mg  40 mg Oral QID PRN    multivitamin, tx-iron-ca-min (THERA-M w/ IRON) tablet 1 Tablet  1 Tablet Oral DAILY    thiamine HCL (B-1) tablet 100 mg  100 mg Oral DAILY    folic acid (FOLVITE) tablet 1 mg  1 mg Oral DAILY    albuterol-ipratropium (DUO-NEB) 2.5 MG-0.5 MG/3 ML  3 mL Nebulization Q6H RT    albuterol (PROVENTIL VENTOLIN) nebulizer solution 2.5 mg  2.5 mg Nebulization Q4H PRN    labetaloL (NORMODYNE;TRANDATE) injection 10 mg  10 mg IntraVENous Q6H PRN    amLODIPine (NORVASC) tablet 10 mg  10 mg Oral DAILY    hydrALAZINE (APRESOLINE) tablet 25 mg  25 mg Oral QID PRN    guaiFENesin ER (MUCINEX) tablet 600 mg  600 mg Oral Q12H    sodium chloride (NS) flush 5-10 mL  5-10 mL IntraVENous Q8H    sodium chloride (NS) flush 5-10 mL  5-10 mL IntraVENous PRN    acetaminophen (TYLENOL) tablet 650 mg  650 mg Oral Q6H PRN    Or    acetaminophen (TYLENOL) suppository 650 mg  650 mg Rectal Q6H PRN    ondansetron (ZOFRAN) injection 4 mg  4 mg IntraVENous Q6H PRN    rivaroxaban (XARELTO) tablet 20 mg  20 mg Oral DAILY WITH BREAKFAST    LORazepam (ATIVAN) injection 1 mg  1 mg IntraVENous Q2H PRN    budesonide (PULMICORT) 500 mcg/2 ml nebulizer suspension  500 mcg Nebulization BID RT       Problem List:  Hospital Problems as of 5/27/2021 Date Reviewed: 10/30/2019        Codes Class Noted - Resolved POA    * (Principal) Alcoholic pancreatitis OOX-81-XW: K85.20  ICD-9-CM: 326.6  5/20/2021 - Present Unknown        Gout ICD-10-CM: M10.9  ICD-9-CM: 274.9  6/29/2019 - Present Yes        Pulmonary emboli ([address] ICD-10-CM: I26.99  ICD-9-CM: 415.19  6/12/2019 - Present Yes        Multiple pulmonary nodules ICD-10-CM: R91.8  ICD-9-CM: 793.19  6/12/2019 - Present Yes        Hyperlipidemia other unsp dyslipidemia ICD-10-CM: E78.5  ICD-9-CM: 272.4  11/18/2015 - Present Yes        CAD (coronary artery disease) (Chronic) ICD-10-CM: I25.10  ICD-9-CM: 414.00  9/15/2013 - Present Yes        Accelerated hypertension ICD-10-CM: I10  ICD-9-CM: 401.0  9/13/2013 - Present Yes               Part of this note was written by using a voice dictation software and the note has been proof read but may still contain some grammatical/other typographical errors.     Signed By: Dominique Maldonado NP   [facility] Hospitalist Service    May 27, 2021  12:32 PM

## 2021-05-27 NOTE — PROGRESS NOTES
END OF SHIFT NOTE:    INTAKE/OUTPUT  05/26 0701 - 05/27 0700  In: 560 [P.O.:560]  Out: 1200 [Urine:1200]  Voiding: YES  Catheter: NO  Drain:              Flatus: Patient does have flatus present. Stool:  0 occurrences. Characteristics:  Stool Assessment  Stool Color: Brown  Stool Appearance: Loose  Stool Amount: Medium  Stool Source/Status: Rectum    Emesis: 0 occurrences. Characteristics:        VITAL SIGNS  Patient Vitals for the past 12 hrs:   Temp Pulse Resp BP SpO2   05/27/21 0349 99 °F (37.2 °C) (!) 104 22 (!) 147/99 95 %   05/26/21 2337 97.9 °F (36.6 °C) 95 24 (!) 154/93 95 %   05/26/21 1937     95 %   05/26/21 1930 99.5 °F (37.5 °C) (!) 105 20 (!) 142/92 96 %       Pain Assessment  Pain Intensity 1: 0 (05/27/21 0058)  Pain Location 1: Abdomen  Pain Intervention(s) 1: Medication (see MAR)  Patient Stated Pain Goal: 0    Ambulating  Yes, ambulated to the bathroom and back to bed      Shift report given to oncoming nurse at the bedside.     Sunny Sahu RN

## 2021-05-27 NOTE — PROGRESS NOTES
Problem: Falls - Risk of  Goal: *Absence of Falls  Description: Document Martin Christopher Fall Risk and appropriate interventions in the flowsheet. Outcome: Progressing Towards Goal  Note: Fall Risk Interventions:  Mobility Interventions: Communicate number of staff needed for ambulation/transfer, Patient to call before getting OOB    Mentation Interventions: Adequate sleep, hydration, pain control, Increase mobility, More frequent rounding    Medication Interventions: Evaluate medications/consider consulting pharmacy, Patient to call before getting OOB, Teach patient to arise slowly    Elimination Interventions: Call light in reach, Patient to call for help with toileting needs, Urinal in reach              Problem: Pressure Injury - Risk of  Goal: *Prevention of pressure injury  Description: Document Yovany Scale and appropriate interventions in the flowsheet.   Outcome: Progressing Towards Goal  Note: Pressure Injury Interventions:  Sensory Interventions: Assess changes in LOC, Avoid rigorous massage over bony prominences, Check visual cues for pain, Keep linens dry and wrinkle-free, Minimize linen layers    Moisture Interventions: Absorbent underpads, Limit adult briefs, Minimize layers    Activity Interventions: Increase time out of bed, Pressure redistribution bed/mattress(bed type), PT/OT evaluation    Mobility Interventions: HOB 30 degrees or less, Pressure redistribution bed/mattress (bed type), PT/OT evaluation    Nutrition Interventions:  (NPO)    Friction and Shear Interventions: HOB 30 degrees or less, Minimize layers

## 2021-05-28 ENCOUNTER — APPOINTMENT (OUTPATIENT)
Dept: GENERAL RADIOLOGY | Age: 71
DRG: 439 | End: 2021-05-28
Attending: NURSE PRACTITIONER
Payer: COMMERCIAL

## 2021-05-28 PROBLEM — E44.1 MILD PROTEIN-CALORIE MALNUTRITION (HCC): Status: ACTIVE | Noted: 2021-05-28

## 2021-05-28 LAB
ANION GAP SERPL CALC-SCNC: 10 MMOL/L (ref 7–16)
BUN SERPL-MCNC: 7 MG/DL (ref 8–23)
CALCIUM SERPL-MCNC: 8.6 MG/DL (ref 8.3–10.4)
CHLORIDE SERPL-SCNC: 111 MMOL/L (ref 98–107)
CO2 SERPL-SCNC: 20 MMOL/L (ref 21–32)
CREAT SERPL-MCNC: 0.77 MG/DL (ref 0.8–1.5)
GLUCOSE SERPL-MCNC: 84 MG/DL (ref 65–100)
MAGNESIUM SERPL-MCNC: 1.8 MG/DL (ref 1.8–2.4)
POTASSIUM SERPL-SCNC: 3.6 MMOL/L (ref 3.5–5.1)
SODIUM SERPL-SCNC: 141 MMOL/L (ref 138–145)

## 2021-05-28 PROCEDURE — 74011250637 HC RX REV CODE- 250/637: Performed by: HOSPITALIST

## 2021-05-28 PROCEDURE — 94760 N-INVAS EAR/PLS OXIMETRY 1: CPT

## 2021-05-28 PROCEDURE — 2709999900 HC NON-CHARGEABLE SUPPLY

## 2021-05-28 PROCEDURE — 74011250636 HC RX REV CODE- 250/636: Performed by: FAMILY MEDICINE

## 2021-05-28 PROCEDURE — 94640 AIRWAY INHALATION TREATMENT: CPT

## 2021-05-28 PROCEDURE — 80048 BASIC METABOLIC PNL TOTAL CA: CPT

## 2021-05-28 PROCEDURE — 74011250636 HC RX REV CODE- 250/636: Performed by: NURSE PRACTITIONER

## 2021-05-28 PROCEDURE — 74011250637 HC RX REV CODE- 250/637: Performed by: FAMILY MEDICINE

## 2021-05-28 PROCEDURE — 36415 COLL VENOUS BLD VENIPUNCTURE: CPT

## 2021-05-28 PROCEDURE — 74011000250 HC RX REV CODE- 250: Performed by: FAMILY MEDICINE

## 2021-05-28 PROCEDURE — 74011250637 HC RX REV CODE- 250/637: Performed by: NURSE PRACTITIONER

## 2021-05-28 PROCEDURE — 65270000029 HC RM PRIVATE

## 2021-05-28 PROCEDURE — 74011000250 HC RX REV CODE- 250: Performed by: INTERNAL MEDICINE

## 2021-05-28 PROCEDURE — 74018 RADEX ABDOMEN 1 VIEW: CPT

## 2021-05-28 PROCEDURE — 83735 ASSAY OF MAGNESIUM: CPT

## 2021-05-28 PROCEDURE — 74011000250 HC RX REV CODE- 250: Performed by: NURSE PRACTITIONER

## 2021-05-28 RX ADMIN — Medication 10 ML: at 15:37

## 2021-05-28 RX ADMIN — BUDESONIDE 500 MCG: 0.5 INHALANT RESPIRATORY (INHALATION) at 19:09

## 2021-05-28 RX ADMIN — IPRATROPIUM BROMIDE AND ALBUTEROL SULFATE 3 ML: .5; 3 SOLUTION RESPIRATORY (INHALATION) at 08:00

## 2021-05-28 RX ADMIN — SODIUM CHLORIDE 125 ML/HR: 900 INJECTION, SOLUTION INTRAVENOUS at 20:41

## 2021-05-28 RX ADMIN — FAMOTIDINE 20 MG: 10 INJECTION INTRAVENOUS at 21:57

## 2021-05-28 RX ADMIN — FOLIC ACID 1 MG: 1 TABLET ORAL at 09:47

## 2021-05-28 RX ADMIN — IPRATROPIUM BROMIDE AND ALBUTEROL SULFATE 3 ML: .5; 3 SOLUTION RESPIRATORY (INHALATION) at 02:28

## 2021-05-28 RX ADMIN — ACETAMINOPHEN 650 MG: 325 TABLET ORAL at 21:58

## 2021-05-28 RX ADMIN — Medication 100 MG: at 09:47

## 2021-05-28 RX ADMIN — LORAZEPAM 1 MG: 2 INJECTION INTRAMUSCULAR; INTRAVENOUS at 00:39

## 2021-05-28 RX ADMIN — AMLODIPINE BESYLATE 10 MG: 10 TABLET ORAL at 09:47

## 2021-05-28 RX ADMIN — FAMOTIDINE 20 MG: 10 INJECTION INTRAVENOUS at 09:47

## 2021-05-28 RX ADMIN — HYDRALAZINE HYDROCHLORIDE 25 MG: 25 TABLET, FILM COATED ORAL at 21:58

## 2021-05-28 RX ADMIN — IPRATROPIUM BROMIDE AND ALBUTEROL SULFATE 3 ML: .5; 3 SOLUTION RESPIRATORY (INHALATION) at 13:23

## 2021-05-28 RX ADMIN — BUDESONIDE 500 MCG: 0.5 INHALANT RESPIRATORY (INHALATION) at 08:00

## 2021-05-28 RX ADMIN — IPRATROPIUM BROMIDE AND ALBUTEROL SULFATE 3 ML: .5; 3 SOLUTION RESPIRATORY (INHALATION) at 19:09

## 2021-05-28 RX ADMIN — GUAIFENESIN 600 MG: 600 TABLET ORAL at 21:58

## 2021-05-28 RX ADMIN — GUAIFENESIN 600 MG: 600 TABLET ORAL at 09:47

## 2021-05-28 NOTE — PROGRESS NOTES
Comprehensive Nutrition Assessment    Type and Reason for Visit: Initial, RD nutrition re-screen/LOS    Nutrition Recommendations/Plan:    Diet advancement per MD.   Marce Justice Pursue nutrition support if diet is not expected to advance in the next 2-4 days. Please consult nutrition services for management of TPN if warranted. Malnutrition Assessment:  Malnutrition Status: Mild malnutrition  Context: Acute illness  Findings of clinical characteristics of malnutrition:   Energy Intake:  1 - 75% or less of est energy req for 7 or more days    Nutrition Assessment:   Nutrition History: Pt reports a UBW of ~220 lbs and states that he has gained weight over the years. Pt reports he used to be slim and believes that he has had weight gain secondary to genetics. He states that he lives at home alone and does not cook much because he does not want to clean. He states that he does not trust other peoples cooking either, so he does not eat out much. When asked for a brief diet recall/how many meals he eats per day, pt states that he just eats when he gets hungry. Nutrition Background: Pt admitted with abdominal pain, + pancreatitis. PMH notable for EtOH abuse, prostate cancer s/p prostatectomy, asthma, HLD, HTN, multiple pulmonary nodules, CKD stage II, PE, CAD with h/o MI, CVA. Daily Update:  Pt seen reclined in bed. NPO with NGT to LIS. Pt denies abdominal pain/nausea. Abdominal Status (last documented): Distended, Passing flatus, Tender abdomen with Active  bowel sounds. Last BM 05/27/21. BM x 9 yesterday, x 1 today  Lipase 84 (5/26)  IVF: NS @ 125 ml/hr    Nutrition Related Findings:   CT A/P 5/20 c/w pancreatitis. Lipase >24,000 upon admission. KUB 5/25 c/w progressive ileus. (5/20-5/24): NPO/CLQ; (5/24): FLQ; (5/25): GI soft then FLQ; (5/26): GI soft then NPO. Currently NPO. NGT placed 5/27 to LIS.       Current Nutrition Therapies:  DIET NPO    Current Intake:   Average Meal Intake: NPO Average Supplement Intake: NPO      Anthropometric Measures:  Height: 6' (182.9 cm)  Current Body Wt: 101.6 kg (223 lb 15.8 oz) (5/20), Weight source: Not specified  BMI: 30.4, Obese class 1 (BMI 30.0-34. 9)  Admission Body Weight: 223 lb 15.8 oz  Ideal Body Weight (lbs) (Calculated): 178 lbs (81 kg), 125.8 %  Edema: LLE: 1+ (5/27/2021  7:15 PM)  RLE: 1+ (5/27/2021  7:15 PM)     WT / BMI 5/20/2021 4/1/2021 3/29/2021 6/13/2020 6/12/2020   WEIGHT 224 lb 224 lb 220 lb 212 lb 212 lb     WT / BMI 1/17/2020 10/30/2019 7/17/2019 7/8/2019 6/29/2019   WEIGHT 212 lb 211 lb 12.8 oz 206 lb 202 lb 12.8 oz 195 lb     Per weights listed in EMR, 29 lb weight gain since 6/2019. Estimated Daily Nutrient Needs:  Energy (kcal/day): 3014-4032 (Kcal/kg (20-25), Weight Used: Ideal (80.9 kg))  Protein (g/day): 81-97 (1-1.2) Weight Used: (Ideal (80.9 kg))  Fluid (ml/day):   (1 ml/kcal)    Nutrition Diagnosis:   · Mild malnutrition, In context of acute illness or injury related to altered GI function as evidenced by NPO or clear liquid status due to medical condition (minimal PO intake x 7 days d/t pancreatitis, ileus)    Nutrition Interventions:   Food and/or Nutrient Delivery:  (Diet advancement vs nutrition support per MD)     Coordination of Nutrition Care: Continue to monitor while inpatient     Goals: Active Goal: Meet >75% of estimated needs at time of follow-up. Nutrition Monitoring and Evaluation:      Food/Nutrient Intake Outcomes: Diet advancement/tolerance  Physical Signs/Symptoms Outcomes: GI status    Discharge Planning:     Too soon to determine    Jadynhoney BravoTheresa rabago, [address], [facility] [address] [place], [address]

## 2021-05-28 NOTE — PROGRESS NOTES
[facility] Progress Note     Name:  Gisele Alvarez  Age:70 y.o. Sex:male   :  1950    MRN:  [id number]     Admit Date:  2021    Reason for Admission:  Alcoholic pancreatitis [facility] Course/Interval history:     72year old AAM  PMH alcoholic pancreatitis, arthritis (gout), CAD, COPD, GERD, HLD, HTN, old MI, PE, CVA, ETOH abuse admitted 3/70 for alcoholic pancreatitis. Subjective (21): Alert and oriented x3, NGT in place, repeat KUB shows decreased small blowel distention. Patient passing gas, no N/V. Review of Systems: 14 point review of systems is otherwise negative with the exception of the elements mentioned above. Assessment & Plan     ALCOHOLIC PANCREATITIS:  HISTORY OF PRIOR EPISODES:               resolved, lipase improved, symptoms resolved     Ileus  21 Spoke with surgery with recs for surgical intervention at this time. ?2/2 pancreatitis. Patient noted with increased abdominal distention today. Repeat KUB today showing multiple dilated small bowel loops. NGT to wall suction. Repeat KUB in a.m. Holding Xarelto for now. Surgery if needed   repeat KUB decreased abdominal distention, passing gas. Remains NPO.     ALCOHOL ABUSE:  DRINKS 1/2 PINT OF LIQUOR DAILY:                 pass window for withdrawal now     HYPOKALEMIA:   Resolved.                    ACCELERATED HYPERTENSION:                :  Remains slightly elevated on norvasc 10 mg daily.                Continue prn apresoline, labetalol   21 /90; continue prn antihypertensives and Norvasc   21 continue Norvasc and prn labetalol      CAD WITH HISTORY OF MI:  Noted      GERD:  Continue protonix     HISTORY OF PROSTATE CANCER:  S/P PROSTATECTOMY:  Noted      ASTHMA/COPD:  Stable, no current exacerbation, continue pulmicort and duoneb      TOBACCO ABUSE:  QUIT 2 YEARS AGO:  Noted      HYPERLIPIDEMIA:  Holding home pravachol     HISTORY OF PULMONARY EMBOLI 2019:  Continue xarelto     MULTIPLE PULMONARY NODULES:  Noted      HISTORY OF STROKE:  No residual      HISTORY OF GOUT:  Holding home colchicine, no flare          Diet:  DIET NPO  DVT PPx: scds  GI Ppx: pepcid  Code: DNR              Objective:     Patient Vitals for the past 24 hrs:   Temp Pulse Resp BP SpO2   05/28/21 0735     96 %   05/28/21 0710 98.9 °F (37.2 °C) 93 19 (!) 155/88 96 %   05/28/21 0336 98 °F (36.7 °C) 99 18 (!) 159/86 99 %   05/28/21 0229     98 %   05/27/21 2340 99.4 °F (37.4 °C) 97 18 (!) 146/82 97 %   05/27/21 1945     95 %   05/27/21 1934 99.9 °F (37.7 °C) 100 18 (!) 150/88 96 %   05/27/21 1500 98.5 °F (36.9 °C) 97 18 136/87 94 %   05/27/21 1342     96 %     Oxygen Therapy  O2 Sat (%): 96 % (05/28/21 0735)  Pulse via Oximetry: 92 beats per minute (05/28/21 0735)  O2 Device: None (Room air) (05/28/21 0805)  Skin Assessment: Clean, dry, & intact (05/28/21 0805)  Skin Protection for O2 Device: N/A (05/27/21 1945)  O2 Flow Rate (L/min): 0 l/min (05/25/21 1400)  FIO2 (%): 21 % (05/28/21 0229)    Body mass index is 30.38 kg/m². Physical Exam:   General: No acute distress, speaking in full sentences, no use of accessory muscles   HEENT: Pupils equal;  oropharynx is clear, NGT in place. Neck: no JVD   Lungs: Clear to auscultation bilaterally   Cardiovascular: Regular rate and rhythm with normal S1 and S2   Abdomen: distended, BS hypoactive 4 quadrants.   Extremities: No cyanosis clubbing or edema   Neuro: Nonfocal, A&O x3   Psych: Normal affect     Data Review:  I have reviewed all labs, meds, and studies from the last 24 hours:    Labs:    Recent Results (from the past 24 hour(s))   METABOLIC PANEL, BASIC    Collection Time: 05/28/21  4:24 AM   Result Value Ref Range    Sodium 141 138 - 145 mmol/L    Potassium 3.6 3.5 - 5.1 mmol/L    Chloride 111 (H) 98 - 107 mmol/L    CO2 20 (L) 21 - 32 mmol/L    Anion gap 10 7 - 16 mmol/L    Glucose 84 65 - 100 mg/dL    BUN 7 (L) 8 - 23 MG/DL    Creatinine 0.77 (L) 0.8 - 1.5 MG/DL    GFR est AA >60 >60 ml/min/1.73m2    GFR est non-AA >60 >60 ml/min/1.73m2    Calcium 8.6 8.3 - 10.4 MG/DL   MAGNESIUM    Collection Time: 05/28/21  4:24 AM   Result Value Ref Range    Magnesium 1.8 1.8 - 2.4 mg/dL       All Micro Results     None          EKG Results     None          Other Studies:  XR ABD (KUB)    Result Date: 5/28/2021  ABDOMINAL FILMS, 1 view(s). HISTORY: Ileus. TECHNIQUE:  Supine views of the abdomen on 3 image(s). COMPARISON: Yesterday's exams. FINDINGS: Mildly dilated small bowel loops. Gas in the colon and rectum NG tube is present. Status post left hip arthroplasty. Decreased small bowel distention. XR ABD (KUB)    Result Date: 5/27/2021  KUB CLINICAL INDICATION: Evaluate NG tube FINDINGS: Single supine view of the upper abdomen shows an NG tube in satisfactory position. NG tube in satisfactory position.       Current Meds:   Current Facility-Administered Medications   Medication Dose Route Frequency    famotidine (PF) (PEPCID) 20 mg in 0.9% sodium chloride 10 mL injection  20 mg IntraVENous Q12H    lip protectant (BLISTEX) ointment 1 Each  1 Each Topical PRN    phenol throat spray (CHLORASEPTIC) 1 Spray  1 Spray Oral PRN    0.9% sodium chloride infusion  125 mL/hr IntraVENous CONTINUOUS    simethicone (MYLICON) tablet 40 mg  40 mg Oral QID PRN    multivitamin, tx-iron-ca-min (THERA-M w/ IRON) tablet 1 Tablet  1 Tablet Oral DAILY    thiamine HCL (B-1) tablet 100 mg  100 mg Oral DAILY    folic acid (FOLVITE) tablet 1 mg  1 mg Oral DAILY    albuterol-ipratropium (DUO-NEB) 2.5 MG-0.5 MG/3 ML  3 mL Nebulization Q6H RT    albuterol (PROVENTIL VENTOLIN) nebulizer solution 2.5 mg  2.5 mg Nebulization Q4H PRN    labetaloL (NORMODYNE;TRANDATE) injection 10 mg  10 mg IntraVENous Q6H PRN    amLODIPine (NORVASC) tablet 10 mg  10 mg Oral DAILY    hydrALAZINE (APRESOLINE) tablet 25 mg  25 mg Oral QID PRN    guaiFENesin ER (MUCINEX) tablet 600 mg  600 mg Oral Q12H    sodium chloride (NS) flush 5-10 mL  5-10 mL IntraVENous Q8H    sodium chloride (NS) flush 5-10 mL  5-10 mL IntraVENous PRN    acetaminophen (TYLENOL) tablet 650 mg  650 mg Oral Q6H PRN    Or    acetaminophen (TYLENOL) suppository 650 mg  650 mg Rectal Q6H PRN    ondansetron (ZOFRAN) injection 4 mg  4 mg IntraVENous Q6H PRN    LORazepam (ATIVAN) injection 1 mg  1 mg IntraVENous Q2H PRN    budesonide (PULMICORT) 500 mcg/2 ml nebulizer suspension  500 mcg Nebulization BID RT       Problem List:  Hospital Problems as of 5/28/2021 Date Reviewed: 10/30/2019        Codes Class Noted - Resolved POA    * (Principal) Alcoholic pancreatitis TZU-14-YR: K85.20  ICD-9-CM: 302.6  5/20/2021 - Present Unknown        Gout ICD-10-CM: M10.9  ICD-9-CM: 274.9  6/29/2019 - Present Yes        Pulmonary emboli ([address] ICD-10-CM: I26.99  ICD-9-CM: 415.19  6/12/2019 - Present Yes        Multiple pulmonary nodules ICD-10-CM: R91.8  ICD-9-CM: 793.19  6/12/2019 - Present Yes        Hyperlipidemia other unsp dyslipidemia ICD-10-CM: E78.5  ICD-9-CM: 272.4  11/18/2015 - Present Yes        CAD (coronary artery disease) (Chronic) ICD-10-CM: I25.10  ICD-9-CM: 414.00  9/15/2013 - Present Yes        Accelerated hypertension ICD-10-CM: I10  ICD-9-CM: 401.0  9/13/2013 - Present Yes              Signed By: Yonathan Lo NP   [facility] Hospitalist Service    May 28, 2021  5:15 PM

## 2021-05-28 NOTE — PROGRESS NOTES
END OF SHIFT NOTE:    INTAKE/OUTPUT  05/27 0701 - 05/28 0700  In: 7111 [I.V.:5242]  Out: 1700 [Urine:800]  Voiding: YES  Catheter: NO  Drain:              Flatus: Patient does have flatus present. Stool:  2 occurrences. Characteristics:  Stool Assessment  Stool Color: Brown  Stool Appearance: Loose  Stool Amount: Small  Stool Source/Status: Rectum    Emesis: 0 occurrences. Characteristics:   Emesis Assessment  Appearance: Yellow  Emesis Amount: Medium    VITAL SIGNS  Patient Vitals for the past 12 hrs:   Temp Pulse Resp BP SpO2   05/28/21 0336 98 °F (36.7 °C) 99 18 (!) 159/86 99 %   05/28/21 0229     98 %   05/27/21 2340 99.4 °F (37.4 °C) 97 18 (!) 146/82 97 %   05/27/21 1945     95 %   05/27/21 1934 99.9 °F (37.7 °C) 100 18 (!) 150/88 96 %       Pain Assessment  Pain Intensity 1: 0 (05/27/21 1915)  Pain Location 1: Abdomen  Pain Intervention(s) 1: Medication (see MAR)  Patient Stated Pain Goal: 0    Ambulating  Yes; ambulated to the bathroom      Shift report given to oncoming nurse at the bedside.     Ainsley Grimes RN

## 2021-05-28 NOTE — PROGRESS NOTES
Problem: Falls - Risk of  Goal: *Absence of Falls  Description: Document Tiffanie Arevalo Fall Risk and appropriate interventions in the flowsheet. Outcome: Progressing Towards Goal  Note: Fall Risk Interventions:  Mobility Interventions: Communicate number of staff needed for ambulation/transfer, Patient to call before getting OOB    Mentation Interventions: Door open when patient unattended, More frequent rounding, Room close to nurse's station, Toileting rounds    Medication Interventions: Patient to call before getting OOB, Teach patient to arise slowly    Elimination Interventions: Call light in reach, Patient to call for help with toileting needs              Problem: Pressure Injury - Risk of  Goal: *Prevention of pressure injury  Description: Document Yovany Scale and appropriate interventions in the flowsheet.   Outcome: Progressing Towards Goal  Note: Pressure Injury Interventions:  Sensory Interventions: Avoid rigorous massage over bony prominences, Float heels, Keep linens dry and wrinkle-free, Minimize linen layers, Pressure redistribution bed/mattress (bed type)    Moisture Interventions: Absorbent underpads, Minimize layers    Activity Interventions: Increase time out of bed, Pressure redistribution bed/mattress(bed type), PT/OT evaluation    Mobility Interventions: HOB 30 degrees or less, Pressure redistribution bed/mattress (bed type), PT/OT evaluation    Nutrition Interventions: Document food/fluid/supplement intake (NPO)    Friction and Shear Interventions: HOB 30 degrees or less

## 2021-05-29 ENCOUNTER — APPOINTMENT (OUTPATIENT)
Dept: GENERAL RADIOLOGY | Age: 71
DRG: 439 | End: 2021-05-29
Attending: NURSE PRACTITIONER
Payer: COMMERCIAL

## 2021-05-29 LAB
ANION GAP SERPL CALC-SCNC: 12 MMOL/L (ref 7–16)
BASOPHILS # BLD: 0 K/UL (ref 0–0.2)
BASOPHILS NFR BLD: 0 % (ref 0–2)
BUN SERPL-MCNC: 6 MG/DL (ref 8–23)
CALCIUM SERPL-MCNC: 8.7 MG/DL (ref 8.3–10.4)
CHLORIDE SERPL-SCNC: 110 MMOL/L (ref 98–107)
CO2 SERPL-SCNC: 19 MMOL/L (ref 21–32)
CREAT SERPL-MCNC: 0.66 MG/DL (ref 0.8–1.5)
DIFFERENTIAL METHOD BLD: ABNORMAL
EOSINOPHIL # BLD: 0.1 K/UL (ref 0–0.8)
EOSINOPHIL NFR BLD: 2 % (ref 0.5–7.8)
ERYTHROCYTE [DISTWIDTH] IN BLOOD BY AUTOMATED COUNT: 15.2 % (ref 11.9–14.6)
GLUCOSE SERPL-MCNC: 81 MG/DL (ref 65–100)
HCT VFR BLD AUTO: 32.5 % (ref 41.1–50.3)
HGB BLD-MCNC: 10.4 G/DL (ref 13.6–17.2)
IMM GRANULOCYTES # BLD AUTO: 0 K/UL (ref 0–0.5)
IMM GRANULOCYTES NFR BLD AUTO: 1 % (ref 0–5)
LYMPHOCYTES # BLD: 1 K/UL (ref 0.5–4.6)
LYMPHOCYTES NFR BLD: 14 % (ref 13–44)
MAGNESIUM SERPL-MCNC: 1.9 MG/DL (ref 1.8–2.4)
MCH RBC QN AUTO: 27.4 PG (ref 26.1–32.9)
MCHC RBC AUTO-ENTMCNC: 32 G/DL (ref 31.4–35)
MCV RBC AUTO: 85.5 FL (ref 79.6–97.8)
MONOCYTES # BLD: 0.9 K/UL (ref 0.1–1.3)
MONOCYTES NFR BLD: 12 % (ref 4–12)
NEUTS SEG # BLD: 5.2 K/UL (ref 1.7–8.2)
NEUTS SEG NFR BLD: 72 % (ref 43–78)
NRBC # BLD: 0 K/UL (ref 0–0.2)
PLATELET # BLD AUTO: 351 K/UL (ref 150–450)
PMV BLD AUTO: 10.1 FL (ref 9.4–12.3)
POTASSIUM SERPL-SCNC: 3.4 MMOL/L (ref 3.5–5.1)
RBC # BLD AUTO: 3.8 M/UL (ref 4.23–5.6)
SODIUM SERPL-SCNC: 141 MMOL/L (ref 136–145)
WBC # BLD AUTO: 7.3 K/UL (ref 4.3–11.1)

## 2021-05-29 PROCEDURE — 74011250637 HC RX REV CODE- 250/637: Performed by: NURSE PRACTITIONER

## 2021-05-29 PROCEDURE — 74018 RADEX ABDOMEN 1 VIEW: CPT

## 2021-05-29 PROCEDURE — 74011250637 HC RX REV CODE- 250/637: Performed by: HOSPITALIST

## 2021-05-29 PROCEDURE — 74011250636 HC RX REV CODE- 250/636: Performed by: NURSE PRACTITIONER

## 2021-05-29 PROCEDURE — 74011000250 HC RX REV CODE- 250: Performed by: FAMILY MEDICINE

## 2021-05-29 PROCEDURE — 65270000029 HC RM PRIVATE

## 2021-05-29 PROCEDURE — 74011250637 HC RX REV CODE- 250/637: Performed by: FAMILY MEDICINE

## 2021-05-29 PROCEDURE — 94760 N-INVAS EAR/PLS OXIMETRY 1: CPT

## 2021-05-29 PROCEDURE — 74011250636 HC RX REV CODE- 250/636: Performed by: FAMILY MEDICINE

## 2021-05-29 PROCEDURE — 74011000250 HC RX REV CODE- 250: Performed by: INTERNAL MEDICINE

## 2021-05-29 PROCEDURE — 85025 COMPLETE CBC W/AUTO DIFF WBC: CPT

## 2021-05-29 PROCEDURE — 80048 BASIC METABOLIC PNL TOTAL CA: CPT

## 2021-05-29 PROCEDURE — 94640 AIRWAY INHALATION TREATMENT: CPT

## 2021-05-29 PROCEDURE — 36415 COLL VENOUS BLD VENIPUNCTURE: CPT

## 2021-05-29 PROCEDURE — 74011000250 HC RX REV CODE- 250: Performed by: NURSE PRACTITIONER

## 2021-05-29 PROCEDURE — 2709999900 HC NON-CHARGEABLE SUPPLY

## 2021-05-29 PROCEDURE — 83735 ASSAY OF MAGNESIUM: CPT

## 2021-05-29 RX ORDER — HYDROMORPHONE HYDROCHLORIDE 1 MG/ML
1 INJECTION, SOLUTION INTRAMUSCULAR; INTRAVENOUS; SUBCUTANEOUS
Status: DISCONTINUED | OUTPATIENT
Start: 2021-05-29 | End: 2021-05-30

## 2021-05-29 RX ORDER — POTASSIUM CHLORIDE 14.9 MG/ML
20 INJECTION INTRAVENOUS ONCE
Status: COMPLETED | OUTPATIENT
Start: 2021-05-29 | End: 2021-05-29

## 2021-05-29 RX ADMIN — METHYLPREDNISOLONE SODIUM SUCCINATE 125 MG: 125 INJECTION, POWDER, FOR SOLUTION INTRAMUSCULAR; INTRAVENOUS at 03:26

## 2021-05-29 RX ADMIN — SODIUM CHLORIDE 125 ML/HR: 900 INJECTION, SOLUTION INTRAVENOUS at 05:00

## 2021-05-29 RX ADMIN — GUAIFENESIN 600 MG: 600 TABLET ORAL at 21:47

## 2021-05-29 RX ADMIN — IPRATROPIUM BROMIDE AND ALBUTEROL SULFATE 3 ML: .5; 3 SOLUTION RESPIRATORY (INHALATION) at 20:15

## 2021-05-29 RX ADMIN — Medication 1 TABLET: at 09:24

## 2021-05-29 RX ADMIN — GUAIFENESIN 600 MG: 600 TABLET ORAL at 09:24

## 2021-05-29 RX ADMIN — Medication 10 ML: at 03:28

## 2021-05-29 RX ADMIN — BUDESONIDE 500 MCG: 0.5 INHALANT RESPIRATORY (INHALATION) at 07:30

## 2021-05-29 RX ADMIN — IPRATROPIUM BROMIDE AND ALBUTEROL SULFATE 3 ML: .5; 3 SOLUTION RESPIRATORY (INHALATION) at 14:43

## 2021-05-29 RX ADMIN — IPRATROPIUM BROMIDE AND ALBUTEROL SULFATE 3 ML: .5; 3 SOLUTION RESPIRATORY (INHALATION) at 07:30

## 2021-05-29 RX ADMIN — POTASSIUM CHLORIDE 20 MEQ: 14.9 INJECTION, SOLUTION INTRAVENOUS at 09:25

## 2021-05-29 RX ADMIN — FAMOTIDINE 20 MG: 10 INJECTION INTRAVENOUS at 09:24

## 2021-05-29 RX ADMIN — Medication 10 ML: at 14:00

## 2021-05-29 RX ADMIN — AMLODIPINE BESYLATE 10 MG: 10 TABLET ORAL at 09:24

## 2021-05-29 RX ADMIN — BUDESONIDE 500 MCG: 0.5 INHALANT RESPIRATORY (INHALATION) at 20:15

## 2021-05-29 RX ADMIN — SODIUM CHLORIDE 75 ML/HR: 900 INJECTION, SOLUTION INTRAVENOUS at 16:45

## 2021-05-29 RX ADMIN — Medication 100 MG: at 09:24

## 2021-05-29 RX ADMIN — Medication 10 ML: at 00:18

## 2021-05-29 RX ADMIN — FOLIC ACID 1 MG: 1 TABLET ORAL at 09:24

## 2021-05-29 RX ADMIN — FAMOTIDINE 20 MG: 10 INJECTION INTRAVENOUS at 21:47

## 2021-05-29 RX ADMIN — IPRATROPIUM BROMIDE AND ALBUTEROL SULFATE 3 ML: .5; 3 SOLUTION RESPIRATORY (INHALATION) at 01:23

## 2021-05-29 RX ADMIN — HYDROMORPHONE HYDROCHLORIDE 1 MG: 1 INJECTION, SOLUTION INTRAMUSCULAR; INTRAVENOUS; SUBCUTANEOUS at 03:27

## 2021-05-29 NOTE — PROGRESS NOTES
Dr. Kervin Cross called unit in response to perfect serve message from primary RN Miya Huggins. Primary RN not available to take call, so provider gave orders to this RN as follows: 125mg ivp solumedrol once and 1mg ivp dilaudid every 4 hours prn. Verbalized to primary HEMAL Huggins and ordered per Dr. Kervin Cross.

## 2021-05-29 NOTE — PROGRESS NOTES
END OF SHIFT NOTE:    INTAKE/OUTPUT  05/28 0701 - 05/29 0700  In: 7486 [P.O.:240; I.V.:3127]  Out: 2275 [Urine:875]  Voiding: YES  Catheter: NO  Drain:              Flatus: Patient does have flatus present. Stool:  1 occurrences. Characteristics:  Stool Assessment  Stool Color: Gib Ephraim (per pt; nurse did not see)  Stool Appearance: Loose  Stool Amount: Medium  Stool Source/Status: Rectum    Emesis: 0 occurrences. Characteristics:   Emesis Assessment  Appearance: Yellow  Emesis Amount: Medium    VITAL SIGNS  Patient Vitals for the past 12 hrs:   Temp Pulse Resp BP SpO2   05/29/21 0303 98.2 °F (36.8 °C) 93 18 (!) 148/86 95 %   05/29/21 0123     95 %   05/28/21 2308 98.9 °F (37.2 °C) 88 19 (!) 151/83 98 %   05/28/21 1920 98.6 °F (37 °C) 89 19 (!) 160/90 100 %   05/28/21 1909     94 %       Pain Assessment  Pain Intensity 1: 7 (05/29/21 0327)  Pain Location 1: Foot  Pain Intervention(s) 1: Medication (see MAR)  Patient Stated Pain Goal: 0    Ambulating  Yes with moderate to max assist of 2 due to gout pain. Very difficult for him to walk . Wants to speak with MD in am about this. Shift report given to oncoming nurse at the bedside.     Symone Thomson RN

## 2021-05-29 NOTE — PROGRESS NOTES
END OF SHIFT NOTE:    INTAKE/OUTPUT  05/28 0701 - 05/29 0700  In: 0613 [P.O.:240; I.V.:3127]  Out: 2625 [Urine:1225]  Voiding: YES  Catheter: NO  Drain:              Flatus: Patient does have flatus present. Stool:  0 occurrences. Characteristics:  Stool Assessment  Stool Color: Genet Eli (per pt; nurse did not see)  Stool Appearance: Loose  Stool Amount: Medium  Stool Source/Status: Rectum    Emesis: 0 occurrences. Characteristics:   Emesis Assessment  Appearance: Yellow  Emesis Amount: Medium    VITAL SIGNS  Patient Vitals for the past 12 hrs:   Temp Pulse Resp BP SpO2   05/29/21 1618 98.1 °F (36.7 °C) 86 18 (!) 150/92 97 %   05/29/21 1443     95 %   05/29/21 1118 98.4 °F (36.9 °C) 85 18 (!) 150/88 96 %   05/29/21 0738 98.3 °F (36.8 °C) 86 18 (!) 143/85 100 %   05/29/21 0730     94 %       Pain Assessment  Pain Intensity 1: 0 (05/29/21 0824)  Pain Location 1: Foot  Pain Intervention(s) 1: Medication (see MAR)  Patient Stated Pain Goal: 0    Ambulating  Yes    Shift report given to oncoming nurse at the bedside.     Jhonny Garza

## 2021-05-29 NOTE — PROGRESS NOTES
given prn doses of hydralazine for bp elevation, tylenol for pain. Ng clamped x 1hour. abd not as distended as last week.

## 2021-05-29 NOTE — PROGRESS NOTES
Pt having excruciating bilateral gout pain. barely able to walk to bathroom even with 2 assist. Have loose brown BM medium amt per his report. Nurse did not see. Messaged hospitalists; Dr. Duane Baumann placed orders. Solumedrol/Dilaudid given per MAR.

## 2021-05-29 NOTE — PROGRESS NOTES
[facility] Progress Note     Name:  Libby Olivares  Age:70 y.o. Sex:male   :  1950    MRN:  [id number]     Admit Date:  2021    Reason for Admission:  Alcoholic pancreatitis [facility] Course/Interval history:     72year old AAM  PMH alcoholic pancreatitis, arthritis (gout), CAD, COPD, GERD, HLD, HTN, old MI, PE, CVA, ETOH abuse admitted  for alcoholic pancreatitis. Subjective (21): Alert and oriented x3, NGT in place, repeat KUB shows non specific bowel gas patterns. Patient passing gas, no N/V. Review of Systems: 14 point review of systems is otherwise negative with the exception of the elements mentioned above. Assessment & Plan     ALCOHOLIC PANCREATITIS:  HISTORY OF PRIOR EPISODES:               resolved, lipase improved, symptoms resolved     Ileus  21 Spoke with surgery with recs for surgical intervention at this time. ?2/2 pancreatitis. Patient noted with increased abdominal distention today. Repeat KUB today showing multiple dilated small bowel loops. NGT to wall suction. Repeat KUB in a.m. Holding Xarelto for now. Surgery if needed   repeat KUB decreased abdominal distention, passing gas. Remains NPO.   repeat KUB shows non specific bowel gas patterns. Patient passing gas, no N/V. Will clamp NGT and order clear  Liquid diet. Repeat KUB in am.     ALCOHOL ABUSE:  DRINKS 1/2 PINT OF LIQUOR DAILY:                 pass window for withdrawal now     HYPOKALEMIA:   Resolved.                    ACCELERATED HYPERTENSION:                :  Remains slightly elevated on norvasc 10 mg daily.                Continue prn apresoline, labetalol   21 /90; continue prn antihypertensives and Norvasc   21 continue Norvasc and prn labetalol      CAD WITH HISTORY OF MI:  Noted      GERD:  Continue protonix     HISTORY OF PROSTATE CANCER:  S/P PROSTATECTOMY:  Noted      ASTHMA/COPD:  Stable, no current exacerbation, continue pulmicort and duoneb      TOBACCO ABUSE:  QUIT 2 YEARS AGO:  Noted      HYPERLIPIDEMIA:  Holding home pravachol     HISTORY OF PULMONARY EMBOLI 2019:  Continue xarelto     MULTIPLE PULMONARY NODULES:  Noted      HISTORY OF STROKE:  No residual      HISTORY OF GOUT:  Holding home colchicine, no flare          Diet:  DIET NPO  DVT PPx: scds  GI Ppx: pepcid  Code: DNR              Objective:     Patient Vitals for the past 24 hrs:   Temp Pulse Resp BP SpO2   05/29/21 1118 98.4 °F (36.9 °C) 85 18 (!) 150/88 96 %   05/29/21 0738 98.3 °F (36.8 °C) 86 18 (!) 143/85 100 %   05/29/21 0730     94 %   05/29/21 0303 98.2 °F (36.8 °C) 93 18 (!) 148/86 95 %   05/29/21 0123     95 %   05/28/21 2308 98.9 °F (37.2 °C) 88 19 (!) 151/83 98 %   05/28/21 1920 98.6 °F (37 °C) 89 19 (!) 160/90 100 %   05/28/21 1909     94 %   05/28/21 1526 98.6 °F (37 °C) 89 19 (!) 149/86 93 %   05/28/21 1323     96 %     Oxygen Therapy  O2 Sat (%): 96 % (05/29/21 1118)  Pulse via Oximetry: 86 beats per minute (05/29/21 0730)  O2 Device: None (Room air) (05/29/21 0730)  Skin Assessment: Clean, dry, & intact (05/28/21 0805)  Skin Protection for O2 Device: N/A (05/27/21 1945)  O2 Flow Rate (L/min): 0 l/min (05/25/21 1400)  FIO2 (%): 21 % (05/28/21 0229)    Body mass index is 30.38 kg/m². Physical Exam:   General: No acute distress, speaking in full sentences, no use of accessory muscles   HEENT: Pupils equal;  oropharynx is clear, NGT in place. Neck: no JVD   Lungs: Clear to auscultation bilaterally   Cardiovascular: Regular rate and rhythm with normal S1 and S2   Abdomen: distended, soft, BS hypoactive 4 quadrants.   Extremities: No cyanosis clubbing or edema   Neuro: Nonfocal, A&O x3   Psych: Normal affect     Data Review:  I have reviewed all labs, meds, and studies from the last 24 hours:    Labs:    Recent Results (from the past 24 hour(s))   CBC WITH AUTOMATED DIFF    Collection Time: 05/29/21  4:00 AM   Result Value Ref Range    WBC 7.3 4.3 - 11.1 K/uL    RBC 3.80 (L) 4.23 - 5.6 M/uL    HGB 10.4 (L) 13.6 - 17.2 g/dL    HCT 32.5 (L) 41.1 - 50.3 %    MCV 85.5 79.6 - 97.8 FL    MCH 27.4 26.1 - 32.9 PG    MCHC 32.0 31.4 - 35.0 g/dL    RDW 15.2 (H) 11.9 - 14.6 %    PLATELET 981 168 - 127 K/uL    MPV 10.1 9.4 - 12.3 FL    ABSOLUTE NRBC 0.00 0.0 - 0.2 K/uL    DF AUTOMATED      NEUTROPHILS 72 43 - 78 %    LYMPHOCYTES 14 13 - 44 %    MONOCYTES 12 4.0 - 12.0 %    EOSINOPHILS 2 0.5 - 7.8 %    BASOPHILS 0 0.0 - 2.0 %    IMMATURE GRANULOCYTES 1 0.0 - 5.0 %    ABS. NEUTROPHILS 5.2 1.7 - 8.2 K/UL    ABS. LYMPHOCYTES 1.0 0.5 - 4.6 K/UL    ABS. MONOCYTES 0.9 0.1 - 1.3 K/UL    ABS. EOSINOPHILS 0.1 0.0 - 0.8 K/UL    ABS. BASOPHILS 0.0 0.0 - 0.2 K/UL    ABS. IMM. GRANS. 0.0 0.0 - 0.5 K/UL   METABOLIC PANEL, BASIC    Collection Time: 05/29/21  4:00 AM   Result Value Ref Range    Sodium 141 136 - 145 mmol/L    Potassium 3.4 (L) 3.5 - 5.1 mmol/L    Chloride 110 (H) 98 - 107 mmol/L    CO2 19 (L) 21 - 32 mmol/L    Anion gap 12 7 - 16 mmol/L    Glucose 81 65 - 100 mg/dL    BUN 6 (L) 8 - 23 MG/DL    Creatinine 0.66 (L) 0.8 - 1.5 MG/DL    GFR est AA >60 >60 ml/min/1.73m2    GFR est non-AA >60 >60 ml/min/1.73m2    Calcium 8.7 8.3 - 10.4 MG/DL   MAGNESIUM    Collection Time: 05/29/21  4:00 AM   Result Value Ref Range    Magnesium 1.9 1.8 - 2.4 mg/dL       All Micro Results     None          EKG Results     None          Other Studies:  XR ABD (KUB)    Result Date: 5/29/2021  Exam: XR ABD (KUB) on 5/29/2021 10:02 AM Clinical History: The Male patient is 72years old  presenting for worsening abdominal distention. Comparison:  None Findings:  Single view of the abdomen demonstrates a non-specific bowel gas pattern. An NG tube passes stomach with the tip in the mid body. There is no soft tissue mass or organomegaly. No gross free intraperitoneal air is identified. No acute osseous abnormality is demonstrated. Left hip prosthesis is in place.      1. Nonspecific nonobstructive bowel gas pattern.  CPT code(s) 22430       Current Meds:   Current Facility-Administered Medications   Medication Dose Route Frequency    HYDROmorphone (DILAUDID) injection 1 mg  1 mg IntraVENous Q4H PRN    famotidine (PF) (PEPCID) 20 mg in 0.9% sodium chloride 10 mL injection  20 mg IntraVENous Q12H    lip protectant (BLISTEX) ointment 1 Each  1 Each Topical PRN    phenol throat spray (CHLORASEPTIC) 1 Spray  1 Spray Oral PRN    0.9% sodium chloride infusion  75 mL/hr IntraVENous CONTINUOUS    simethicone (MYLICON) tablet 40 mg  40 mg Oral QID PRN    multivitamin, tx-iron-ca-min (THERA-M w/ IRON) tablet 1 Tablet  1 Tablet Oral DAILY    thiamine HCL (B-1) tablet 100 mg  100 mg Oral DAILY    folic acid (FOLVITE) tablet 1 mg  1 mg Oral DAILY    albuterol-ipratropium (DUO-NEB) 2.5 MG-0.5 MG/3 ML  3 mL Nebulization Q6H RT    albuterol (PROVENTIL VENTOLIN) nebulizer solution 2.5 mg  2.5 mg Nebulization Q4H PRN    labetaloL (NORMODYNE;TRANDATE) injection 10 mg  10 mg IntraVENous Q6H PRN    amLODIPine (NORVASC) tablet 10 mg  10 mg Oral DAILY    hydrALAZINE (APRESOLINE) tablet 25 mg  25 mg Oral QID PRN    guaiFENesin ER (MUCINEX) tablet 600 mg  600 mg Oral Q12H    sodium chloride (NS) flush 5-10 mL  5-10 mL IntraVENous Q8H    sodium chloride (NS) flush 5-10 mL  5-10 mL IntraVENous PRN    acetaminophen (TYLENOL) tablet 650 mg  650 mg Oral Q6H PRN    Or    acetaminophen (TYLENOL) suppository 650 mg  650 mg Rectal Q6H PRN    ondansetron (ZOFRAN) injection 4 mg  4 mg IntraVENous Q6H PRN    LORazepam (ATIVAN) injection 1 mg  1 mg IntraVENous Q2H PRN    budesonide (PULMICORT) 500 mcg/2 ml nebulizer suspension  500 mcg Nebulization BID RT       Problem List:  Hospital Problems as of 5/29/2021 Date Reviewed: 10/30/2019        Codes Class Noted - Resolved POA    Mild protein-calorie malnutrition ([facility] ICD-10-CM: E44.1  ICD-9-CM: 263.1  5/28/2021 - Present Yes        * (Principal) Alcoholic pancreatitis ICD-10-CM: K85.20  ICD-9-CM: 135.0  5/20/2021 - Present Unknown        Gout ICD-10-CM: M10.9  ICD-9-CM: 274.9  6/29/2019 - Present Yes        Pulmonary emboli ([address] ICD-10-CM: I26.99  ICD-9-CM: 415.19  6/12/2019 - Present Yes        Multiple pulmonary nodules ICD-10-CM: R91.8  ICD-9-CM: 793.19  6/12/2019 - Present Yes        Hyperlipidemia other unsp dyslipidemia ICD-10-CM: E78.5  ICD-9-CM: 272.4  11/18/2015 - Present Yes        CAD (coronary artery disease) (Chronic) ICD-10-CM: I25.10  ICD-9-CM: 414.00  9/15/2013 - Present Yes        Accelerated hypertension ICD-10-CM: I10  ICD-9-CM: 401.0  9/13/2013 - Present Yes              Signed By: José Hu NP   [address] [facility]    May 29, 2021  5:15 PM

## 2021-05-30 ENCOUNTER — APPOINTMENT (OUTPATIENT)
Dept: GENERAL RADIOLOGY | Age: 71
DRG: 439 | End: 2021-05-30
Attending: NURSE PRACTITIONER
Payer: COMMERCIAL

## 2021-05-30 LAB
ANION GAP SERPL CALC-SCNC: 7 MMOL/L (ref 7–16)
BASOPHILS # BLD: 0 K/UL (ref 0–0.2)
BASOPHILS NFR BLD: 0 % (ref 0–2)
BUN SERPL-MCNC: 11 MG/DL (ref 8–23)
CALCIUM SERPL-MCNC: 9.1 MG/DL (ref 8.3–10.4)
CHLORIDE SERPL-SCNC: 112 MMOL/L (ref 98–107)
CO2 SERPL-SCNC: 22 MMOL/L (ref 21–32)
CREAT SERPL-MCNC: 0.68 MG/DL (ref 0.8–1.5)
DIFFERENTIAL METHOD BLD: ABNORMAL
EOSINOPHIL # BLD: 0 K/UL (ref 0–0.8)
EOSINOPHIL NFR BLD: 0 % (ref 0.5–7.8)
ERYTHROCYTE [DISTWIDTH] IN BLOOD BY AUTOMATED COUNT: 14.7 % (ref 11.9–14.6)
GLUCOSE SERPL-MCNC: 120 MG/DL (ref 65–100)
HCT VFR BLD AUTO: 29.4 % (ref 41.1–50.3)
HGB BLD-MCNC: 9.7 G/DL (ref 13.6–17.2)
IMM GRANULOCYTES # BLD AUTO: 0.1 K/UL (ref 0–0.5)
IMM GRANULOCYTES NFR BLD AUTO: 1 % (ref 0–5)
LYMPHOCYTES # BLD: 0.7 K/UL (ref 0.5–4.6)
LYMPHOCYTES NFR BLD: 7 % (ref 13–44)
MAGNESIUM SERPL-MCNC: 1.9 MG/DL (ref 1.8–2.4)
MCH RBC QN AUTO: 27.7 PG (ref 26.1–32.9)
MCHC RBC AUTO-ENTMCNC: 33 G/DL (ref 31.4–35)
MCV RBC AUTO: 84 FL (ref 79.6–97.8)
MONOCYTES # BLD: 0.8 K/UL (ref 0.1–1.3)
MONOCYTES NFR BLD: 8 % (ref 4–12)
NEUTS SEG # BLD: 8.5 K/UL (ref 1.7–8.2)
NEUTS SEG NFR BLD: 84 % (ref 43–78)
NRBC # BLD: 0 K/UL (ref 0–0.2)
PLATELET # BLD AUTO: 351 K/UL (ref 150–450)
PMV BLD AUTO: 10 FL (ref 9.4–12.3)
POTASSIUM SERPL-SCNC: 3.4 MMOL/L (ref 3.5–5.1)
RBC # BLD AUTO: 3.5 M/UL (ref 4.23–5.6)
SODIUM SERPL-SCNC: 141 MMOL/L (ref 136–145)
WBC # BLD AUTO: 10.1 K/UL (ref 4.3–11.1)

## 2021-05-30 PROCEDURE — 94640 AIRWAY INHALATION TREATMENT: CPT

## 2021-05-30 PROCEDURE — 74011250636 HC RX REV CODE- 250/636: Performed by: NURSE PRACTITIONER

## 2021-05-30 PROCEDURE — 74011000250 HC RX REV CODE- 250: Performed by: INTERNAL MEDICINE

## 2021-05-30 PROCEDURE — 74011250637 HC RX REV CODE- 250/637: Performed by: FAMILY MEDICINE

## 2021-05-30 PROCEDURE — 36415 COLL VENOUS BLD VENIPUNCTURE: CPT

## 2021-05-30 PROCEDURE — 83735 ASSAY OF MAGNESIUM: CPT

## 2021-05-30 PROCEDURE — 80048 BASIC METABOLIC PNL TOTAL CA: CPT

## 2021-05-30 PROCEDURE — 74011000250 HC RX REV CODE- 250: Performed by: NURSE PRACTITIONER

## 2021-05-30 PROCEDURE — 74011250637 HC RX REV CODE- 250/637: Performed by: HOSPITALIST

## 2021-05-30 PROCEDURE — 74011250637 HC RX REV CODE- 250/637: Performed by: NURSE PRACTITIONER

## 2021-05-30 PROCEDURE — 2709999900 HC NON-CHARGEABLE SUPPLY

## 2021-05-30 PROCEDURE — 74011000250 HC RX REV CODE- 250: Performed by: FAMILY MEDICINE

## 2021-05-30 PROCEDURE — 94760 N-INVAS EAR/PLS OXIMETRY 1: CPT

## 2021-05-30 PROCEDURE — 65270000029 HC RM PRIVATE

## 2021-05-30 PROCEDURE — 74018 RADEX ABDOMEN 1 VIEW: CPT

## 2021-05-30 PROCEDURE — 85025 COMPLETE CBC W/AUTO DIFF WBC: CPT

## 2021-05-30 RX ORDER — POTASSIUM CHLORIDE 14.9 MG/ML
20 INJECTION INTRAVENOUS ONCE
Status: COMPLETED | OUTPATIENT
Start: 2021-05-30 | End: 2021-05-30

## 2021-05-30 RX ORDER — COLCHICINE 0.6 MG/1
0.6 TABLET ORAL DAILY
Status: DISCONTINUED | OUTPATIENT
Start: 2021-05-31 | End: 2021-05-31 | Stop reason: HOSPADM

## 2021-05-30 RX ORDER — LORAZEPAM 1 MG/1
1 TABLET ORAL
Status: DISCONTINUED | OUTPATIENT
Start: 2021-05-30 | End: 2021-05-31 | Stop reason: HOSPADM

## 2021-05-30 RX ORDER — COLCHICINE 0.6 MG/1
1.2 TABLET ORAL ONCE
Status: COMPLETED | OUTPATIENT
Start: 2021-05-30 | End: 2021-05-30

## 2021-05-30 RX ADMIN — POTASSIUM CHLORIDE 20 MEQ: 200 INJECTION, SOLUTION INTRAVENOUS at 09:39

## 2021-05-30 RX ADMIN — FOLIC ACID 1 MG: 1 TABLET ORAL at 09:40

## 2021-05-30 RX ADMIN — BUDESONIDE 500 MCG: 0.5 INHALANT RESPIRATORY (INHALATION) at 19:07

## 2021-05-30 RX ADMIN — AMLODIPINE BESYLATE 10 MG: 10 TABLET ORAL at 09:40

## 2021-05-30 RX ADMIN — IPRATROPIUM BROMIDE AND ALBUTEROL SULFATE 3 ML: .5; 3 SOLUTION RESPIRATORY (INHALATION) at 01:00

## 2021-05-30 RX ADMIN — BUDESONIDE 500 MCG: 0.5 INHALANT RESPIRATORY (INHALATION) at 07:17

## 2021-05-30 RX ADMIN — GUAIFENESIN 600 MG: 600 TABLET ORAL at 09:40

## 2021-05-30 RX ADMIN — LORAZEPAM 1 MG: 1 TABLET ORAL at 19:40

## 2021-05-30 RX ADMIN — IPRATROPIUM BROMIDE AND ALBUTEROL SULFATE 3 ML: .5; 3 SOLUTION RESPIRATORY (INHALATION) at 13:18

## 2021-05-30 RX ADMIN — ACETAMINOPHEN 650 MG: 325 TABLET ORAL at 01:48

## 2021-05-30 RX ADMIN — GUAIFENESIN 600 MG: 600 TABLET ORAL at 21:54

## 2021-05-30 RX ADMIN — ACETAMINOPHEN 650 MG: 325 TABLET ORAL at 19:34

## 2021-05-30 RX ADMIN — FAMOTIDINE 20 MG: 10 INJECTION INTRAVENOUS at 09:40

## 2021-05-30 RX ADMIN — FAMOTIDINE 20 MG: 10 INJECTION INTRAVENOUS at 21:54

## 2021-05-30 RX ADMIN — Medication 10 ML: at 21:54

## 2021-05-30 RX ADMIN — Medication 1 TABLET: at 09:39

## 2021-05-30 RX ADMIN — COLCHICINE 1.2 MG: 0.6 TABLET, FILM COATED ORAL at 13:55

## 2021-05-30 RX ADMIN — IPRATROPIUM BROMIDE AND ALBUTEROL SULFATE 3 ML: .5; 3 SOLUTION RESPIRATORY (INHALATION) at 07:17

## 2021-05-30 RX ADMIN — SODIUM CHLORIDE 75 ML/HR: 900 INJECTION, SOLUTION INTRAVENOUS at 06:52

## 2021-05-30 RX ADMIN — IPRATROPIUM BROMIDE AND ALBUTEROL SULFATE 3 ML: .5; 3 SOLUTION RESPIRATORY (INHALATION) at 19:07

## 2021-05-30 NOTE — PROGRESS NOTES
END OF SHIFT NOTE:    INTAKE/OUTPUT  05/29 0701 - 05/30 0700  In: 1931.6 [P.O.:100; I.V.:1831.6]  Out: 2275 [Urine:1875]  Voiding: YES  Catheter: NO  Drain:              Flatus: Patient does have flatus present. Stool:  1 occurrences. Characteristics:  Stool Assessment  Stool Color: Kiersten Kayser (per pt ; nurse did not see)  Stool Appearance: Loose  Stool Amount: Medium  Stool Source/Status: Rectum    Emesis: 0 occurrences. Characteristics:   Emesis Assessment  Appearance: Yellow  Emesis Amount: Medium    VITAL SIGNS  Patient Vitals for the past 12 hrs:   Temp Pulse Resp BP SpO2   05/30/21 0315 98.1 °F (36.7 °C) 86 18 (!) 140/80 96 %   05/30/21 0100     94 %   05/29/21 2330 97.8 °F (36.6 °C) 83 18 (!) 147/84 96 %   05/29/21 2015     96 %   05/29/21 1940 97.9 °F (36.6 °C) 83 18 (!) 155/83 98 %       Pain Assessment  Pain Intensity 1: 6 (05/30/21 0154)  Pain Location 1: Generalized  Pain Intervention(s) 1: Medication (see MAR)  Patient Stated Pain Goal: 0    Ambulating  Yes  Wants ng out and wants to go home . Walking a little better tonight  0700-Pt pulled ou ng his am. No bleeding. No nausea. Shift report given to oncoming nurse at the bedside.     Ellen Emerson RN

## 2021-05-30 NOTE — PROGRESS NOTES
Hospitalist Progress Note    Subjective:   Daily Progress Note: 5/30/2021 7:42 AM    Patient presented to ER 5/20 with complaints of sudden onset epigastric pain x 6 hours. History of alcohol abuse drinking 1/2 pint of liquor daily. CT abdomen with findings of pancreatitis. lipase 24,724.    5/24: Improving.   5/25:  Planned for discharge, noted distended abdomen, has painless ileus with liquid BMs. Spoke with surgery, continue full liquids. Reports intermittent diarrhea x 6 mos.   5/26:  KUB with unchanged small bowel distention. NPO. Lipase 84. Spoke with surgery. 5/27:  Increased distention, KUB with multiple dilated small bowel loops. NG placed. Holding xaralto  5/28:  Improved KUB. 5/29:  Excruciating gout pain. Loose brown BM    5/30:  Up in chair, irritated, states he has to go home to pay rent or he will be evicted. Agrees to go tomorrow. Tolerating clear liquids. Reports BM. Will advance diet to full liquids tonight, then regular in am.  If tolerates diet, will discharge in am.      Assessment/Plan:     ALCOHOLIC PANCREATITIS:  HISTORY OF PRIOR EPISODES:                8/29:  Advanced to clear liquid diet.  Unable to tolerate              5/23:  Worsening abdominal pain and distention.  KUB without focal obstruction or dilation.               1/67:  No abdominal pain, nausea or vomiting. Advancing diet tonight and tomorrow               ILEUS: SBO:  5/25:    5/25:  Diet increased to GI soft, found with worsening ileus with bowel distention to 4 cm without symptoms. Backed             down to full liquids. Spoke with surgery, recs to continue full liquids, recheck in am.       5/27: Worsening KUB. NPO, N/G  5/28:  Improving KUB, NG clamped, clear liquids   5/29:  Continues to improve. Accidentally removed NG tube. 5/30:   Advance to full liquids, regular tomorrow.   If tolerates, can discharge.              ALCOHOL ABUSE:  DRINKS 1/2 PINT OF LIQUOR DAILY:                5/25:  No signs of DT or withdrawal symptoms, continue vitamins                          continue serial labs                         Continue prn ativan            5/30:  Did not have any signs of with drawls.       HISTORY OF GOUT:  Holding home colchicine, no flare on admission   5/29:  Gout flare, given one dose of solumedrol  and one dose of dilaudid    5/30:  Colchicine 1.2 mg now and then 0.6 mg daily          Uric acid level in am     5/24: Vester Morgan:  K+: 3.1              Replace and recheck              5/25:  Mag 1.6, K+: 3.3   5/30:  K+ 3.4, replace and recheck     ACCELERATED HYPERTENSION:                5/24:  Remains slightly elevated on norvasc 10 mg daily.                Continue prn apresoline, labetalol      CAD WITH HISTORY OF MI:  Noted      GERD:  Continue protonix     HISTORY OF PROSTATE CANCER:  S/P PROSTATECTOMY:  Noted      ASTHMA/COPD:  Stable, no current exacerbation, continue pulmicort and duoneb      TOBACCO ABUSE:  QUIT 2 YEARS AGO:  Noted      HYPERLIPIDEMIA:  Holding home pravachol     HISTORY OF PULMONARY EMBOLI 2019:  Continue xaralto     MULTIPLE PULMONARY NODULES:  Noted      HISTORY OF STROKE:  No residual        Current Facility-Administered Medications   Medication Dose Route Frequency    HYDROmorphone (DILAUDID) injection 1 mg  1 mg IntraVENous Q4H PRN    famotidine (PF) (PEPCID) 20 mg in 0.9% sodium chloride 10 mL injection  20 mg IntraVENous Q12H    lip protectant (BLISTEX) ointment 1 Each  1 Each Topical PRN    phenol throat spray (CHLORASEPTIC) 1 Spray  1 Spray Oral PRN    0.9% sodium chloride infusion  75 mL/hr IntraVENous CONTINUOUS    simethicone (MYLICON) tablet 40 mg  40 mg Oral QID PRN    multivitamin, tx-iron-ca-min (THERA-M w/ IRON) tablet 1 Tablet  1 Tablet Oral DAILY    thiamine HCL (B-1) tablet 100 mg  100 mg Oral DAILY    folic acid (FOLVITE) tablet 1 mg  1 mg Oral DAILY    albuterol-ipratropium (DUO-NEB) 2.5 MG-0.5 MG/3 ML  3 mL Nebulization Q6H RT    albuterol (PROVENTIL VENTOLIN) nebulizer solution 2.5 mg  2.5 mg Nebulization Q4H PRN    labetaloL (NORMODYNE;TRANDATE) injection 10 mg  10 mg IntraVENous Q6H PRN    amLODIPine (NORVASC) tablet 10 mg  10 mg Oral DAILY    hydrALAZINE (APRESOLINE) tablet 25 mg  25 mg Oral QID PRN    guaiFENesin ER (MUCINEX) tablet 600 mg  600 mg Oral Q12H    sodium chloride (NS) flush 5-10 mL  5-10 mL IntraVENous Q8H    sodium chloride (NS) flush 5-10 mL  5-10 mL IntraVENous PRN    acetaminophen (TYLENOL) tablet 650 mg  650 mg Oral Q6H PRN    Or    acetaminophen (TYLENOL) suppository 650 mg  650 mg Rectal Q6H PRN    ondansetron (ZOFRAN) injection 4 mg  4 mg IntraVENous Q6H PRN    LORazepam (ATIVAN) injection 1 mg  1 mg IntraVENous Q2H PRN    budesonide (PULMICORT) 500 mcg/2 ml nebulizer suspension  500 mcg Nebulization BID RT        Review of Systems  A comprehensive review of systems was negative except for that written in the HPI. Objective:     Visit Vitals  BP (!) 140/80 (BP 1 Location: Right arm, BP Patient Position: Lying; At rest)   Pulse 86   Temp 98.1 °F (36.7 °C)   Resp 18   Ht 6' (1.829 m)   Wt 101.6 kg (224 lb)   SpO2 97%   BMI 30.38 kg/m²    O2 Flow Rate (L/min): 0 l/min O2 Device: None (Room air)    Temp (24hrs), Av.1 °F (36.7 °C), Min:97.8 °F (36.6 °C), Max:98.4 °F (36.9 °C)     1901 -  0700  In: 5298.6 [P.O.:340; I.V.:4958.6]  Out: 8357 [Urine:2925]    General appearance: Up in chair, oriented and alert, irritable. Wants go home. Denies pain or nausea at present.  Abdomen remains distended. Tolerating clear liquids well. Wants food. Head: Normocephalic, without obvious abnormality, atraumatic  Throat: Lips, mucosa, and tongue normal. Teeth and gums normal  Neck: supple, symmetrical, trachea midline, and no JVD  Lungs: clear to auscultation bilaterally  Heart: regular rate and rhythm, S1, S2 normal, no murmur, click, rub or gallop  Abdomen: Distended, protuberant, soft, non-tender.  Bowel sounds normal. No masses,  no organomegaly. Extremities: All extremities normal, atraumatic, no cyanosis or edema  Skin: Skin color, texture, turgor normal. No rashes or lesions  Neurologic: Grossly normal     Additional comments: Notes,orders, test results, vitals reviewed    Data Review  Recent Results (from the past 24 hour(s))   CBC WITH AUTOMATED DIFF    Collection Time: 05/30/21  4:59 AM   Result Value Ref Range    WBC 10.1 4.3 - 11.1 K/uL    RBC 3.50 (L) 4.23 - 5.6 M/uL    HGB 9.7 (L) 13.6 - 17.2 g/dL    HCT 29.4 (L) 41.1 - 50.3 %    MCV 84.0 79.6 - 97.8 FL    MCH 27.7 26.1 - 32.9 PG    MCHC 33.0 31.4 - 35.0 g/dL    RDW 14.7 (H) 11.9 - 14.6 %    PLATELET 437 321 - 726 K/uL    MPV 10.0 9.4 - 12.3 FL    ABSOLUTE NRBC 0.00 0.0 - 0.2 K/uL    DF AUTOMATED      NEUTROPHILS 84 (H) 43 - 78 %    LYMPHOCYTES 7 (L) 13 - 44 %    MONOCYTES 8 4.0 - 12.0 %    EOSINOPHILS 0 (L) 0.5 - 7.8 %    BASOPHILS 0 0.0 - 2.0 %    IMMATURE GRANULOCYTES 1 0.0 - 5.0 %    ABS. NEUTROPHILS 8.5 (H) 1.7 - 8.2 K/UL    ABS. LYMPHOCYTES 0.7 0.5 - 4.6 K/UL    ABS. MONOCYTES 0.8 0.1 - 1.3 K/UL    ABS. EOSINOPHILS 0.0 0.0 - 0.8 K/UL    ABS. BASOPHILS 0.0 0.0 - 0.2 K/UL    ABS. IMM. GRANS. 0.1 0.0 - 0.5 K/UL   METABOLIC PANEL, BASIC    Collection Time: 05/30/21  4:59 AM   Result Value Ref Range    Sodium 141 136 - 145 mmol/L    Potassium 3.4 (L) 3.5 - 5.1 mmol/L    Chloride 112 (H) 98 - 107 mmol/L    CO2 22 21 - 32 mmol/L    Anion gap 7 7 - 16 mmol/L    Glucose 120 (H) 65 - 100 mg/dL    BUN 11 8 - 23 MG/DL    Creatinine 0.68 (L) 0.8 - 1.5 MG/DL    GFR est AA >60 >60 ml/min/1.73m2    GFR est non-AA >60 >60 ml/min/1.73m2    Calcium 9.1 8.3 - 10.4 MG/DL     5/20:  CT ABDOMEN AND PELVIS:  Findings compatible with pancreatitis.     5/22:  CXR:  Normal chest.     5/24:  KUBNelva Keys prominent small bowel gas, without evidence of focal  obstruction or dilatation by size criterion.  A very mild is not excluded.        5/25: KUB: Dilated small bowel loops have increased to 4 cm. Left hip prosthesis  is present.     IMPRESSION:  Progressive ileus. 5/26:  KUB:  Unchanged small bowel distention. 5/27:  KUB:  NO SIGNIFICANT INTERVAL CHANGE.    5/27:  KUB: NG tube in satisfactory position. 5/28:  KUB:  Decreased small bowel distention. 5/29:  KUB:  Nonspecific nonobstructive bowel gas pattern. 5/30:  KUB:   Nonspecific bowel gas pattern.        Care Plan discussed with: Patient and Nurse    Signed By: Kassie Benitez NP     May 30, 2021

## 2021-05-31 ENCOUNTER — APPOINTMENT (OUTPATIENT)
Dept: GENERAL RADIOLOGY | Age: 71
DRG: 439 | End: 2021-05-31
Attending: NURSE PRACTITIONER
Payer: COMMERCIAL

## 2021-05-31 VITALS
WEIGHT: 224 LBS | SYSTOLIC BLOOD PRESSURE: 141 MMHG | HEART RATE: 90 BPM | RESPIRATION RATE: 18 BRPM | DIASTOLIC BLOOD PRESSURE: 91 MMHG | OXYGEN SATURATION: 94 % | BODY MASS INDEX: 30.34 KG/M2 | HEIGHT: 72 IN | TEMPERATURE: 98.3 F

## 2021-05-31 LAB
ANION GAP SERPL CALC-SCNC: 7 MMOL/L (ref 7–16)
BASOPHILS # BLD: 0 K/UL (ref 0–0.2)
BASOPHILS NFR BLD: 0 % (ref 0–2)
BUN SERPL-MCNC: 8 MG/DL (ref 8–23)
CALCIUM SERPL-MCNC: 8.8 MG/DL (ref 8.3–10.4)
CHLORIDE SERPL-SCNC: 113 MMOL/L (ref 98–107)
CO2 SERPL-SCNC: 23 MMOL/L (ref 21–32)
CREAT SERPL-MCNC: 0.8 MG/DL (ref 0.8–1.5)
DIFFERENTIAL METHOD BLD: ABNORMAL
EOSINOPHIL # BLD: 0.1 K/UL (ref 0–0.8)
EOSINOPHIL NFR BLD: 1 % (ref 0.5–7.8)
ERYTHROCYTE [DISTWIDTH] IN BLOOD BY AUTOMATED COUNT: 15 % (ref 11.9–14.6)
GLUCOSE SERPL-MCNC: 103 MG/DL (ref 65–100)
HCT VFR BLD AUTO: 33.1 % (ref 41.1–50.3)
HGB BLD-MCNC: 10.7 G/DL (ref 13.6–17.2)
IMM GRANULOCYTES # BLD AUTO: 0.1 K/UL (ref 0–0.5)
IMM GRANULOCYTES NFR BLD AUTO: 1 % (ref 0–5)
LYMPHOCYTES # BLD: 1.1 K/UL (ref 0.5–4.6)
LYMPHOCYTES NFR BLD: 15 % (ref 13–44)
MAGNESIUM SERPL-MCNC: 1.8 MG/DL (ref 1.8–2.4)
MCH RBC QN AUTO: 27.2 PG (ref 26.1–32.9)
MCHC RBC AUTO-ENTMCNC: 32.3 G/DL (ref 31.4–35)
MCV RBC AUTO: 84.2 FL (ref 79.6–97.8)
MONOCYTES # BLD: 0.7 K/UL (ref 0.1–1.3)
MONOCYTES NFR BLD: 9 % (ref 4–12)
NEUTS SEG # BLD: 5.2 K/UL (ref 1.7–8.2)
NEUTS SEG NFR BLD: 74 % (ref 43–78)
NRBC # BLD: 0 K/UL (ref 0–0.2)
PLATELET # BLD AUTO: 439 K/UL (ref 150–450)
PMV BLD AUTO: 10.2 FL (ref 9.4–12.3)
POTASSIUM SERPL-SCNC: 3.2 MMOL/L (ref 3.5–5.1)
RBC # BLD AUTO: 3.93 M/UL (ref 4.23–5.6)
SODIUM SERPL-SCNC: 143 MMOL/L (ref 138–145)
URATE SERPL-MCNC: 9.3 MG/DL (ref 2.6–6)
WBC # BLD AUTO: 7.1 K/UL (ref 4.3–11.1)

## 2021-05-31 PROCEDURE — 94640 AIRWAY INHALATION TREATMENT: CPT

## 2021-05-31 PROCEDURE — 94760 N-INVAS EAR/PLS OXIMETRY 1: CPT

## 2021-05-31 PROCEDURE — 84550 ASSAY OF BLOOD/URIC ACID: CPT

## 2021-05-31 PROCEDURE — 74018 RADEX ABDOMEN 1 VIEW: CPT

## 2021-05-31 PROCEDURE — 85025 COMPLETE CBC W/AUTO DIFF WBC: CPT

## 2021-05-31 PROCEDURE — 74011000250 HC RX REV CODE- 250: Performed by: INTERNAL MEDICINE

## 2021-05-31 PROCEDURE — 74011250637 HC RX REV CODE- 250/637: Performed by: HOSPITALIST

## 2021-05-31 PROCEDURE — 74011250637 HC RX REV CODE- 250/637: Performed by: NURSE PRACTITIONER

## 2021-05-31 PROCEDURE — 74011000250 HC RX REV CODE- 250: Performed by: NURSE PRACTITIONER

## 2021-05-31 PROCEDURE — 74011250637 HC RX REV CODE- 250/637: Performed by: FAMILY MEDICINE

## 2021-05-31 PROCEDURE — 74011250636 HC RX REV CODE- 250/636: Performed by: NURSE PRACTITIONER

## 2021-05-31 PROCEDURE — 80048 BASIC METABOLIC PNL TOTAL CA: CPT

## 2021-05-31 PROCEDURE — 36415 COLL VENOUS BLD VENIPUNCTURE: CPT

## 2021-05-31 PROCEDURE — 83735 ASSAY OF MAGNESIUM: CPT

## 2021-05-31 PROCEDURE — 74011000250 HC RX REV CODE- 250: Performed by: FAMILY MEDICINE

## 2021-05-31 RX ORDER — FAMOTIDINE 20 MG/1
20 TABLET, FILM COATED ORAL EVERY 12 HOURS
Status: DISCONTINUED | OUTPATIENT
Start: 2021-05-31 | End: 2021-05-31 | Stop reason: HOSPADM

## 2021-05-31 RX ORDER — ALBUTEROL SULFATE 90 UG/1
1 AEROSOL, METERED RESPIRATORY (INHALATION)
Qty: 1 INHALER | Refills: 0 | Status: SHIPPED | OUTPATIENT
Start: 2021-05-31 | End: 2022-05-03 | Stop reason: SDUPTHER

## 2021-05-31 RX ORDER — COLCHICINE 0.6 MG/1
0.6 CAPSULE ORAL DAILY
Qty: 14 CAPSULE | Refills: 0 | Status: SHIPPED | OUTPATIENT
Start: 2021-05-31 | End: 2021-06-14

## 2021-05-31 RX ORDER — POTASSIUM CHLORIDE 20 MEQ/1
40 TABLET, EXTENDED RELEASE ORAL
Status: COMPLETED | OUTPATIENT
Start: 2021-05-31 | End: 2021-05-31

## 2021-05-31 RX ADMIN — FOLIC ACID 1 MG: 1 TABLET ORAL at 08:50

## 2021-05-31 RX ADMIN — BUDESONIDE 500 MCG: 0.5 INHALANT RESPIRATORY (INHALATION) at 07:19

## 2021-05-31 RX ADMIN — AMLODIPINE BESYLATE 10 MG: 10 TABLET ORAL at 08:49

## 2021-05-31 RX ADMIN — FAMOTIDINE 20 MG: 10 INJECTION INTRAVENOUS at 08:50

## 2021-05-31 RX ADMIN — POTASSIUM CHLORIDE 40 MEQ: 20 TABLET, EXTENDED RELEASE ORAL at 11:31

## 2021-05-31 RX ADMIN — GUAIFENESIN 600 MG: 600 TABLET ORAL at 08:50

## 2021-05-31 RX ADMIN — Medication 1 TABLET: at 08:50

## 2021-05-31 RX ADMIN — COLCHICINE 0.6 MG: 0.6 TABLET, FILM COATED ORAL at 08:49

## 2021-05-31 RX ADMIN — Medication 10 ML: at 06:02

## 2021-05-31 RX ADMIN — IPRATROPIUM BROMIDE AND ALBUTEROL SULFATE 3 ML: .5; 3 SOLUTION RESPIRATORY (INHALATION) at 07:19

## 2021-05-31 RX ADMIN — Medication 100 MG: at 08:50

## 2021-05-31 RX ADMIN — IPRATROPIUM BROMIDE AND ALBUTEROL SULFATE 3 ML: .5; 3 SOLUTION RESPIRATORY (INHALATION) at 01:14

## 2021-05-31 NOTE — DISCHARGE INSTRUCTIONS
DISCHARGE SUMMARY from Nurse    PATIENT INSTRUCTIONS:    After general anesthesia or intravenous sedation, for 24 hours or while taking prescription Narcotics:  · Limit your activities  · Do not drive and operate hazardous machinery  · Do not make important personal or business decisions  · Do  not drink alcoholic beverages  · If you have not urinated within 8 hours after discharge, please contact your surgeon on call. Report the following to your surgeon:  · Excessive pain, swelling, redness or odor of or around the surgical area  · Temperature over 100.5  · Nausea and vomiting lasting longer than 4 hours or if unable to take medications  · Any signs of decreased circulation or nerve impairment to extremity: change in color, persistent  numbness, tingling, coldness or increase pain  · Any questions    What to do at Home:  Recommended activity: Activity as tolerated, no driving for today or while taking narcotic pain medication. If you experience any of the following symptoms fever of 101 or greater, pain unrelieved by medication, uncontrollable nausea or vomiting, greater than 3 days without Bowel movement please follow up with PCP. *  Please give a list of your current medications to your Primary Care Provider. *  Please update this list whenever your medications are discontinued, doses are      changed, or new medications (including over-the-counter products) are added. *  Please carry medication information at all times in case of emergency situations. These are general instructions for a healthy lifestyle:    No smoking/ No tobacco products/ Avoid exposure to second hand smoke  Surgeon General's Warning:  Quitting smoking now greatly reduces serious risk to your health.     Obesity, smoking, and sedentary lifestyle greatly increases your risk for illness    A healthy diet, regular physical exercise & weight monitoring are important for maintaining a healthy lifestyle    You may be retaining fluid if you have a history of heart failure or if you experience any of the following symptoms:  Weight gain of 3 pounds or more overnight or 5 pounds in a week, increased swelling in our hands or feet or shortness of breath while lying flat in bed. Please call your doctor as soon as you notice any of these symptoms; do not wait until your next office visit. The discharge information has been reviewed with the patient. The patient verbalized understanding. Discharge medications reviewed with the patient and appropriate educational materials and side effects teaching were provided. ___________________________________________________________________________________________________________________________________    Patient Education        Pancreatitis: Care Instructions  Overview     The pancreas is an organ behind the stomach. It makes hormones and enzymes to help your body digest food. But if these enzymes attack the pancreas, it can get inflamed. This is called pancreatitis. Most cases are caused by gallstones or by heavy alcohol use. If you take care of yourself at home, it will help you get better. It will also help you avoid more problems with your pancreas. How can you care for yourself at home? · Drink clear liquids and eat bland foods until you feel better. [place] foods include rice, dry toast, and crackers. They also include bananas and applesauce. · Eat a low-fat diet until your doctor says your pancreas is healed. · Do not drink alcohol. Tell your doctor if you need help to quit. Counseling, support groups, and sometimes medicines can help you stay sober. · Be safe with medicines. Read and follow all instructions on the label. ? If the doctor gave you a prescription medicine for pain, take it as prescribed. ? If you are not taking a prescription pain medicine, ask your doctor if you can take an over-the-counter medicine. · If your doctor prescribed antibiotics, take them as directed. Do not stop taking them just because you feel better. You need to take the full course of antibiotics. · Get extra rest until you feel better. To prevent future problems with your pancreas  · Do not drink alcohol. · Tell your doctors and pharmacist that you've had pancreatitis. They can help you avoid medicines that may cause this problem again. Where can you learn more? Go to http://www.gray.com/  Enter J381 in the search box to learn more about \"Pancreatitis: Care Instructions. \"  Current as of: April 15, 2020               Content Version: 12.8  © 6987-1851 [employer]. Care instructions adapted under license by [employer] (which disclaims liability or warranty for this information). If you have questions about a medical condition or this instruction, always ask your healthcare professional. [place] [address] any warranty or liability for your use of this information. Patient Education        Learning About Ileus  What is ileus? Ileus (say \"ILL-ee-us\") is a blockage of the bowel (intestines) that happens when the intestines don't work as they should. Normally, muscles in the intestines squeeze to push food and waste along. When this process stops, the intestines stop digesting food and moving waste out of the body. This may also be called paralytic ileus. Ileus is not the same as a mechanical bowel obstruction. In a mechanical bowel obstruction, something is actually blocking the intestine, like scar tissue or a tumor. That is not true in ileus. Ileus sometimes happens after surgery to the belly. But it can also be caused by other things, such as some medicines, certain diseases or infections, and nerve problems. The doctor may do a number of tests. These tests may include X-rays, blood tests, and a CT scan. Testing can help the doctor be sure that nothing is blocking the intestines.   Most people who have ileus need to be treated in the hospital.  What are the symptoms? Symptoms may include:  · Cramping belly pain. · Bloating. · Nausea or vomiting. · Not passing stool or gas. How is ileus treated? You will need to avoid eating solid food until you are better. Instead, you will get fluids and nutrition through a vein (IV). This helps prevent dehydration. It also lets your bowel rest.  You may have a tube that goes through your nose and into your stomach. This can help ease pain and bloating. You may get other treatments, depending on what caused ileus. For example, a medicine might be stopped if it is affecting your bowel. The intestines will often start working again in a few days. Signs of this include being able to pass gas or have a bowel movement. As your intestines start working, you will switch slowly from a liquid diet back to solid foods. Follow-up care is a key part of your treatment and safety. Be sure to make and go to all appointments, and call your doctor if you are having problems. It's also a good idea to know your test results and keep a list of the medicines you take. Where can you learn more? Go to http://www.gray.com/  Enter M933 in the search box to learn more about \"Learning About Ileus. \"  Current as of: April 15, 2020               Content Version: 12.8  © 2006-2021 [employer]. Care instructions adapted under license by [employer] (which disclaims liability or warranty for this information). If you have questions about a medical condition or this instruction, always ask your healthcare professional. [place] [id number] any warranty or liability for your use of this information.

## 2021-05-31 NOTE — DISCHARGE SUMMARY
[address] [facility] Hospitalist Discharge Summary     Name:  Michelle Denton    Age:70 y.o. Sex:male  :  1950       MRN:  [id number]       Admitting Physician: Erik Kramer DO Admit Date: 2021  3:39 PM   Attending Physician: Avi Sesay MD  Primary Care Physician: Burlene Claude, MD       Discharge Physician: Maribel Horne NP  Discharge date: 21   Discharged Condition: Stable    Indication for Admission:   Chief Complaint   Patient presents with    Abdominal Pain        Reasons for hospitalization:  Hospital Problems as of 2021 Date Reviewed: 10/30/2019        Codes Class Noted - Resolved POA    Mild protein-calorie malnutrition ([facility] ICD-10-CM: E44.1  ICD-9-CM: 263.1  2021 - Present Yes        * (Principal) Alcoholic pancreatitis LSN-54-YF: K85.20  ICD-9-CM: 971.5  2021 - Present Unknown        Gout ICD-10-CM: M10.9  ICD-9-CM: 274.9  2019 - Present Yes        Pulmonary emboli ([facility] ICD-10-CM: I26.99  ICD-9-CM: 415.19  2019 - Present Yes        Multiple pulmonary nodules ICD-10-CM: R91.8  ICD-9-CM: 793.19  2019 - Present Yes        Hyperlipidemia other unsp dyslipidemia ICD-10-CM: E78.5  ICD-9-CM: 272.4  2015 - Present Yes        CAD (coronary artery disease) (Chronic) ICD-10-CM: I25.10  ICD-9-CM: 414.00  9/15/2013 - Present Yes        Accelerated hypertension ICD-10-CM: I10  ICD-9-CM: 401.0  2013 - Present Yes                 Discharge Diagnosis: Alcoholic Pancreatitis   Did Patient have Sepsis (YES OR NO): No       Hospital Course :  Patient presented to ER  with complaints of sudden onset, severe upper abdominal pain x approx one hour along with nausea, no vomiting.  History of alcohol abuse, drinking at least 1/2 pint of liquor daily, can not remember when last drink was.  Poor historian.  . Found with lipase of 24,724.  CT with findings of pancreatitis.   Has slowly progressed to tolerating clear liquids on .     ALCOHOLIC PANCREATITIS:  HISTORY OF PRIOR EPISODES:                0/38:  Advanced to clear liquid diet.  Unable to tolerate              1/89:  Worsening abdominal pain and distention.  KUB without focal obstruction or dilation.               5/30:  No abdominal pain, nausea or vomiting. Advancing diet tonight and tomorrow             5/31/21 Lipase 84 on 5/26/21 Patient denies abdominal pain    ILEUS: SBO:  5/25:    5/25:  Diet increased to GI soft, found with worsening ileus with bowel distention to 4 cm without symptoms. Backed             down to full liquids.  Spoke with surgery, recs to continue full liquids, recheck in am.       5/27: Worsening KUB. NPO, N/G  5/28:  Improving KUB, NG clamped, clear liquids   5/29:  Continues to improve. Accidentally removed NG tube. 5/30:   Advance to full liquids, regular tomorrow. If tolerates, can discharge.            5/31/21 Patient denying abdominal pain and tolerating full liquids/grits. Has had 2 BMs this a.m. and reports passing flatus regularly     ALCOHOL ABUSE:  DRINKS 1/2 PINT OF LIQUOR DAILY:                5/25:  No signs of DT or withdrawal symptoms, continue vitamins                          continue serial labs                         Continue prn ativan               5/30:  Did not have any signs of with drawls.       HISTORY OF GOUT:  Holding home colchicine, no flare on admission      5/29:  Gout flare, given one dose of solumedrol  and one dose of dilaudid       5/30:  Colchicine 1.2 mg now and then 0.6 mg daily             Uric acid level in am        5/31/21 Uric acid 9.3 and patient to resume home meds    5/24: Juma Hamman:  K+: 3.1              Replace and recheck              5/25:  Mag 1.6, K+: 3.3      5/30:  K+ 3.4, replace and recheck     ACCELERATED HYPERTENSION:                5/24:  Remains slightly elevated on norvasc 10 mg daily.                Continue prn apresoline, labetalol    5/31/21 Stable BP; Continue Norvasc at discharge    CAD WITH HISTORY OF MI:  Noted      GERD:  Continue protonix     HISTORY OF PROSTATE CANCER:  S/P PROSTATECTOMY:  Noted      ASTHMA/COPD:  Stable, no current exacerbation, continue pulmicort and duoneb      TOBACCO ABUSE:  QUIT 2 YEARS AGO:  Noted      HYPERLIPIDEMIA:  Holding home pravachol     HISTORY OF PULMONARY EMBOLI 2019:  Continue xaralto     MULTIPLE PULMONARY NODULES:  Noted      HISTORY OF STROKE:  No residual         Consults: None     Disposition: Home or Self Care  Diet: DIET FULL LIQUID  Code Status: DNR      Discharge Info:     Current Discharge Medication List      CONTINUE these medications which have CHANGED    Details   albuterol (PROVENTIL HFA, VENTOLIN HFA, PROAIR HFA) 90 mcg/actuation inhaler Take 1 Puff by inhalation every four (4) hours as needed for Wheezing. Qty: 1 Inhaler, Refills: 0  Start date: 5/31/2021         CONTINUE these medications which have NOT CHANGED    Details   budesonide (Pulmicort) 0.5 mg/2 mL nbsp 2 mL by Nebulization route two (2) times a day. Qty: 60 Each, Refills: 11    Associated Diagnoses: COPD exacerbation (HCC)      Xarelto 20 mg tab tablet TAKE 1 TABLET BY MOUTH DAILY WITH BREAKFAST  Qty: 30 Tab, Refills: 5      colchicine (MITIGARE) 0.6 mg capsule Take 1 Cap by mouth daily. Qty: 10 Cap, Refills: 0    Associated Diagnoses: Acute gouty arthritis      allopurinol (ZYLOPRIM) 100 mg tablet Take 1 Tab by mouth daily. Qty: 30 Tab, Refills: 2      umeclidinium (INCRUSE ELLIPTA) 62.5 mcg/actuation inhaler Take 1 Puff by inhalation daily. Qty: 1 Inhaler, Refills: 11      tiotropium (SPIRIVA) 18 mcg inhalation capsule Take 1 Cap by inhalation daily. Qty: 30 Cap, Refills: 11      pantoprazole (PROTONIX) 40 mg tablet Take 1 Tab by mouth daily. Qty: 30 Tab, Refills: 1      aspirin delayed-release 81 mg tablet Take 1 Tab by mouth daily. Qty: 30 Tab, Refills: 0      mirtazapine (REMERON) 15 mg tablet Take 15 mg by mouth nightly.       senna-docusate (PERICOLACE) 8.6-50 mg per tablet Take 2 Tabs by mouth daily. Qty: 120 Tab, Refills: 0      oxybutynin chloride XL (DITROPAN XL) 5 mg CR tablet Take 5 mg by mouth daily. Take / use AM day of surgery  per anesthesia protocols. pravastatin (PRAVACHOL) 40 mg tablet Take 40 mg by mouth daily. Take / use AM day of surgery  per anesthesia protocols. amLODIPine (NORVASC) 10 mg tablet Take 10 mg by mouth daily. Take / use AM day of surgery  per anesthesia protocols. albuterol-ipratropium (DUO-NEB) 2.5 mg-0.5 mg/3 ml nebu 3 mL by Nebulization route four (4) times daily. Qty: 120 Nebule, Refills: 11    Associated Diagnoses: COPD exacerbation ([address]      albuterol (PROVENTIL VENTOLIN) 2.5 mg /3 mL (0.083 %) nebu 3 mL by Nebulization route every six (6) hours as needed for Other. Qty: 120 Nebule, Refills: 2      lidocaine 4 % patch Apply to shoulder at night and remove in the morning  Qty: 5 Patch, Refills: 0      ondansetron (ZOFRAN ODT) 8 mg disintegrating tablet Take 1 Tab by mouth every eight (8) hours as needed for Nausea. Qty: 30 Tab, Refills: 0             Medications Discontinued During This Encounter   Medication Reason    budesonide (PULMICORT) 500 mcg/2 ml nebulizer suspension     labetaloL (NORMODYNE;TRANDATE) injection 20 mg     hydrALAZINE (APRESOLINE) 20 mg/mL injection 20 mg     albuterol-ipratropium (DUO-NEB) 2.5 MG-0.5 MG/3 ML     albuterol (PROVENTIL VENTOLIN) nebulizer solution 2.5 mg     albuterol-ipratropium (DUO-NEB) 2.5 MG-0.5 MG/3 ML     0.9% sodium chloride 1,000 mL with mvi (adult no. 4 with vit K) 10 mL, thiamine 221 mg, folic acid 1 mg infusion     lactated Ringers infusion     polyethylene glycol (MIRALAX) packet 17 g     potassium chloride (K-DUR, KLOR-CON) SR tablet 20 mEq     psyllium husk-aspartame (METAMUCIL FIBER) packet 1 Packet     loperamide (IMODIUM) 1 mg/7.5 mL oral solution 2 mg     potassium chloride 20 mEq in 100 ml IVPB     pantoprazole (PROTONIX) 40 mg in 0.9% sodium chloride 10 mL injection     HYDROmorphone (DILAUDID) injection 1 mg     potassium chloride (K-DUR, KLOR-CON) SR tablet 40 mEq     potassium chloride 20 mEq in 100 ml IVPB     rivaroxaban (XARELTO) tablet 20 mg     pantoprazole (PROTONIX) tablet 40 mg     LORazepam (ATIVAN) injection 1 mg     HYDROmorphone (DILAUDID) injection 1 mg          Follow Up Orders: Follow-up Appointments   Procedures    FOLLOW UP VISIT Appointment in: One Week Follow-up with PCP     Follow-up with PCP     Standing Status:   Standing     Number of Occurrences:   1     Standing Expiration Date:   6/1/2021     Order Specific Question:   Appointment in     Answer: One Week       Follow-up Information     Follow up With Specialties Details Why Contact Info    Jocelyne Panda MD Internal Medicine   [address] [place] [address]  [phone]              Discharge Exam:    Patient Vitals for the past 24 hrs:   Temp Pulse Resp BP SpO2   05/31/21 0719     94 %   05/31/21 0716 98.3 °F (36.8 °C) 90 18 (!) 141/91 95 %   05/31/21 0303 98.9 °F (37.2 °C) 77 17 126/66 95 %   05/31/21 0114     95 %   05/30/21 2327 98.7 °F (37.1 °C) 95 19 (!) 147/82 97 %   05/30/21 1936 98.1 °F (36.7 °C) 100 19 (!) 150/83 97 %   05/30/21 1907     96 %   05/30/21 1520 98.1 °F (36.7 °C) 84 18 (!) 159/89 98 %   05/30/21 1318     95 %   05/30/21 1145 97.6 °F (36.4 °C) 80 18 (!) 140/78 96 %     Oxygen Therapy  O2 Sat (%): 94 % (05/31/21 0719)  Pulse via Oximetry: 72 beats per minute (05/31/21 0719)  O2 Device: None (Room air) (05/31/21 0719)  Skin Assessment: Clean, dry, & intact (05/30/21 1936)  Skin Protection for O2 Device: N/A (05/27/21 1945)  O2 Flow Rate (L/min): 0 l/min (05/25/21 1400)  FIO2 (%): 21 % (05/28/21 0229)    Estimated body mass index is 30.38 kg/m² as calculated from the following:    Height as of this encounter: 6' (1.829 m). Weight as of this encounter: 101.6 kg (224 lb).       Intake/Output Summary (Last 24 hours) at 5/31/2021 600 JINA Haynes filed at 5/31/2021 0913  Gross per 24 hour   Intake 2153 ml   Output 1000 ml   Net 1153 ml       *Note that automatically entered I/Os may not be accurate; dependent on patient compliance with collection and accurate  by assistants. Physical Exam:   General: No acute distress, speaking in full sentences, no use of accessory muscles   HEENT: Pupils equal; oropharynx is clear   Neck: no JVD   Lungs: Clear to auscultation bilaterally   Cardiovascular: Regular rate and rhythm with normal S1 and S2   Abdomen: Soft, nontender, nondistended, normoactive bowel sounds   Extremities: No cyanosis clubbing or edema   Neuro: Nonfocal, A&O x3   Psych: Normal affect       All Labs from Last 24 Hrs:  Recent Results (from the past 24 hour(s))   CBC WITH AUTOMATED DIFF    Collection Time: 05/31/21  5:15 AM   Result Value Ref Range    WBC 7.1 4.3 - 11.1 K/uL    RBC 3.93 (L) 4.23 - 5.6 M/uL    HGB 10.7 (L) 13.6 - 17.2 g/dL    HCT 33.1 (L) 41.1 - 50.3 %    MCV 84.2 79.6 - 97.8 FL    MCH 27.2 26.1 - 32.9 PG    MCHC 32.3 31.4 - 35.0 g/dL    RDW 15.0 (H) 11.9 - 14.6 %    PLATELET 885 426 - 572 K/uL    MPV 10.2 9.4 - 12.3 FL    ABSOLUTE NRBC 0.00 0.0 - 0.2 K/uL    DF AUTOMATED      NEUTROPHILS 74 43 - 78 %    LYMPHOCYTES 15 13 - 44 %    MONOCYTES 9 4.0 - 12.0 %    EOSINOPHILS 1 0.5 - 7.8 %    BASOPHILS 0 0.0 - 2.0 %    IMMATURE GRANULOCYTES 1 0.0 - 5.0 %    ABS. NEUTROPHILS 5.2 1.7 - 8.2 K/UL    ABS. LYMPHOCYTES 1.1 0.5 - 4.6 K/UL    ABS. MONOCYTES 0.7 0.1 - 1.3 K/UL    ABS. EOSINOPHILS 0.1 0.0 - 0.8 K/UL    ABS. BASOPHILS 0.0 0.0 - 0.2 K/UL    ABS. IMM.  GRANS. 0.1 0.0 - 0.5 K/UL   METABOLIC PANEL, BASIC    Collection Time: 05/31/21  5:15 AM   Result Value Ref Range    Sodium 143 138 - 145 mmol/L    Potassium 3.2 (L) 3.5 - 5.1 mmol/L    Chloride 113 (H) 98 - 107 mmol/L    CO2 23 21 - 32 mmol/L    Anion gap 7 7 - 16 mmol/L    Glucose 103 (H) 65 - 100 mg/dL    BUN 8 8 - 23 MG/DL    Creatinine 0.80 0.8 - 1.5 MG/DL GFR est AA >60 >60 ml/min/1.73m2    GFR est non-AA >60 >60 ml/min/1.73m2    Calcium 8.8 8.3 - 10.4 MG/DL   URIC ACID    Collection Time: 05/31/21  5:15 AM   Result Value Ref Range    Uric acid 9.3 (H) 2.6 - 6.0 MG/DL   MAGNESIUM    Collection Time: 05/31/21  5:15 AM   Result Value Ref Range    Magnesium 1.8 1.8 - 2.4 mg/dL       All Micro Results     None          SARS-CoV-2 Lab Results  \"Novel Coronavirus\" Test: No results found for: COV2NT   \"Emergent Disease\" Test: No results found for: EDPR  \"SARS-COV-2\" Test: No results found for: XGCOVT  Rapid Test:   Lab Results   Component Value Date/Time    COVR Not detected 03/29/2021 08:42 AM            Diagnostic Imaging/Tests:   XR CHEST SNGL V    Result Date: 5/22/2021  EXAM: XR CHEST SNGL V INDICATION: SOB COMPARISON: 3/29/2021 FINDINGS: A portable AP radiograph of the chest was obtained at 1652 hours. The patient is on a cardiac monitor. The lungs are clear. The cardiac and mediastinal contours and pulmonary vascularity are normal.  The bones and soft tissues are grossly within normal limits. Normal chest.    XR ABD (KUB)    Result Date: 5/31/2021  Clinical history: Evaluate for small bowel obstruction. Progress TECHNIQUE: AP supine abdominal x-ray. COMPARISON: Yesterday. FINDINGS: There is paucity of bowel gas. There is overall nonobstructive bowel gas pattern. Surrounding bones are stable. 1. Bowel gas pattern appears nonobstructive on today's study. XR ABD (KUB)    Result Date: 5/30/2021  Abdomen x-ray single view. HISTORY: Ileus. COMPARISON: 5/29/2021. FINDINGS: Bowel gas pattern is now nonspecific. There is no evidence organomegaly, abnormal calcification or ascites. Status post left hip replacement. Nonspecific bowel gas pattern. XR ABD (KUB)    Result Date: 5/29/2021  Exam: XR ABD (KUB) on 5/29/2021 10:02 AM Clinical History: The Male patient is 72years old  presenting for worsening abdominal distention.  Comparison:  None Findings: Single view of the abdomen demonstrates a non-specific bowel gas pattern. An NG tube passes stomach with the tip in the mid body. There is no soft tissue mass or organomegaly. No gross free intraperitoneal air is identified. No acute osseous abnormality is demonstrated. Left hip prosthesis is in place. 1. Nonspecific nonobstructive bowel gas pattern. CPT code(s) 03497     XR ABD (KUB)    Result Date: 5/28/2021  ABDOMINAL FILMS, 1 view(s). HISTORY: Ileus. TECHNIQUE:  Supine views of the abdomen on 3 image(s). COMPARISON: Yesterday's exams. FINDINGS: Mildly dilated small bowel loops. Gas in the colon and rectum NG tube is present. Status post left hip arthroplasty. Decreased small bowel distention. XR ABD (KUB)    Result Date: 5/27/2021  KUB CLINICAL INDICATION: Evaluate NG tube FINDINGS: Single supine view of the upper abdomen shows an NG tube in satisfactory position. NG tube in satisfactory position. XR ABD (KUB)    Result Date: 5/27/2021  KUB: CLINICAL HISTORY:  Abdominal pain and distention with pancreatitis. COMPARISON:  5/26/2021 and 5/25/2021. FINDINGS:  AP supine images demonstrates a small amount of stool scattered in the colon with multiple dilated small bowel loops. No abnormal soft tissue mass or calcific density is noted. NO SIGNIFICANT INTERVAL CHANGE. XR ABD (KUB)    Result Date: 5/26/2021  EXAM: Abdomen x-rays. INDICATION: Follow-up ileus. COMPARISON: Yesterday's abdomen x-rays. TECHNIQUE: 2 portable supine views of the abdomen were obtained. FINDINGS: Mildly distended small bowel loops in the central abdomen are unchanged, measuring up to 4 cm in diameter. The colon gas pattern is within normal limits. No gross free air is seen. Again noted is a left hip arthroplasty. Unchanged small bowel distention. XR ABD (KUB)    Result Date: 5/25/2021  ABDOMINAL FILMS, 1 view(s). HISTORY: Pancreatitis and mild ileus TECHNIQUE:  Supine views of the abdomen on 2 image(s). COMPARISON: Exam 2 days prior and CT scan FINDINGS: Dilated small bowel loops have increased to 4 cm. Left hip prosthesis is present. Progressive ileus. XR ABD (KUB)    Result Date: 5/23/2021  EXAM: Abdomen x-rays. INDICATION: Pain. COMPARISON: Prior CT abdomen on May 20, 2021. TECHNIQUE: 2 portable supine x-rays of the abdomen were obtained. FINDINGS: There is mildly prominent small bowel gas, although there are no dilated loops by size criterion. The colon gas pattern is normal. No gross free air is visualized. Again noted is a left hip arthroplasty. Mildly prominent small bowel gas, without evidence of focal obstruction or dilatation by size criterion. A very mild is not excluded. CT CHEST WO CONT    Result Date: 5/20/2021  CT OF THE CHEST WITH INTRAVENOUS CONTRAST. INDICATION: Shortness breath. COPD exacerbation. Abdominal pain. COMPARISON: June 2019. TECHNIQUE:   5 mm axial scans from the apices through the diaphragms without contrast. Radiation dose reduction techniques were used for this study. Our CT scanners use one or more of the following:  Automated exposure control, adjustment of the mA and or kV according to patient size, iterative reconstruction. FINDINGS: LUNGS: No pulmonary nodules. Minimal atelectasis at the bases. No airspace disease. AIRWAYS: Trachea and proximal bronchi grossly patent. PLEURA: No effusion or thickening or calcifications. LYMPH NODES: No enlarged axillary, hilar or mediastinal lymph nodes. HEART: Normal size. CORONARIES: Mild  calcifications. UPPER ABDOMEN: Moderate hiatal hernia. . SKELETAL/CHEST WALL: DJD, otherwise no gross bony lesions. Bilateral renal cysts. Low-attenuation of the liver. Stranding densities surrounding the pancreas suspicious for acute pancreatitis. 1) Stranding densities near the pancreas suspicious for acute pancreatitis although incompletely evaluated. 2) No acute abnormality of alignment.  3) Additional findings include: Coronary atherosclerosis, moderate hiatal hernia, bilateral renal cysts and fatty infiltration of the liver     CT ABD PELV W CONT    Result Date: 5/20/2021  HISTORY:  Upper abdominal pain, one hour duration. COMPARISON: None EXAM: CT abdomen and pelvis with iv contrast TECHNIQUE: Thin section axial CT was performed from the lung bases through the symphysis pubis during uneventful rapid bolus intravenous administration of 125 mL of Isovue 370. Oral contrast was not administered. Radiation dose reduction techniques were used for this study. Our CT scanners use one or all of the following: Automated exposure control, adjustment of the mA and/or kV according to patient size, use of iterative reconstruction. FINDINGS: There is a hiatal hernia. CT abdomen: There is a subtle area of low density seen peripherally within the right posterior lobe of the liver, uncertain etiology or significance. The spleen enhances homogeneously without discrete lesions. There is no biliary ductal dilatation. The gallbladder and adrenal glands are normal. There is peripancreatic fat stranding with small fluid seen in the left paracolic gutter. The kidneys enhance symmetrically. Bilateral renal cysts are present. Bowel loops in the upper abdomen are normal. No definite upper abdominal lymphadenopathy seen. CT pelvis: The patient is status post right hemicolectomy. The bladder and rectum are normal. No pelvic adenopathy seen. No free air or free fluid seen within the pelvis. Bone window evaluation demonstrates no aggressive osseous lesions. Findings compatible with pancreatitis. Echocardiogram/EKG results:  No results found for this visit on 05/20/21.     EKG Results     None          Results for orders placed or performed during the hospital encounter of 03/29/21   EKG, 12 LEAD, INITIAL   Result Value Ref Range    Ventricular Rate 85 BPM    Atrial Rate 85 BPM    P-R Interval 160 ms    QRS Duration 76 ms    Q-T Interval 382 ms    QTC Calculation (Bezet) 454 ms    Calculated P Axis 75 degrees    Calculated R Axis -34 degrees    Calculated T Axis 35 degrees    Diagnosis       !!! Poor data quality, interpretation may be adversely affected  Sinus rhythm  Left axis deviation  Abnormal ECG  When compared with ECG of 13-JUN-2020 18:13,  Poor data quality in current ECG precludes serial comparison  Confirmed by [facility] JOHN MART MD (), JOY PEREZ ([id number]) on 3/29/2021 5:07:47 PM         Procedures done this admission:  * No surgery found *        Time spent in patient discharge planning and coordination 40 minutes.       Signed By: Betsy León NP   [facility]    May 31, 2021  10:50 AM

## 2021-05-31 NOTE — PROGRESS NOTES
END OF SHIFT NOTE:    INTAKE/OUTPUT  05/30 0701 - 05/31 0700  In: 2033 [P.O.:840; I.V.:1193]  Out: 800 [Urine:800]  Voiding: YES  Catheter: NO  Drain:              Flatus: Patient does have flatus present. Stool:  0 occurrences. Characteristics:  Stool Assessment  Stool Color: Carolina Stalker (per pt ; nurse did not see)  Stool Appearance: Loose  Stool Amount: Medium  Stool Source/Status: Rectum    Emesis: 0 occurrences. Characteristics:   Emesis Assessment  Appearance: Yellow  Emesis Amount: Medium    VITAL SIGNS  Patient Vitals for the past 12 hrs:   Temp Pulse Resp BP SpO2   05/31/21 0303 98.9 °F (37.2 °C) 77 17 126/66 95 %   05/31/21 0114     95 %   05/30/21 2327 98.7 °F (37.1 °C) 95 19 (!) 147/82 97 %   05/30/21 1936 98.1 °F (36.7 °C) 100 19 (!) 150/83 97 %   05/30/21 1907     96 %       Pain Assessment  Pain Intensity 1: 0 (05/30/21 1355)  Pain Location 1: Generalized  Pain Intervention(s) 1: Medication (see MAR)  Patient Stated Pain Goal: 0    Ambulating  Yes few steps in room. Walking better than Sat pm. When gout pain was so bad. No c/o nausea. Shift report given to oncoming nurse at the bedside.     Laurita Cardenas RN

## 2021-05-31 NOTE — PROGRESS NOTES
Discharge teaching complete . Patient verbalized understanding of diet, activity, s/sx as reviewed, and follow up appointments. Rx electronically sent to pharmacy. Discharge instructions given to patient. IV d/c'd with tip intact and dressing applied.

## 2021-05-31 NOTE — PROGRESS NOTES
Pt is for discharge home today with no needs/supportive care orders received for CM at this time. Pt will have close follow up with PCP    Milestones met    Care Management Interventions  PCP Verified by CM:  Yes (Dr. Laurent Crooks)  Mode of Transport at Discharge: Self  Transition of Care Consult (CM Consult): Discharge Planning, Other (CLTC aide)  Discharge Durable Medical Equipment: No  Physical Therapy Consult: No  Occupational Therapy Consult: No  Speech Therapy Consult: No  Current Support Network: Own Home, Lives Alone, Family Lives Nearby  Confirm Follow Up Transport: Family  The Plan for Transition of Care is Related to the Following Treatment Goals : no further needs  The Patient and/or Patient Representative was Provided with a Choice of Provider and Agrees with the Discharge Plan?: Yes  Name of the Patient Representative Who was Provided with a Choice of Provider and Agrees with the Discharge Plan: patient  Freedom of Choice List was Provided with Basic Dialogue that Supports the Patient's Individualized Plan of Care/Goals, Treatment Preferences and Shares the Quality Data Associated with the Providers?: Yes  [place] Resource Information Provided?: No  Discharge Location  Discharge Placement: Home

## 2021-06-16 NOTE — H&P (VIEW-ONLY)
[facility] CONSULT NOTE Primary GI Physician:  Has not been seen by GI in the past.  Has been followed by Dr. Clay Nowak with [facility] for his colonoscopies. [facility] now seeing patient this admission - Juve Delcid MD 
 
Referring Physician:  Dr. Ken Arnold Chief Complaint:  Dark Stools and Anemia after recent PE on Xarelto Problem List: 
Principal Problem: 
  GI bleed (6/29/2019) Active Problems: Accelerated hypertension (9/13/2013) CAD (coronary artery disease) (9/15/2013) Hypertension, benign, controlled (11/18/2015) Coronary atherosclerosis of native coronary vessel (11/18/2015) Pulmonary emboli ([address] (6/12/2019) Gout (6/29/2019) Mr. Ancelmo Delaney is a 80 y.o. male with extensive PMH as listed below including CAD s/p stent about 4 years ago on ASA 81 mg PO daily, recent PE from 6/11/19 started on Xarelto at that time, Prostate Cancer s/p surgery around 2016 at [facility], Benign colon polyps s/p partial right colectomy with Dr. Clay Nowak at [facility] around 11/2011, FMHx of Colon Cancer in Father and Paternal Grandfather, HTN, HLD, Asthma and GERD who is seen in consultation at the request of Dr. Ken Arnold for dark stools and anemia (Hg on last check was 8.8, on arrival it was noted to be 9.4, and Hg on 6/14 was 11.7). Plt 328. INR has not been checked. Vitals stable. 1. Melena 2. Non-transfused Acute Blood Loss Anemia 3. Recent PEs diagnosed on 6/11 on Xarelto - d/dx includes PUD vs. Gastritis/ Duodenitis vs. Erosions vs. AVMs vs. Less likely Malignancy - Monitor serial H/H 
- Will add on to check INR 
- Transfuse for Hg < 7, Plt < 50,000 and INR > 2.5 
- Okay for clear liquid diet from GI standpoint today. - Keep patient NPO after MN. 
- Will plan for an EGD tomorrow. - Agree with PPI IV BID. 
- Endoscopy and risks explained to the patient.   Risks including reaction to sedation, cardiopulmonary events, infection, bleeding, perforation, requirement for surgery if complications should occur, death were explained to patient who expressed understanding and agreed to proceed with endoscopy. - Patient is up to date for his screening colonoscopies which have been done at [facility] with Dr. Igor Johnson. He will continue to follow with them as previously scheduled. Dali Huang MD  
[facility] Subjective:  
 
History of Present Illness:   
 
Patient is a 80 y.o. male with extensive PMH as listed below including CAD s/p stent about 4 years ago on ASA 81 mg PO daily, recent PE from 6/11/19 started on Xarelto at that time, Prostate Cancer s/p surgery around 2006, Benign colon polyps s/p partial colectomy, FMHx of Colon Cancer in Father and Paternal Grandfather, HTN, HLD, Asthma and GERD who is seen in consultation at the request of Dr. Loni Patton for dark stools and anemia. Patient was recently found to have small subsegmental PEs on 6/11/19 for which he was started on Xarelto. This was found after he had presented with worsening SOA. Note that the LE duplex was negative but the CTA demonstrated subsegmental PEs. Patient was also treated for COPD exacerbation during that hospitalization and he was discharged on 6/14/19. This time, patient came to the ED with complaints of 1 day history of right big toe swelling associated with pain. His uric acid levels were elevated and he was diagnosed with gout. Patient with started on Colcrys. He was noted to have a worsening anemia (Hg on last check was 8.8, on arrival it was noted to be 9.4, and Hg on 6/14 was 11.7)  and he reported dark stools so GI is being consulted to further evaluate. Patient tells me that he never paid attention to his stool color other than once he started taking the Xarelto, they just looked darker than usual.  He states that they were \"black and tarry\". He denies any abdominal pain. He denies any BRBPR and denies any hematemesis. He denies taking NSAIDs.   He denies ever having an EGD. On a separate note, he tells me that he is up to date with his colonoscopies and they have all been done at [facility]. On review of the [facility] records, he has been followed by Dr. Lisette Bojorquez. He had a history of a tubular adenoma for which he had a lap right hemicolectomy with Dr. Hui Mitchell around 11/2011. He then had a colonoscopy on 8/6/2013 which showed a normal anastomosis. His most recent colonoscopy was on 2/6/18 for rectal bleeding by Dr. Lisette Bojorquez (which I do not have full records of) but appears that a descending colon polyp was removed. Pathology from that polyp was a tubular adenoma. PMH: 
Past Medical History:  
Diagnosis Date  Arthritis  Asthma   
 albuterol prn (uses 1-2 times per day)  CAD (coronary artery disease) 9/15/2013  Chronic obstructive pulmonary disease ([address]  Coronary atherosclerosis of native coronary vessel 11/18/2015  Current smoker on some days  Dental disorder  Dyspepsia and other specified disorders of function of stomach  GERD (gastroesophageal reflux disease)   
 on med for control  GI bleed 6/29/2019  Hyperlipidemia other unsp dyslipidemia 11/18/2015  
 on med  Hypertension   
 on med for control  Hypertension, benign, controlled 11/18/2015  MI (myocardial infarction) ([address] 2013 NSTEMI  
 Multiple renal cysts  Other ill-defined conditions(799.89)   
 hernia, pt unsure of site (resolved with surery)  Prostate cancer ([address]  Pulmonary emboli ([address] 6/12/2019  Stroke [facility]) 2011 TIA; no residual   
 
 
PSH: 
Past Surgical History:  
Procedure Laterality Date  CARDIAC SURG PROCEDURE UNLIST  2013  
 stent  HX COLECTOMY    
 due to polyp that could not be excised  HX COLONOSCOPY  2018  HX HERNIA REPAIR    
 HX PROSTATECTOMY  HX PTCA    
 x 1 Allergies: Allergies Allergen Reactions  Lisinopril Swelling  Tramadol Rash and Itching Home Medications: Prior to Admission medications Medication Sig Start Date End Date Taking? Authorizing Provider  
aspirin delayed-release 81 mg tablet Take 1 Tab by mouth daily. 6/14/19  Yes Gianna Cano, DO  
tiotropium (SPIRIVA) 18 mcg inhalation capsule Take 1 Cap by inhalation daily. 6/15/19  Yes Gianna Cano, DO  
albuterol (PROVENTIL HFA, VENTOLIN HFA, PROAIR HFA) 90 mcg/actuation inhaler Take 2 Puffs by inhalation every six (6) hours as needed for Wheezing. Take every 4-6 hours for wheezing 6/14/19  Yes Gianna Cano, DO  
mirtazapine (REMERON) 15 mg tablet Take 15 mg by mouth nightly. Yes Provider, Historical  
acetaminophen (TYLENOL) 500 mg tablet Take 2 Tabs by mouth every six (6) hours. 11/22/18  Yes Hawa Mitchell MD  
hydroCHLOROthiazide (MICROZIDE) 12.5 mg capsule Take 12.5 mg by mouth daily. Yes Provider, Historical  
oxybutynin chloride XL (DITROPAN XL) 5 mg CR tablet Take 5 mg by mouth daily. Take / use AM day of surgery  per anesthesia protocols. Yes Provider, Historical  
omeprazole (PRILOSEC) 20 mg capsule Take 20 mg by mouth daily. Take / use AM day of surgery  per anesthesia protocols. Yes Provider, Historical  
pravastatin (PRAVACHOL) 40 mg tablet Take 40 mg by mouth daily. Take / use AM day of surgery  per anesthesia protocols. Yes Provider, Historical  
predniSONE (DELTASONE) 10 mg tablet Two tabs by mouth daily x 3 days then one tab by mouth daily x 3 days then stop 6/14/19   Kirsten LEE, DO  
ondansetron (ZOFRAN ODT) 8 mg disintegrating tablet Take 1 Tab by mouth every eight (8) hours as needed for Nausea. 11/21/18   Hawa Mitchell MD  
senna-docusate (PERICOLACE) 8.6-50 mg per tablet Take 2 Tabs by mouth daily. 11/22/18   Hawa Mitchell MD  
amLODIPine (NORVASC) 10 mg tablet Take 10 mg by mouth daily. Take / use AM day of surgery  per anesthesia protocols. Provider, Historical  
 
 
Hospital Medications: 
Current Facility-Administered Medications Medication Dose Route Frequency Provider Last Rate Last Dose  morphine injection 4 mg  4 mg IntraVENous Q4H PRN Yulia Briggs MD   4 mg at 06/30/19 1044  
 HYDROcodone-acetaminophen (NORCO) 5-325 mg per tablet 2 Tab  2 Tab Oral Q4H PRN Yulia Briggs MD   2 Tab at 06/30/19 0847  
 0.9% sodium chloride infusion  125 mL/hr IntraVENous CONTINUOUS [place], [place]      
 albuterol (PROVENTIL VENTOLIN) nebulizer solution 2.5 mg  2.5 mg Nebulization Q4H PRN Dat Grimes MD   2.5 mg at 06/29/19 2145  amLODIPine (NORVASC) tablet 10 mg  10 mg Oral DAILY Dat Grimes MD   10 mg at 06/30/19 0845  mirtazapine (REMERON) tablet 15 mg  15 mg Oral QHS Dat Grimes MD   15 mg at 06/29/19 2100  
 oxybutynin chloride XL (DITROPAN XL) tablet 5 mg  5 mg Oral DAILY Dat Grimes MD   5 mg at 06/30/19 0846  
 pravastatin (PRAVACHOL) tablet 40 mg  40 mg Oral DAILY Dat Grimes MD   40 mg at 06/30/19 4661  tiotropium (SPIRIVA) inhalation capsule 18 mcg  1 Cap Inhalation DAILY Dat Grimes MD   18 mcg at 06/30/19 8652  sodium chloride (NS) flush 5-40 mL  5-40 mL IntraVENous Q8H Dat Grimes MD   10 mL at 06/30/19 0542  sodium chloride (NS) flush 5-40 mL  5-40 mL IntraVENous PRN Dat Grimes MD      
 acetaminophen (TYLENOL) tablet 650 mg  650 mg Oral Q4H PRN Dat Grimes MD      
 naloxone [facility]) injection 0.4 mg  0.4 mg IntraVENous PRN Dat Grimes MD      
 diphenhydrAMINE (BENADRYL) injection 12.5 mg  12.5 mg IntraVENous Q4H PRN Dat Grimes MD      
 ondansetron [facility]) injection 4 mg  4 mg IntraVENous Q4H PRN Dat Girmes MD      
 pantoprazole (PROTONIX) 40 mg in sodium chloride 0.9% 10 mL injection  40 mg IntraVENous Q12H Dat Grimes MD   40 mg at 06/30/19 7320  colchicine tablet 0.6 mg  0.6 mg Oral DAILY Dat Grimes MD   0.6 mg at 06/30/19 9281 Social History: 
Social History Tobacco Use  
  Smoking status: Current Some Day Smoker Years: 50.00  Smokeless tobacco: Never Used Substance Use Topics  Alcohol use: Yes Alcohol/week: 1.8 oz Types: 3 Cans of beer per week Pt denies any history of drug use, blood transfusions, or tattoos. Family History: 
Family History Problem Relation Age of Onset  Stroke Mother  Diabetes Mother  Heart Disease Maternal Grandmother  Cancer Father Review of Systems: A detailed 10 system ROS is obtained, with pertinent positives as listed above. All others are negative. Diet:   
 
Objective:  
 
Physical Exam: 
Vitals: 
Visit Vitals /65 Pulse 92 Temp 99.8 °F (37.7 °C) Resp 18 Ht 6' 2\" (1.88 m) Wt 88.5 kg (195 lb) SpO2 94% BMI 25.04 kg/m² Gen:  Pt is alert, cooperative, no acute distress Skin:  Extremities and face reveal no rashes. HEENT: Sclerae anicteric. Extra-occular muscles are intact. No oral ulcers. No abnormal pigmentation of the lips. The neck is supple. Cardiovascular: Regular rate and rhythm. No murmurs, gallops, or rubs. Respiratory:  Comfortable breathing with no accessory muscle use. Clear breath sounds anteriorly with no wheezes, rales, or rhonchi. GI:  Abdomen nondistended, soft, and nontender. Normal active bowel sounds. He does have multiple surgical scars noted from his reported surgeries. No enlargement of the liver or spleen. No masses palpable. Rectal:  Deferred Musculoskeletal:  No pitting edema of the lower legs. Neurological:  Gross memory appears intact. Patient is alert and oriented. Psychiatric:  Mood appears appropriate with judgement intact. Lymphatic:  No cervical or supraclavicular adenopathy. Laboratory:   
Recent Labs  
  06/30/19 
0711 06/30/19 
0305 06/29/19 
1922 06/29/19 
1620 06/29/19 
1612 WBC  --  7.8  --  7.8  --   
HGB 8.8* 8.8* 9.5* 9.4*  --   
HCT 27.7* 27.9* 29.5* 28.8*  --   
PLT  --  328  --  357  --   
 MCV  --  87.5  --  86.5  --   
NA  --  140  --   --  140 K  --  3.5  --   --  4.7 CL  --  103  --   --  105 CO2  --  28  --   --  23 BUN  --  14  --   --  14 Dali Huang MD 
[facility] Information: Selecting Yes will display possible errors in your note based on the variables you have selected. This validation is only offered as a suggestion for you. PLEASE NOTE THAT THE VALIDATION TEXT WILL BE REMOVED WHEN YOU FINALIZE YOUR NOTE. IF YOU WANT TO FAX A PRELIMINARY NOTE YOU WILL NEED TO TOGGLE THIS TO 'NO' IF YOU DO NOT WANT IT IN YOUR FAXED NOTE.

## 2021-07-20 ENCOUNTER — HOSPITAL ENCOUNTER (EMERGENCY)
Age: 71
Discharge: HOME | End: 2021-07-20
Attending: EMERGENCY MEDICINE
Payer: COMMERCIAL

## 2021-07-20 VITALS
DIASTOLIC BLOOD PRESSURE: 94 MMHG | OXYGEN SATURATION: 95 % | HEART RATE: 91 BPM | SYSTOLIC BLOOD PRESSURE: 165 MMHG | RESPIRATION RATE: 18 BRPM | TEMPERATURE: 97.5 F

## 2021-07-20 DIAGNOSIS — B02.9 HERPES ZOSTER WITHOUT COMPLICATION: Primary | ICD-10-CM

## 2021-07-20 PROCEDURE — 99282 EMERGENCY DEPT VISIT SF MDM: CPT

## 2021-07-20 RX ORDER — VALACYCLOVIR HYDROCHLORIDE 1 G/1
1000 TABLET, FILM COATED ORAL 3 TIMES DAILY
Qty: 30 TABLET | Refills: 0 | Status: SHIPPED | OUTPATIENT
Start: 2021-07-20 | End: 2021-07-30

## 2021-07-20 RX ORDER — HYDROCODONE BITARTRATE AND ACETAMINOPHEN 5; 325 MG/1; MG/1
1 TABLET ORAL
Qty: 12 TABLET | Refills: 0 | Status: SHIPPED | OUTPATIENT
Start: 2021-07-20 | End: 2021-07-23

## 2021-07-20 RX ORDER — PREDNISONE 10 MG/1
10 TABLET ORAL
Qty: 45 TABLET | Refills: 0 | Status: SHIPPED | OUTPATIENT
Start: 2021-07-20 | End: 2022-05-03

## 2021-07-20 RX ORDER — LIDOCAINE 50 MG/G
PATCH TOPICAL
Qty: 30 EACH | Refills: 0 | Status: SHIPPED | OUTPATIENT
Start: 2021-07-20

## 2021-07-20 NOTE — ED PROVIDER NOTES
Patient is here with an itching, burning, vesicular rash to his right trunk that started on Friday, 4 days ago. He has no fever but states he was admitted in the hospital in May and feels like he has been run down. He has no other symptoms at this time. He did have chickenpox as a child and has not had the shingles vaccination. No fever, nausea, vomiting, chest pain, shortness of breath, abdominal pain, dizziness, weakness, dyspnea on exertion, orthopnea, swelling/tingling or weakness to his arms or legs, trouble with urination or bowel movements or other new symptoms. He did ambulate to the room without difficulty and is well-hydrated. The history is provided by the patient. Rash   This is a new problem. The current episode started more than 2 days ago. The problem has been gradually worsening. The problem is associated with an unknown factor. There has been no fever. Affected Location: Right trunk. The pain is at a severity of 8/10. The pain is moderate. The pain has been constant since onset. Associated symptoms include blisters, itching and pain. Pertinent negatives include no weeping and no hives. He has tried nothing for the symptoms.         Past Medical History:   Diagnosis Date    Alcoholic pancreatitis 2/39/9421    Arthritis     Asthma     albuterol prn (uses 1-2 times per day)    CAD (coronary artery disease) 9/15/2013    [address] obstructive [address] disease [address]     Coronary atherosclerosis of native coronary vessel 11/18/2015    Current smoker on some days     Dental disorder     Dyspepsia and other specified disorders of function of stomach     GERD (gastroesophageal reflux disease)     on med for control     GI bleed 6/29/2019    Hyperlipidemia other unsp dyslipidemia 11/18/2015    on med    Hypertension     on med for control     Hypertension, benign, controlled 11/18/2015    MI (myocardial infarction) [facility]) 2013    NSTEMI    Multiple renal cysts     Other ill-defined conditions(799.89)     hernia, pt unsure of site (resolved with surery)    Pro[facility] [address]     Pulmonary emboli ([facility] 2019    Stroke ([facility] 2011    TIA; no residual        Past Surgical History:   Procedure Laterality Date    HX COLONOSCOPY  2018    HX HERNIA REPAIR      HX PROSTATECTOMY      HX PTCA      x 1    HX TOTAL COLECTOMY      due to polyp that could not be excised    IN CARDIAC SURG PROCEDURE UNLIST  2013    stent         Family History:   Problem Relation Age of Onset    Stroke Mother     Diabetes Mother     Heart Disease Maternal Grandmother     Cancer Father        Social History     Socioeconomic History    Marital status: SINGLE     Spouse name: Not on file    Number of children: Not on file    Years of education: Not on file    Highest education level: Not on file   Occupational History    Not on file   Tobacco Use    Smoking status: Former Smoker     Years: 50.00     Types: Cigarettes     Quit date: 2019     Years since quittin.2    Smokeless tobacco: Never Used   Substance and Sexual Activity    Alcohol use: Yes     Alcohol/week: 3.0 standard drinks     Types: 3 Cans of beer per week    Drug use: No    Sexual activity: Not on file   Other Topics Concern    Not on file   Social History Narrative    Not on file     Social Determinants of Health     Financial Resource Strain:     Difficulty of Paying Living Expenses:    Food Insecurity:     Worried About Running Out of Food in the Last Year:     [address] in the Last Year:    Transportation Needs:     Lack of Transportation (Medical):      Lack of Transportation (Non-Medical):    Physical Activity:     Days of Exercise per Week:     Minutes of Exercise per Session:    Stress:     Feeling of Stress :    Social Connections:     Frequency of Communication with Friends and Family:     Frequency of Social Gatherings with Friends and Family:     Attends Pentec[employer] Services:     Active Member of Clubs or Organizations:     Attends Club or Organization Meetings:     Marital Status:    Intimate Partner Violence:     Fear of Current or Ex-Partner:     Emotionally Abused:     Physically Abused:     Sexually Abused: ALLERGIES: Lisinopril and Tramadol    Review of Systems   Constitutional: Negative. HENT: Negative. Eyes: Negative. Respiratory: Negative. Cardiovascular: Negative. Gastrointestinal: Negative. Genitourinary: Negative. Musculoskeletal: Negative. Skin: Positive for color change, itching and rash. Neurological: Negative. Psychiatric/Behavioral: Negative. All other systems reviewed and are negative. Vitals:    07/20/21 0953   BP: (!) 165/94   Pulse: 91   Resp: 18   Temp: 97.5 °F (36.4 °C)   SpO2: 95%            Physical Exam  Vitals and nursing note reviewed. Constitutional:       Appearance: He is well-developed. HENT:      Head: Normocephalic and atraumatic. Right Ear: External ear normal.      Left Ear: External ear normal.      Nose: Nose normal.   Eyes:      Conjunctiva/sclera: Conjunctivae normal.      Pupils: Pupils are equal, round, and reactive to light. Cardiovascular:      Rate and Rhythm: Normal rate and regular rhythm. Heart sounds: Normal heart sounds. Pulmonary:      Effort: Pulmonary effort is normal.      Breath sounds: Normal breath sounds. Abdominal:      General: Bowel sounds are normal.      Palpations: Abdomen is soft. Musculoskeletal:         General: Normal range of motion. Cervical back: Normal range of motion and neck supple. Skin:     General: Skin is warm and dry. Capillary Refill: Capillary refill takes less than 2 seconds. Findings: Erythema and rash present. Neurological:      General: No focal deficit present. Mental Status: He is alert and oriented to person, place, and time. Deep Tendon Reflexes: Reflexes are normal and symmetric.    Psychiatric:         Mood and Affect: Mood normal.         Behavior: Behavior normal.         Thought Content: Thought content normal.         Judgment: Judgment normal.          MDM  Number of Diagnoses or Management Options  Risk of Complications, Morbidity, and/or Mortality  Presenting problems: moderate  Diagnostic procedures: moderate  Management options: moderate    Patient Progress  Patient progress: stable         Procedures  The patient was observed in the ED. Patient's rash is consistent with shingles. He was instructed on symptomatic care and I will send him with Valtrex, prednisone, lidocaine patches and Norco.  He is not a diabetic. He was instructed to return if his symptoms worsen in any way. We did discuss the fact that he can have chronic long-term pain from this and he does express understanding. He does have a primary care physician at [address], Dr. Stacie Sandifer.  Patient is stable for discharge and ambulatory out of the ER without difficulty at this time. I discussed the results of all labs, procedures, radiographs, and treatments with the patient and available family. Treatment plan is agreed upon and the patient is ready for discharge. All voiced understanding of the discharge plan and medication instructions or changes as appropriate. Questions about treatment in the ED were answered. All were encouraged to return should symptoms worsen or new problems develop.

## 2021-07-20 NOTE — DISCHARGE INSTRUCTIONS
Wash two-three times daily with soap and water, blot dry, and apply a clean dressing. Watch for redness, swelling, pus, increasing pain, fever and return if any of those symptoms begin. Finish all of the Valtrex and Prednisone. Return to the ED if worse.

## 2021-07-20 NOTE — ED TRIAGE NOTES
Ambulatory to triage .  Reports x3 days of \"shingles\" to right lateral side radiating to back   Reports itching

## 2021-07-20 NOTE — ED NOTES
I have reviewed discharge instructions with the patient. The patient verbalized understanding. Patient left ED via Discharge Method: ambulatory to Home with self transport. The patient is ambulatory upon exit and appears in no acute distress. The patient has been provided discharge instructions, prescriptions x4, and follow up information. Opportunity for questions and clarification provided. Patient given 4 scripts. To continue your aftercare when you leave the hospital, you may receive an automated call from our care team to check in on how you are doing. This is a free service and part of our promise to provide the best care and service to meet your aftercare needs.  If you have questions, or wish to unsubscribe from this service please call [phone]. Thank you for Choosing our [facility] Emergency Department.

## 2021-07-22 ENCOUNTER — HOSPITAL ENCOUNTER (EMERGENCY)
Age: 71
Discharge: LEFT BEFORE DISCHARGE | End: 2021-07-22
Attending: EMERGENCY MEDICINE
Payer: COMMERCIAL

## 2021-07-22 DIAGNOSIS — Z53.21 PATIENT LEFT WITHOUT BEING SEEN: Primary | ICD-10-CM

## 2021-07-22 PROCEDURE — 75810000275 HC EMERGENCY DEPT VISIT NO LEVEL OF CARE

## 2021-07-24 NOTE — ED PROVIDER NOTES
HPI     Past Medical History:   Diagnosis Date    Alcoholic pancreatitis 8999    Arthritis     Asthma     albuterol prn (uses 1-2 times per day)    CAD (coronary artery disease) 9/15/2013    Chronic obstructive pulmonary disease ([address]     Coronary atherosclerosis of native coronary vessel 2015    Current smoker on some days     Dental disorder     Dyspepsia and other specified disorders of function of stomach     GERD (gastroesophageal reflux disease)     on med for control     GI bleed 2019    Hyperlipidemia other unsp dyslipidemia 2015    on med    Hypertension     on med for control     Hypertension, benign, controlled 2015    MI (my[facility] infarction) ([facility]     NSTEMI    Multiple renal cysts     Other ill-defined conditions(799.89)     hernia, pt unsure of site (resolved with surery)    Prostate cancer ([facility]     Pulmonary emboli ([facility] 2019    Stroke ([facility]     TIA; no residual        Past Surgical History:   Procedure Laterality Date    HX COLONOSCOPY      HX HERNIA REPAIR      HX PROSTATECTOMY      HX PTCA      x 1    HX TOTAL COLECTOMY      due to polyp that could not be excised    CA CARDIAC SURG PROCEDURE UNLIST  2013    stent         Family History:   Problem Relation Age of Onset    Stroke Mother     Diabetes Mother     Heart Disease Maternal Grandmother     Cancer Father        Social History     Socioeconomic History    Marital status: SINGLE     Spouse name: Not on file    Number of children: Not on file    Years of education: Not on file    Highest education level: Not on file   Occupational History    Not on file   Tobacco Use    Smoking status: Former Smoker     Years: 50.00     Types: Cigarettes     Quit date: 2019     Years since quittin.2    Smokeless tobacco: Never Used   Substance and Sexual Activity    Alcohol use:  Yes     Alcohol/week: 3.0 standard drinks     Types: 3 Cans of beer per week    Drug use: No    Sexual activity: Not on file   Other Topics Concern    Not on file   Social History Narrative    Not on file     Social Determinants of Health     Financial Resource Strain:     Difficulty of Paying Living Expenses:    Food Insecurity:     Worried About Running Out of Food in the Last Year:     [address] in the Last Year:    Transportation Needs:     Lack of Transportation (Medical):  Lack of Transportation (Non-Medical):    Physical Activity:     Days of Exercise per Week:     Minutes of Exercise per Session:    Stress:     Feeling of Stress :    Social Connections:     Frequency of Communication with Friends and Family:     Frequency of Social Gatherings with Friends and Family:     Attends Scientologist Services:     Active Member of Clubs or Organizations:     Attends Club or Organization Meetings:     Marital Status:    Intimate Partner Violence:     Fear of Current or Ex-Partner:     Emotionally Abused:     Physically Abused:     Sexually Abused: ALLERGIES: Lisinopril and Tramadol    Review of Systems    There were no vitals filed for this visit.          Physical Exam     MDM       Procedures

## 2021-10-14 PROBLEM — J44.9 COPD (CHRONIC OBSTRUCTIVE PULMONARY DISEASE) (HCC): Status: ACTIVE | Noted: 2019-06-11

## 2021-10-14 PROBLEM — Z87.891 HISTORY OF PRIOR CIGARETTE SMOKING: Status: ACTIVE | Noted: 2018-11-08

## 2022-03-18 PROBLEM — E44.1 MILD PROTEIN-CALORIE MALNUTRITION (HCC): Status: ACTIVE | Noted: 2021-05-28

## 2022-03-18 PROBLEM — Z87.891 HISTORY OF PRIOR CIGARETTE SMOKING: Status: ACTIVE | Noted: 2018-11-08

## 2022-03-19 PROBLEM — K92.2 GI BLEED: Status: ACTIVE | Noted: 2019-06-29

## 2022-03-19 PROBLEM — M79.89 LEG SWELLING: Status: ACTIVE | Noted: 2021-04-01

## 2022-03-19 PROBLEM — M10.9 GOUT: Status: ACTIVE | Noted: 2019-06-29

## 2022-03-19 PROBLEM — I26.99 PULMONARY EMBOLI (HCC): Status: ACTIVE | Noted: 2019-06-12

## 2022-03-19 PROBLEM — R91.8 MULTIPLE PULMONARY NODULES: Status: ACTIVE | Noted: 2019-06-12

## 2022-03-19 PROBLEM — J44.9 COPD (CHRONIC OBSTRUCTIVE PULMONARY DISEASE) (HCC): Status: ACTIVE | Noted: 2019-06-11

## 2022-03-20 PROBLEM — K85.20 ALCOHOLIC PANCREATITIS: Status: ACTIVE | Noted: 2021-05-20

## 2022-03-20 PROBLEM — M16.9 OA (OSTEOARTHRITIS) OF HIP: Status: ACTIVE | Noted: 2018-11-21

## 2022-05-02 ENCOUNTER — HOSPITAL ENCOUNTER (OUTPATIENT)
Dept: CT IMAGING | Age: 72
Discharge: HOME | End: 2022-05-02
Attending: INTERNAL MEDICINE
Payer: COMMERCIAL

## 2022-05-02 DIAGNOSIS — R91.1 LUNG NODULE: ICD-10-CM

## 2022-05-02 PROCEDURE — 71250 CT THORAX DX C-: CPT

## 2023-01-01 NOTE — ED NOTES
I have reviewed discharge instructions with the patient. The patient verbalized understanding. Patient left ED via Discharge Method: wheelchair to Home with self to waiting room where he states he is calling for a ride home. Opportunity for questions and clarification provided. Patient given 2 scripts. To continue your aftercare when you leave the hospital, you may receive an automated call from our care team to check in on how you are doing. This is a free service and part of our promise to provide the best care and service to meet your aftercare needs.  If you have questions, or wish to unsubscribe from this service please call [phone]. Thank you for Choosing our [facility] Emergency Department.
Report given to Lucie Cormier, RN care transferred at this time.
hard copy, drawn during this pregnancy

## 2023-01-12 ENCOUNTER — HOSPITAL ENCOUNTER (EMERGENCY)
Age: 73
Discharge: HOME | End: 2023-01-12
Attending: EMERGENCY MEDICINE | Admitting: EMERGENCY MEDICINE
Payer: MEDICAID

## 2023-01-12 VITALS
SYSTOLIC BLOOD PRESSURE: 139 MMHG | TEMPERATURE: 98 F | BODY MASS INDEX: 28.49 KG/M2 | WEIGHT: 215 LBS | OXYGEN SATURATION: 96 % | RESPIRATION RATE: 20 BRPM | HEIGHT: 73 IN | HEART RATE: 82 BPM | DIASTOLIC BLOOD PRESSURE: 82 MMHG

## 2023-01-12 DIAGNOSIS — M10.071 ACUTE IDIOPATHIC GOUT OF RIGHT ANKLE: Primary | ICD-10-CM

## 2023-01-12 PROCEDURE — 6370000000 HC RX 637 (ALT 250 FOR IP)

## 2023-01-12 PROCEDURE — 99283 EMERGENCY DEPT VISIT LOW MDM: CPT

## 2023-01-12 RX ORDER — PREDNISONE 50 MG/1
50 TABLET ORAL DAILY
Qty: 5 TABLET | Refills: 0 | Status: SHIPPED | OUTPATIENT
Start: 2023-01-12 | End: 2023-01-17

## 2023-01-12 RX ORDER — ALLOPURINOL 300 MG/1
TABLET ORAL
COMMUNITY
Start: 2022-12-21

## 2023-01-12 RX ORDER — HYDROCODONE BITARTRATE AND ACETAMINOPHEN 5; 325 MG/1; MG/1
1 TABLET ORAL
Status: COMPLETED | OUTPATIENT
Start: 2023-01-12 | End: 2023-01-12

## 2023-01-12 RX ADMIN — HYDROCODONE BITARTRATE AND ACETAMINOPHEN 1 TABLET: 5; 325 TABLET ORAL at 01:22

## 2023-01-12 ASSESSMENT — ENCOUNTER SYMPTOMS
COLOR CHANGE: 0
ABDOMINAL DISTENTION: 0
WHEEZING: 0
CHEST TIGHTNESS: 0
SINUS PRESSURE: 0
VOMITING: 0
DIARRHEA: 0
TROUBLE SWALLOWING: 0
VOICE CHANGE: 0
SINUS PAIN: 0
SHORTNESS OF BREATH: 0
BACK PAIN: 0
CONSTIPATION: 0

## 2023-01-12 ASSESSMENT — PAIN DESCRIPTION - ORIENTATION: ORIENTATION: LEFT;LOWER

## 2023-01-12 ASSESSMENT — PAIN SCALES - GENERAL: PAINLEVEL_OUTOF10: 7

## 2023-01-12 ASSESSMENT — PAIN - FUNCTIONAL ASSESSMENT: PAIN_FUNCTIONAL_ASSESSMENT: 0-10

## 2023-01-12 ASSESSMENT — PAIN DESCRIPTION - LOCATION: LOCATION: LEG

## 2023-01-12 NOTE — ED PROVIDER NOTES
Emergency Department Provider Note                   PCP:                Brain Page MD               Age: 71 y.o. Sex: male       ICD-10-CM    1. Acute idiopathic gout of right ankle  M10.071           DISPOSITION Decision To Discharge 01/12/2023 12:52:08 AM        Medical Decision Making  80-year-old male presents with right ankle pain after recently being diagnosed with gout. Did not have any signs of systemic infection or injury. Was given prescription for prednisone. Discharged to follow-up with primary care. Complexity of Problem: 1 stable, acute illness. (3)      I have reviewed records from an external source: provider visit notes from PCP. Considerations: Shared decision making was utilized in the care of this patient. Patient was discharged risks and benefits of hospitalization were discussed. No orders of the defined types were placed in this encounter. Medications   HYDROcodone-acetaminophen (NORCO) 5-325 MG per tablet 1 tablet (1 tablet Oral Given 1/12/23 0122)       Discharge Medication List as of 1/12/2023  1:20 AM           Jasen Hess is a 71 y.o. male who presents to the Emergency Department with chief complaint of    Chief Complaint   Patient presents with    Leg Pain      80-year-old male with past medical history of arthritis, asthma, gout, CAD, pulmonary embolism presents for right ankle pain. He states that this started around 1 week ago has worsened. States that he was contacted by his primary care office and told that his uric acid was elevated and placed on allopurinol. States he has not had any relief of the pain while taking the allopurinol. Denies injury or fall. The history is provided by the patient. Review of Systems   Constitutional:  Negative for activity change, appetite change, chills and fatigue. HENT:  Negative for congestion, sinus pressure, sinus pain, trouble swallowing and voice change.     Respiratory:  Negative for chest tightness, shortness of breath and wheezing. Cardiovascular:  Negative for chest pain and palpitations. Gastrointestinal:  Negative for abdominal distention, constipation, diarrhea and vomiting. Genitourinary:  Negative for decreased urine volume, difficulty urinating, flank pain, frequency and urgency. Musculoskeletal:  Positive for joint swelling and myalgias. Negative for arthralgias, back pain, neck pain and neck stiffness. Skin:  Negative for color change, pallor, rash and wound. Neurological:  Negative for tremors, facial asymmetry, weakness and numbness. Hematological:  Negative for adenopathy. Does not bruise/bleed easily. Psychiatric/Behavioral:  Negative for agitation, behavioral problems and confusion. All other systems reviewed and are negative.     Past Medical History:   Diagnosis Date    Alcoholic pancreatitis 9/80/3117    Arthritis     Asthma     albuterol prn (uses 1-2 times per day)    CAD (coronary artery disease) 9/15/2013    Chronic obstructive pulmonary disease (HCC)     Coronary atherosclerosis of native coronary vessel 11/18/2015    Current smoker on some days     Dental disorder     Dyspepsia and other specified disorders of function of stomach     GERD (gastroesophageal reflux disease)     on med for control     GI bleed 6/29/2019    Hyperlipidemia 11/18/2015    on med    Hypertension     on med for control     Hypertension, benign 11/18/2015    MI (myocardial infarction) ([address] 2013    NSTEMI    Multiple renal cysts     Other ill-defined conditions(799.89)     hernia, pt unsure of site (resolved with surery)    Prostate cancer [facility])     Pulmonary emboli ([address] 6/12/2019    Stroke ([facility] 2011    TIA; no residual         Past Surgical History:   Procedure Laterality Date    COLONOSCOPY  2018    HERNIA REPAIR      [place] UNLISTED PROCEDURE CARDIAC SURGERY  2013    stent    PROSTATECTOMY      PTCA      x 1    TOTAL COLECTOMY      due to polyp that could not be excised Family History   Problem Relation Age of Onset    Stroke Mother     Cancer Father     Heart Disease Maternal Grandmother     Diabetes Mother         Social History     Socioeconomic History    Marital status: Single     Spouse name: None    Number of children: None    Years of education: None    Highest education level: None   Tobacco Use    Smoking status: Former     Types: Cigarettes     Quit date: 5/1/2019     Years since quitting: 3.7    Smokeless tobacco: Never   Substance and Sexual Activity    Alcohol use: Yes     Alcohol/week: 3.0 standard drinks    Drug use: No         Lisinopril and Tramadol     Discharge Medication List as of 1/12/2023  1:20 AM        CONTINUE these medications which have NOT CHANGED    Details   !! allopurinol (ZYLOPRIM) 300 MG tablet TAKE 1 TABLET BY MOUTH TWICE DAILYHistorical Med      albuterol sulfate  (90 Base) MCG/ACT inhaler Inhale 1 puff into the lungs every 4 hours as neededHistorical Med      albuterol (PROVENTIL) (2.5 MG/3ML) 0.083% nebulizer solution Inhale 2.5 mg into the lungs every 6 hours as neededHistorical Med      !! allopurinol (ZYLOPRIM) 100 MG tablet Take 100 mg by mouth dailyHistorical Med      amLODIPine (NORVASC) 10 MG tablet Take 10 mg by mouth dailyHistorical Med      aspirin 81 MG EC tablet Take 81 mg by mouth dailyHistorical Med      fluticasone-umeclidin-vilant (TRELEGY ELLIPTA) 100-62.5-25 MCG/INH AEPB Inhale 1 puff into the lungs dailyHistorical Med      lidocaine (LIDODERM) 5 % Apply patch to the affected area for 12 hours a day and remove for 12 hours a day. Historical Med      mirtazapine (REMERON) 15 MG tablet Take 15 mg by mouthHistorical Med      ondansetron (ZOFRAN-ODT) 8 MG TBDP disintegrating tablet Take 8 mg by mouth every 8 hours as neededHistorical Med      oxybutynin (DITROPAN-XL) 5 MG extended release tablet Take 5 mg by mouth dailyHistorical Med      pantoprazole (PROTONIX) 40 MG tablet Take 40 mg by mouth dailyHistorical Med pravastatin (PRAVACHOL) 40 MG tablet Take 40 mg by mouth dailyHistorical Med      rivaroxaban (XARELTO) 20 MG TABS tablet TAKE 1 TABLET BY MOUTH DAILY WITH BREAKFASTHistorical Med      senna-docusate (PERICOLACE) 8.6-50 MG per tablet Take 2 tablets by mouth dailyHistorical Med       !! - Potential duplicate medications found. Please discuss with provider. Vitals signs and nursing note reviewed. Patient Vitals for the past 4 hrs:   Temp Pulse Resp BP SpO2   01/12/23 0049 98 °F (36.7 °C) 82 20 139/82 96 %          Physical Exam  Vitals and nursing note reviewed. Constitutional:       General: He is not in acute distress. Appearance: Normal appearance. He is normal weight. He is not ill-appearing or toxic-appearing. HENT:      Head: Normocephalic and atraumatic. Right Ear: External ear normal.      Left Ear: External ear normal.      Nose: Nose normal. No congestion or rhinorrhea. Mouth/Throat:      Mouth: Mucous membranes are moist.      Pharynx: No oropharyngeal exudate or posterior oropharyngeal erythema. Eyes:      Extraocular Movements: Extraocular movements intact. Pupils: Pupils are equal, round, and reactive to light. Neck:      Vascular: No carotid bruit. Cardiovascular:      Rate and Rhythm: Normal rate and regular rhythm. Pulses: Normal pulses. Heart sounds: Normal heart sounds. No murmur heard. No friction rub. No gallop. Pulmonary:      Effort: Pulmonary effort is normal. No respiratory distress. Breath sounds: Normal breath sounds. No stridor. No wheezing, rhonchi or rales. Chest:      Chest wall: No tenderness. Abdominal:      General: Abdomen is flat. Palpations: Abdomen is soft. Musculoskeletal:         General: No swelling, tenderness or deformity. Normal range of motion. Cervical back: Normal range of motion and neck supple. No rigidity or tenderness.         Legs:       Comments: Diffuse right ankle tenderness, no warmth or redness. Lymphadenopathy:      Cervical: No cervical adenopathy. Skin:     General: Skin is warm. Capillary Refill: Capillary refill takes less than 2 seconds. Coloration: Skin is not jaundiced or pale. Findings: No bruising, erythema, lesion or rash. Neurological:      General: No focal deficit present. Mental Status: He is alert and oriented to person, place, and time. Mental status is at baseline. Cranial Nerves: No cranial nerve deficit. Sensory: No sensory deficit. Motor: No weakness. Coordination: Coordination normal.      Gait: Gait normal.   Psychiatric:         Mood and Affect: Mood normal.         Behavior: Behavior normal.         Thought Content: Thought content normal.         Judgment: Judgment normal.        Procedures    No results found for any visits on 01/12/23. No orders to display                       Voice dictation software was used during the making of this note. This software is not perfect and grammatical and other typographical errors may be present. This note has not been completely proofread for errors.      ANNMARIE Santos - KLEVER  01/12/23 0130

## 2023-01-12 NOTE — ED TRIAGE NOTES
Pt presents from [facility] coming from home, complaining of left lower leg pain, hx of gout, no relief with allopurinol.

## 2023-01-12 NOTE — ED NOTES
I have reviewed discharge instructions with the patient. The patient verbalized understanding. Patient left ED via Discharge Method: ambulatory to Home with self. Opportunity for questions and clarification provided. Patient given 1 scripts. To continue your aftercare when you leave the hospital, you may receive an automated call from our care team to check in on how you are doing. This is a free service and part of our promise to provide the best care and service to meet your aftercare needs.  If you have questions, or wish to unsubscribe from this service please call [phone]. Thank you for Choosing our [facility] Emergency Department.           Cody Gentile RN  01/12/23 3970

## 2023-05-01 ENCOUNTER — HOSPITAL ENCOUNTER (OUTPATIENT)
Dept: CT IMAGING | Age: 73
Discharge: HOME | End: 2023-05-04
Payer: MEDICAID

## 2023-05-01 VITALS — HEIGHT: 73 IN | BODY MASS INDEX: 28.49 KG/M2 | WEIGHT: 215 LBS

## 2023-05-01 DIAGNOSIS — Z87.891 HISTORY OF SMOKING: ICD-10-CM

## 2023-05-01 PROCEDURE — 71271 CT THORAX LUNG CANCER SCR C-: CPT

## 2023-05-02 ENCOUNTER — TELEPHONE (OUTPATIENT)
Dept: PULMONOLOGY | Age: 73
End: 2023-05-02

## 2023-05-02 DIAGNOSIS — Z87.891 HISTORY OF NICOTINE DEPENDENCE: ICD-10-CM

## 2023-05-02 DIAGNOSIS — Z12.9 SCREENING FOR CANCER: Primary | ICD-10-CM

## 2023-05-02 NOTE — TELEPHONE ENCOUNTER
Spoke with the patient in regards to their CT scan results, explained per Dr. Cricket Lin that the CT is stable without concerns and that he would like for the patient to keep their upcoming appointment in a couple of weeks and to also have a repeat CT scan in 1 year. Patient understood the results and did not have any further questions or concerns at this time. Order for LDCT has been established to be completed in 1 year. // Jarrett KING

## 2023-05-02 NOTE — TELEPHONE ENCOUNTER
----- Message from Mary Kay Dawson MD sent at 5/2/2023 10:51 AM EDT -----  CT stable without concerns. Can repeat in 1 year for lung cancer screening. Keep appt in a couple weeks to discuss further and other issues.    ----- Message -----  From: Kayleigh Asencio Incoming Orders Results To [facility]iant  Sent: 5/1/2023   9:12 PM EDT  To: Mary Kay Dawson MD

## 2023-05-18 ENCOUNTER — OFFICE VISIT (OUTPATIENT)
Dept: PULMONOLOGY | Age: 73
End: 2023-05-18
Payer: MEDICARE

## 2023-05-18 VITALS
OXYGEN SATURATION: 96 % | RESPIRATION RATE: 20 BRPM | DIASTOLIC BLOOD PRESSURE: 90 MMHG | TEMPERATURE: 98 F | HEIGHT: 74 IN | SYSTOLIC BLOOD PRESSURE: 150 MMHG | BODY MASS INDEX: 25.28 KG/M2 | HEART RATE: 75 BPM | WEIGHT: 197 LBS

## 2023-05-18 DIAGNOSIS — J44.9 CHRONIC OBSTRUCTIVE PULMONARY DISEASE, UNSPECIFIED COPD TYPE (HCC): Primary | ICD-10-CM

## 2023-05-18 DIAGNOSIS — Z87.891 HISTORY OF PRIOR CIGARETTE SMOKING: ICD-10-CM

## 2023-05-18 PROCEDURE — 3017F COLORECTAL CA SCREEN DOC REV: CPT | Performed by: INTERNAL MEDICINE

## 2023-05-18 PROCEDURE — 3023F SPIROM DOC REV: CPT | Performed by: INTERNAL MEDICINE

## 2023-05-18 PROCEDURE — 1036F TOBACCO NON-USER: CPT | Performed by: INTERNAL MEDICINE

## 2023-05-18 PROCEDURE — 1123F ACP DISCUSS/DSCN MKR DOCD: CPT | Performed by: INTERNAL MEDICINE

## 2023-05-18 PROCEDURE — G8419 CALC BMI OUT NRM PARAM NOF/U: HCPCS | Performed by: INTERNAL MEDICINE

## 2023-05-18 PROCEDURE — G8427 DOCREV CUR MEDS BY ELIG CLIN: HCPCS | Performed by: INTERNAL MEDICINE

## 2023-05-18 PROCEDURE — 99214 OFFICE O/P EST MOD 30 MIN: CPT | Performed by: INTERNAL MEDICINE

## 2023-05-18 PROCEDURE — 3080F DIAST BP >= 90 MM HG: CPT | Performed by: INTERNAL MEDICINE

## 2023-05-18 PROCEDURE — 3077F SYST BP >= 140 MM HG: CPT | Performed by: INTERNAL MEDICINE

## 2023-05-18 RX ORDER — ALBUTEROL SULFATE 2.5 MG/3ML
2.5 SOLUTION RESPIRATORY (INHALATION) EVERY 6 HOURS PRN
Qty: 120 EACH | Refills: 3 | Status: SHIPPED | OUTPATIENT
Start: 2023-07-01

## 2023-05-18 NOTE — PROGRESS NOTES
Name:  Natalya Mercado  YOB: 1950   MRN: [id number]      Office Visit: 5/18/2023        ASSESSMENT AND PLAN:  (Medical Decision Making)    Impression: 71 y.o. male quit smoking in 2019 with COPD and undergoing lung cancer screening    1. Chronic obstructive pulmonary disease, unspecified COPD type (HCC)  Continue Trelegy and albuterol. He has previously quit smoking and he is doing well on this regimen. He can follow-up in 1 year and we can adjust therapy as needed. - albuterol (PROVENTIL) (2.5 MG/3ML) 0.083% nebulizer solution; Take 3 mLs by nebulization every 6 hours as needed for Shortness of Breath or Wheezing  Dispense: 120 each; Refill: 3    2. History of prior cigarette smoking  He will continue lung cancer screening CTs and will be due in 1 year and we will follow-up after that. Orders Placed This Encounter   Medications    albuterol (PROVENTIL) (2.5 MG/3ML) 0.083% nebulizer solution     Sig: Take 3 mLs by nebulization every 6 hours as needed for Shortness of Breath or Wheezing     Dispense:  120 each     Refill:  3     No orders of the defined types were placed in this encounter. Follow-up and Dispositions    Return in about 1 year (around 5/18/2024) for Dr. Bar Fletcher or FAY Hinson, After CT. Nidhi Winters MD    No specialty comments available.  _________________________________________________________________________    HISTORY OF PRESENT ILLNESS:    Mr. Low Landin is a 71 y.o. male who is seen at [facility] today for  COPD  He presents today for follow-up. He reports no exacerbations or any worsening symptoms. He is tolerating inhalers well. He is exercising and denies any other concerns. He denies any fevers, chills, night sweats or weight loss. REVIEW OF SYSTEMS: 10 point review of systems is negative except as reported in HPI. PHYSICAL EXAM: Body mass index is 25.29 kg/m².   Vitals:    05/18/23 1350   BP: (!) 150/90   Pulse: 75   Resp: 20   Temp: 98 °F none

## 2023-11-28 RX ORDER — ALBUTEROL SULFATE 90 UG/1
2 AEROSOL, METERED RESPIRATORY (INHALATION) EVERY 6 HOURS PRN
Qty: 1 EACH | Refills: 11 | Status: SHIPPED | OUTPATIENT
Start: 2023-11-28

## 2023-11-28 NOTE — TELEPHONE ENCOUNTER
Dr. Leopoldo Bays, I pended the Albuterol to the patient's preferred pharmacy. Can you please sign off for Dr. May Bess?   He is currently on vacation right now. // Toña Rees

## 2023-11-28 NOTE — TELEPHONE ENCOUNTER
Pt came to window and states he needs refill on Albuterol inhaler. Please send to [facility]. He states he has colonoscopy tomorrow morning and was advised to take it with him. Last visit was 5/18/23.

## 2024-02-03 ENCOUNTER — HOSPITAL ENCOUNTER (EMERGENCY)
Age: 74
Discharge: HOME | End: 2024-02-03
Payer: MEDICARE

## 2024-02-03 VITALS
TEMPERATURE: 98.5 F | DIASTOLIC BLOOD PRESSURE: 94 MMHG | HEART RATE: 87 BPM | OXYGEN SATURATION: 97 % | SYSTOLIC BLOOD PRESSURE: 175 MMHG | RESPIRATION RATE: 16 BRPM

## 2024-02-03 DIAGNOSIS — L30.9 DERMATITIS: Primary | ICD-10-CM

## 2024-02-03 PROCEDURE — 99283 EMERGENCY DEPT VISIT LOW MDM: CPT

## 2024-02-03 RX ORDER — TRIAMCINOLONE ACETONIDE 0.25 MG/G
OINTMENT TOPICAL
Qty: 15 G | Refills: 1 | Status: SHIPPED | OUTPATIENT
Start: 2024-02-03 | End: 2024-02-10

## 2024-02-03 ASSESSMENT — LIFESTYLE VARIABLES
HOW MANY STANDARD DRINKS CONTAINING ALCOHOL DO YOU HAVE ON A TYPICAL DAY: PATIENT DOES NOT DRINK
HOW OFTEN DO YOU HAVE A DRINK CONTAINING ALCOHOL: NEVER

## 2024-02-03 NOTE — ED NOTES
I have reviewed discharge instructions with the patient.  The patient verbalized understanding.    Patient left ED via Discharge Method: ambulatory to Home with self.    Opportunity for questions and clarification provided.       Patient given 1 scripts.         To continue your aftercare when you leave the hospital, you may receive an automated call from our care team to check in on how you are doing.  This is a free service and part of our promise to provide the best care and service to meet your aftercare needs.” If you have questions, or wish to unsubscribe from this service please call [phone].  Thank you for Choosing our [facility] Emergency Department.       Zackary Bella RN  02/03/24 1256

## 2024-02-03 NOTE — ED PROVIDER NOTES
Emergency Department Provider Note       PCP: Ilia Wang MD   Age: 73 y.o.   Sex: male     DISPOSITION       No diagnosis found.    Medical Decision Making     Complexity of Problems Addressed:  1 or more acute illnesses that pose a threat to life or bodily function.     Data Reviewed and Analyzed:  I independently ordered and reviewed each unique test.  I reviewed external records: provider visit note from PCP.           Discussion of management or test interpretation.  73-year-old male with atopic dermatitis bilateral forearms.  Does not appear to be infectious.  Will place on a triamcinolone cream daily advised to keep the skin moisturized see his primary care physician for routine recheck try not to scratch the areas avoid any harsh lotions or soaps            Risk of Complications and/or Morbidity of Patient Management:  Prescription drug management performed.        History       Pt to er  c/o rash to bilateral  for past  2 days no new soaps etc, patient states it is itchy he has not used any over-the-counter medications for the symptoms he does have a primary care physician he has not called.  Denies any rash to any other parts of the body    Past Medical History:  5/20/2021: Alcoholic pancreatitis  No date: Arthritis  No date: Asthma      Comment:  albuterol prn (uses 1-2 times per day)  9/15/2013: CAD (coronary artery disease)  No date: Chronic obstructive pulmonary disease (HCC)  11/18/2015: Coronary atherosclerosis of native coronary vessel  No date: Current smoker on some days  No date: Dental disorder  No date: Dyspepsia and other specified disorders of function of   stomach  No date: GERD (gastroesophageal reflux disease)      Comment:  on med for control   6/29/2019: GI bleed  11/18/2015: Hyperlipidemia      Comment:  on med  No date: Hypertension      Comment:  on med for control   11/18/2015: Hypertension, benign  2013: [facility]my[facility] infarction) [facility]      Comment:  NSTEMI  No date: Multiple  Smoking status: Former     Current packs/day: 0.00     Average packs/day: 0.5 packs/day for 50.0 years (25.0 ttl pk-yrs)     Types: Cigarettes     Start date: 1969     Quit date: 2019     Years since quittin.7    Smokeless tobacco: Never   Substance and Sexual Activity    Alcohol use: Yes     Alcohol/week: 3.0 standard drinks of alcohol    Drug use: No        Previous Medications    ALBUTEROL (PROVENTIL) (2.5 MG/3ML) 0.083% NEBULIZER SOLUTION    Take 3 mLs by nebulization every 6 hours as needed for Shortness of Breath or Wheezing    ALBUTEROL SULFATE HFA (PROVENTIL;VENTOLIN;PROAIR) 108 (90 BASE) MCG/ACT INHALER    Inhale 2 puffs into the lungs every 6 hours as needed for Wheezing    ALBUTEROL SULFATE  (90 BASE) MCG/ACT INHALER    Inhale 1 puff into the lungs every 4 hours as needed    ALLOPURINOL (ZYLOPRIM) 100 MG TABLET    Take 100 mg by mouth daily    ALLOPURINOL (ZYLOPRIM) 300 MG TABLET    TAKE 1 TABLET BY MOUTH TWICE DAILY    AMLODIPINE (NORVASC) 10 MG TABLET    Take 10 mg by mouth daily    ASPIRIN 81 MG EC TABLET    Take 81 mg by mouth daily    FLUTICASONE-UMECLIDIN-VILANT (TRELEGY ELLIPTA) 100-62.5-25 MCG/ACT AEPB INHALER    Inhale 1 puff into the lungs daily    FLUTICASONE-UMECLIDIN-VILANT (TRELEGY ELLIPTA) 100-62.5-25 MCG/INH AEPB    Inhale 1 puff into the lungs daily    LIDOCAINE (LIDODERM) 5 %    Apply patch to the affected area for 12 hours a day and remove for 12 hours a day.    MIRTAZAPINE (REMERON) 15 MG TABLET    Take 15 mg by mouth    ONDANSETRON (ZOFRAN-ODT) 8 MG TBDP DISINTEGRATING TABLET    Take 8 mg by mouth every 8 hours as needed    OXYBUTYNIN (DITROPAN-XL) 5 MG EXTENDED RELEASE TABLET    Take 5 mg by mouth daily    PANTOPRAZOLE (PROTONIX) 40 MG TABLET    Take 40 mg by mouth daily    PRAVASTATIN (PRAVACHOL) 40 MG TABLET    Take 40 mg by mouth daily    RIVAROXABAN (XARELTO) 20 MG TABS TABLET    TAKE 1 TABLET BY MOUTH DAILY WITH BREAKFAST    SENNA-DOCUSATE (PERICOLACE) 8.6-50

## 2024-04-17 RX ORDER — FLUTICASONE FUROATE, UMECLIDINIUM BROMIDE AND VILANTEROL TRIFENATATE 100; 62.5; 25 UG/1; UG/1; UG/1
1 POWDER RESPIRATORY (INHALATION) DAILY
Qty: 60 EACH | Refills: 11 | Status: SHIPPED | OUTPATIENT
Start: 2024-04-17

## 2024-05-22 ENCOUNTER — APPOINTMENT (OUTPATIENT)
Dept: CT IMAGING | Age: 74
End: 2024-05-22
Payer: MEDICARE

## 2024-05-22 ENCOUNTER — APPOINTMENT (OUTPATIENT)
Dept: GENERAL RADIOLOGY | Age: 74
End: 2024-05-22
Payer: MEDICARE

## 2024-05-22 ENCOUNTER — HOSPITAL ENCOUNTER (INPATIENT)
Age: 74
LOS: 3 days | Discharge: HOME | End: 2024-05-25
Attending: GENERAL PRACTICE | Admitting: STUDENT IN AN ORGANIZED HEALTH CARE EDUCATION/TRAINING PROGRAM
Payer: MEDICARE

## 2024-05-22 DIAGNOSIS — N17.9 ACUTE KIDNEY INJURY (HCC): Primary | ICD-10-CM

## 2024-05-22 DIAGNOSIS — E86.0 DEHYDRATION: ICD-10-CM

## 2024-05-22 DIAGNOSIS — R11.2 NAUSEA AND VOMITING, UNSPECIFIED VOMITING TYPE: ICD-10-CM

## 2024-05-22 DIAGNOSIS — K85.90 ACUTE PANCREATITIS, UNSPECIFIED COMPLICATION STATUS, UNSPECIFIED PANCREATITIS TYPE: ICD-10-CM

## 2024-05-22 PROBLEM — F10.90 ALCOHOL USE DISORDER: Status: ACTIVE | Noted: 2024-05-22

## 2024-05-22 PROBLEM — K86.89 PANCREATIC MASS: Status: ACTIVE | Noted: 2024-05-22

## 2024-05-22 PROBLEM — F17.200 TOBACCO USE DISORDER: Status: ACTIVE | Noted: 2018-11-08

## 2024-05-22 PROBLEM — R74.8 ELEVATED ALKALINE PHOSPHATASE LEVEL: Status: ACTIVE | Noted: 2024-05-22

## 2024-05-22 LAB
ALBUMIN SERPL-MCNC: 4.1 G/DL (ref 3.2–4.6)
ALBUMIN/GLOB SERPL: 0.8 (ref 1–1.9)
ALP SERPL-CCNC: 155 U/L (ref 40–129)
ALT SERPL-CCNC: 14 U/L (ref 12–65)
ANION GAP SERPL CALC-SCNC: 26 MMOL/L (ref 9–18)
APPEARANCE UR: ABNORMAL
AST SERPL-CCNC: 33 U/L (ref 15–37)
BACTERIA URNS QL MICRO: ABNORMAL /HPF
BASOPHILS # BLD: 0 K/UL (ref 0–0.2)
BASOPHILS NFR BLD: 1 % (ref 0–2)
BILIRUB SERPL-MCNC: 0.4 MG/DL (ref 0–1.2)
BILIRUB UR QL: NEGATIVE
BUN SERPL-MCNC: 21 MG/DL (ref 8–23)
CALCIUM SERPL-MCNC: 11.2 MG/DL (ref 8.8–10.2)
CHLORIDE SERPL-SCNC: 88 MMOL/L (ref 98–107)
CO2 SERPL-SCNC: 22 MMOL/L (ref 20–28)
COLOR UR: ABNORMAL
CREAT SERPL-MCNC: 2.34 MG/DL (ref 0.8–1.3)
CRYSTALS URNS QL MICRO: ABNORMAL /LPF
DIFFERENTIAL METHOD BLD: ABNORMAL
EKG ATRIAL RATE: 132 BPM
EKG DIAGNOSIS: NORMAL
EKG P AXIS: 66 DEGREES
EKG P-R INTERVAL: 119 MS
EKG Q-T INTERVAL: 312 MS
EKG QRS DURATION: 92 MS
EKG QTC CALCULATION (BAZETT): 461 MS
EKG R AXIS: -54 DEGREES
EKG T AXIS: 77 DEGREES
EKG VENTRICULAR RATE: 131 BPM
EOSINOPHIL # BLD: 0 K/UL (ref 0–0.8)
EOSINOPHIL NFR BLD: 0 % (ref 0.5–7.8)
EPI CELLS #/AREA URNS HPF: ABNORMAL /HPF
ERYTHROCYTE [DISTWIDTH] IN BLOOD BY AUTOMATED COUNT: 14.5 % (ref 11.9–14.6)
GLOBULIN SER CALC-MCNC: 5.5 G/DL (ref 2.3–3.5)
GLUCOSE SERPL-MCNC: 113 MG/DL (ref 70–99)
GLUCOSE UR STRIP.AUTO-MCNC: NEGATIVE MG/DL
HCT VFR BLD AUTO: 44.2 % (ref 41.1–50.3)
HGB BLD-MCNC: 14.5 G/DL (ref 13.6–17.2)
HGB UR QL STRIP: NEGATIVE
IMM GRANULOCYTES # BLD AUTO: 0.1 K/UL (ref 0–0.5)
IMM GRANULOCYTES NFR BLD AUTO: 1 % (ref 0–5)
KETONES UR QL STRIP.AUTO: 15 MG/DL
LACTATE SERPL-SCNC: 1.5 MMOL/L (ref 0.5–2)
LACTATE SERPL-SCNC: 1.9 MMOL/L (ref 0.5–2)
LEUKOCYTE ESTERASE UR QL STRIP.AUTO: NEGATIVE
LIPASE SERPL-CCNC: 82 U/L (ref 13–60)
LYMPHOCYTES # BLD: 0.8 K/UL (ref 0.5–4.6)
LYMPHOCYTES NFR BLD: 13 % (ref 13–44)
MCH RBC QN AUTO: 28 PG (ref 26.1–32.9)
MCHC RBC AUTO-ENTMCNC: 32.8 G/DL (ref 31.4–35)
MCV RBC AUTO: 85.5 FL (ref 82–102)
MONOCYTES # BLD: 0.4 K/UL (ref 0.1–1.3)
MONOCYTES NFR BLD: 5 % (ref 4–12)
NEUTS SEG # BLD: 5.3 K/UL (ref 1.7–8.2)
NEUTS SEG NFR BLD: 80 % (ref 43–78)
NITRITE UR QL STRIP.AUTO: NEGATIVE
NRBC # BLD: 0 K/UL (ref 0–0.2)
OTHER OBSERVATIONS: ABNORMAL
PH UR STRIP: 5 (ref 5–9)
PLATELET # BLD AUTO: 592 K/UL (ref 150–450)
PMV BLD AUTO: 9.7 FL (ref 9.4–12.3)
POTASSIUM SERPL-SCNC: 4 MMOL/L (ref 3.5–5.1)
PROT SERPL-MCNC: 9.6 G/DL (ref 6.3–8.2)
PROT UR STRIP-MCNC: 30 MG/DL
RBC # BLD AUTO: 5.17 M/UL (ref 4.23–5.6)
RBC #/AREA URNS HPF: ABNORMAL /HPF
SODIUM SERPL-SCNC: 136 MMOL/L (ref 136–145)
SP GR UR REFRACTOMETRY: 1.02 (ref 1–1.02)
UROBILINOGEN UR QL STRIP.AUTO: 1 EU/DL (ref 0.2–1)
WBC # BLD AUTO: 6.6 K/UL (ref 4.3–11.1)
WBC URNS QL MICRO: ABNORMAL /HPF

## 2024-05-22 PROCEDURE — 96361 HYDRATE IV INFUSION ADD-ON: CPT

## 2024-05-22 PROCEDURE — 81001 URINALYSIS AUTO W/SCOPE: CPT

## 2024-05-22 PROCEDURE — 93010 ELECTROCARDIOGRAM REPORT: CPT | Performed by: INTERNAL MEDICINE

## 2024-05-22 PROCEDURE — 6370000000 HC RX 637 (ALT 250 FOR IP): Performed by: STUDENT IN AN ORGANIZED HEALTH CARE EDUCATION/TRAINING PROGRAM

## 2024-05-22 PROCEDURE — 99285 EMERGENCY DEPT VISIT HI MDM: CPT

## 2024-05-22 PROCEDURE — 80053 COMPREHEN METABOLIC PANEL: CPT

## 2024-05-22 PROCEDURE — 83605 ASSAY OF LACTIC ACID: CPT

## 2024-05-22 PROCEDURE — 6360000002 HC RX W HCPCS: Performed by: FAMILY MEDICINE

## 2024-05-22 PROCEDURE — 85025 COMPLETE CBC W/AUTO DIFF WBC: CPT

## 2024-05-22 PROCEDURE — 1100000000 HC RM PRIVATE

## 2024-05-22 PROCEDURE — 74176 CT ABD & PELVIS W/O CONTRAST: CPT

## 2024-05-22 PROCEDURE — 6360000002 HC RX W HCPCS: Performed by: STUDENT IN AN ORGANIZED HEALTH CARE EDUCATION/TRAINING PROGRAM

## 2024-05-22 PROCEDURE — 2580000003 HC RX 258: Performed by: GENERAL PRACTICE

## 2024-05-22 PROCEDURE — 6360000002 HC RX W HCPCS: Performed by: GENERAL PRACTICE

## 2024-05-22 PROCEDURE — 2580000003 HC RX 258: Performed by: STUDENT IN AN ORGANIZED HEALTH CARE EDUCATION/TRAINING PROGRAM

## 2024-05-22 PROCEDURE — 96375 TX/PRO/DX INJ NEW DRUG ADDON: CPT

## 2024-05-22 PROCEDURE — 2500000003 HC RX 250 WO HCPCS: Performed by: STUDENT IN AN ORGANIZED HEALTH CARE EDUCATION/TRAINING PROGRAM

## 2024-05-22 PROCEDURE — 93005 ELECTROCARDIOGRAM TRACING: CPT | Performed by: GENERAL PRACTICE

## 2024-05-22 PROCEDURE — 96365 THER/PROPH/DIAG IV INF INIT: CPT

## 2024-05-22 PROCEDURE — 83690 ASSAY OF LIPASE: CPT

## 2024-05-22 PROCEDURE — 87040 BLOOD CULTURE FOR BACTERIA: CPT

## 2024-05-22 PROCEDURE — 74018 RADEX ABDOMEN 1 VIEW: CPT

## 2024-05-22 PROCEDURE — 94640 AIRWAY INHALATION TREATMENT: CPT

## 2024-05-22 PROCEDURE — 71046 X-RAY EXAM CHEST 2 VIEWS: CPT

## 2024-05-22 RX ORDER — LOSARTAN POTASSIUM 50 MG/1
50 TABLET ORAL DAILY
COMMUNITY

## 2024-05-22 RX ORDER — SODIUM CHLORIDE, SODIUM LACTATE, POTASSIUM CHLORIDE, CALCIUM CHLORIDE 600; 310; 30; 20 MG/100ML; MG/100ML; MG/100ML; MG/100ML
INJECTION, SOLUTION INTRAVENOUS CONTINUOUS
Status: ACTIVE | OUTPATIENT
Start: 2024-05-22 | End: 2024-05-23

## 2024-05-22 RX ORDER — SODIUM CHLORIDE 0.9 % (FLUSH) 0.9 %
5-40 SYRINGE (ML) INJECTION EVERY 12 HOURS SCHEDULED
Status: DISCONTINUED | OUTPATIENT
Start: 2024-05-22 | End: 2024-05-25 | Stop reason: HOSPADM

## 2024-05-22 RX ORDER — MORPHINE SULFATE 4 MG/ML
4 INJECTION, SOLUTION INTRAMUSCULAR; INTRAVENOUS
Status: COMPLETED | OUTPATIENT
Start: 2024-05-22 | End: 2024-05-22

## 2024-05-22 RX ORDER — SODIUM CHLORIDE 0.9 % (FLUSH) 0.9 %
5-40 SYRINGE (ML) INJECTION PRN
Status: DISCONTINUED | OUTPATIENT
Start: 2024-05-22 | End: 2024-05-25 | Stop reason: HOSPADM

## 2024-05-22 RX ORDER — LANOLIN ALCOHOL/MO/W.PET/CERES
100 CREAM (GRAM) TOPICAL DAILY
Status: DISCONTINUED | OUTPATIENT
Start: 2024-05-22 | End: 2024-05-25 | Stop reason: HOSPADM

## 2024-05-22 RX ORDER — ALBUTEROL SULFATE 2.5 MG/3ML
2.5 SOLUTION RESPIRATORY (INHALATION) EVERY 6 HOURS PRN
Status: DISCONTINUED | OUTPATIENT
Start: 2024-05-22 | End: 2024-05-25 | Stop reason: HOSPADM

## 2024-05-22 RX ORDER — CHLORTHALIDONE 50 MG/1
50 TABLET ORAL DAILY
COMMUNITY

## 2024-05-22 RX ORDER — POTASSIUM CHLORIDE 7.45 MG/ML
10 INJECTION INTRAVENOUS PRN
Status: DISCONTINUED | OUTPATIENT
Start: 2024-05-22 | End: 2024-05-25 | Stop reason: HOSPADM

## 2024-05-22 RX ORDER — OMEPRAZOLE 20 MG/1
40 CAPSULE, DELAYED RELEASE ORAL DAILY
COMMUNITY

## 2024-05-22 RX ORDER — OXYCODONE HYDROCHLORIDE 5 MG/1
5 TABLET ORAL EVERY 4 HOURS PRN
Status: DISCONTINUED | OUTPATIENT
Start: 2024-05-22 | End: 2024-05-25 | Stop reason: HOSPADM

## 2024-05-22 RX ORDER — LORAZEPAM 0.5 MG/1
0.5 TABLET ORAL PRN
Status: DISCONTINUED | OUTPATIENT
Start: 2024-05-22 | End: 2024-05-25 | Stop reason: HOSPADM

## 2024-05-22 RX ORDER — PROCHLORPERAZINE EDISYLATE 5 MG/ML
10 INJECTION INTRAMUSCULAR; INTRAVENOUS EVERY 6 HOURS PRN
Status: DISCONTINUED | OUTPATIENT
Start: 2024-05-22 | End: 2024-05-25 | Stop reason: HOSPADM

## 2024-05-22 RX ORDER — 0.9 % SODIUM CHLORIDE 0.9 %
30 INTRAVENOUS SOLUTION INTRAVENOUS ONCE
Status: COMPLETED | OUTPATIENT
Start: 2024-05-22 | End: 2024-05-22

## 2024-05-22 RX ORDER — HEPARIN SODIUM 5000 [USP'U]/ML
5000 INJECTION, SOLUTION INTRAVENOUS; SUBCUTANEOUS EVERY 8 HOURS SCHEDULED
Status: DISCONTINUED | OUTPATIENT
Start: 2024-05-22 | End: 2024-05-25 | Stop reason: HOSPADM

## 2024-05-22 RX ORDER — MAGNESIUM SULFATE IN WATER 40 MG/ML
2000 INJECTION, SOLUTION INTRAVENOUS PRN
Status: DISCONTINUED | OUTPATIENT
Start: 2024-05-22 | End: 2024-05-25 | Stop reason: HOSPADM

## 2024-05-22 RX ORDER — ASPIRIN 81 MG/1
81 TABLET ORAL DAILY
Status: DISCONTINUED | OUTPATIENT
Start: 2024-05-22 | End: 2024-05-22

## 2024-05-22 RX ORDER — ACETAMINOPHEN 650 MG/1
650 SUPPOSITORY RECTAL EVERY 6 HOURS PRN
Status: DISCONTINUED | OUTPATIENT
Start: 2024-05-22 | End: 2024-05-25 | Stop reason: HOSPADM

## 2024-05-22 RX ORDER — HYDROMORPHONE HYDROCHLORIDE 1 MG/ML
0.5 INJECTION, SOLUTION INTRAMUSCULAR; INTRAVENOUS; SUBCUTANEOUS EVERY 4 HOURS PRN
Status: DISCONTINUED | OUTPATIENT
Start: 2024-05-22 | End: 2024-05-25 | Stop reason: HOSPADM

## 2024-05-22 RX ORDER — POLYETHYLENE GLYCOL 3350 17 G/17G
17 POWDER, FOR SOLUTION ORAL DAILY PRN
Status: DISCONTINUED | OUTPATIENT
Start: 2024-05-22 | End: 2024-05-25 | Stop reason: HOSPADM

## 2024-05-22 RX ORDER — ACETAMINOPHEN 325 MG/1
650 TABLET ORAL EVERY 6 HOURS PRN
Status: DISCONTINUED | OUTPATIENT
Start: 2024-05-22 | End: 2024-05-25 | Stop reason: HOSPADM

## 2024-05-22 RX ORDER — POTASSIUM CHLORIDE 20 MEQ/1
40 TABLET, EXTENDED RELEASE ORAL PRN
Status: DISCONTINUED | OUTPATIENT
Start: 2024-05-22 | End: 2024-05-25 | Stop reason: HOSPADM

## 2024-05-22 RX ORDER — ONDANSETRON 2 MG/ML
4 INJECTION INTRAMUSCULAR; INTRAVENOUS ONCE
Status: COMPLETED | OUTPATIENT
Start: 2024-05-22 | End: 2024-05-22

## 2024-05-22 RX ORDER — ONDANSETRON 4 MG/1
4 TABLET, ORALLY DISINTEGRATING ORAL EVERY 8 HOURS PRN
Status: DISCONTINUED | OUTPATIENT
Start: 2024-05-22 | End: 2024-05-25 | Stop reason: HOSPADM

## 2024-05-22 RX ORDER — NICOTINE 21 MG/24HR
1 PATCH, TRANSDERMAL 24 HOURS TRANSDERMAL DAILY PRN
Status: DISCONTINUED | OUTPATIENT
Start: 2024-05-22 | End: 2024-05-25 | Stop reason: HOSPADM

## 2024-05-22 RX ORDER — ENOXAPARIN SODIUM 100 MG/ML
40 INJECTION SUBCUTANEOUS DAILY
Status: DISCONTINUED | OUTPATIENT
Start: 2024-05-22 | End: 2024-05-22 | Stop reason: ALTCHOICE

## 2024-05-22 RX ORDER — OXYBUTYNIN CHLORIDE 5 MG/1
5 TABLET, EXTENDED RELEASE ORAL DAILY
Status: DISCONTINUED | OUTPATIENT
Start: 2024-05-22 | End: 2024-05-22

## 2024-05-22 RX ORDER — SODIUM CHLORIDE 9 MG/ML
INJECTION, SOLUTION INTRAVENOUS PRN
Status: DISCONTINUED | OUTPATIENT
Start: 2024-05-22 | End: 2024-05-25 | Stop reason: HOSPADM

## 2024-05-22 RX ORDER — PANTOPRAZOLE SODIUM 40 MG/1
40 TABLET, DELAYED RELEASE ORAL DAILY
Status: DISCONTINUED | OUTPATIENT
Start: 2024-05-22 | End: 2024-05-25 | Stop reason: HOSPADM

## 2024-05-22 RX ORDER — BUDESONIDE AND FORMOTEROL FUMARATE DIHYDRATE 160; 4.5 UG/1; UG/1
2 AEROSOL RESPIRATORY (INHALATION)
Status: DISCONTINUED | OUTPATIENT
Start: 2024-05-22 | End: 2024-05-25 | Stop reason: HOSPADM

## 2024-05-22 RX ORDER — ALLOPURINOL 300 MG/1
300 TABLET ORAL DAILY
Status: DISCONTINUED | OUTPATIENT
Start: 2024-05-22 | End: 2024-05-25 | Stop reason: HOSPADM

## 2024-05-22 RX ORDER — ONDANSETRON 2 MG/ML
4 INJECTION INTRAMUSCULAR; INTRAVENOUS EVERY 6 HOURS PRN
Status: DISCONTINUED | OUTPATIENT
Start: 2024-05-22 | End: 2024-05-25 | Stop reason: HOSPADM

## 2024-05-22 RX ADMIN — OXYCODONE 5 MG: 5 TABLET ORAL at 13:05

## 2024-05-22 RX ADMIN — PANTOPRAZOLE SODIUM 40 MG: 40 TABLET, DELAYED RELEASE ORAL at 17:02

## 2024-05-22 RX ADMIN — Medication 100 MG: at 17:02

## 2024-05-22 RX ADMIN — SODIUM CHLORIDE 2397 ML: 9 INJECTION, SOLUTION INTRAVENOUS at 10:14

## 2024-05-22 RX ADMIN — ONDANSETRON 4 MG: 2 INJECTION INTRAMUSCULAR; INTRAVENOUS at 09:38

## 2024-05-22 RX ADMIN — PIPERACILLIN AND TAZOBACTAM 4500 MG: 4; .5 INJECTION, POWDER, LYOPHILIZED, FOR SOLUTION INTRAVENOUS at 09:38

## 2024-05-22 RX ADMIN — ONDANSETRON 4 MG: 2 INJECTION INTRAMUSCULAR; INTRAVENOUS at 17:05

## 2024-05-22 RX ADMIN — BUDESONIDE AND FORMOTEROL FUMARATE DIHYDRATE 2 PUFF: 160; 4.5 AEROSOL RESPIRATORY (INHALATION) at 20:25

## 2024-05-22 RX ADMIN — SODIUM CHLORIDE, POTASSIUM CHLORIDE, SODIUM LACTATE AND CALCIUM CHLORIDE: 600; 310; 30; 20 INJECTION, SOLUTION INTRAVENOUS at 16:39

## 2024-05-22 RX ADMIN — HYDROMORPHONE HYDROCHLORIDE 0.5 MG: 1 INJECTION, SOLUTION INTRAMUSCULAR; INTRAVENOUS; SUBCUTANEOUS at 17:12

## 2024-05-22 RX ADMIN — HEPARIN SODIUM 5000 UNITS: 5000 INJECTION INTRAVENOUS; SUBCUTANEOUS at 22:00

## 2024-05-22 RX ADMIN — HYDROMORPHONE HYDROCHLORIDE 0.5 MG: 1 INJECTION, SOLUTION INTRAMUSCULAR; INTRAVENOUS; SUBCUTANEOUS at 13:05

## 2024-05-22 RX ADMIN — MORPHINE SULFATE 4 MG: 4 INJECTION INTRAVENOUS at 09:39

## 2024-05-22 RX ADMIN — HYDROMORPHONE HYDROCHLORIDE 0.5 MG: 1 INJECTION, SOLUTION INTRAMUSCULAR; INTRAVENOUS; SUBCUTANEOUS at 20:42

## 2024-05-22 RX ADMIN — PROMETHAZINE HYDROCHLORIDE 6.25 MG: 25 INJECTION INTRAMUSCULAR; INTRAVENOUS at 11:18

## 2024-05-22 RX ADMIN — PROCHLORPERAZINE EDISYLATE 10 MG: 5 INJECTION INTRAMUSCULAR; INTRAVENOUS at 20:43

## 2024-05-22 RX ADMIN — ALLOPURINOL 300 MG: 300 TABLET ORAL at 17:03

## 2024-05-22 RX ADMIN — SODIUM CHLORIDE, PRESERVATIVE FREE 10 ML: 5 INJECTION INTRAVENOUS at 19:54

## 2024-05-22 ASSESSMENT — PAIN DESCRIPTION - DESCRIPTORS
DESCRIPTORS: DISCOMFORT
DESCRIPTORS: ACHING

## 2024-05-22 ASSESSMENT — PAIN SCALES - GENERAL
PAINLEVEL_OUTOF10: 7
PAINLEVEL_OUTOF10: 0
PAINLEVEL_OUTOF10: 2
PAINLEVEL_OUTOF10: 10

## 2024-05-22 ASSESSMENT — PAIN DESCRIPTION - LOCATION
LOCATION: ABDOMEN

## 2024-05-22 ASSESSMENT — PAIN DESCRIPTION - ORIENTATION: ORIENTATION: MID

## 2024-05-22 ASSESSMENT — PAIN - FUNCTIONAL ASSESSMENT: PAIN_FUNCTIONAL_ASSESSMENT: 0-10

## 2024-05-22 NOTE — PROGRESS NOTES
Pt had one episode of vomiting. Ambulates with a steady gait. C/o abd pain. PRN meds administered. Patient denies any needs at this time. Bed is in the low position, locked and call light/personal items are within reach. Hourly rounds performed during shift.

## 2024-05-22 NOTE — ED PROVIDER NOTES
injection 4 mg (4 mg IntraVENous Given 24 2361)   sodium chloride 0.9 % bolus 2,397 mL (0 mLs IntraVENous Stopped 24 1045)   promethazine (PHENERGAN) 6.25 mg in sodium chloride 0.9 % 50 mL IVPB (0 mg IntraVENous Stopped 24 1133)       New Prescriptions    No medications on file        Past Medical History:   Diagnosis Date    Alcoholic pancreatitis 2021    Arthritis     Asthma     albuterol prn (uses 1-2 times per day)    CAD (coronary artery disease) 9/15/2013    Chronic obstructive pulmonary disease (HCC)     Coronary atherosclerosis of native coronary vessel 2015    Current smoker on some days     Dental disorder     Dyspepsia and other specified disorders of function of stomach     GERD (gastroesophageal reflux disease)     on med for control     GI bleed 2019    Hyperlipidemia 2015    on med    Hypertension     on med for control     Hypertension, benign 2015    MI (myocardial infarction) (HCC)     NSTEMI    Multiple renal cysts     Other ill-defined conditions(799.89)     hernia, pt unsure of site (resolved with surery)    Prostate cancer (HCC)     Pulmonary emboli (HCC) 2019    Stroke (HCC) 2011    TIA; no residual         Past Surgical History:   Procedure Laterality Date    COLONOSCOPY  2018    HERNIA REPAIR      [place] UNLISTED PROCEDURE CARDIAC SURGERY  2013    stent    PROSTATECTOMY      PTCA      x 1    TOTAL COLECTOMY      due to polyp that could not be excised        Social History     Socioeconomic History    Marital status: Single   Tobacco Use    Smoking status: Former     Current packs/day: 0.00     Average packs/day: 0.5 packs/day for 50.0 years (25.0 ttl pk-yrs)     Types: Cigarettes     Start date: 1969     Quit date: 2019     Years since quittin.0    Smokeless tobacco: Never   Substance and Sexual Activity    Alcohol use: Yes     Alcohol/week: 3.0 standard drinks of alcohol    Drug use: No        Previous Medications    ALBUTEROL  (PROVENTIL) (2.5 MG/3ML) 0.083% NEBULIZER SOLUTION    Take 3 mLs by nebulization every 6 hours as needed for Shortness of Breath or Wheezing    ALBUTEROL SULFATE HFA (PROVENTIL;VENTOLIN;PROAIR) 108 (90 BASE) MCG/ACT INHALER    Inhale 2 puffs into the lungs every 6 hours as needed for Wheezing    ALBUTEROL SULFATE  (90 BASE) MCG/ACT INHALER    Inhale 1 puff into the lungs every 4 hours as needed    ALLOPURINOL (ZYLOPRIM) 100 MG TABLET    Take 100 mg by mouth daily    ALLOPURINOL (ZYLOPRIM) 300 MG TABLET    TAKE 1 TABLET BY MOUTH TWICE DAILY    AMLODIPINE (NORVASC) 10 MG TABLET    Take 10 mg by mouth daily    ASPIRIN 81 MG EC TABLET    Take 81 mg by mouth daily    FLUTICASONE-UMECLIDIN-VILANT (TRELEGY ELLIPTA) 100-62.5-25 MCG/ACT AEPB INHALER    INHALE 1 PUFF INTO THE LUNGS DAILY    FLUTICASONE-UMECLIDIN-VILANT (TRELEGY ELLIPTA) 100-62.5-25 MCG/INH AEPB    Inhale 1 puff into the lungs daily    LIDOCAINE (LIDODERM) 5 %    Apply patch to the affected area for 12 hours a day and remove for 12 hours a day.    MIRTAZAPINE (REMERON) 15 MG TABLET    Take 15 mg by mouth    ONDANSETRON (ZOFRAN-ODT) 8 MG TBDP DISINTEGRATING TABLET    Take 8 mg by mouth every 8 hours as needed    OXYBUTYNIN (DITROPAN-XL) 5 MG EXTENDED RELEASE TABLET    Take 5 mg by mouth daily    PANTOPRAZOLE (PROTONIX) 40 MG TABLET    Take 40 mg by mouth daily    PRAVASTATIN (PRAVACHOL) 40 MG TABLET    Take 40 mg by mouth daily    RIVAROXABAN (XARELTO) 20 MG TABS TABLET    TAKE 1 TABLET BY MOUTH DAILY WITH BREAKFAST    SENNA-DOCUSATE (PERICOLACE) 8.6-50 MG PER TABLET    Take 2 tablets by mouth daily        Results for orders placed or performed during the hospital encounter of 05/22/24   XR CHEST (2 VW)    Narrative    Chest X-ray    INDICATION: suspected infection    COMPARISON: 5/22/2021.    TECHNIQUE: PA and lateral views of the chest were obtained.    FINDINGS: EKG leads overlie the chest the lungs are clear. There are no  infiltrates or effusions.

## 2024-05-22 NOTE — CARE COORDINATION
Past lives at [facility] for the last 8 years.  He denies any DME and reports he has a CLTC worker but doesn't recall how many hours a week she is there. There are grabs bars in the bathroom and  he has a nebulizer.    CM to monitor clinical course to see if patient needs any additional HH services at [facility].    Patient confirms POA, PCP and insur   05/22/24 1211   Service Assessment   Patient Orientation Alert and Oriented   Cognition Alert   History Provided By Patient   Primary Caregiver Self   Support Systems Family Members;Home Care Staff   Patient's Healthcare Decision Maker is: Legal Next of Kin   PCP Verified by CM Yes   Last Visit to PCP Within last 3 months   Prior Functional Level Assistance with the following:;Cooking;Housework;Shopping   Current Functional Level Assistance with the following:;Cooking;Housework;Shopping   Can patient return to prior living arrangement Yes   Ability to make needs known: Good   Family able to assist with home care needs: No   Would you like for me to discuss the discharge plan with any other family members/significant others, and if so, who? No   Financial Resources Medicaid;Medicare   Community Resources Other (Comment)   CM/SW Referral Other (see comment)   Social/Functional History   Lives With Alone   Type of Home Assisted living   Home Layout One level   Home Access Level entry   Bathroom Shower/Tub Tub/Shower unit   Bathroom Toilet Standard   Bathroom Equipment Grab bars in shower;Grab bars around toilet   Bathroom Accessibility Accessible   Home Equipment Grab bars   Receives Help From Personal care attendant   ADL Assistance Independent   Homemaking Assistance Needs assistance   Homemaking Responsibilities No   Ambulation Assistance Independent   Transfer Assistance Independent   Active  No   Occupation Retired   Discharge Planning   Type of Residence Other (Comment);Independent Living   Living Arrangements Alone   Current Services Prior To Admission

## 2024-05-22 NOTE — H&P
Output 100 ml   Net -100 ml         Physical Exam:    General:    Well nourished.  Tired appearing  Head:  Normocephalic, atraumatic  Eyes:  Sclerae appear normal.  Pupils equally round.  ENT:  Nares appear normal.  Moist oral mucosa  Neck:  No restricted ROM.  Trachea midline   CV:   RRR.  No m/r/g.  No jugular venous distension.  Lungs:   CTAB.  No wheezing, rhonchi, or rales.  Symmetric expansion.  Abdomen:   Soft, tender with deep palpation in epigastric and right upper quadrant, nondistended.  Extremities: No cyanosis or clubbing.  No edema  Skin:     No rashes and normal coloration.   Warm and dry.    Neuro:  CN II-XII grossly intact.  Sensation intact.   Psych:  Normal mood and affect.      Orders Placed This Encounter   Medications    piperacillin-tazobactam (ZOSYN) 4,500 mg in sodium chloride 0.9 % 100 mL IVPB (mini-bag)     Order Specific Question:   Antimicrobial Indications     Answer:   Intra-Abdominal Infection    iopamidol (ISOVUE-370) 76 % injection 100 mL    morphine sulfate (PF) injection 4 mg    ondansetron (ZOFRAN) injection 4 mg    sodium chloride 0.9 % bolus 2,397 mL    promethazine (PHENERGAN) 6.25 mg in sodium chloride 0.9 % 50 mL IVPB    albuterol (PROVENTIL) (2.5 MG/3ML) 0.083% nebulizer solution 2.5 mg     Order Specific Question:   Initiate RT Bronchodilator Protocol     Answer:   Yes - Inpatient Protocol    DISCONTD: aspirin EC tablet 81 mg    DISCONTD: fluticasone-umeclidin-vilant (TRELEGY ELLIPTA) 100-62.5-25 MCG/ACT inhaler 1 puff    DISCONTD: oxyBUTYnin (DITROPAN-XL) extended release tablet 5 mg    pantoprazole (PROTONIX) tablet 40 mg    DISCONTD: rivaroxaban (XARELTO) tablet 20 mg     Order Specific Question:   Indication of Use     Answer:   History of DVT/PE (indefinite)    sodium chloride flush 0.9 % injection 5-40 mL    sodium chloride flush 0.9 % injection 5-40 mL    0.9 % sodium chloride infusion    OR Linked Order Group     potassium chloride (KLOR-CON M) extended release  0.5 - 2.0 MMOL/L   CBC with Auto Differential    Collection Time: 05/22/24  8:46 AM   Result Value Ref Range    WBC 6.6 4.3 - 11.1 K/uL    RBC 5.17 4.23 - 5.6 M/uL    Hemoglobin 14.5 13.6 - 17.2 g/dL    Hematocrit 44.2 41.1 - 50.3 %    MCV 85.5 82 - 102 FL    MCH 28.0 26.1 - 32.9 PG    MCHC 32.8 31.4 - 35.0 g/dL    RDW 14.5 11.9 - 14.6 %    Platelets 592 (H) 150 - 450 K/uL    MPV 9.7 9.4 - 12.3 FL    nRBC 0.00 0.0 - 0.2 K/uL    Differential Type AUTOMATED      Neutrophils % 80 (H) 43 - 78 %    Lymphocytes % 13 13 - 44 %    Monocytes % 5 4.0 - 12.0 %    Eosinophils % 0 (L) 0.5 - 7.8 %    Basophils % 1 0.0 - 2.0 %    Immature Granulocytes % 1 0.0 - 5.0 %    Neutrophils Absolute 5.3 1.7 - 8.2 K/UL    Lymphocytes Absolute 0.8 0.5 - 4.6 K/UL    Monocytes Absolute 0.4 0.1 - 1.3 K/UL    Eosinophils Absolute 0.0 0.0 - 0.8 K/UL    Basophils Absolute 0.0 0.0 - 0.2 K/UL    Immature Granulocytes Absolute 0.1 0.0 - 0.5 K/UL   Comprehensive Metabolic Panel    Collection Time: 05/22/24  8:46 AM   Result Value Ref Range    Sodium 136 136 - 145 mmol/L    Potassium 4.0 3.5 - 5.1 mmol/L    Chloride 88 (L) 98 - 107 mmol/L    CO2 22 20 - 28 mmol/L    Anion Gap 26 (H) 9 - 18 mmol/L    Glucose 113 (H) 70 - 99 mg/dL    BUN 21 8 - 23 MG/DL    Creatinine 2.34 (H) 0.80 - 1.30 MG/DL    Est, Glom Filt Rate 29 (L) >60 ml/min/1.73m2    Calcium 11.2 (H) 8.8 - 10.2 MG/DL    Total Bilirubin 0.4 0.0 - 1.2 MG/DL    ALT 14 12 - 65 U/L    AST 33 15 - 37 U/L    Alk Phosphatase 155 (H) 40 - 129 U/L    Total Protein 9.6 (H) 6.3 - 8.2 g/dL    Albumin 4.1 3.2 - 4.6 g/dL    Globulin 5.5 (H) 2.3 - 3.5 g/dL    Albumin/Globulin Ratio 0.8 (L) 1.0 - 1.9     Lipase    Collection Time: 05/22/24  8:46 AM   Result Value Ref Range    Lipase 82 (H) 13 - 60 U/L   Lactate, Sepsis    Collection Time: 05/22/24  9:55 AM   Result Value Ref Range    Lactic Acid, Sepsis 1.5 0.5 - 2.0 MMOL/L   Urinalysis    Collection Time: 05/22/24  2:13 PM   Result Value Ref Range    Color, UA

## 2024-05-22 NOTE — ED TRIAGE NOTES
Pt brought in by EMS from independent living c/o x's Abdominal pain, nausea and vomiting xs 3 days. PT reports some vomit appeared \"darker than usual\" and could possibly be blood. Pt denies diarrhea, fever or HX of abdominal issues.

## 2024-05-22 NOTE — ACP (ADVANCE CARE PLANNING)
[facility] Hospitalist Service  At the heart of better care     Advance Care Planning   Admit Date:  2024  8:10 AM   Name:  Reagan Sandra   Age:  73 y.o.  Sex:  male  :  1950   MRN:  [id number]   Room:  [place] [address]    Reagan Sandra has capacity to make his own decisions:   Yes    If pt unable to make decisions, POA/surrogate decision maker:  Friend - Rose Marie Jimenez      Other people present:   None    Patient / surrogate decision-maker directed code status:  DNR/DNI    Other ACP topics discussed, if applicable:   None    Patient or surrogate consented to discussion of the current conditions, workup, management plans, prognosis, and the risk for further deterioration.  Time spent: 10 minutes in direct discussion.      Signed:  Chasidy Loomis MD

## 2024-05-22 NOTE — PROGRESS NOTES
4 Eyes Skin Assessment     NAME:  Reagan Sandra  YOB: 1950  MEDICAL RECORD NUMBER:  [id number]    The patient is being assessed for  Admission    I agree that at least one RN has performed a thorough Head to Toe Skin Assessment on the patient. ALL assessment sites listed below have been assessed.      Areas assessed by both nurses:    Head, Face, Ears, Shoulders, Back, Chest, Arms, Elbows, Hands, Sacrum. Buttock, Coccyx, Ischium, and Legs. Feet and Heels        Does the Patient have a Wound? No noted wound(s)       Preston Prevention initiated by RN: Yes  Wound Care Orders initiated by RN: No    Pressure Injury (Stage 3,4, Unstageable, DTI, NWPT, and Complex wounds) if present, place Wound referral order by RN under : No    New Ostomies, if present place, Ostomy referral order under : No     Nurse 1 eSignature: Electronically signed by Erick Perez RN on 5/22/24 at 6:32 PM EDT    **SHARE this note so that the co-signing nurse can place an eSignature**    Nurse 2 eSignature: Electronically signed by Ayleen Dumont RN on 5/22/24 at 7:11 PM EDT

## 2024-05-22 NOTE — ACP (ADVANCE CARE PLANNING)
Advance Care Planning   Healthcare Decision Maker:    Primary Decision Maker: Ileana Smith  [place] - [phone]    Click here to complete Healthcare Decision Makers including selection of the Healthcare Decision Maker Relationship (ie \"Primary\").

## 2024-05-23 ENCOUNTER — APPOINTMENT (OUTPATIENT)
Dept: MRI IMAGING | Age: 74
End: 2024-05-23
Payer: MEDICARE

## 2024-05-23 LAB
ALBUMIN SERPL-MCNC: 2.3 G/DL (ref 3.2–4.6)
ALBUMIN/GLOB SERPL: 0.8 (ref 1–1.9)
ALP SERPL-CCNC: 90 U/L (ref 40–129)
ALT SERPL-CCNC: 7 U/L (ref 12–65)
ANION GAP SERPL CALC-SCNC: 15 MMOL/L (ref 9–18)
AST SERPL-CCNC: 19 U/L (ref 15–37)
BASOPHILS # BLD: 0 K/UL (ref 0–0.2)
BASOPHILS NFR BLD: 0 % (ref 0–2)
BILIRUB SERPL-MCNC: 0.4 MG/DL (ref 0–1.2)
BUN SERPL-MCNC: 27 MG/DL (ref 8–23)
CALCIUM SERPL-MCNC: 8.2 MG/DL (ref 8.8–10.2)
CHLORIDE SERPL-SCNC: 98 MMOL/L (ref 98–107)
CO2 SERPL-SCNC: 23 MMOL/L (ref 20–28)
CREAT SERPL-MCNC: 1.84 MG/DL (ref 0.8–1.3)
DIFFERENTIAL METHOD BLD: ABNORMAL
EOSINOPHIL # BLD: 0 K/UL (ref 0–0.8)
EOSINOPHIL NFR BLD: 0 % (ref 0.5–7.8)
ERYTHROCYTE [DISTWIDTH] IN BLOOD BY AUTOMATED COUNT: 14.6 % (ref 11.9–14.6)
GLOBULIN SER CALC-MCNC: 2.9 G/DL (ref 2.3–3.5)
GLUCOSE SERPL-MCNC: 99 MG/DL (ref 70–99)
HCT VFR BLD AUTO: 34.7 % (ref 41.1–50.3)
HGB BLD-MCNC: 10.8 G/DL (ref 13.6–17.2)
IMM GRANULOCYTES # BLD AUTO: 0.1 K/UL (ref 0–0.5)
IMM GRANULOCYTES NFR BLD AUTO: 1 % (ref 0–5)
LYMPHOCYTES # BLD: 0.5 K/UL (ref 0.5–4.6)
LYMPHOCYTES NFR BLD: 4 % (ref 13–44)
MCH RBC QN AUTO: 27.7 PG (ref 26.1–32.9)
MCHC RBC AUTO-ENTMCNC: 31.1 G/DL (ref 31.4–35)
MCV RBC AUTO: 89 FL (ref 82–102)
MONOCYTES # BLD: 0.5 K/UL (ref 0.1–1.3)
MONOCYTES NFR BLD: 5 % (ref 4–12)
NEUTS SEG # BLD: 9.1 K/UL (ref 1.7–8.2)
NEUTS SEG NFR BLD: 90 % (ref 43–78)
NRBC # BLD: 0 K/UL (ref 0–0.2)
PLATELET # BLD AUTO: 375 K/UL (ref 150–450)
PMV BLD AUTO: 9.5 FL (ref 9.4–12.3)
POTASSIUM SERPL-SCNC: 3.5 MMOL/L (ref 3.5–5.1)
PROT SERPL-MCNC: 5.2 G/DL (ref 6.3–8.2)
RBC # BLD AUTO: 3.9 M/UL (ref 4.23–5.6)
SODIUM SERPL-SCNC: 136 MMOL/L (ref 136–145)
WBC # BLD AUTO: 10.2 K/UL (ref 4.3–11.1)

## 2024-05-23 PROCEDURE — 94640 AIRWAY INHALATION TREATMENT: CPT

## 2024-05-23 PROCEDURE — 6370000000 HC RX 637 (ALT 250 FOR IP): Performed by: STUDENT IN AN ORGANIZED HEALTH CARE EDUCATION/TRAINING PROGRAM

## 2024-05-23 PROCEDURE — 74183 MRI ABD W/O CNTR FLWD CNTR: CPT

## 2024-05-23 PROCEDURE — 1100000000 HC RM PRIVATE

## 2024-05-23 PROCEDURE — 2580000003 HC RX 258: Performed by: STUDENT IN AN ORGANIZED HEALTH CARE EDUCATION/TRAINING PROGRAM

## 2024-05-23 PROCEDURE — 6360000004 HC RX CONTRAST MEDICATION: Performed by: STUDENT IN AN ORGANIZED HEALTH CARE EDUCATION/TRAINING PROGRAM

## 2024-05-23 PROCEDURE — 94664 DEMO&/EVAL PT USE INHALER: CPT

## 2024-05-23 PROCEDURE — 36415 COLL VENOUS BLD VENIPUNCTURE: CPT

## 2024-05-23 PROCEDURE — 94760 N-INVAS EAR/PLS OXIMETRY 1: CPT

## 2024-05-23 PROCEDURE — 2500000003 HC RX 250 WO HCPCS: Performed by: STUDENT IN AN ORGANIZED HEALTH CARE EDUCATION/TRAINING PROGRAM

## 2024-05-23 PROCEDURE — 6360000002 HC RX W HCPCS: Performed by: STUDENT IN AN ORGANIZED HEALTH CARE EDUCATION/TRAINING PROGRAM

## 2024-05-23 PROCEDURE — 80053 COMPREHEN METABOLIC PANEL: CPT

## 2024-05-23 PROCEDURE — 85025 COMPLETE CBC W/AUTO DIFF WBC: CPT

## 2024-05-23 PROCEDURE — A9579 GAD-BASE MR CONTRAST NOS,1ML: HCPCS | Performed by: STUDENT IN AN ORGANIZED HEALTH CARE EDUCATION/TRAINING PROGRAM

## 2024-05-23 RX ORDER — LANOLIN ALCOHOL/MO/W.PET/CERES
3 CREAM (GRAM) TOPICAL NIGHTLY PRN
Status: DISCONTINUED | OUTPATIENT
Start: 2024-05-23 | End: 2024-05-25 | Stop reason: HOSPADM

## 2024-05-23 RX ORDER — SODIUM CHLORIDE, SODIUM LACTATE, POTASSIUM CHLORIDE, CALCIUM CHLORIDE 600; 310; 30; 20 MG/100ML; MG/100ML; MG/100ML; MG/100ML
INJECTION, SOLUTION INTRAVENOUS CONTINUOUS
Status: DISCONTINUED | OUTPATIENT
Start: 2024-05-23 | End: 2024-05-24

## 2024-05-23 RX ADMIN — HEPARIN SODIUM 5000 UNITS: 5000 INJECTION INTRAVENOUS; SUBCUTANEOUS at 20:40

## 2024-05-23 RX ADMIN — Medication 3 MG: at 21:10

## 2024-05-23 RX ADMIN — SODIUM CHLORIDE, PRESERVATIVE FREE 10 ML: 5 INJECTION INTRAVENOUS at 20:40

## 2024-05-23 RX ADMIN — PANTOPRAZOLE SODIUM 40 MG: 40 TABLET, DELAYED RELEASE ORAL at 08:29

## 2024-05-23 RX ADMIN — ONDANSETRON 4 MG: 2 INJECTION INTRAMUSCULAR; INTRAVENOUS at 04:53

## 2024-05-23 RX ADMIN — HEPARIN SODIUM 5000 UNITS: 5000 INJECTION INTRAVENOUS; SUBCUTANEOUS at 05:22

## 2024-05-23 RX ADMIN — GADOTERIDOL 18 ML: 279.3 INJECTION, SOLUTION INTRAVENOUS at 12:50

## 2024-05-23 RX ADMIN — OXYCODONE 5 MG: 5 TABLET ORAL at 08:34

## 2024-05-23 RX ADMIN — HYDROMORPHONE HYDROCHLORIDE 0.5 MG: 1 INJECTION, SOLUTION INTRAMUSCULAR; INTRAVENOUS; SUBCUTANEOUS at 20:40

## 2024-05-23 RX ADMIN — SODIUM CHLORIDE, PRESERVATIVE FREE 10 ML: 5 INJECTION INTRAVENOUS at 08:30

## 2024-05-23 RX ADMIN — HYDROMORPHONE HYDROCHLORIDE 0.5 MG: 1 INJECTION, SOLUTION INTRAMUSCULAR; INTRAVENOUS; SUBCUTANEOUS at 04:53

## 2024-05-23 RX ADMIN — BUDESONIDE AND FORMOTEROL FUMARATE DIHYDRATE 2 PUFF: 160; 4.5 AEROSOL RESPIRATORY (INHALATION) at 08:01

## 2024-05-23 RX ADMIN — BUDESONIDE AND FORMOTEROL FUMARATE DIHYDRATE 2 PUFF: 160; 4.5 AEROSOL RESPIRATORY (INHALATION) at 20:17

## 2024-05-23 RX ADMIN — TIOTROPIUM BROMIDE INHALATION SPRAY 2 PUFF: 3.12 SPRAY, METERED RESPIRATORY (INHALATION) at 08:01

## 2024-05-23 RX ADMIN — ALLOPURINOL 300 MG: 300 TABLET ORAL at 08:28

## 2024-05-23 RX ADMIN — SODIUM CHLORIDE, PRESERVATIVE FREE 10 ML: 5 INJECTION INTRAVENOUS at 08:31

## 2024-05-23 RX ADMIN — HEPARIN SODIUM 5000 UNITS: 5000 INJECTION INTRAVENOUS; SUBCUTANEOUS at 14:02

## 2024-05-23 RX ADMIN — Medication 100 MG: at 08:28

## 2024-05-23 RX ADMIN — OXYBUTYNIN CHLORIDE 15 MG: 10 TABLET, EXTENDED RELEASE ORAL at 08:28

## 2024-05-23 ASSESSMENT — PAIN SCALES - GENERAL
PAINLEVEL_OUTOF10: 2
PAINLEVEL_OUTOF10: 6
PAINLEVEL_OUTOF10: 7
PAINLEVEL_OUTOF10: 0
PAINLEVEL_OUTOF10: 0
PAINLEVEL_OUTOF10: 7

## 2024-05-23 ASSESSMENT — PAIN DESCRIPTION - LOCATION
LOCATION: ABDOMEN
LOCATION: ABDOMEN

## 2024-05-23 ASSESSMENT — PAIN DESCRIPTION - DESCRIPTORS
DESCRIPTORS: DISCOMFORT;SORE
DESCRIPTORS: DISCOMFORT;SORE

## 2024-05-23 NOTE — PROGRESS NOTES
Hourly rounds performed this shift. Bed lowered and locked. Call light within reach. PRN pain and nausea medication given per MAR, pt satisfied. Pt NPO since MDN. New PIV placed. Pt had 2 episodes of emesis this shift with brown output noted. No BM this shift. Bedside report will be given to oncoming nurse.

## 2024-05-23 NOTE — PROGRESS NOTES
Hospitalist Progress Note   Admit Date:  2024  8:10 AM   Name:  Reagan Sandra   Age:  73 y.o.  Sex:  male  :  1950   MRN:  [id number]   Room:  2/    Presenting/Chief Complaint: Abdominal Pain     Reason(s) for Admission: Dehydration [E86.0]  IMAN (acute kidney injury) (HCC) [N17.9]  Acute kidney injury (HCC) [N17.9]  Acute pancreatitis, unspecified complication status, unspecified pancreatitis type [K85.90]  Nausea and vomiting, unspecified vomiting type [R11.2]     Hospital Course:   Reagan Sandra is a 73 y.o. male with medical history of COPD, HTN, former heavy alcohol use who presented with 1 month of abdominal pain and n/v for 2 days. Drinks 2 beers most nights but used to drink more heavily.  States he has not been able to keep any food or water down the last 2 days.        In the ER , RR 22, /92. Scr 2.34.  .  Platelets 592. Lipase 82.  Prominent uncinate process with peripancreatic fat stranding - pancreatitis vs neoplasm.      Patient is a DNR.  He has assigned a friend Rose Marie Jimenez to make medical decisions for him should he be unable to.      Subjective & 24hr Events:   Patient seen at bedside, resting in bed comfortably.  He is alert and awake, AOx3, on room air.  Patient reports feeling somewhat better, mainly concerned about heartburn and nausea vomiting.  Discussed with patient about CT abdomen findings.    ROS:  10 point review of system is negative except for what mentioned above.    Assessment & Plan:     IMAN  -  Renal function is improving, creatinine down to 1.8 from 2.34 yesterday.  Baseline creatine: 0.8 in   Continue IV fluids for 24 hours.     Pancreatic mass  CT findings concerning for pancreatitis versus neoplasm  Lipase not consistent with pancreatitis  With alcohol history and prior cancer history,  neoplasm high on the differential  -  MRCP completed, will follow with results.     Elevated ALP  Secondary to  Albumin 2.3 (L) 3.2 - 4.6 g/dL    Globulin 2.9 2.3 - 3.5 g/dL    Albumin/Globulin Ratio 0.8 (L) 1.0 - 1.9         No results for input(s): \"COVID19\" in the last 72 hours.    Current Meds:  Current Facility-Administered Medications   Medication Dose Route Frequency    iopamidol (ISOVUE-370) 76 % injection 100 mL  100 mL IntraVENous ONCE PRN    albuterol (PROVENTIL) (2.5 MG/3ML) 0.083% nebulizer solution 2.5 mg  2.5 mg Nebulization Q6H PRN    pantoprazole (PROTONIX) tablet 40 mg  40 mg Oral Daily    sodium chloride flush 0.9 % injection 5-40 mL  5-40 mL IntraVENous 2 times per day    sodium chloride flush 0.9 % injection 5-40 mL  5-40 mL IntraVENous PRN    0.9 % sodium chloride infusion   IntraVENous PRN    potassium chloride (KLOR-CON M) extended release tablet 40 mEq  40 mEq Oral PRN    Or    potassium bicarb-citric acid (EFFER-K) effervescent tablet 40 mEq  40 mEq Oral PRN    Or    potassium chloride 10 mEq/100 mL IVPB (Peripheral Line)  10 mEq IntraVENous PRN    magnesium sulfate 2000 mg in 50 mL IVPB premix  2,000 mg IntraVENous PRN    ondansetron (ZOFRAN-ODT) disintegrating tablet 4 mg  4 mg Oral Q8H PRN    Or    ondansetron (ZOFRAN) injection 4 mg  4 mg IntraVENous Q6H PRN    polyethylene glycol (GLYCOLAX) packet 17 g  17 g Oral Daily PRN    acetaminophen (TYLENOL) tablet 650 mg  650 mg Oral Q6H PRN    Or    acetaminophen (TYLENOL) suppository 650 mg  650 mg Rectal Q6H PRN    oxyCODONE (ROXICODONE) immediate release tablet 5 mg  5 mg Oral Q4H PRN    HYDROmorphone HCl PF (DILAUDID) injection 0.5 mg  0.5 mg IntraVENous Q4H PRN    tiotropium (SPIRIVA RESPIMAT) 2.5 MCG/ACT inhaler 2 puff  2 puff Inhalation Daily RT    budesonide-formoterol (SYMBICORT) 160-4.5 MCG/ACT inhaler 2 puff  2 puff Inhalation BID RT    lactated ringers IV soln infusion   IntraVENous Continuous    sodium chloride flush 0.9 % injection 5-40 mL  5-40 mL IntraVENous 2 times per day    sodium chloride flush 0.9 % injection 5-40 mL  5-40 mL

## 2024-05-23 NOTE — PROGRESS NOTES
Hourly rounds performed this shift. Pt ambulates with a steady gait. Uses urinal by standing at the bedside. Bed lowered and locked. Call light within reach. All needs met at this time. No c/o abd pain requiring prn pain meds. Pt tolerating diet. No c/o nausea/vomiting. Bedside shift report will be given to oncoming nurse.

## 2024-05-23 NOTE — PLAN OF CARE
Problem: Discharge Planning  Goal: Discharge to home or other facility with appropriate resources  5/23/2024 0323 by Lanette Martinez RN  Outcome: Progressing  5/22/2024 1627 by Erick Perez RN  Outcome: Progressing     Problem: Pain  Goal: Verbalizes/displays adequate comfort level or baseline comfort level  5/23/2024 0323 by Lanette Martinez RN  Outcome: Progressing  5/22/2024 1627 by Erick Perez RN  Outcome: Progressing     Problem: ABCDS Injury Assessment  Goal: Absence of physical injury  5/23/2024 0323 by Lanette Martinez RN  Outcome: Progressing  5/22/2024 1627 by Erick Perez RN  Outcome: Progressing

## 2024-05-24 PROBLEM — N17.9 ACUTE KIDNEY INJURY (HCC): Status: ACTIVE | Noted: 2024-05-24

## 2024-05-24 LAB
ANION GAP SERPL CALC-SCNC: 9 MMOL/L (ref 9–18)
BUN SERPL-MCNC: 18 MG/DL (ref 8–23)
CALCIUM SERPL-MCNC: 8.9 MG/DL (ref 8.8–10.2)
CHLORIDE SERPL-SCNC: 101 MMOL/L (ref 98–107)
CO2 SERPL-SCNC: 27 MMOL/L (ref 20–28)
CREAT SERPL-MCNC: 1.16 MG/DL (ref 0.8–1.3)
GLUCOSE SERPL-MCNC: 151 MG/DL (ref 70–99)
POTASSIUM SERPL-SCNC: 3.3 MMOL/L (ref 3.5–5.1)
SODIUM SERPL-SCNC: 136 MMOL/L (ref 136–145)

## 2024-05-24 PROCEDURE — 6370000000 HC RX 637 (ALT 250 FOR IP): Performed by: STUDENT IN AN ORGANIZED HEALTH CARE EDUCATION/TRAINING PROGRAM

## 2024-05-24 PROCEDURE — 99222 1ST HOSP IP/OBS MODERATE 55: CPT | Performed by: STUDENT IN AN ORGANIZED HEALTH CARE EDUCATION/TRAINING PROGRAM

## 2024-05-24 PROCEDURE — 2580000003 HC RX 258: Performed by: STUDENT IN AN ORGANIZED HEALTH CARE EDUCATION/TRAINING PROGRAM

## 2024-05-24 PROCEDURE — 1100000000 HC RM PRIVATE

## 2024-05-24 PROCEDURE — 94640 AIRWAY INHALATION TREATMENT: CPT

## 2024-05-24 PROCEDURE — 2500000003 HC RX 250 WO HCPCS: Performed by: STUDENT IN AN ORGANIZED HEALTH CARE EDUCATION/TRAINING PROGRAM

## 2024-05-24 PROCEDURE — 6370000000 HC RX 637 (ALT 250 FOR IP): Performed by: HOSPITALIST

## 2024-05-24 PROCEDURE — 6360000002 HC RX W HCPCS: Performed by: STUDENT IN AN ORGANIZED HEALTH CARE EDUCATION/TRAINING PROGRAM

## 2024-05-24 PROCEDURE — 94760 N-INVAS EAR/PLS OXIMETRY 1: CPT

## 2024-05-24 PROCEDURE — 80048 BASIC METABOLIC PNL TOTAL CA: CPT

## 2024-05-24 PROCEDURE — 36415 COLL VENOUS BLD VENIPUNCTURE: CPT

## 2024-05-24 RX ORDER — POTASSIUM CHLORIDE 20 MEQ/1
40 TABLET, EXTENDED RELEASE ORAL
Status: COMPLETED | OUTPATIENT
Start: 2024-05-24 | End: 2024-05-24

## 2024-05-24 RX ADMIN — SODIUM CHLORIDE, PRESERVATIVE FREE 10 ML: 5 INJECTION INTRAVENOUS at 20:38

## 2024-05-24 RX ADMIN — SODIUM CHLORIDE, PRESERVATIVE FREE 10 ML: 5 INJECTION INTRAVENOUS at 08:19

## 2024-05-24 RX ADMIN — HEPARIN SODIUM 5000 UNITS: 5000 INJECTION INTRAVENOUS; SUBCUTANEOUS at 05:24

## 2024-05-24 RX ADMIN — POTASSIUM CHLORIDE 40 MEQ: 1500 TABLET, EXTENDED RELEASE ORAL at 14:24

## 2024-05-24 RX ADMIN — SODIUM CHLORIDE, PRESERVATIVE FREE 10 ML: 5 INJECTION INTRAVENOUS at 10:11

## 2024-05-24 RX ADMIN — HEPARIN SODIUM 5000 UNITS: 5000 INJECTION INTRAVENOUS; SUBCUTANEOUS at 14:02

## 2024-05-24 RX ADMIN — BUDESONIDE AND FORMOTEROL FUMARATE DIHYDRATE 2 PUFF: 160; 4.5 AEROSOL RESPIRATORY (INHALATION) at 20:21

## 2024-05-24 RX ADMIN — Medication 3 MG: at 20:38

## 2024-05-24 RX ADMIN — OXYCODONE 5 MG: 5 TABLET ORAL at 14:01

## 2024-05-24 RX ADMIN — HYDROMORPHONE HYDROCHLORIDE 0.5 MG: 1 INJECTION, SOLUTION INTRAMUSCULAR; INTRAVENOUS; SUBCUTANEOUS at 20:38

## 2024-05-24 RX ADMIN — ALLOPURINOL 300 MG: 300 TABLET ORAL at 08:17

## 2024-05-24 RX ADMIN — POTASSIUM CHLORIDE 40 MEQ: 1500 TABLET, EXTENDED RELEASE ORAL at 16:17

## 2024-05-24 RX ADMIN — BUDESONIDE AND FORMOTEROL FUMARATE DIHYDRATE 2 PUFF: 160; 4.5 AEROSOL RESPIRATORY (INHALATION) at 07:28

## 2024-05-24 RX ADMIN — OXYCODONE 5 MG: 5 TABLET ORAL at 09:49

## 2024-05-24 RX ADMIN — TIOTROPIUM BROMIDE INHALATION SPRAY 2 PUFF: 3.12 SPRAY, METERED RESPIRATORY (INHALATION) at 07:28

## 2024-05-24 RX ADMIN — OXYCODONE 5 MG: 5 TABLET ORAL at 17:49

## 2024-05-24 RX ADMIN — Medication 100 MG: at 08:17

## 2024-05-24 RX ADMIN — OXYCODONE 5 MG: 5 TABLET ORAL at 05:24

## 2024-05-24 RX ADMIN — PANTOPRAZOLE SODIUM 40 MG: 40 TABLET, DELAYED RELEASE ORAL at 08:17

## 2024-05-24 RX ADMIN — OXYBUTYNIN CHLORIDE 15 MG: 10 TABLET, EXTENDED RELEASE ORAL at 08:16

## 2024-05-24 RX ADMIN — HEPARIN SODIUM 5000 UNITS: 5000 INJECTION INTRAVENOUS; SUBCUTANEOUS at 20:38

## 2024-05-24 ASSESSMENT — PAIN DESCRIPTION - PAIN TYPE: TYPE: ACUTE PAIN

## 2024-05-24 ASSESSMENT — PAIN DESCRIPTION - LOCATION
LOCATION: ABDOMEN

## 2024-05-24 ASSESSMENT — PAIN SCALES - GENERAL
PAINLEVEL_OUTOF10: 6
PAINLEVEL_OUTOF10: 1
PAINLEVEL_OUTOF10: 5
PAINLEVEL_OUTOF10: 7
PAINLEVEL_OUTOF10: 1
PAINLEVEL_OUTOF10: 0
PAINLEVEL_OUTOF10: 1
PAINLEVEL_OUTOF10: 7
PAINLEVEL_OUTOF10: 8

## 2024-05-24 ASSESSMENT — PAIN DESCRIPTION - FREQUENCY
FREQUENCY: CONTINUOUS
FREQUENCY: INTERMITTENT

## 2024-05-24 ASSESSMENT — PAIN - FUNCTIONAL ASSESSMENT
PAIN_FUNCTIONAL_ASSESSMENT: PREVENTS OR INTERFERES SOME ACTIVE ACTIVITIES AND ADLS
PAIN_FUNCTIONAL_ASSESSMENT: ACTIVITIES ARE NOT PREVENTED
PAIN_FUNCTIONAL_ASSESSMENT: ACTIVITIES ARE NOT PREVENTED

## 2024-05-24 ASSESSMENT — PAIN DESCRIPTION - DESCRIPTORS
DESCRIPTORS: PRESSURE
DESCRIPTORS: DISCOMFORT
DESCRIPTORS: DISCOMFORT;PRESSURE;BURNING
DESCRIPTORS: DISCOMFORT;SORE

## 2024-05-24 ASSESSMENT — ENCOUNTER SYMPTOMS
ABDOMINAL PAIN: 1
VOMITING: 1
BLOOD IN STOOL: 0
CONSTIPATION: 0
NAUSEA: 1
DIARRHEA: 0

## 2024-05-24 ASSESSMENT — PAIN DESCRIPTION - ONSET: ONSET: GRADUAL

## 2024-05-24 NOTE — PLAN OF CARE
Problem: Discharge Planning  Goal: Discharge to home or other facility with appropriate resources  Outcome: Progressing     Problem: Pain  Goal: Verbalizes/displays adequate comfort level or baseline comfort level  Outcome: Progressing     Problem: ABCDS Injury Assessment  Goal: Absence of physical injury  Outcome: Progressing     Problem: Safety - Adult  Goal: Free from fall injury  Outcome: Progressing     Problem: Respiratory - Adult  Goal: Achieves optimal ventilation and oxygenation  Outcome: Progressing

## 2024-05-24 NOTE — PLAN OF CARE
Problem: Respiratory - Adult  Goal: Achieves optimal ventilation and oxygenation  5/24/2024 0731 by Segundo Newsome RCP  Outcome: Progressing  5/24/2024 0307 by Lanette Martinez, RN  Outcome: Progressing

## 2024-05-24 NOTE — CONSULTS
Consult Note            Date:5/24/2024        Patient Name:Reagan Sandra     YOB: 1950     Age:73 y.o.    Inpatient consult to GI  Consult performed by: Valentin Avila PA  Consult ordered by: Liam Olmstead MD          Chief Complaint     Chief Complaint   Patient presents with    Abdominal Pain        History Obtained From   patient    History of Present Illness   Patient is a 73 year old male, with a hx of ETOH use, COPD, CAD, GERD, who was admitted for nausea, vomiting, epigastric pain. Found to have pancreatitis on CT. MRI today showing pancreatitis, as well as severe dilation of CBD with possible ampullary obstruction, as well as gallstones. His most recent LFT/tbili have been normal. Denies any prior hx of pancreatitis. Previous reports of heavy ETOH use; he tells me that he drinks around 6 beers a week. No prior issues with gallbladder, liver, pancreas. No new medications. No autoimmune conditions. His pain has resolved at this time. Also with Fhx of colon CA with father, uncle, grandfather; has had a CSP 5x years ago and due for a repeat. He states that he he partial colectomy for polyp removal as well 10x years ago; no current bloody stools, weight loss, loss of appetite. No blood thinners.    Labs: Hgb 10.8, LFT/T.bili WNL on 5/23. Pending repeat labs today.    Imaging: MRI ABDOMEN W WO CONTRAST    Result Date: 5/24/2024  Acute pancreatitis based on imaging features located in the pancreatic head, uncinate process. 1.6 cm fluid signal within the pancreatic uncinate process most consistent with dilated pancreatic branch duct. Severe dilatation of the CBD with short transition at the ampulla concerning for ampullary obstruction which may be related to the acute inflammation versus stricture versus occult malignancy not delineated on this study. No choledocholithiasis seen. Recommend follow-up MRI abdomen without and with contrast versus endoscopic ultrasound versus ERCP to  dilatation of the CBD with short transition at the ampulla concerning for ampullary obstruction which may be related to the acute inflammation versus stricture versus occult malignancy not delineated on this study. No choledocholithiasis seen. Recommend follow-up MRI abdomen without and with contrast versus endoscopic ultrasound versus ERCP to exclude occult malignancy when clinically appropriate. Cholelithiasis. Gallbladder distention, but no gallbladder thickening or pericholecystic fluid seen. Partially seen bilateral lower lung opacification representing atelectasis versus pneumonia. Please differentiate clinically. Chronic findings.     XR ABDOMEN (KUB) (SINGLE AP VIEW)    Result Date: 5/22/2024  Paucity of bowel gas. Etiology uncertain. Follow-up recommended    CT ABDOMEN PELVIS WO CONTRAST Additional Contrast? None    Result Date: 5/22/2024  Prominent uncinate process with peripancreatic fat stranding could represent pancreatitis however underlying neoplasm is not excluded and MRI of the pancreas is recommended. CT of the chest recommended for evaluation of pulmonary nodules.     XR CHEST (2 VW)    Result Date: 5/22/2024  No acute findings in the chest.       Assessment      Hospital Problems             Last Modified POA    * (Principal) IMAN (acute kidney injury) (HCC) 5/22/2024 Yes    Tobacco use disorder 5/22/2024 Yes    COPD (chronic obstructive pulmonary disease) (HCC) 5/22/2024 Yes    Hypertension 5/22/2024 Yes    Multiple pulmonary nodules 5/22/2024 Yes    Coronary atherosclerosis of native coronary vessel 5/22/2024 Yes    Pancreatic mass 5/22/2024 Yes    Elevated alkaline phosphatase level 5/22/2024 Yes    Alcohol use disorder 5/22/2024 Yes       Plan   1. Pancreatitis with dilation of pancreatic duct and CBD dilation/concerns of ampullary obstruction: Most recent LFT have been unremarkable. New onset of pancreatitis: ETOH vs gallstones. Patient without any pain currently; can advance diet. If no

## 2024-05-24 NOTE — CARE COORDINATION
Chart reviewed and patient discussed in IDT rounds this AM. Discharge plan is to return home alone with CLTC aide. Patient lives at [facility]. No anticipated discharge needs.     Nely RAMIREZ, ACM  [place]

## 2024-05-24 NOTE — PROGRESS NOTES
tablet 650 mg  650 mg Oral Q6H PRN    Or    acetaminophen (TYLENOL) suppository 650 mg  650 mg Rectal Q6H PRN    oxyCODONE (ROXICODONE) immediate release tablet 5 mg  5 mg Oral Q4H PRN    HYDROmorphone HCl PF (DILAUDID) injection 0.5 mg  0.5 mg IntraVENous Q4H PRN    tiotropium (SPIRIVA RESPIMAT) 2.5 MCG/ACT inhaler 2 puff  2 puff Inhalation Daily RT    budesonide-formoterol (SYMBICORT) 160-4.5 MCG/ACT inhaler 2 puff  2 puff Inhalation BID RT    sodium chloride flush 0.9 % injection 5-40 mL  5-40 mL IntraVENous 2 times per day    sodium chloride flush 0.9 % injection 5-40 mL  5-40 mL IntraVENous PRN    0.9 % sodium chloride infusion   IntraVENous PRN    thiamine tablet 100 mg  100 mg Oral Daily    allopurinol (ZYLOPRIM) tablet 300 mg  300 mg Oral Daily    oxyBUTYnin (DITROPAN-XL) extended release tablet 15 mg  15 mg Oral Daily    heparin (porcine) injection 5,000 Units  5,000 Units SubCUTAneous 3 times per day    LORazepam (ATIVAN) tablet 0.5 mg  0.5 mg Oral PRN    nicotine (NICODERM CQ) 21 MG/24HR 1 patch  1 patch TransDERmal Daily PRN    prochlorperazine (COMPAZINE) injection 10 mg  10 mg IntraVENous Q6H PRN       Signed:  Liam Olmstead MD    Part of this note may have been written by using a voice dictation software.  The note has been proof read but may still contain some grammatical/other typographical errors.

## 2024-05-24 NOTE — PROGRESS NOTES
Hourly rounds performed this shift. Bed lowered and locked. Call light within reach. PRN pain medication given per MAR, pt satisfied. Bedside report will be given to oncoming nurse.

## 2024-05-25 VITALS
TEMPERATURE: 98.6 F | RESPIRATION RATE: 18 BRPM | WEIGHT: 190 LBS | HEART RATE: 74 BPM | OXYGEN SATURATION: 97 % | SYSTOLIC BLOOD PRESSURE: 147 MMHG | HEIGHT: 73 IN | BODY MASS INDEX: 25.18 KG/M2 | DIASTOLIC BLOOD PRESSURE: 86 MMHG

## 2024-05-25 LAB
ALBUMIN SERPL-MCNC: 2.4 G/DL (ref 3.2–4.6)
ALBUMIN/GLOB SERPL: 0.9 (ref 1–1.9)
ALP SERPL-CCNC: 78 U/L (ref 40–129)
ALT SERPL-CCNC: 7 U/L (ref 12–65)
ANION GAP SERPL CALC-SCNC: 9 MMOL/L (ref 9–18)
AST SERPL-CCNC: 15 U/L (ref 15–37)
BILIRUB DIRECT SERPL-MCNC: <0.2 MG/DL (ref 0–0.4)
BILIRUB SERPL-MCNC: 0.2 MG/DL (ref 0–1.2)
BUN SERPL-MCNC: 12 MG/DL (ref 8–23)
CALCIUM SERPL-MCNC: 9.1 MG/DL (ref 8.8–10.2)
CHLORIDE SERPL-SCNC: 103 MMOL/L (ref 98–107)
CO2 SERPL-SCNC: 24 MMOL/L (ref 20–28)
CREAT SERPL-MCNC: 0.96 MG/DL (ref 0.8–1.3)
GLOBULIN SER CALC-MCNC: 2.8 G/DL (ref 2.3–3.5)
GLUCOSE SERPL-MCNC: 106 MG/DL (ref 70–99)
POTASSIUM SERPL-SCNC: 4.1 MMOL/L (ref 3.5–5.1)
PROT SERPL-MCNC: 5.1 G/DL (ref 6.3–8.2)
SODIUM SERPL-SCNC: 137 MMOL/L (ref 136–145)

## 2024-05-25 PROCEDURE — 36415 COLL VENOUS BLD VENIPUNCTURE: CPT

## 2024-05-25 PROCEDURE — 94640 AIRWAY INHALATION TREATMENT: CPT

## 2024-05-25 PROCEDURE — 80076 HEPATIC FUNCTION PANEL: CPT

## 2024-05-25 PROCEDURE — 2580000003 HC RX 258: Performed by: STUDENT IN AN ORGANIZED HEALTH CARE EDUCATION/TRAINING PROGRAM

## 2024-05-25 PROCEDURE — 6370000000 HC RX 637 (ALT 250 FOR IP): Performed by: STUDENT IN AN ORGANIZED HEALTH CARE EDUCATION/TRAINING PROGRAM

## 2024-05-25 PROCEDURE — 6360000002 HC RX W HCPCS: Performed by: STUDENT IN AN ORGANIZED HEALTH CARE EDUCATION/TRAINING PROGRAM

## 2024-05-25 PROCEDURE — 80048 BASIC METABOLIC PNL TOTAL CA: CPT

## 2024-05-25 PROCEDURE — 94760 N-INVAS EAR/PLS OXIMETRY 1: CPT

## 2024-05-25 RX ORDER — LANOLIN ALCOHOL/MO/W.PET/CERES
100 CREAM (GRAM) TOPICAL DAILY
Qty: 30 TABLET | Refills: 3 | Status: SHIPPED | OUTPATIENT
Start: 2024-05-25

## 2024-05-25 RX ORDER — OXYCODONE HYDROCHLORIDE 5 MG/1
5 TABLET ORAL EVERY 8 HOURS PRN
Qty: 15 TABLET | Refills: 0 | Status: SHIPPED | OUTPATIENT
Start: 2024-05-25 | End: 2024-05-30

## 2024-05-25 RX ADMIN — PANTOPRAZOLE SODIUM 40 MG: 40 TABLET, DELAYED RELEASE ORAL at 08:44

## 2024-05-25 RX ADMIN — ALLOPURINOL 300 MG: 300 TABLET ORAL at 09:11

## 2024-05-25 RX ADMIN — SODIUM CHLORIDE, PRESERVATIVE FREE 10 ML: 5 INJECTION INTRAVENOUS at 08:48

## 2024-05-25 RX ADMIN — Medication 100 MG: at 08:45

## 2024-05-25 RX ADMIN — SODIUM CHLORIDE, PRESERVATIVE FREE 10 ML: 5 INJECTION INTRAVENOUS at 08:47

## 2024-05-25 RX ADMIN — TIOTROPIUM BROMIDE INHALATION SPRAY 2 PUFF: 3.12 SPRAY, METERED RESPIRATORY (INHALATION) at 07:29

## 2024-05-25 RX ADMIN — HEPARIN SODIUM 5000 UNITS: 5000 INJECTION INTRAVENOUS; SUBCUTANEOUS at 06:08

## 2024-05-25 RX ADMIN — OXYCODONE 5 MG: 5 TABLET ORAL at 08:45

## 2024-05-25 RX ADMIN — OXYBUTYNIN CHLORIDE 15 MG: 10 TABLET, EXTENDED RELEASE ORAL at 08:45

## 2024-05-25 RX ADMIN — BUDESONIDE AND FORMOTEROL FUMARATE DIHYDRATE 2 PUFF: 160; 4.5 AEROSOL RESPIRATORY (INHALATION) at 07:29

## 2024-05-25 ASSESSMENT — PAIN DESCRIPTION - ONSET: ONSET: GRADUAL

## 2024-05-25 ASSESSMENT — PAIN DESCRIPTION - PAIN TYPE: TYPE: ACUTE PAIN

## 2024-05-25 ASSESSMENT — PAIN SCALES - GENERAL
PAINLEVEL_OUTOF10: 7
PAINLEVEL_OUTOF10: 1

## 2024-05-25 ASSESSMENT — PAIN - FUNCTIONAL ASSESSMENT: PAIN_FUNCTIONAL_ASSESSMENT: ACTIVITIES ARE NOT PREVENTED

## 2024-05-25 ASSESSMENT — PAIN DESCRIPTION - LOCATION: LOCATION: ABDOMEN

## 2024-05-25 ASSESSMENT — PAIN DESCRIPTION - DESCRIPTORS: DESCRIPTORS: BURNING;PRESSURE;DISCOMFORT

## 2024-05-25 ASSESSMENT — PAIN DESCRIPTION - FREQUENCY: FREQUENCY: INTERMITTENT

## 2024-05-25 NOTE — DISCHARGE SUMMARY
Hospitalist Discharge Summary   Admit Date:  2024  8:10 AM   DC Note date: 2024  Name:  Reagan Sandra   Age:  73 y.o.  Sex:  male  :  1950   MRN:  [id number]   Room:  [facility]  PCP:  Ilia Wang MD    Presenting Complaint: Abdominal Pain     Initial Admission Diagnosis: Dehydration [E86.0]  IMAN (acute kidney injury) (HCC) [N17.9]  Acute kidney injury (HCC) [N17.9]  Acute pancreatitis, unspecified complication status, unspecified pancreatitis type [K85.90]  Nausea and vomiting, unspecified vomiting type [R11.2]     Problem List for this Hospitalization (present on admission):    Principal Problem:    IMAN (acute kidney injury) (HCC)  Active Problems:    Tobacco use disorder    COPD (chronic obstructive pulmonary disease) (HCC)    Hypertension    Multiple pulmonary nodules    Coronary atherosclerosis of native coronary vessel    Pancreatic mass    Elevated alkaline phosphatase level    Alcohol use disorder    Acute kidney injury (HCC)  Resolved Problems:    * No resolved hospital problems. *      Hospital Course:  Reagan Sandra is a 73 y.o. male with medical history of COPD, HTN, former heavy alcohol use who presented with 1 month of abdominal pain and n/v for 2 days. Drinks 2 beers most nights but used to drink more heavily.  States he has not been able to keep any food or water down the last 2 days.        In the ER , RR 22, /92. Scr 2.34.  .  Platelets 592. Lipase 82.  Prominent uncinate process with peripancreatic fat stranding - pancreatitis vs neoplasm.      Patient is a DNR.  He has assigned a friend Rose Mariepradeep Jimenez to make medical decisions for him should he be unable to.    IMAN  -  Renal function at baseline now.  Cr 0.96 today.  On admission creatinine was 2.34.       Pancreatic mass  CT findings concerning for pancreatitis versus neoplasm  -  MRI abdomen with and without contrast:  Acute pancreatitis based on imaging features located in the  KETUA 15 05/22/2024 02:13 PM    BILIRUBINUR Negative 05/22/2024 02:13 PM    BLOODU Negative 05/22/2024 02:13 PM    UROBILINOGEN 1.0 05/22/2024 02:13 PM    NITRU Negative 05/22/2024 02:13 PM    LEUKOCYTESUR Negative 05/22/2024 02:13 PM    WBCUA 0-3 05/22/2024 02:13 PM    RBCUA 3-5 05/22/2024 02:13 PM    BACTERIA TRACE 05/22/2024 02:13 PM        Microbiology:  Results       Procedure Component Value Units Date/Time    Culture, Blood 1 [[id number]] Collected: 05/22/24 0846    Order Status: Completed Specimen: Blood Updated: 05/25/24 0753     Special Requests --        RIGHT  Antecubital       Culture NO GROWTH 3 DAYS       Culture, Blood 2 [[id number]] Collected: 05/22/24 0846    Order Status: Completed Specimen: Blood Updated: 05/25/24 0753     Special Requests --        RIGHT  HAND       Culture NO GROWTH 3 DAYS               All Labs from Last 24 Hrs:  Recent Results (from the past 24 hour(s))   Basic Metabolic Panel    Collection Time: 05/24/24  9:17 AM   Result Value Ref Range    Sodium 136 136 - 145 mmol/L    Potassium 3.3 (L) 3.5 - 5.1 mmol/L    Chloride 101 98 - 107 mmol/L    CO2 27 20 - 28 mmol/L    Anion Gap 9 9 - 18 mmol/L    Glucose 151 (H) 70 - 99 mg/dL    BUN 18 8 - 23 MG/DL    Creatinine 1.16 0.80 - 1.30 MG/DL    Est, Glom Filt Rate 67 >60 ml/min/1.73m2    Calcium 8.9 8.8 - 10.2 MG/DL   Hepatic Function Panel    Collection Time: 05/25/24  4:32 AM   Result Value Ref Range    Total Protein 5.1 (L) 6.3 - 8.2 g/dL    Albumin 2.4 (L) 3.2 - 4.6 g/dL    Globulin 2.8 2.3 - 3.5 g/dL    Albumin/Globulin Ratio 0.9 (L) 1.0 - 1.9      Total Bilirubin 0.2 0.0 - 1.2 MG/DL    Bilirubin, Direct <0.2 0.0 - 0.4 MG/DL    Alk Phosphatase 78 40 - 129 U/L    AST 15 15 - 37 U/L    ALT 7 (L) 12 - 65 U/L   Basic Metabolic Panel    Collection Time: 05/25/24  4:32 AM   Result Value Ref Range    Sodium 137 136 - 145 mmol/L    Potassium 4.1 3.5 - 5.1 mmol/L    Chloride 103 98 - 107 mmol/L    CO2 24 20 - 28 mmol/L    Anion Gap 9 9 -

## 2024-05-25 NOTE — CARE COORDINATION
Pt is for discharge home today by [employer] at 1:20pm. Pt will resume care in the home with [facility] aide.Pt given IMM2 at bedside. No other needs/supportive care orders recieved for MSW at this time.       05/22/24 1211   Service Assessment   Patient Orientation Alert and Oriented   Cognition Alert   History Provided By Patient   Primary Caregiver Self   Support Systems Family Members;Home Care Staff   Patient's Healthcare Decision Maker is: Legal Next of Kin   PCP Verified by CM Yes   Last Visit to PCP Within last 3 months   Prior Functional Level Assistance with the following:;Cooking;Housework;Shopping   Current Functional Level Assistance with the following:;Cooking;Housework;Shopping   Can patient return to prior living arrangement Yes   Ability to make needs known: Good   Family able to assist with home care needs: No   Would you like for me to discuss the discharge plan with any other family members/significant others, and if so, who? No   Financial Resources Medicaid;Medicare   Community Resources Other (Comment)   CM/SW Referral Other (see comment)   Social/Functional History   Lives With Alone   Type of Home Assisted living   Home Layout One level   Home Access Level entry   Bathroom Shower/Tub Tub/Shower unit   Bathroom Toilet Standard   Bathroom Equipment Grab bars in shower;Grab bars around toilet   Bathroom Accessibility Accessible   Home Equipment Grab bars   Receives Help From Personal care attendant   ADL Assistance Independent   Homemaking Assistance Needs assistance   Homemaking Responsibilities No   Ambulation Assistance Independent   Transfer Assistance Independent   Active  No   Occupation Retired   Discharge Planning   Type of Residence Other (Comment);Independent Living   Living Arrangements Alone   Current Services Prior To Admission None   Potential Assistance Needed N/A   DME Ordered? No   Potential Assistance Purchasing Medications No   Type of Home Care Services Aide Services   Patient

## 2024-05-28 ENCOUNTER — TELEPHONE (OUTPATIENT)
Dept: GASTROENTEROLOGY | Age: 74
End: 2024-05-28

## 2024-05-28 ENCOUNTER — PREP FOR PROCEDURE (OUTPATIENT)
Dept: GASTROENTEROLOGY | Age: 74
End: 2024-05-28

## 2024-05-28 DIAGNOSIS — Z12.11 ENCOUNTER FOR SCREENING COLONOSCOPY: ICD-10-CM

## 2024-05-28 DIAGNOSIS — K80.50 COMMON BILE DUCT STONE: ICD-10-CM

## 2024-05-28 RX ORDER — SODIUM CHLORIDE 9 MG/ML
25 INJECTION, SOLUTION INTRAVENOUS PRN
Status: CANCELLED | OUTPATIENT
Start: 2024-05-28

## 2024-05-28 RX ORDER — SODIUM CHLORIDE 0.9 % (FLUSH) 0.9 %
5-40 SYRINGE (ML) INJECTION PRN
Status: CANCELLED | OUTPATIENT
Start: 2024-05-28

## 2024-05-28 RX ORDER — SODIUM CHLORIDE 0.9 % (FLUSH) 0.9 %
5-40 SYRINGE (ML) INJECTION EVERY 12 HOURS SCHEDULED
Status: CANCELLED | OUTPATIENT
Start: 2024-05-28

## 2024-05-28 NOTE — TELEPHONE ENCOUNTER
set up colonoscopy for screening with Dr. Garay   Called patient to schedule  / patient requested that I call him back at a better time

## 2024-05-28 NOTE — TELEPHONE ENCOUNTER
Scheduled patient for colon screening adriana Garay on 07/08/2024 [facility]         GASTROENTEROLOGY  MIRALAX/GATORADE PREP      You are scheduled for a colonoscopy on ________07/08/2024__________________  Items to purchase at pharmacy several days before your test:  Dulcolax tablets-- (LAXATIVE ONLY- not stool softener)  238-gram bottle of MiraLAX  64 ounces Gatorade/Crystal Lite/apple juice (NO RED BLUE OR PURPLE IN COLOR)    The Day before your test:   Clear liquids only the entire day (water, coffee, tea, soda, Jell-O, ice popsicles, broth, apple juice, etc.) NO MILK, CREAMER, OR DAIRY PRODUCTS. NO SOLID FOODS. NOTHING RED, BLUE, OR PURPLE IN COLOR.  Mix MiraLAX and Gatorade together, shake the solution until the MiraLAX is dissolved. Chill before drinking.   Start at 5:00pm Start drinking MiraLAX solution until half (½) is gone, put remainder in refrigerator.   At 5:00 pm take 2x Dulcolax tablets with 16 oz of water  NO MORE LIQUIDS AFTER MIDNIGHT (other than 2ND part of MiraLAX solution)     The day of your test:     6 HOURS PRIOR TO PROCEDURE, drink other half of MiraLAX solution until gone.    NO MORE LIQUIDS UNTIL AFTER PROCEDURE - If you have problems with your prep, call [phone]  ([place])    ** IMPORTANT: IF YOU DO NOT FOLLOW THESE DIRECTIONS, YOUR COLONOSCOPY COULD BE CANCELED DUE TO HAVING AN UNCLEAR PREP**    GETTING READY FOR YOUR COLONOSCOPY    [address] [address]  [phone]    You will need to  your bowel prep from your local pharmacy.    You must arrange for someone 18 years or older to come with you to and from your colonoscopy and stay during your visit to drive you home. Your colonoscopy may need to be rescheduled if you do not have a .    Check with your insurance company before your procedure to ensure coverage and plan to bring copays if necessary.    You will be getting a call from our preassessment office 4-7 days prior

## 2024-05-28 NOTE — TELEPHONE ENCOUNTER
----- Message from OWEN Duke sent at 5/24/2024  3:55 PM EDT -----  Can you schedule patient for EUS/ERCP for common bile duct and pancreatic duct dilation? Next available with Dr. Caro if possible. He should hopefully be out of the hospital by Tuesday. Thank you!

## 2024-06-08 ENCOUNTER — HOSPITAL ENCOUNTER (EMERGENCY)
Age: 74
Discharge: HOME | End: 2024-06-08
Attending: EMERGENCY MEDICINE
Payer: MEDICARE

## 2024-06-08 ENCOUNTER — APPOINTMENT (OUTPATIENT)
Dept: GENERAL RADIOLOGY | Age: 74
End: 2024-06-08
Payer: MEDICARE

## 2024-06-08 VITALS
HEIGHT: 73 IN | RESPIRATION RATE: 20 BRPM | SYSTOLIC BLOOD PRESSURE: 123 MMHG | TEMPERATURE: 98 F | WEIGHT: 185 LBS | HEART RATE: 88 BPM | OXYGEN SATURATION: 96 % | BODY MASS INDEX: 24.52 KG/M2 | DIASTOLIC BLOOD PRESSURE: 65 MMHG

## 2024-06-08 DIAGNOSIS — E87.6 HYPOKALEMIA: ICD-10-CM

## 2024-06-08 DIAGNOSIS — K86.1 OTHER CHRONIC PANCREATITIS (HCC): Primary | ICD-10-CM

## 2024-06-08 DIAGNOSIS — E83.42 HYPOMAGNESEMIA: ICD-10-CM

## 2024-06-08 DIAGNOSIS — K59.00 CONSTIPATION, UNSPECIFIED CONSTIPATION TYPE: ICD-10-CM

## 2024-06-08 LAB
ALBUMIN SERPL-MCNC: 3.2 G/DL (ref 3.2–4.6)
ALBUMIN/GLOB SERPL: 1.1 (ref 1–1.9)
ALP SERPL-CCNC: 104 U/L (ref 40–129)
ALT SERPL-CCNC: 13 U/L (ref 12–65)
ANION GAP SERPL CALC-SCNC: 11 MMOL/L (ref 9–18)
AST SERPL-CCNC: 20 U/L (ref 15–37)
BASOPHILS # BLD: 0 K/UL (ref 0–0.2)
BASOPHILS NFR BLD: 1 % (ref 0–2)
BILIRUB SERPL-MCNC: 0.4 MG/DL (ref 0–1.2)
BILIRUB UR QL: NEGATIVE
BUN SERPL-MCNC: 17 MG/DL (ref 8–23)
CALCIUM SERPL-MCNC: 8.9 MG/DL (ref 8.8–10.2)
CHLORIDE SERPL-SCNC: 106 MMOL/L (ref 98–107)
CO2 SERPL-SCNC: 23 MMOL/L (ref 20–28)
CREAT SERPL-MCNC: 1.29 MG/DL (ref 0.8–1.3)
CRP SERPL HS-MCNC: 2.6 MG/L (ref 0–3)
DIFFERENTIAL METHOD BLD: ABNORMAL
EOSINOPHIL # BLD: 0.2 K/UL (ref 0–0.8)
EOSINOPHIL NFR BLD: 4 % (ref 0.5–7.8)
ERYTHROCYTE [DISTWIDTH] IN BLOOD BY AUTOMATED COUNT: 14.6 % (ref 11.9–14.6)
GLOBULIN SER CALC-MCNC: 2.8 G/DL (ref 2.3–3.5)
GLUCOSE SERPL-MCNC: 115 MG/DL (ref 70–99)
GLUCOSE UR QL STRIP.AUTO: NEGATIVE MG/DL
HCT VFR BLD AUTO: 34.6 % (ref 41.1–50.3)
HGB BLD-MCNC: 11.1 G/DL (ref 13.6–17.2)
IMM GRANULOCYTES # BLD AUTO: 0 K/UL (ref 0–0.5)
IMM GRANULOCYTES NFR BLD AUTO: 0 % (ref 0–5)
KETONES UR-MCNC: NEGATIVE MG/DL
LACTATE SERPL-SCNC: 0.7 MMOL/L (ref 0.5–2)
LEUKOCYTE ESTERASE UR QL STRIP: NEGATIVE
LIPASE SERPL-CCNC: 94 U/L (ref 13–60)
LYMPHOCYTES # BLD: 1.6 K/UL (ref 0.5–4.6)
LYMPHOCYTES NFR BLD: 29 % (ref 13–44)
MAGNESIUM SERPL-MCNC: 0.9 MG/DL (ref 1.8–2.4)
MCH RBC QN AUTO: 28 PG (ref 26.1–32.9)
MCHC RBC AUTO-ENTMCNC: 32.1 G/DL (ref 31.4–35)
MCV RBC AUTO: 87.4 FL (ref 82–102)
MONOCYTES # BLD: 0.3 K/UL (ref 0.1–1.3)
MONOCYTES NFR BLD: 6 % (ref 4–12)
NEUTS SEG # BLD: 3.4 K/UL (ref 1.7–8.2)
NEUTS SEG NFR BLD: 60 % (ref 43–78)
NITRITE UR QL: NEGATIVE
NRBC # BLD: 0 K/UL (ref 0–0.2)
PH UR: 6.5 (ref 5–9)
PLATELET # BLD AUTO: 349 K/UL (ref 150–450)
PMV BLD AUTO: 10.2 FL (ref 9.4–12.3)
POTASSIUM SERPL-SCNC: 3.2 MMOL/L (ref 3.5–5.1)
PROT SERPL-MCNC: 6 G/DL (ref 6.3–8.2)
PROT UR QL: NEGATIVE MG/DL
RBC # BLD AUTO: 3.96 M/UL (ref 4.23–5.6)
RBC # UR STRIP: NEGATIVE
SERVICE CMNT-IMP: NORMAL
SODIUM SERPL-SCNC: 140 MMOL/L (ref 136–145)
SP GR UR: 1.01 (ref 1–1.02)
UROBILINOGEN UR QL: 0.2 EU/DL (ref 0.2–1)
WBC # BLD AUTO: 5.6 K/UL (ref 4.3–11.1)

## 2024-06-08 PROCEDURE — 80053 COMPREHEN METABOLIC PANEL: CPT

## 2024-06-08 PROCEDURE — 6370000000 HC RX 637 (ALT 250 FOR IP): Performed by: EMERGENCY MEDICINE

## 2024-06-08 PROCEDURE — 83605 ASSAY OF LACTIC ACID: CPT

## 2024-06-08 PROCEDURE — C9113 INJ PANTOPRAZOLE SODIUM, VIA: HCPCS | Performed by: EMERGENCY MEDICINE

## 2024-06-08 PROCEDURE — 96375 TX/PRO/DX INJ NEW DRUG ADDON: CPT

## 2024-06-08 PROCEDURE — A4216 STERILE WATER/SALINE, 10 ML: HCPCS | Performed by: EMERGENCY MEDICINE

## 2024-06-08 PROCEDURE — 74022 RADEX COMPL AQT ABD SERIES: CPT

## 2024-06-08 PROCEDURE — 99284 EMERGENCY DEPT VISIT MOD MDM: CPT

## 2024-06-08 PROCEDURE — 83690 ASSAY OF LIPASE: CPT

## 2024-06-08 PROCEDURE — 86141 C-REACTIVE PROTEIN HS: CPT

## 2024-06-08 PROCEDURE — 81003 URINALYSIS AUTO W/O SCOPE: CPT

## 2024-06-08 PROCEDURE — 96365 THER/PROPH/DIAG IV INF INIT: CPT

## 2024-06-08 PROCEDURE — 96361 HYDRATE IV INFUSION ADD-ON: CPT

## 2024-06-08 PROCEDURE — 2580000003 HC RX 258: Performed by: EMERGENCY MEDICINE

## 2024-06-08 PROCEDURE — 83735 ASSAY OF MAGNESIUM: CPT

## 2024-06-08 PROCEDURE — 85025 COMPLETE CBC W/AUTO DIFF WBC: CPT

## 2024-06-08 PROCEDURE — 96366 THER/PROPH/DIAG IV INF ADDON: CPT

## 2024-06-08 PROCEDURE — 6360000002 HC RX W HCPCS: Performed by: EMERGENCY MEDICINE

## 2024-06-08 RX ORDER — ONDANSETRON HYDROCHLORIDE 8 MG/1
8 TABLET, FILM COATED ORAL EVERY 8 HOURS PRN
Qty: 15 TABLET | Refills: 0 | Status: ON HOLD | OUTPATIENT
Start: 2024-06-08 | End: 2024-06-13

## 2024-06-08 RX ORDER — ONDANSETRON 2 MG/ML
4 INJECTION INTRAMUSCULAR; INTRAVENOUS
Status: COMPLETED | OUTPATIENT
Start: 2024-06-08 | End: 2024-06-08

## 2024-06-08 RX ORDER — MORPHINE SULFATE 2 MG/ML
2 INJECTION, SOLUTION INTRAMUSCULAR; INTRAVENOUS
Status: COMPLETED | OUTPATIENT
Start: 2024-06-08 | End: 2024-06-08

## 2024-06-08 RX ORDER — MAGNESIUM SULFATE IN WATER 40 MG/ML
4000 INJECTION, SOLUTION INTRAVENOUS
Status: COMPLETED | OUTPATIENT
Start: 2024-06-08 | End: 2024-06-08

## 2024-06-08 RX ORDER — POLYETHYLENE GLYCOL 3350 17 G/17G
17 POWDER, FOR SOLUTION ORAL DAILY
Qty: 510 G | Refills: 0 | Status: ON HOLD | OUTPATIENT
Start: 2024-06-08 | End: 2024-07-08

## 2024-06-08 RX ORDER — SODIUM CHLORIDE, SODIUM LACTATE, POTASSIUM CHLORIDE, AND CALCIUM CHLORIDE .6; .31; .03; .02 G/100ML; G/100ML; G/100ML; G/100ML
1000 INJECTION, SOLUTION INTRAVENOUS
Status: COMPLETED | OUTPATIENT
Start: 2024-06-08 | End: 2024-06-08

## 2024-06-08 RX ORDER — HYDROCODONE BITARTRATE AND ACETAMINOPHEN 5; 325 MG/1; MG/1
1 TABLET ORAL EVERY 4 HOURS PRN
Qty: 18 TABLET | Refills: 0 | Status: SHIPPED | OUTPATIENT
Start: 2024-06-08 | End: 2024-06-11

## 2024-06-08 RX ADMIN — MAGNESIUM SULFATE HEPTAHYDRATE 4000 MG: 40 INJECTION, SOLUTION INTRAVENOUS at 16:56

## 2024-06-08 RX ADMIN — POTASSIUM BICARBONATE 20 MEQ: 782 TABLET, EFFERVESCENT ORAL at 16:11

## 2024-06-08 RX ADMIN — PANTOPRAZOLE SODIUM 40 MG: 40 INJECTION, POWDER, FOR SOLUTION INTRAVENOUS at 15:17

## 2024-06-08 RX ADMIN — SODIUM CHLORIDE, POTASSIUM CHLORIDE, SODIUM LACTATE AND CALCIUM CHLORIDE 1000 ML: 600; 310; 30; 20 INJECTION, SOLUTION INTRAVENOUS at 15:17

## 2024-06-08 RX ADMIN — MORPHINE SULFATE 2 MG: 2 INJECTION, SOLUTION INTRAMUSCULAR; INTRAVENOUS at 15:17

## 2024-06-08 RX ADMIN — ONDANSETRON 4 MG: 2 INJECTION INTRAMUSCULAR; INTRAVENOUS at 15:17

## 2024-06-08 ASSESSMENT — ENCOUNTER SYMPTOMS
CONSTIPATION: 0
ABDOMINAL PAIN: 1
DIARRHEA: 0
NAUSEA: 1
VOMITING: 1
BLOOD IN STOOL: 0

## 2024-06-08 ASSESSMENT — PAIN SCALES - GENERAL
PAINLEVEL_OUTOF10: 7
PAINLEVEL_OUTOF10: 7

## 2024-06-08 ASSESSMENT — PAIN - FUNCTIONAL ASSESSMENT: PAIN_FUNCTIONAL_ASSESSMENT: 0-10

## 2024-06-08 ASSESSMENT — PAIN DESCRIPTION - LOCATION
LOCATION: ABDOMEN
LOCATION: ABDOMEN

## 2024-06-08 ASSESSMENT — PAIN DESCRIPTION - DESCRIPTORS: DESCRIPTORS: ACHING

## 2024-06-08 NOTE — ED TRIAGE NOTES
Pt arrives via [facility] from home with CO NV x 2 days with abdominal pain. Recent admission for pancreatitis.

## 2024-06-08 NOTE — ED PROVIDER NOTES
Emergency Department Provider Note       PCP: Ilia Wang MD   Age: 73 y.o.   Sex: male     DISPOSITION Discharge - Pending Orders Complete 06/08/2024 05:02:11 PM       ICD-10-CM    1. Other chronic pancreatitis (HCC)  K86.1 HYDROcodone-acetaminophen (NORCO) 5-325 MG per tablet      2. Hypomagnesemia  E83.42       3. Hypokalemia  E87.6       4. Constipation, unspecified constipation type  K59.00           Medical Decision Making     Review of test results show a mild hypokalemia but a significant hypomagnesemia with a magnesium level of 0.9.  Will replace this with 4 g of mag sulfate IV.  Patient does have a slight elevation of the creatinine as well as a slight elevation of the lipase.  Patient is otherwise clinically stable and I believe with appropriate treatment he can be managed as an outpatient.     1 chronic illness with exacerbation.  Prescription drug management performed.  Shared medical decision making was utilized in creating the patients health plan today.    I independently ordered and reviewed each unique test.       I interpreted the X-rays x-ray no free air and nonspecific bowel gas pattern but no evidence of obstruction.  There is evidence of constipation with stool throughout the colon..              History     Patient presents by EMS from home with complaints of abdominal pain and nausea and vomiting.  Patient states he has been vomiting pretty much continuously for the past 2 days.  Abdominal pain is rated 7/10 in intensity in the epigastric area.  Symptoms consistent with those that resulted in admission earlier this month for pancreatitis and IMAN.  Patient is scheduled for outpatient endoscopy.  He states medications at home are not helping.  He denies hematemesis, hematochezia or melena.  Review of systems is otherwise negative    The history is provided by the patient and medical records.       ROS     Review of Systems   Constitutional:  Negative for chills and fever.   Gastrointestinal:

## 2024-06-12 ENCOUNTER — HOSPITAL ENCOUNTER (INPATIENT)
Age: 74
LOS: 10 days | Discharge: HOME | DRG: 439 | End: 2024-06-22
Attending: EMERGENCY MEDICINE | Admitting: FAMILY MEDICINE
Payer: MEDICARE

## 2024-06-12 ENCOUNTER — APPOINTMENT (OUTPATIENT)
Dept: CT IMAGING | Age: 74
DRG: 439 | End: 2024-06-12
Payer: MEDICARE

## 2024-06-12 DIAGNOSIS — K86.89 PANCREATIC MASS: ICD-10-CM

## 2024-06-12 DIAGNOSIS — K56.609 SMALL BOWEL OBSTRUCTION (HCC): Primary | ICD-10-CM

## 2024-06-12 DIAGNOSIS — K85.90 ACUTE PANCREATITIS, UNSPECIFIED COMPLICATION STATUS, UNSPECIFIED PANCREATITIS TYPE: ICD-10-CM

## 2024-06-12 DIAGNOSIS — K80.50 COMMON BILE DUCT STONE: ICD-10-CM

## 2024-06-12 LAB
ALBUMIN SERPL-MCNC: 2.7 G/DL (ref 3.2–4.6)
ALBUMIN/GLOB SERPL: 0.9 (ref 1–1.9)
ALP SERPL-CCNC: 103 U/L (ref 40–129)
ALT SERPL-CCNC: 14 U/L (ref 12–65)
ANION GAP SERPL CALC-SCNC: 8 MMOL/L (ref 9–18)
AST SERPL-CCNC: 20 U/L (ref 15–37)
BASOPHILS # BLD: 0 K/UL (ref 0–0.2)
BASOPHILS NFR BLD: 0 % (ref 0–2)
BILIRUB SERPL-MCNC: 0.4 MG/DL (ref 0–1.2)
BUN SERPL-MCNC: 16 MG/DL (ref 8–23)
CALCIUM SERPL-MCNC: 8.6 MG/DL (ref 8.8–10.2)
CHLORIDE SERPL-SCNC: 106 MMOL/L (ref 98–107)
CO2 SERPL-SCNC: 24 MMOL/L (ref 20–28)
CREAT SERPL-MCNC: 1.46 MG/DL (ref 0.8–1.3)
DIFFERENTIAL METHOD BLD: ABNORMAL
EOSINOPHIL # BLD: 0.2 K/UL (ref 0–0.8)
EOSINOPHIL NFR BLD: 4 % (ref 0.5–7.8)
ERYTHROCYTE [DISTWIDTH] IN BLOOD BY AUTOMATED COUNT: 15.1 % (ref 11.9–14.6)
GLOBULIN SER CALC-MCNC: 3 G/DL (ref 2.3–3.5)
GLUCOSE SERPL-MCNC: 105 MG/DL (ref 70–99)
HCT VFR BLD AUTO: 31.3 % (ref 41.1–50.3)
HGB BLD-MCNC: 9.5 G/DL (ref 13.6–17.2)
IMM GRANULOCYTES # BLD AUTO: 0 K/UL (ref 0–0.5)
IMM GRANULOCYTES NFR BLD AUTO: 0 % (ref 0–5)
LACTATE SERPL-SCNC: 0.8 MMOL/L (ref 0.5–2)
LIPASE SERPL-CCNC: 18 U/L (ref 13–60)
LYMPHOCYTES # BLD: 0.9 K/UL (ref 0.5–4.6)
LYMPHOCYTES NFR BLD: 19 % (ref 13–44)
MAGNESIUM SERPL-MCNC: 1.2 MG/DL (ref 1.8–2.4)
MCH RBC QN AUTO: 27.6 PG (ref 26.1–32.9)
MCHC RBC AUTO-ENTMCNC: 30.4 G/DL (ref 31.4–35)
MCV RBC AUTO: 91 FL (ref 82–102)
MONOCYTES # BLD: 0.3 K/UL (ref 0.1–1.3)
MONOCYTES NFR BLD: 7 % (ref 4–12)
NEUTS SEG # BLD: 3.1 K/UL (ref 1.7–8.2)
NEUTS SEG NFR BLD: 70 % (ref 43–78)
NRBC # BLD: 0 K/UL (ref 0–0.2)
PLATELET # BLD AUTO: 304 K/UL (ref 150–450)
PMV BLD AUTO: 9.8 FL (ref 9.4–12.3)
POTASSIUM SERPL-SCNC: 3.7 MMOL/L (ref 3.5–5.1)
PROCALCITONIN SERPL-MCNC: 0.16 NG/ML (ref 0–0.1)
PROT SERPL-MCNC: 5.7 G/DL (ref 6.3–8.2)
RBC # BLD AUTO: 3.44 M/UL (ref 4.23–5.6)
SODIUM SERPL-SCNC: 137 MMOL/L (ref 136–145)
TROPONIN T SERPL HS-MCNC: 35 NG/L (ref 0–22)
TROPONIN T SERPL HS-MCNC: 38 NG/L (ref 0–22)
WBC # BLD AUTO: 4.6 K/UL (ref 4.3–11.1)

## 2024-06-12 PROCEDURE — 6370000000 HC RX 637 (ALT 250 FOR IP): Performed by: EMERGENCY MEDICINE

## 2024-06-12 PROCEDURE — 74177 CT ABD & PELVIS W/CONTRAST: CPT

## 2024-06-12 PROCEDURE — 83690 ASSAY OF LIPASE: CPT

## 2024-06-12 PROCEDURE — 85025 COMPLETE CBC W/AUTO DIFF WBC: CPT

## 2024-06-12 PROCEDURE — 84484 ASSAY OF TROPONIN QUANT: CPT

## 2024-06-12 PROCEDURE — 6360000002 HC RX W HCPCS: Performed by: FAMILY MEDICINE

## 2024-06-12 PROCEDURE — 0D9670Z DRAINAGE OF STOMACH WITH DRAINAGE DEVICE, VIA NATURAL OR ARTIFICIAL OPENING: ICD-10-PCS | Performed by: INTERNAL MEDICINE

## 2024-06-12 PROCEDURE — 2500000003 HC RX 250 WO HCPCS: Performed by: EMERGENCY MEDICINE

## 2024-06-12 PROCEDURE — 6360000004 HC RX CONTRAST MEDICATION: Performed by: EMERGENCY MEDICINE

## 2024-06-12 PROCEDURE — 83735 ASSAY OF MAGNESIUM: CPT

## 2024-06-12 PROCEDURE — 99285 EMERGENCY DEPT VISIT HI MDM: CPT

## 2024-06-12 PROCEDURE — 80053 COMPREHEN METABOLIC PANEL: CPT

## 2024-06-12 PROCEDURE — 3E0G76Z INTRODUCTION OF NUTRITIONAL SUBSTANCE INTO UPPER GI, VIA NATURAL OR ARTIFICIAL OPENING: ICD-10-PCS | Performed by: INTERNAL MEDICINE

## 2024-06-12 PROCEDURE — 83605 ASSAY OF LACTIC ACID: CPT

## 2024-06-12 PROCEDURE — 6360000002 HC RX W HCPCS: Performed by: EMERGENCY MEDICINE

## 2024-06-12 PROCEDURE — 1100000000 HC RM PRIVATE

## 2024-06-12 PROCEDURE — 2580000003 HC RX 258: Performed by: EMERGENCY MEDICINE

## 2024-06-12 PROCEDURE — 96374 THER/PROPH/DIAG INJ IV PUSH: CPT

## 2024-06-12 PROCEDURE — 84145 PROCALCITONIN (PCT): CPT

## 2024-06-12 PROCEDURE — 96375 TX/PRO/DX INJ NEW DRUG ADDON: CPT

## 2024-06-12 RX ORDER — 0.9 % SODIUM CHLORIDE 0.9 %
1000 INTRAVENOUS SOLUTION INTRAVENOUS ONCE
Status: COMPLETED | OUTPATIENT
Start: 2024-06-12 | End: 2024-06-12

## 2024-06-12 RX ORDER — PROCHLORPERAZINE EDISYLATE 5 MG/ML
10 INJECTION INTRAMUSCULAR; INTRAVENOUS EVERY 6 HOURS PRN
Status: DISCONTINUED | OUTPATIENT
Start: 2024-06-12 | End: 2024-06-22 | Stop reason: HOSPADM

## 2024-06-12 RX ORDER — 0.9 % SODIUM CHLORIDE 0.9 %
1000 INTRAVENOUS SOLUTION INTRAVENOUS ONCE
Status: COMPLETED | OUTPATIENT
Start: 2024-06-12 | End: 2024-06-13

## 2024-06-12 RX ORDER — OXYMETAZOLINE HYDROCHLORIDE 0.05 G/100ML
2 SPRAY NASAL
Status: COMPLETED | OUTPATIENT
Start: 2024-06-12 | End: 2024-06-12

## 2024-06-12 RX ORDER — MORPHINE SULFATE 4 MG/ML
4 INJECTION, SOLUTION INTRAMUSCULAR; INTRAVENOUS
Status: COMPLETED | OUTPATIENT
Start: 2024-06-12 | End: 2024-06-12

## 2024-06-12 RX ORDER — METOCLOPRAMIDE HYDROCHLORIDE 5 MG/ML
10 INJECTION INTRAMUSCULAR; INTRAVENOUS
Status: COMPLETED | OUTPATIENT
Start: 2024-06-12 | End: 2024-06-12

## 2024-06-12 RX ORDER — HYDROMORPHONE HYDROCHLORIDE 1 MG/ML
1 INJECTION, SOLUTION INTRAMUSCULAR; INTRAVENOUS; SUBCUTANEOUS
Status: COMPLETED | OUTPATIENT
Start: 2024-06-12 | End: 2024-06-12

## 2024-06-12 RX ADMIN — MORPHINE SULFATE 4 MG: 4 INJECTION INTRAVENOUS at 23:15

## 2024-06-12 RX ADMIN — SODIUM CHLORIDE 1000 ML: 9 INJECTION, SOLUTION INTRAVENOUS at 21:45

## 2024-06-12 RX ADMIN — HYDROMORPHONE HYDROCHLORIDE 1 MG: 1 INJECTION, SOLUTION INTRAMUSCULAR; INTRAVENOUS; SUBCUTANEOUS at 21:46

## 2024-06-12 RX ADMIN — IOPAMIDOL 100 ML: 755 INJECTION, SOLUTION INTRAVENOUS at 22:33

## 2024-06-12 RX ADMIN — OXYMETAZOLINE HCL 2 SPRAY: 0.05 SPRAY NASAL at 23:16

## 2024-06-12 RX ADMIN — PROCHLORPERAZINE EDISYLATE 10 MG: 5 INJECTION INTRAMUSCULAR; INTRAVENOUS at 23:44

## 2024-06-12 RX ADMIN — METOCLOPRAMIDE 10 MG: 5 INJECTION, SOLUTION INTRAMUSCULAR; INTRAVENOUS at 21:46

## 2024-06-12 ASSESSMENT — PAIN DESCRIPTION - ORIENTATION: ORIENTATION: MID;UPPER

## 2024-06-12 ASSESSMENT — LIFESTYLE VARIABLES
HOW OFTEN DO YOU HAVE A DRINK CONTAINING ALCOHOL: NEVER
HOW MANY STANDARD DRINKS CONTAINING ALCOHOL DO YOU HAVE ON A TYPICAL DAY: PATIENT DOES NOT DRINK

## 2024-06-12 ASSESSMENT — PAIN SCALES - GENERAL
PAINLEVEL_OUTOF10: 7
PAINLEVEL_OUTOF10: 8

## 2024-06-12 ASSESSMENT — PAIN DESCRIPTION - LOCATION: LOCATION: ABDOMEN

## 2024-06-12 ASSESSMENT — PAIN - FUNCTIONAL ASSESSMENT: PAIN_FUNCTIONAL_ASSESSMENT: 0-10

## 2024-06-13 ENCOUNTER — APPOINTMENT (OUTPATIENT)
Dept: GENERAL RADIOLOGY | Age: 74
DRG: 439 | End: 2024-06-13
Payer: MEDICARE

## 2024-06-13 PROBLEM — K85.90 PANCREATITIS: Status: ACTIVE | Noted: 2024-06-13

## 2024-06-13 LAB
ALBUMIN SERPL-MCNC: 2.8 G/DL (ref 3.2–4.6)
ALBUMIN/GLOB SERPL: 0.9 (ref 1–1.9)
ALP SERPL-CCNC: 115 U/L (ref 40–129)
ALT SERPL-CCNC: 14 U/L (ref 12–65)
ANION GAP SERPL CALC-SCNC: 7 MMOL/L (ref 9–18)
AST SERPL-CCNC: 21 U/L (ref 15–37)
BASOPHILS # BLD: 0 K/UL (ref 0–0.2)
BASOPHILS NFR BLD: 0 % (ref 0–2)
BILIRUB SERPL-MCNC: 0.4 MG/DL (ref 0–1.2)
BUN SERPL-MCNC: 14 MG/DL (ref 8–23)
CALCIUM SERPL-MCNC: 8.8 MG/DL (ref 8.8–10.2)
CHLORIDE SERPL-SCNC: 106 MMOL/L (ref 98–107)
CO2 SERPL-SCNC: 23 MMOL/L (ref 20–28)
CREAT SERPL-MCNC: 1.41 MG/DL (ref 0.8–1.3)
DIFFERENTIAL METHOD BLD: ABNORMAL
EOSINOPHIL # BLD: 0.2 K/UL (ref 0–0.8)
EOSINOPHIL NFR BLD: 5 % (ref 0.5–7.8)
ERYTHROCYTE [DISTWIDTH] IN BLOOD BY AUTOMATED COUNT: 15 % (ref 11.9–14.6)
GLOBULIN SER CALC-MCNC: 3.3 G/DL (ref 2.3–3.5)
GLUCOSE SERPL-MCNC: 101 MG/DL (ref 70–99)
HCT VFR BLD AUTO: 35.1 % (ref 41.1–50.3)
HGB BLD-MCNC: 10.6 G/DL (ref 13.6–17.2)
IMM GRANULOCYTES # BLD AUTO: 0 K/UL (ref 0–0.5)
IMM GRANULOCYTES NFR BLD AUTO: 0 % (ref 0–5)
LYMPHOCYTES # BLD: 0.9 K/UL (ref 0.5–4.6)
LYMPHOCYTES NFR BLD: 20 % (ref 13–44)
MCH RBC QN AUTO: 27.5 PG (ref 26.1–32.9)
MCHC RBC AUTO-ENTMCNC: 30.2 G/DL (ref 31.4–35)
MCV RBC AUTO: 91.2 FL (ref 82–102)
MONOCYTES # BLD: 0.4 K/UL (ref 0.1–1.3)
MONOCYTES NFR BLD: 8 % (ref 4–12)
NEUTS SEG # BLD: 3.1 K/UL (ref 1.7–8.2)
NEUTS SEG NFR BLD: 67 % (ref 43–78)
NRBC # BLD: 0 K/UL (ref 0–0.2)
PLATELET # BLD AUTO: 335 K/UL (ref 150–450)
PMV BLD AUTO: 10.6 FL (ref 9.4–12.3)
POTASSIUM SERPL-SCNC: 3.9 MMOL/L (ref 3.5–5.1)
PROT SERPL-MCNC: 6.1 G/DL (ref 6.3–8.2)
RBC # BLD AUTO: 3.85 M/UL (ref 4.23–5.6)
SODIUM SERPL-SCNC: 137 MMOL/L (ref 136–145)
WBC # BLD AUTO: 4.6 K/UL (ref 4.3–11.1)

## 2024-06-13 PROCEDURE — 1100000000 HC RM PRIVATE

## 2024-06-13 PROCEDURE — 80053 COMPREHEN METABOLIC PANEL: CPT

## 2024-06-13 PROCEDURE — 74250 X-RAY XM SM INT 1CNTRST STD: CPT

## 2024-06-13 PROCEDURE — 74018 RADEX ABDOMEN 1 VIEW: CPT

## 2024-06-13 PROCEDURE — 36415 COLL VENOUS BLD VENIPUNCTURE: CPT

## 2024-06-13 PROCEDURE — 2580000003 HC RX 258: Performed by: FAMILY MEDICINE

## 2024-06-13 PROCEDURE — 99222 1ST HOSP IP/OBS MODERATE 55: CPT | Performed by: SURGERY

## 2024-06-13 PROCEDURE — 6370000000 HC RX 637 (ALT 250 FOR IP): Performed by: NURSE PRACTITIONER

## 2024-06-13 PROCEDURE — 2580000003 HC RX 258: Performed by: EMERGENCY MEDICINE

## 2024-06-13 PROCEDURE — 85025 COMPLETE CBC W/AUTO DIFF WBC: CPT

## 2024-06-13 PROCEDURE — 6360000002 HC RX W HCPCS: Performed by: FAMILY MEDICINE

## 2024-06-13 PROCEDURE — 94640 AIRWAY INHALATION TREATMENT: CPT

## 2024-06-13 PROCEDURE — 6360000002 HC RX W HCPCS: Performed by: STUDENT IN AN ORGANIZED HEALTH CARE EDUCATION/TRAINING PROGRAM

## 2024-06-13 PROCEDURE — 6370000000 HC RX 637 (ALT 250 FOR IP): Performed by: FAMILY MEDICINE

## 2024-06-13 PROCEDURE — 6360000004 HC RX CONTRAST MEDICATION: Performed by: NURSE PRACTITIONER

## 2024-06-13 PROCEDURE — 2580000003 HC RX 258: Performed by: STUDENT IN AN ORGANIZED HEALTH CARE EDUCATION/TRAINING PROGRAM

## 2024-06-13 PROCEDURE — 94760 N-INVAS EAR/PLS OXIMETRY 1: CPT

## 2024-06-13 RX ORDER — POLYETHYLENE GLYCOL 3350 17 G/17G
17 POWDER, FOR SOLUTION ORAL DAILY PRN
Status: DISCONTINUED | OUTPATIENT
Start: 2024-06-13 | End: 2024-06-22 | Stop reason: HOSPADM

## 2024-06-13 RX ORDER — NICOTINE 21 MG/24HR
1 PATCH, TRANSDERMAL 24 HOURS TRANSDERMAL AS NEEDED
Status: DISCONTINUED | OUTPATIENT
Start: 2024-06-13 | End: 2024-06-22 | Stop reason: HOSPADM

## 2024-06-13 RX ORDER — ENOXAPARIN SODIUM 100 MG/ML
40 INJECTION SUBCUTANEOUS DAILY
Status: DISCONTINUED | OUTPATIENT
Start: 2024-06-13 | End: 2024-06-22 | Stop reason: HOSPADM

## 2024-06-13 RX ORDER — POTASSIUM CHLORIDE 20 MEQ/1
40 TABLET, EXTENDED RELEASE ORAL PRN
Status: DISCONTINUED | OUTPATIENT
Start: 2024-06-13 | End: 2024-06-22 | Stop reason: HOSPADM

## 2024-06-13 RX ORDER — MAGNESIUM SULFATE IN WATER 40 MG/ML
2000 INJECTION, SOLUTION INTRAVENOUS PRN
Status: DISCONTINUED | OUTPATIENT
Start: 2024-06-13 | End: 2024-06-22 | Stop reason: HOSPADM

## 2024-06-13 RX ORDER — SODIUM CHLORIDE 0.9 % (FLUSH) 0.9 %
5-40 SYRINGE (ML) INJECTION EVERY 12 HOURS SCHEDULED
Status: DISCONTINUED | OUTPATIENT
Start: 2024-06-13 | End: 2024-06-22 | Stop reason: HOSPADM

## 2024-06-13 RX ORDER — ACETAMINOPHEN 325 MG/1
650 TABLET ORAL EVERY 6 HOURS PRN
Status: DISCONTINUED | OUTPATIENT
Start: 2024-06-13 | End: 2024-06-22 | Stop reason: HOSPADM

## 2024-06-13 RX ORDER — ONDANSETRON 2 MG/ML
4 INJECTION INTRAMUSCULAR; INTRAVENOUS EVERY 6 HOURS PRN
Status: DISCONTINUED | OUTPATIENT
Start: 2024-06-13 | End: 2024-06-22 | Stop reason: HOSPADM

## 2024-06-13 RX ORDER — SODIUM CHLORIDE, SODIUM LACTATE, POTASSIUM CHLORIDE, CALCIUM CHLORIDE 600; 310; 30; 20 MG/100ML; MG/100ML; MG/100ML; MG/100ML
INJECTION, SOLUTION INTRAVENOUS CONTINUOUS
Status: DISCONTINUED | OUTPATIENT
Start: 2024-06-13 | End: 2024-06-14

## 2024-06-13 RX ORDER — BUDESONIDE AND FORMOTEROL FUMARATE DIHYDRATE 160; 4.5 UG/1; UG/1
2 AEROSOL RESPIRATORY (INHALATION)
Status: DISCONTINUED | OUTPATIENT
Start: 2024-06-13 | End: 2024-06-22 | Stop reason: HOSPADM

## 2024-06-13 RX ORDER — ONDANSETRON 4 MG/1
4 TABLET, ORALLY DISINTEGRATING ORAL EVERY 8 HOURS PRN
Status: DISCONTINUED | OUTPATIENT
Start: 2024-06-13 | End: 2024-06-22 | Stop reason: HOSPADM

## 2024-06-13 RX ORDER — SODIUM CHLORIDE 0.9 % (FLUSH) 0.9 %
5-40 SYRINGE (ML) INJECTION PRN
Status: DISCONTINUED | OUTPATIENT
Start: 2024-06-13 | End: 2024-06-22 | Stop reason: HOSPADM

## 2024-06-13 RX ORDER — ACETAMINOPHEN 650 MG/1
650 SUPPOSITORY RECTAL EVERY 6 HOURS PRN
Status: DISCONTINUED | OUTPATIENT
Start: 2024-06-13 | End: 2024-06-22 | Stop reason: HOSPADM

## 2024-06-13 RX ORDER — THIAMINE HYDROCHLORIDE 100 MG/ML
100 INJECTION, SOLUTION INTRAMUSCULAR; INTRAVENOUS DAILY
Status: DISCONTINUED | OUTPATIENT
Start: 2024-06-13 | End: 2024-06-14

## 2024-06-13 RX ORDER — HYDRALAZINE HYDROCHLORIDE 20 MG/ML
10 INJECTION INTRAMUSCULAR; INTRAVENOUS EVERY 6 HOURS PRN
Status: DISCONTINUED | OUTPATIENT
Start: 2024-06-13 | End: 2024-06-22 | Stop reason: HOSPADM

## 2024-06-13 RX ORDER — MORPHINE SULFATE 2 MG/ML
2 INJECTION, SOLUTION INTRAMUSCULAR; INTRAVENOUS EVERY 4 HOURS PRN
Status: DISCONTINUED | OUTPATIENT
Start: 2024-06-13 | End: 2024-06-22 | Stop reason: HOSPADM

## 2024-06-13 RX ORDER — POTASSIUM CHLORIDE 7.45 MG/ML
10 INJECTION INTRAVENOUS PRN
Status: DISCONTINUED | OUTPATIENT
Start: 2024-06-13 | End: 2024-06-22 | Stop reason: HOSPADM

## 2024-06-13 RX ORDER — SODIUM CHLORIDE 9 MG/ML
INJECTION, SOLUTION INTRAVENOUS PRN
Status: DISCONTINUED | OUTPATIENT
Start: 2024-06-13 | End: 2024-06-22 | Stop reason: HOSPADM

## 2024-06-13 RX ORDER — ALBUTEROL SULFATE 2.5 MG/3ML
2.5 SOLUTION RESPIRATORY (INHALATION) EVERY 6 HOURS PRN
Status: DISCONTINUED | OUTPATIENT
Start: 2024-06-13 | End: 2024-06-22 | Stop reason: HOSPADM

## 2024-06-13 RX ADMIN — SODIUM CHLORIDE, POTASSIUM CHLORIDE, SODIUM LACTATE AND CALCIUM CHLORIDE: 600; 310; 30; 20 INJECTION, SOLUTION INTRAVENOUS at 02:11

## 2024-06-13 RX ADMIN — SODIUM CHLORIDE, PRESERVATIVE FREE 10 ML: 5 INJECTION INTRAVENOUS at 20:50

## 2024-06-13 RX ADMIN — BUDESONIDE AND FORMOTEROL FUMARATE DIHYDRATE 2 PUFF: 160; 4.5 AEROSOL RESPIRATORY (INHALATION) at 07:34

## 2024-06-13 RX ADMIN — SODIUM CHLORIDE, POTASSIUM CHLORIDE, SODIUM LACTATE AND CALCIUM CHLORIDE: 600; 310; 30; 20 INJECTION, SOLUTION INTRAVENOUS at 18:09

## 2024-06-13 RX ADMIN — SODIUM CHLORIDE 0.5 MG: 9 INJECTION INTRAMUSCULAR; INTRAVENOUS; SUBCUTANEOUS at 01:10

## 2024-06-13 RX ADMIN — SODIUM CHLORIDE 1000 ML: 9 INJECTION, SOLUTION INTRAVENOUS at 01:07

## 2024-06-13 RX ADMIN — PHENOL 1 SPRAY: 1.5 LIQUID ORAL at 08:00

## 2024-06-13 RX ADMIN — TIOTROPIUM BROMIDE INHALATION SPRAY 2 PUFF: 3.12 SPRAY, METERED RESPIRATORY (INHALATION) at 07:34

## 2024-06-13 RX ADMIN — MORPHINE SULFATE 2 MG: 2 INJECTION, SOLUTION INTRAMUSCULAR; INTRAVENOUS at 07:59

## 2024-06-13 RX ADMIN — DIATRIZOATE MEGLUMINE AND DIATRIZOATE SODIUM 180 ML: 660; 100 LIQUID ORAL; RECTAL at 09:15

## 2024-06-13 RX ADMIN — THIAMINE HYDROCHLORIDE 100 MG: 100 INJECTION, SOLUTION INTRAMUSCULAR; INTRAVENOUS at 13:13

## 2024-06-13 RX ADMIN — SODIUM CHLORIDE, POTASSIUM CHLORIDE, SODIUM LACTATE AND CALCIUM CHLORIDE: 600; 310; 30; 20 INJECTION, SOLUTION INTRAVENOUS at 09:28

## 2024-06-13 RX ADMIN — MORPHINE SULFATE 2 MG: 2 INJECTION, SOLUTION INTRAMUSCULAR; INTRAVENOUS at 20:43

## 2024-06-13 RX ADMIN — ENOXAPARIN SODIUM 40 MG: 40 INJECTION SUBCUTANEOUS at 09:26

## 2024-06-13 RX ADMIN — MORPHINE SULFATE 2 MG: 2 INJECTION, SOLUTION INTRAMUSCULAR; INTRAVENOUS at 12:23

## 2024-06-13 ASSESSMENT — PAIN SCALES - GENERAL
PAINLEVEL_OUTOF10: 7
PAINLEVEL_OUTOF10: 6
PAINLEVEL_OUTOF10: 2
PAINLEVEL_OUTOF10: 5
PAINLEVEL_OUTOF10: 7
PAINLEVEL_OUTOF10: 3
PAINLEVEL_OUTOF10: 4

## 2024-06-13 ASSESSMENT — PAIN DESCRIPTION - ORIENTATION
ORIENTATION: ANTERIOR;MID
ORIENTATION: MID;ANTERIOR
ORIENTATION: MID;ANTERIOR
ORIENTATION: ANTERIOR;MID
ORIENTATION: ANTERIOR

## 2024-06-13 ASSESSMENT — PAIN - FUNCTIONAL ASSESSMENT
PAIN_FUNCTIONAL_ASSESSMENT: PREVENTS OR INTERFERES SOME ACTIVE ACTIVITIES AND ADLS

## 2024-06-13 ASSESSMENT — PAIN DESCRIPTION - DESCRIPTORS
DESCRIPTORS: SORE
DESCRIPTORS: ACHING;SHARP
DESCRIPTORS: ACHING;SHARP
DESCRIPTORS: ACHING
DESCRIPTORS: ACHING

## 2024-06-13 ASSESSMENT — PAIN DESCRIPTION - LOCATION
LOCATION: ABDOMEN
LOCATION: THROAT
LOCATION: ABDOMEN;NOSE
LOCATION: ABDOMEN
LOCATION: ABDOMEN;THROAT

## 2024-06-13 ASSESSMENT — PAIN DESCRIPTION - PAIN TYPE: TYPE: ACUTE PAIN

## 2024-06-13 NOTE — ASSESSMENT & PLAN NOTE
- Saw patient in ER immediately following NGT placement  - Copious amount of light brown liquid pouring into cannister (~500mls in ~5 minutes)  - NPO, bowel rest  - PRN compazine for nausea/vomiting  - Consult with General Surgery

## 2024-06-13 NOTE — PROGRESS NOTES
END OF SHIFT NOTE:    INTAKE/OUTPUT  06/12 0701 - 06/13 0700  In: -   Out: 2100 [Urine:700]  Voiding: Yes  Catheter: No  Drain:   NG/OG/NJ/NE Tube Nasogastric 16 fr Right nostril (Active)   Surrounding Skin Clean, dry & intact 06/13/24 1839   Securement device Tape 06/13/24 1839   Status Suction-low intermittent 06/13/24 1839   Placement Verified Gastric Contents;Other (comment) 06/13/24 1839   NG/OG/NJ/NE External Measurement (cm) 60 cm 06/13/24 1839   Drainage Appearance Green 06/13/24 1839   Output (mL) 200 ml 06/13/24 1839               Flatus: Patient does have flatus present.    Stool:  occurrences.    Characteristics:  Stool Appearance: Watery  Stool Color: Green  Stool Amount: Medium  Stool Assessment  Incontinence: No  Stool Appearance: Watery  Stool Color: Green  Stool Amount: Medium  Stool Source: Rectum  Last BM (including prior to admit): 06/13/24    Emesis:  occurrences.    Characteristics:   Emesis Appearance: Green    VITAL SIGNS  Patient Vitals for the past 12 hrs:   Temp Pulse Resp BP SpO2   06/13/24 1546 99.1 °F (37.3 °C) 89 18 125/83 98 %   06/13/24 1253 -- -- 18 -- --   06/13/24 1223 -- -- 18 -- --   06/13/24 1200 98.8 °F (37.1 °C) 99 16 119/85 97 %   06/13/24 0829 -- -- 18 -- --   06/13/24 0735 98.2 °F (36.8 °C) 93 -- 133/67 97 %   06/13/24 0734 -- 93 18 -- 97 %       Pain Assessment  Pain Level: 3 (06/13/24 1253)  Pain Location: Abdomen, Nose  Patient's Stated Pain Goal: 0 - No pain    Ambulating  Yes to bathroom    Shift report given to oncoming nurse at the bedside.    Juan José Eldridge, RN

## 2024-06-13 NOTE — PROGRESS NOTES
NGT fell out of pts R kelvin. MD made aware ordered for replacement and KUB obtained. NGT noted in R nare taped at 60cm

## 2024-06-13 NOTE — H&P
Hospitalist History and Physical   Admit Date:  2024  9:24 PM   Name:  Reagan Sandra   Age:  73 y.o.  Sex:  male  :  1950   MRN:  [id number]     Presenting Complaint: abdominal pain  Reason(s) for Admission: Small bowel obstruction (HCC) [K56.609]  SBO (small bowel obstruction) (HCC) [K56.609]  Acute pancreatitis, unspecified complication status, unspecified pancreatitis type [K85.90]     History of Present Illness:   Reagan Sandra is a 73 y.o. male with medical history of HTN, COPD, pancreatitis who presented to ED with abdominal pain.  Pain started ~3 days ago.  Located mainly in epigastrium, but radiates to his back.  Associated nausea.  Upon ER evaluation, labs show Cr of 1.46.  Lipase is only 18.  CT shows:  IMPRESSION:  1.  Small bowel obstruction is identified new since last exam.  2.  Findings of acute pancreatitis is again seen.  A small 1.3 cm  focus of fluid attenuation is again seen in the uncinate process  region.  3.  Cholelithiasis is identified.  4.  Common duct dilation is again seen.  Hospitalist asked to admit.    Review of Systems:  10 systems reviewed and negative except as noted in HPI.  Assessment & Plan:   * SBO (small bowel obstruction) (HCC)  Assessment & Plan  - Saw patient in ER immediately following NGT placement  - Copious amount of light brown liquid pouring into cannister (~500mls in ~5 minutes)  - NPO, bowel rest  - PRN compazine for nausea/vomiting  - Consult with General Surgery    Pancreatitis  Assessment & Plan  - CT re-demonstrates acute pancreatitis despite normal lipase  - NPO  - PRN pain medications  - Advance diet as SBO and pancreatitis allows    Hypertension  Assessment & Plan  - Holding PO antihypertensives due to SBO and NGT status  - PRN IV hydralazine for elevated pressures    COPD (chronic obstructive pulmonary disease) (HCC)  Assessment & Plan  - Continue home nebulizers/inhalers        Disposition: inpatient      Past medical history  reviewed.    Past Medical History:   Diagnosis Date    Alcoholic pancreatitis 2021    per pt-- stopped drinking 2024--pt unsure exact dates- was admitted with dx 24    Arthritis     Asthma     albuterol prn (uses 1-2 times per day)    CAD (coronary artery disease) 2013    STENT X 1-- SAW DR GAUCHER. --after reviewing records not seen since     Chronic obstructive pulmonary disease (HCC)     Colon cancer (HCC)     SURG ONLY    Current smoker on some days     Dental disorder     Dyspepsia and other specified disorders of function of stomach     GERD (gastroesophageal reflux disease)     on med for control     GI bleed 2019    Hyperlipidemia 2015    on med    Hypertension     on med for control     MI (myocardial infarction) (HCC)     NSTEMI--- stent x 1---    Multiple renal cysts     Other ill-defined conditions(799.89)     hernia, pt unsure of site (resolved with surery)    Poor historian     pt \" acted very disinterested at time of phone assess. pt states didnt know his meds. would not go get them for us to review. denied anybody that helps him that i could call    Prostate cancer (HCC) 2018    surg only    Pulmonary emboli (HCC) 2019    Stroke (HCC) 2012    TIA; no residual      Past surgical history reviewed.    Past Surgical History:   Procedure Laterality Date    COLONOSCOPY  2018    HERNIA REPAIR      IL UNLISTED PROCEDURE CARDIAC SURGERY  2013    stent    PROSTATECTOMY  2018    PTCA      x 1    TOTAL COLECTOMY      due to polyp that could not be excised      Allergies   Allergen Reactions    Hydrochlorothiazide Anaphylaxis     Lips swelling    Lisinopril Swelling    Tramadol Itching and Rash      Social History     Tobacco Use    Smoking status: Former     Current packs/day: 0.00     Average packs/day: 0.5 packs/day for 50.0 years (25.0 ttl pk-yrs)     Types: Cigarettes     Start date: 1969     Quit date: 2019     Years since quittin.1    Smokeless tobacco:

## 2024-06-13 NOTE — PROGRESS NOTES
Hospitalist Progress Note   Admit Date:  2024  9:24 PM   Name:  Reagan Sandra   Age:  73 y.o.  Sex:  male  :  1950   MRN:  [id number]   Room:      Presenting/Chief Complaint: Abdominal Pain     Reason(s) for Admission: Small bowel obstruction (HCC) [K56.609]  SBO (small bowel obstruction) (HCC) [K56.609]  Acute pancreatitis, unspecified complication status, unspecified pancreatitis type [K85.90]     Hospital Course:   Reagan Sandra is a 73 y.o. male with medical history of HTN, COPD, pancreatitis who presented to ED on  with abdominal pain.  Recent discharge for pancreatitis 2/2 pancreatic mass. Plan at discharge for outpt EUS with GI.  Vitals and labs stable in the ER. CT abd pelvis with sbo, acute pancreatitis, cbd dilation.       Subjective & 24hr Events:   Pt known to me from prior admission. Last BM yesterday. Is passing gas this morning. Cannot provide me any further history.       Assessment & Plan:      SBO (small bowel obstruction) (HCC)  Ng tube  NPO, bowel rest  PRN compazine for nausea/vomiting  Consult with General Surgery - small bowel follow through today   Continue IV fluids and monitor renal function     Pancreatitis  Pancreatic mass   CT re-demonstrates acute pancreatitis despite normal lipase   NPO  PRN pain medications  Advance diet as SBO and pancreatitis allows  Per last discharge summary pt was to have outpt EUS with GI   Will hold off on inpatient GI consult due to SBO      Hypertension   Holding PO antihypertensives due to SBO and NGT status  BP currently WNL    PRN IV hydralazine for elevated pressures     COPD (chronic obstructive pulmonary disease) (HCC)  Continue home nebulizers/inhalers     Tobacco use disorder  Cessation discussion had  Nicotine patch ordered        Alcohol use disorder  Thiamine IV while NPO --> switch to po when able  Folic acid on hold until pt can take po        PT/OT evals and PPD needed/ordered?  Will hold on consult

## 2024-06-13 NOTE — PROGRESS NOTES
TRANSFER - IN REPORT:    Verbal report received from ED on Reagan Sandra  being received from eJn ALBERTO for routine progression of patient care      Report consisted of patient's Situation, Background, Assessment and   Recommendations(SBAR).     Information from the following report(s) ED Encounter Summary, ED SBAR, Intake/Output, MAR, and Recent Results was reviewed with the receiving nurse.    Opportunity for questions and clarification was provided.      Assessment completed upon patient's arrival to unit and care assumed.

## 2024-06-13 NOTE — PROGRESS NOTES
4 Eyes Skin Assessment     NAME:  Reagan Sandra  YOB: 1950  MEDICAL RECORD NUMBER:  [id number]    The patient is being assessed for  Admission    I agree that at least one RN has performed a thorough Head to Toe Skin Assessment on the patient. ALL assessment sites listed below have been assessed.      Areas assessed by both nurses:    Head, Face, Ears, Shoulders, Back, Chest, Arms, Elbows, Hands, Sacrum. Buttock, Coccyx, Ischium, Legs. Feet and Heels, and Under Medical Devices         Does the Patient have a Wound? No noted wound(s)       Preston Prevention initiated by RN: Yes  Wound Care Orders initiated by RN: No    Pressure Injury (Stage 3,4, Unstageable, DTI, NWPT, and Complex wounds) if present, place Wound referral order by RN under : No    New Ostomies, if present place, Ostomy referral order under : No     Nurse 1 eSignature: Electronically signed by Magdi Cardenas RN on 6/13/24 at 1:40 AM EDT    **SHARE this note so that the co-signing nurse can place an eSignature**    Nurse 2 eSignature: Electronically signed by Anne Marie Barton RN on 6/13/24 at 2:09 AM EDT

## 2024-06-13 NOTE — CONSULTS
H&P/Consult Note/Progress Note/Office Note:   Reagan Sandra  MRN: [id number]  :1950  Age:73 y.o.    HPI: Reagan Sandra is a 73 y.o. male with COPD/Asthma, HTN, CAD w PCI , CKD, hx of pancreatitis and cholelithiasis who we are asked by IM to see for SBO. The patient has a history of Total colectomy ? 2018 for a large polyp that could not be excised. He presented with abdominal pain - he is uncertain when the abdominal pain started or why. He is a poor historian and does not provide much history. Last BM yesterday (not diarrhea) and he may be still passing flatus. He was admitted on 2024 for SBO confirmed on CT A/P imaging on admission reviewed as below.     Past Surgical History: Total Colectomy (? 2018), Prostatectomy 2018  Colonoscopy: 2018  Anticoagulation: none prior to admission  Family Hx: non contributory    Admission Vital signs: 98.5, RR 22, HR 86, 137/102 BP  Admission Labs: WBC 4.6, Plts 304, Cr 1.41, Procal  0.16, LA 0.8, LFTs unremarkable        Past Medical History:   Diagnosis Date    Alcoholic pancreatitis 2021    per pt-- stopped drinking 2024--pt unsure exact dates- was admitted with dx 24    Arthritis     Asthma     albuterol prn (uses 1-2 times per day)    CAD (coronary artery disease)     STENT X 1-- SAW DR GAUCHER. --after reviewing records not seen since     Chronic obstructive pulmonary disease (HCC)     Colon cancer (HCC)     SURG ONLY    Current smoker on some days     Dental disorder     Dyspepsia and other specified disorders of function of stomach     GERD (gastroesophageal reflux disease)     on med for control     GI bleed 2019    Hyperlipidemia 2015    on med    Hypertension     on med for control     MI (myocardial infarction) (HCC)     NSTEMI--- stent x 1---    Multiple renal cysts     Other ill-defined conditions(799.89)     hernia, pt unsure of site (resolved with surery)    Poor historian     pt \" acted very  Dose: CTDI is 10.49 mGy. DLP is 532.85 mGy-cm.    Contrast Type: iopamidol (ISOVUE-370) 76 . Contrast Volume: 100 mL    Report signed on 06/12/2024 (23:03 Eastern Time)  Signed by: Arnold Guerrero M.D.  Reading Location: 46    US Result (most recent):  US VENOUS DOPPLER LOWER EXTREMITIES BILATERAL 06/12/2019    Narrative  Bilateral lower extremity venous ultrasound    INDICATION: Pulmonary embolus    Doppler ultrasound of both lower extremities was performed.    FINDINGS:  There is normal flow in the greater saphenous, common femoral,  superficial femoral, and popliteal veins. Normal compression and augmentation is  demonstrated. The proximal calf veins are also patent.    Impression  IMPRESSION: No evidence of deep venous thrombosis in either lower extremity        Admission date (for inpatients): 6/12/2024   * No surgery found *  * No surgery found *    ASSESSMENT/PLAN: 73 yr old male with SBO     Principal Problem:    SBO (small bowel obstruction) (HCC)  Active Problems:    COPD (chronic obstructive pulmonary disease) (HCC)    Hypertension    Pancreatitis  Resolved Problems:    * No resolved hospital problems. *     Agree with current medical management per Hospitalists  Continue NGT to LIWS, IVF, NPO  Trend electrolyes and replace prn  Monitor for Flatus and Bms  Treat for pain  Serial Abd exams  We hope to correct SBO with non surgical management as above.  Will follow with you       Signed:  ANNMARIE CRONIN - CNP   As above. GI evaluation to see if anything needs to be done with cystic pancreatic lesion or dilated CBD.  MD Wally.

## 2024-06-13 NOTE — CARE COORDINATION
RNCM met with patient in room 219 to discuss discharge planning.    CM assessment completed and documented in flowsheet. Patient lives alone in senior apartment. Patient has CLTC aide that assists with housekeeping, shopping, and transportation. Demographics and PCP verified. CM following for discharge needs.       06/13/24 4141   Service Assessment   Patient Orientation Alert and Oriented   Cognition Alert   History Provided By Patient   Primary Caregiver Self   Accompanied By/Relationship n/a   Support Systems Family Members;Home Care Staff   Patient's Healthcare Decision Maker is: Legal Next of Kin   PCP Verified by CM Yes   Last Visit to PCP Within last 3 months   Prior Functional Level Assistance with the following:;Cooking;Housework;Shopping   Current Functional Level Shopping;Housework;Cooking;Assistance with the following:   Can patient return to prior living arrangement Yes   Ability to make needs known: Good   Family able to assist with home care needs: No   Would you like for me to discuss the discharge plan with any other family members/significant others, and if so, who? No   Financial Resources Medicare;Medicaid   Community Resources ECF/Home Care   Social/Functional History   Lives With Alone   Type of Home Apartment   Home Access Level entry   Bathroom Equipment Grab bars around toilet;Grab bars in shower   Receives Help From Personal care attendant   ADL Assistance Independent   Ambulation Assistance Independent   Transfer Assistance Independent   Active  No   Occupation Retired   Discharge Planning   Type of Residence Apartment   Living Arrangements Alone   Current Services Prior To Admission Home Care   Type of Home Care Services Aide Services   Patient expects to be discharged to: Apartment   One/Two Story Residence One story

## 2024-06-13 NOTE — ED NOTES
TRANSFER - OUT REPORT:    Verbal report given to 219 RN on Reagan Sandra  being transferred to 219 for routine progression of patient care       Report consisted of patient's Situation, Background, Assessment and   Recommendations(SBAR).     Information from the following report(s) Nurse Handoff Report was reviewed with the receiving nurse.    Lines:   Peripheral IV 06/12/24 Right Antecubital (Active)        Opportunity for questions and clarification was provided.      Patient transported with:  Registered Nurse      Taras, Jen, RN  06/12/24 2189

## 2024-06-13 NOTE — ASSESSMENT & PLAN NOTE
- CT re-demonstrates acute pancreatitis despite normal lipase  - NPO  - PRN pain medications  - Advance diet as SBO and pancreatitis allows

## 2024-06-13 NOTE — ASSESSMENT & PLAN NOTE
- Holding PO antihypertensives due to SBO and NGT status  - PRN IV hydralazine for elevated pressures

## 2024-06-13 NOTE — ED TRIAGE NOTES
Patient arrives via EMS from home. Reports abdominal pain x3 days. States it radiates from epigastric area to his back. Endorses nausea. Hx pancreatitis. 3.75G zosyn and 200mL NS given en route. EMS vitals 136/84, 98.7, 110HR, 24RR, 97% room air.

## 2024-06-13 NOTE — CONSULTS
Renal note:   Seen and examined   Full consult is to follow    Elderly WM  Pale   On nasal cannula 4 liters. Per patient, he has been on home O2 4 liter for sometime  He complains of lower back pain  He lives at home with his daughter, able to walk to bathroom and kitchen with a walker  Was ok prior to the adm  Ana Paula Rhymes off a recliner and could not get up, stayed on floor for about 30 min  He is very edematous, in all 4 limbs and some  Dependent edema    Per his nurse, he has not urinated since yesterday 3 pm. However, patient stated that he might have urinated once this am    No cardoza cath  Will place cardoza cath and asked his nurse to perfectserve me with the urine volume  Will hold NS infusion for now      Very sleepy       Assessment and plan:    MCKENNA on the basis of CKD  Hypotension  Altered mental status  Lactic acidosis, presented with lactic acid level of 5.8  Anemia    Will place cardoza  Will get cortisol at am  He will need to be diuresed once he is more stable hemodynamically There is no distension.      Palpations: Abdomen is soft.      Tenderness: There is abdominal tenderness (mild generalized). There is no guarding or rebound.      Comments: Hypoactive bowel sounds. NGT in place; clamped. 400 cc bilious output total   Neurological:      General: No focal deficit present.      Mental Status: He is alert.   Psychiatric:         Mood and Affect: Mood normal.         Labs    CBC:  Recent Labs     06/12/24 2138 06/13/24  0352   WBC 4.6 4.6   RBC 3.44* 3.85*   HGB 9.5* 10.6*   HCT 31.3* 35.1*   MCV 91.0 91.2   RDW 15.1* 15.0*    335     CHEMISTRIES:  Recent Labs     06/12/24 2138 06/13/24  0352    137   K 3.7 3.9    106   CO2 24 23   BUN 16 14   CREATININE 1.46* 1.41*   GLUCOSE 105* 101*   MG 1.2*  --      PT/INR:No results for input(s): \"PROTIME\", \"INR\" in the last 72 hours.  APTT:No results for input(s): \"APTT\" in the last 72 hours.  LIVER PROFILE:  Recent Labs     06/12/24 2138 06/13/24  0352   AST 20 21   ALT 14 14   BILITOT 0.4 0.4   ALKPHOS 103 115       Imaging/Diagnostics   XR ABDOMEN (KUB) (SINGLE AP VIEW)    Result Date: 6/13/2024  1.  Dilated loops of small bowel are seen in the upper abdomen. 2.  NG tube tip is in the body of the stomach. Report signed on 06/13/2024 (01:49 Eastern Time) Signed by: Arnold Guerrero M.D. Reading Location: 46    CT ABDOMEN PELVIS W IV CONTRAST Additional Contrast? None    Result Date: 6/12/2024  1.  Small bowel obstruction is identified new since last exam. 2.  Findings of acute pancreatitis is again seen.  A small 1.3 cm focus of fluid attenuation is again seen in the uncinate process region. 3.  Cholelithiasis is identified. 4.  Common duct dilation is again seen. Automatic exposure control was used as a dose lowering technique. Radiation Dose: CTDI is 10.49 mGy. DLP is 532.85 mGy-cm. Contrast Type: iopamidol (ISOVUE-370) 76 . Contrast Volume: 100 mL Report signed on 06/12/2024 (23:03 Eastern Time) Signed by: Arnold Guerrero M.D.

## 2024-06-14 PROBLEM — K56.609 SMALL BOWEL OBSTRUCTION (HCC): Status: ACTIVE | Noted: 2024-06-14

## 2024-06-14 LAB
ALBUMIN SERPL-MCNC: 2.4 G/DL (ref 3.2–4.6)
ALBUMIN/GLOB SERPL: 0.8 (ref 1–1.9)
ALP SERPL-CCNC: 98 U/L (ref 40–129)
ALT SERPL-CCNC: 10 U/L (ref 12–65)
ANION GAP SERPL CALC-SCNC: 8 MMOL/L (ref 9–18)
AST SERPL-CCNC: 14 U/L (ref 15–37)
BASOPHILS # BLD: 0 K/UL (ref 0–0.2)
BASOPHILS NFR BLD: 0 % (ref 0–2)
BILIRUB SERPL-MCNC: 0.3 MG/DL (ref 0–1.2)
BUN SERPL-MCNC: 11 MG/DL (ref 8–23)
CALCIUM SERPL-MCNC: 9.1 MG/DL (ref 8.8–10.2)
CHLORIDE SERPL-SCNC: 109 MMOL/L (ref 98–107)
CO2 SERPL-SCNC: 24 MMOL/L (ref 20–28)
CREAT SERPL-MCNC: 1.03 MG/DL (ref 0.8–1.3)
DIFFERENTIAL METHOD BLD: ABNORMAL
EOSINOPHIL # BLD: 0.3 K/UL (ref 0–0.8)
EOSINOPHIL NFR BLD: 6 % (ref 0.5–7.8)
ERYTHROCYTE [DISTWIDTH] IN BLOOD BY AUTOMATED COUNT: 14.8 % (ref 11.9–14.6)
GLOBULIN SER CALC-MCNC: 3 G/DL (ref 2.3–3.5)
GLUCOSE SERPL-MCNC: 85 MG/DL (ref 70–99)
HCT VFR BLD AUTO: 30.1 % (ref 41.1–50.3)
HGB BLD-MCNC: 9.3 G/DL (ref 13.6–17.2)
IMM GRANULOCYTES # BLD AUTO: 0 K/UL (ref 0–0.5)
IMM GRANULOCYTES NFR BLD AUTO: 0 % (ref 0–5)
LYMPHOCYTES # BLD: 1.1 K/UL (ref 0.5–4.6)
LYMPHOCYTES NFR BLD: 22 % (ref 13–44)
MAGNESIUM SERPL-MCNC: 1.1 MG/DL (ref 1.8–2.4)
MCH RBC QN AUTO: 27.4 PG (ref 26.1–32.9)
MCHC RBC AUTO-ENTMCNC: 30.9 G/DL (ref 31.4–35)
MCV RBC AUTO: 88.5 FL (ref 82–102)
MONOCYTES # BLD: 0.3 K/UL (ref 0.1–1.3)
MONOCYTES NFR BLD: 6 % (ref 4–12)
NEUTS SEG # BLD: 3.1 K/UL (ref 1.7–8.2)
NEUTS SEG NFR BLD: 66 % (ref 43–78)
NRBC # BLD: 0 K/UL (ref 0–0.2)
PHOSPHATE SERPL-MCNC: 2.3 MG/DL (ref 2.5–4.5)
PLATELET # BLD AUTO: 321 K/UL (ref 150–450)
PMV BLD AUTO: 10.2 FL (ref 9.4–12.3)
POTASSIUM SERPL-SCNC: 3.4 MMOL/L (ref 3.5–5.1)
PROT SERPL-MCNC: 5.4 G/DL (ref 6.3–8.2)
RBC # BLD AUTO: 3.4 M/UL (ref 4.23–5.6)
SODIUM SERPL-SCNC: 141 MMOL/L (ref 136–145)
WBC # BLD AUTO: 4.7 K/UL (ref 4.3–11.1)

## 2024-06-14 PROCEDURE — 6370000000 HC RX 637 (ALT 250 FOR IP): Performed by: STUDENT IN AN ORGANIZED HEALTH CARE EDUCATION/TRAINING PROGRAM

## 2024-06-14 PROCEDURE — 94760 N-INVAS EAR/PLS OXIMETRY 1: CPT

## 2024-06-14 PROCEDURE — 94640 AIRWAY INHALATION TREATMENT: CPT

## 2024-06-14 PROCEDURE — 97161 PT EVAL LOW COMPLEX 20 MIN: CPT

## 2024-06-14 PROCEDURE — 99232 SBSQ HOSP IP/OBS MODERATE 35: CPT | Performed by: INTERNAL MEDICINE

## 2024-06-14 PROCEDURE — 85025 COMPLETE CBC W/AUTO DIFF WBC: CPT

## 2024-06-14 PROCEDURE — 97165 OT EVAL LOW COMPLEX 30 MIN: CPT

## 2024-06-14 PROCEDURE — 84100 ASSAY OF PHOSPHORUS: CPT

## 2024-06-14 PROCEDURE — 83735 ASSAY OF MAGNESIUM: CPT

## 2024-06-14 PROCEDURE — 2580000003 HC RX 258: Performed by: STUDENT IN AN ORGANIZED HEALTH CARE EDUCATION/TRAINING PROGRAM

## 2024-06-14 PROCEDURE — 6370000000 HC RX 637 (ALT 250 FOR IP): Performed by: FAMILY MEDICINE

## 2024-06-14 PROCEDURE — 6360000002 HC RX W HCPCS: Performed by: STUDENT IN AN ORGANIZED HEALTH CARE EDUCATION/TRAINING PROGRAM

## 2024-06-14 PROCEDURE — 1100000000 HC RM PRIVATE

## 2024-06-14 PROCEDURE — 36415 COLL VENOUS BLD VENIPUNCTURE: CPT

## 2024-06-14 PROCEDURE — 6360000002 HC RX W HCPCS: Performed by: FAMILY MEDICINE

## 2024-06-14 PROCEDURE — 2580000003 HC RX 258: Performed by: FAMILY MEDICINE

## 2024-06-14 PROCEDURE — 80053 COMPREHEN METABOLIC PANEL: CPT

## 2024-06-14 RX ORDER — LOSARTAN POTASSIUM 50 MG/1
50 TABLET ORAL DAILY
Status: DISCONTINUED | OUTPATIENT
Start: 2024-06-14 | End: 2024-06-22 | Stop reason: HOSPADM

## 2024-06-14 RX ORDER — OXYBUTYNIN CHLORIDE 5 MG/1
5 TABLET, EXTENDED RELEASE ORAL DAILY
Status: DISCONTINUED | OUTPATIENT
Start: 2024-06-14 | End: 2024-06-22 | Stop reason: HOSPADM

## 2024-06-14 RX ORDER — FOLIC ACID 1 MG/1
1 TABLET ORAL DAILY
Status: DISCONTINUED | OUTPATIENT
Start: 2024-06-14 | End: 2024-06-22 | Stop reason: HOSPADM

## 2024-06-14 RX ORDER — LANOLIN ALCOHOL/MO/W.PET/CERES
100 CREAM (GRAM) TOPICAL DAILY
Status: DISCONTINUED | OUTPATIENT
Start: 2024-06-15 | End: 2024-06-22 | Stop reason: HOSPADM

## 2024-06-14 RX ORDER — ALLOPURINOL 300 MG/1
300 TABLET ORAL 2 TIMES DAILY
Status: DISCONTINUED | OUTPATIENT
Start: 2024-06-14 | End: 2024-06-22 | Stop reason: HOSPADM

## 2024-06-14 RX ADMIN — TIOTROPIUM BROMIDE INHALATION SPRAY 2 PUFF: 3.12 SPRAY, METERED RESPIRATORY (INHALATION) at 07:32

## 2024-06-14 RX ADMIN — MORPHINE SULFATE 2 MG: 2 INJECTION, SOLUTION INTRAMUSCULAR; INTRAVENOUS at 02:43

## 2024-06-14 RX ADMIN — MAGNESIUM SULFATE HEPTAHYDRATE 2000 MG: 40 INJECTION, SOLUTION INTRAVENOUS at 06:05

## 2024-06-14 RX ADMIN — SODIUM CHLORIDE, PRESERVATIVE FREE 10 ML: 5 INJECTION INTRAVENOUS at 20:06

## 2024-06-14 RX ADMIN — LOSARTAN POTASSIUM 50 MG: 50 TABLET, FILM COATED ORAL at 14:10

## 2024-06-14 RX ADMIN — BUDESONIDE AND FORMOTEROL FUMARATE DIHYDRATE 2 PUFF: 160; 4.5 AEROSOL RESPIRATORY (INHALATION) at 19:54

## 2024-06-14 RX ADMIN — BUDESONIDE AND FORMOTEROL FUMARATE DIHYDRATE 2 PUFF: 160; 4.5 AEROSOL RESPIRATORY (INHALATION) at 07:32

## 2024-06-14 RX ADMIN — MORPHINE SULFATE 2 MG: 2 INJECTION, SOLUTION INTRAMUSCULAR; INTRAVENOUS at 07:21

## 2024-06-14 RX ADMIN — PHENOL 1 SPRAY: 1.5 LIQUID ORAL at 08:15

## 2024-06-14 RX ADMIN — MAGNESIUM SULFATE HEPTAHYDRATE 2000 MG: 40 INJECTION, SOLUTION INTRAVENOUS at 08:08

## 2024-06-14 RX ADMIN — THIAMINE HYDROCHLORIDE 100 MG: 100 INJECTION, SOLUTION INTRAMUSCULAR; INTRAVENOUS at 09:16

## 2024-06-14 RX ADMIN — SODIUM CHLORIDE, POTASSIUM CHLORIDE, SODIUM LACTATE AND CALCIUM CHLORIDE: 600; 310; 30; 20 INJECTION, SOLUTION INTRAVENOUS at 03:59

## 2024-06-14 RX ADMIN — ENOXAPARIN SODIUM 40 MG: 40 INJECTION SUBCUTANEOUS at 09:20

## 2024-06-14 RX ADMIN — OXYBUTYNIN CHLORIDE 5 MG: 5 TABLET, EXTENDED RELEASE ORAL at 15:36

## 2024-06-14 RX ADMIN — FOLIC ACID 1 MG: 1 TABLET ORAL at 14:10

## 2024-06-14 RX ADMIN — ALLOPURINOL 300 MG: 300 TABLET ORAL at 20:06

## 2024-06-14 ASSESSMENT — PAIN DESCRIPTION - ORIENTATION
ORIENTATION: MID
ORIENTATION: ANTERIOR;MID
ORIENTATION: ANTERIOR

## 2024-06-14 ASSESSMENT — PAIN SCALES - GENERAL
PAINLEVEL_OUTOF10: 2
PAINLEVEL_OUTOF10: 8
PAINLEVEL_OUTOF10: 2
PAINLEVEL_OUTOF10: 7
PAINLEVEL_OUTOF10: 3

## 2024-06-14 ASSESSMENT — PAIN DESCRIPTION - LOCATION
LOCATION: ABDOMEN
LOCATION: ABDOMEN
LOCATION: THROAT

## 2024-06-14 ASSESSMENT — PAIN - FUNCTIONAL ASSESSMENT
PAIN_FUNCTIONAL_ASSESSMENT: PREVENTS OR INTERFERES SOME ACTIVE ACTIVITIES AND ADLS
PAIN_FUNCTIONAL_ASSESSMENT: ACTIVITIES ARE NOT PREVENTED
PAIN_FUNCTIONAL_ASSESSMENT: PREVENTS OR INTERFERES SOME ACTIVE ACTIVITIES AND ADLS

## 2024-06-14 ASSESSMENT — PAIN DESCRIPTION - DESCRIPTORS
DESCRIPTORS: ACHING
DESCRIPTORS: ACHING
DESCRIPTORS: SORE

## 2024-06-14 ASSESSMENT — PAIN DESCRIPTION - PAIN TYPE: TYPE: ACUTE PAIN

## 2024-06-14 NOTE — CARE COORDINATION
Chart reviewed by RNCM.    CM following for continued stay.    Patient admitted 6/12 with SBO.    Hospitalist, GI, and General Surgery following.    NGT removed, +BM/+flatus.    Plan for EUS on Monday.    Referral made to [facility] for alcohol cessation counseling.     Please consult case management if any additional discharge needs arise.

## 2024-06-14 NOTE — CARE COORDINATION
Patient admits to struggling with ETOH. He believes he won't have a hard time giving up the drink. Says he gave up smoking crack about 8 years ago and believes this will be the same way. Says he has a great support network with his Yazidi.

## 2024-06-14 NOTE — THERAPY EVALUATION
Treatment: no c/o pain       Vitals          Oxygen   Room air          GROSS EVALUATION: INTACT IMPAIRED   (See Comments)   UE AROM [x] []   UE PROM [x] []   Strength [x]       Posture / Balance [x] Posture: Good  Sitting - Static: Good  Sitting - Dynamic: Good  Standing - Static: Good  Standing - Dynamic: Good   Sensation [x]     Coordination [x]       Tone []       Edema []    Activity Tolerance [x] Patient Tolerated treatment well     Hand Dominance R [] L []      COGNITION/  PERCEPTION: INTACT IMPAIRED   (See Comments)   Orientation [x]     Vision [x]     Hearing [x]     Cognition  [x]     Perception [x]       MOBILITY: I Mod I S SBA CGA Min Mod Max Total  NT x2 Comments:   Bed Mobility    Rolling [x] [] [] [] [] [] [] [] [] [] []    Supine to Sit [x] [] [] [] [] [] [] [] [] [] []    Scooting [x] [] [] [] [] [] [] [] [] [] []    Sit to Supine [x] [] [] [] [] [] [] [] [] [] []    Transfers    Sit to Stand [x] [] [] [] [] [] [] [] [] [] []    Bed to Chair [] [] [] [] [] [] [] [] [] [] []    Stand to Sit [x] [] [] [] [] [] [] [] [] [] []    Tub/Shower [] [] [] [] [] [] [] [] [] [] []     Toilet [] [] [] [] [] [] [] [] [] [] []      [] [] [] [] [] [] [] [] [] [] []    I=Independent, Mod I=Modified Independent, S=Supervision/Setup, SBA=Standby Assistance, CGA=Contact Guard Assistance, Min=Minimal Assistance, Mod=Moderate Assistance, Max=Maximal Assistance, Total=Total Assistance, NT=Not Tested    ACTIVITIES OF DAILY LIVING: I Mod I S SBA CGA Min Mod Max Total NT Comments   BASIC ADLs:              Upper Body Bathing  [] [] [] [] [] [] [] [] [] []     Lower Body Bathing [] [] [] [] [] [] [] [] [] []     Toileting [] [] [] [] [] [] [] [] [] []    Upper Body Dressing [] [] [] [] [] [] [] [] [] []    Lower Body Dressing [x] [] [] [] [] [] [] [] [] [] Sitting edge of bed, doff/don socks   Feeding [] [] [] [] [] [] [] [] [] []    Grooming [] [] [] [] [] [] [] [] [] []    Personal Device Care [] [] [] [] [] [] [] [] [] []     Functional Mobility [x] [] [] [] [] [] [] [] [] [] No AD   I=Independent, Mod I=Modified Independent, S=Supervision/Setup, SBA=Standby Assistance, CGA=Contact Guard Assistance, Min=Minimal Assistance, Mod=Moderate Assistance, Max=Maximal Assistance, Total=Total Assistance, NT=Not Tested    PLAN:   FREQUENCY/DURATION   OT Plan of Care:  (Eval & d/c) for duration of hospital stay or until stated goals are met, whichever comes first.    PROBLEM LIST:   (Skilled intervention is medically necessary to address:)  Skilled intervention is not medically necessary at this time.   INTERVENTIONS PLANNED:  (Benefits and precautions of occupational therapy have been discussed with the patient.)  Skilled intervention is not medically necessary at this time.         TREATMENT:     EVALUATION: LOW COMPLEXITY: (Untimed Charge)  The initial evaluation charge encompasses clinical chart review, objective assessment, interpretation of assessment, and skilled monitoring of the patient's response to treatment in order to develop a plan of care.     TREATMENT GRID:  N/A    AFTER TREATMENT PRECAUTIONS: Bed, Bed/Chair Locked, Call light within reach, Needs within reach, and RN notified    INTERDISCIPLINARY COLLABORATION:  RN/ PCT, PT/ PTA, and OT/ BLANCO    EDUCATION:  Education Given To: Patient  Education Provided: Role of Therapy;Plan of Care  Education Method: Verbal  Education Outcome: Verbalized understanding    TOTAL TREATMENT DURATION AND TIME:  Time In: 1356  Time Out: 1404  Minutes: 8    NADEEM PHAM OT

## 2024-06-14 NOTE — PLAN OF CARE
Problem: Respiratory - Adult  Goal: Achieves optimal ventilation and oxygenation  Outcome: Progressing  Flowsheets (Taken 6/14/2024 1757)  Achieves optimal ventilation and oxygenation:   Assess for changes in respiratory status   Respiratory therapy support as indicated

## 2024-06-14 NOTE — THERAPY EVALUATION
PHYSICAL THERAPY Initial Assessment and PM  (Link to Caseload Tracking: PT Visit Days : 1  Acknowledge Orders  Time In/Out  PT Charge Capture  Rehab Caseload Tracker    Reagan Sandra is a 73 y.o. male   PRIMARY DIAGNOSIS: SBO (small bowel obstruction) (HCC)  Small bowel obstruction (HCC) [K56.609]  SBO (small bowel obstruction) (HCC) [K56.609]  Acute pancreatitis, unspecified complication status, unspecified pancreatitis type [K85.90]       Reason for Referral: Other abnormalities of gait and mobility (R26.89)  Inpatient: Payor: [employer] MEDICARE / Plan: [employer] MMP DUAL / Product Type: *No Product type* /     ASSESSMENT:     REHAB RECOMMENDATIONS:   Recommendation to date pending progress:  Setting:  No further skilled physical therapy after discharge from hospital    Equipment:    None     ASSESSMENT:  Mr. Sandra is a 73 year old male admitted with small bowel obstruction.  Pt independent with all functional mobility and ADL's at baseline without use of assistive device.  Pt agreeable to mobilizing in room.  Pt reports he has been up and down to the bathroom.  Pt able to mobilize in room with independence, no gait or balance deficits noted.  Pt with no additional acute PT needs.  Pt educated to continue mobility in room and hallway while in house, pt verbalized understanding and agreed.  Discharge pt from acute PT at this time.     [facility] AM-PAC™ “6 Clicks” Basic Mobility Inpatient Short Form  AM-PAC Basic Mobility - Inpatient   How much help is needed turning from your back to your side while in a flat bed without using bedrails?: None  How much help is needed moving from lying on your back to sitting on the side of a flat bed without using bedrails?: None  How much help is needed moving to and from a bed to a chair?: None  How much help is needed standing up from a chair using your arms?: None  How much help is needed walking in hospital room?: None  How much help is

## 2024-06-14 NOTE — PROGRESS NOTES
Reagan Sandra is 73 y.o. y/o male     Initial consult: See consult note from 6/13: Surgery requesting evaluation of CBD dilation IP given upcoming OP EUS for evaluation of CBD dilation/pancreatic cyst at uncinate process/ possible ampullary obstruction with normal labs. Patient initially admitted this admission for SBO and pancreatitis. Planning for EUS IP on 6/17 pending SBO has resolved.    Today: Patient passing several BM; NGT was removed. Still with some mild generalized abdominal pain.    Labs: Hgb 9.3 (10.6); TB and LFT remain unremarkable.     PE:   Vitals:    06/14/24 1112   BP: (!) 152/87   Pulse: 84   Resp: 17   Temp: 98.1 °F (36.7 °C)   SpO2: 97%      General:  The patient appears well-nourished, and is in no acute distress.    HEENT:  Normocephalic, atraumatic. No sclerae icterus.   Cardiovascular:  Regular rate and rhythm.     Abdomen:  Soft, non tender to palpation. No distention. Normoactive bowel sounds present.    Neurologic:  Alert and oriented x3.  Psychiatric: Appropriate mood and affect.    Assessment and Plan:   #CBD dilation/pancreatic cystic lesion: SBO has resolved; patient passing BM with NABS and NGT removed. Will remain IP for EUS on Monday. Advance diet as tolerated. NPO at MN on Monday. Hold Lovenox at MN on Monday.     Electronically signed by Valentin Avila PA-C.      Physician Attestation:   I have personally discussed the patient's management with the GI PA.  I have fully participated in the care of this patient and have provided the substantive portion including the entire medical decision making (MDM) for this split/shared patient encounter. I have personally reviewed all pertinent clinical information, including history, physical exam, and plan. I have personally reviewed and approved all orders. I agree with the note as written unless otherwise specified below.     Review of Systems: A complete 10-point ROS was conducted - all pertinent positives are per the  HPI/Subjective portion of this note, all others are negative.     74 yo M  with abnormal imaging showing dilated PD as well as CBD, need to rule out ampullary lesion, stricture, etc, pending evaluation with EUS by Dr Caro. Recovering from SBO at this time, plan for EUS on Monday if patient continues to improve. NPO after MN on Sunday for anticipated procedure on Monday.     Thank you for involving the GI service in the care of your patient. Please dont hesitate to call me directly on my cell below with any questions, updates, etc.      -Glen Brewer MD  [phone]   Gastroenterology/Hepatology

## 2024-06-14 NOTE — PROGRESS NOTES
Absolute 0.2 0.0 - 0.8 K/UL    Basophils Absolute 0.0 0.0 - 0.2 K/UL    Immature Granulocytes Absolute 0.0 0.0 - 0.5 K/UL   Comprehensive Metabolic Panel w/ Reflex to MG    Collection Time: 06/14/24  4:55 AM   Result Value Ref Range    Sodium 141 136 - 145 mmol/L    Potassium 3.4 (L) 3.5 - 5.1 mmol/L    Chloride 109 (H) 98 - 107 mmol/L    CO2 24 20 - 28 mmol/L    Anion Gap 8 (L) 9 - 18 mmol/L    Glucose 85 70 - 99 mg/dL    BUN 11 8 - 23 MG/DL    Creatinine 1.03 0.80 - 1.30 MG/DL    Est, Glom Filt Rate 77 >60 ml/min/1.73m2    Calcium 9.1 8.8 - 10.2 MG/DL    Total Bilirubin 0.3 0.0 - 1.2 MG/DL    ALT 10 (L) 12 - 65 U/L    AST 14 (L) 15 - 37 U/L    Alk Phosphatase 98 40 - 129 U/L    Total Protein 5.4 (L) 6.3 - 8.2 g/dL    Albumin 2.4 (L) 3.2 - 4.6 g/dL    Globulin 3.0 2.3 - 3.5 g/dL    Albumin/Globulin Ratio 0.8 (L) 1.0 - 1.9     CBC with Auto Differential    Collection Time: 06/14/24  4:55 AM   Result Value Ref Range    WBC 4.7 4.3 - 11.1 K/uL    RBC 3.40 (L) 4.23 - 5.6 M/uL    Hemoglobin 9.3 (L) 13.6 - 17.2 g/dL    Hematocrit 30.1 (L) 41.1 - 50.3 %    MCV 88.5 82 - 102 FL    MCH 27.4 26.1 - 32.9 PG    MCHC 30.9 (L) 31.4 - 35.0 g/dL    RDW 14.8 (H) 11.9 - 14.6 %    Platelets 321 150 - 450 K/uL    MPV 10.2 9.4 - 12.3 FL    nRBC 0.00 0.0 - 0.2 K/uL    Differential Type AUTOMATED      Neutrophils % 66 43 - 78 %    Lymphocytes % 22 13 - 44 %    Monocytes % 6 4.0 - 12.0 %    Eosinophils % 6 0.5 - 7.8 %    Basophils % 0 0.0 - 2.0 %    Immature Granulocytes % 0 0.0 - 5.0 %    Neutrophils Absolute 3.1 1.7 - 8.2 K/UL    Lymphocytes Absolute 1.1 0.5 - 4.6 K/UL    Monocytes Absolute 0.3 0.1 - 1.3 K/UL    Eosinophils Absolute 0.3 0.0 - 0.8 K/UL    Basophils Absolute 0.0 0.0 - 0.2 K/UL    Immature Granulocytes Absolute 0.0 0.0 - 0.5 K/UL   Magnesium    Collection Time: 06/14/24  4:55 AM   Result Value Ref Range    Magnesium 1.1 (LL) 1.8 - 2.4 mg/dL   Phosphorus    Collection Time: 06/14/24  4:55 AM   Result Value Ref Range        prochlorperazine (COMPAZINE) injection 10 mg  10 mg IntraVENous Q6H PRN       Signed:  Chasidy Loomis MD    Part of this note may have been written by using a voice dictation software.  The note has been proof read but may still contain some grammatical/other typographical errors.

## 2024-06-14 NOTE — PROGRESS NOTES
General Surgery Progress Note    6/14/2024    Admit Date: 6/12/2024    Subjective:   Surgery     Small bowel follow through was negative yesterday. SBFT showed:    CLINICAL HISTORY: Small bowel obstruction     COMPARISON: Recent CT abdomen/pelvis 1 day prior     TECHNIQUE: Gastrografin was injected via NG tube and multiple sequential  radiographs were obtained.  No fluoroscopy performed.      FINDINGS:  radiograph demonstrates NG tube with tip in the stomach.  Air  dilatation of multiple proximal small bowel loops similar to recent CT.   Contrast material in the bladder.  Left hip replacement.     The immediate image demonstrates a small hiatal hernia.  The 1 hour image  demonstrates gastroesophageal reflux with filling of the distal esophagus with  contrast.  Oral contrast fills the dilated proximal small bowel on the 1 hour  and 2-hour imaging.  There is oral contrast filling nondilated small bowel in  the pelvis on the 2-hour image, without contrast extension into the colon.  At 3  hours, contrast is identified throughout the colon and rectum.  Persistent oral  contrast in the dilated proximal small bowel and nondilated distal small bowel.     IMPRESSION:  Small bowel ileus versus partial small bowel obstruction, with dilatation of  multiple proximal small bowel loops similar to recent CT.  Contrast identified  throughout the colon and rectum 3 hours post administration.    The patient had several bowel movements last night.     Objective:     BP (!) 144/80   Pulse 74   Temp 98.1 °F (36.7 °C) (Oral)   Resp 16   Ht 1.854 m (6' 1\")   Wt 83.9 kg (185 lb)   SpO2 97%   BMI 24.41 kg/m²       Intake/Output Summary (Last 24 hours) at 6/14/2024 1030  Last data filed at 6/14/2024 0712  Gross per 24 hour   Intake 2935.78 ml   Output 2445 ml   Net 490.78 ml        EXAM:  ABD soft, less distended, active BS'S. No tenderness to palpation.        Data Review    Recent Results (from the past 24 hour(s))

## 2024-06-14 NOTE — PROGRESS NOTES
END OF SHIFT NOTE:    INTAKE/OUTPUT  06/13 0701 - 06/14 0700  In: 2935.8 [I.V.:2935.8]  Out: 2200 [Urine:1200]  Voiding: Yes  Catheter: No  Drain:              Flatus: Patient does have flatus present.    Stool:  occurrences.    Characteristics:  Stool Appearance: Watery  Stool Color: Green  Stool Amount: Medium  Stool Assessment  Incontinence: No  Stool Appearance: Watery  Stool Color: Green  Stool Amount: Medium  Stool Source: Rectum  Last BM (including prior to admit): 06/13/24    Emesis:  occurrences.    Characteristics:   Emesis Appearance: Green    VITAL SIGNS  Patient Vitals for the past 12 hrs:   Temp Pulse Resp BP SpO2   06/14/24 1521 97.9 °F (36.6 °C) 67 17 (!) 144/91 100 %   06/14/24 1112 98.1 °F (36.7 °C) 84 17 (!) 152/87 97 %   06/14/24 0751 -- -- 18 -- --   06/14/24 0732 -- 74 16 -- 97 %   06/14/24 0721 -- -- 18 -- --       Pain Assessment  Pain Level: 2 (06/14/24 0845)  Pain Location: Throat  Patient's Stated Pain Goal: 0 - No pain    Ambulating  Yes    Shift report given to oncoming nurse at the bedside.    Juan José Eldridge RN

## 2024-06-14 NOTE — ACP (ADVANCE CARE PLANNING)
Advance Care Planning   Healthcare Decision Maker:    Primary Decision Maker: Abraham Rosario - Brother/Sister    Click here to complete Healthcare Decision Makers including selection of the Healthcare Decision Maker Relationship (ie \"Primary\").

## 2024-06-15 LAB
ALBUMIN SERPL-MCNC: 2.4 G/DL (ref 3.2–4.6)
ALBUMIN/GLOB SERPL: 0.8 (ref 1–1.9)
ALP SERPL-CCNC: 94 U/L (ref 40–129)
ALT SERPL-CCNC: 11 U/L (ref 12–65)
ANION GAP SERPL CALC-SCNC: 9 MMOL/L (ref 9–18)
AST SERPL-CCNC: 18 U/L (ref 15–37)
BASOPHILS # BLD: 0 K/UL (ref 0–0.2)
BASOPHILS NFR BLD: 1 % (ref 0–2)
BILIRUB SERPL-MCNC: 0.3 MG/DL (ref 0–1.2)
BUN SERPL-MCNC: 7 MG/DL (ref 8–23)
CALCIUM SERPL-MCNC: 8.9 MG/DL (ref 8.8–10.2)
CHLORIDE SERPL-SCNC: 104 MMOL/L (ref 98–107)
CO2 SERPL-SCNC: 26 MMOL/L (ref 20–28)
CREAT SERPL-MCNC: 0.84 MG/DL (ref 0.8–1.3)
DIFFERENTIAL METHOD BLD: ABNORMAL
EOSINOPHIL # BLD: 0.3 K/UL (ref 0–0.8)
EOSINOPHIL NFR BLD: 6 % (ref 0.5–7.8)
ERYTHROCYTE [DISTWIDTH] IN BLOOD BY AUTOMATED COUNT: 14.7 % (ref 11.9–14.6)
GLOBULIN SER CALC-MCNC: 3 G/DL (ref 2.3–3.5)
GLUCOSE SERPL-MCNC: 95 MG/DL (ref 70–99)
HCT VFR BLD AUTO: 29.4 % (ref 41.1–50.3)
HGB BLD-MCNC: 9.3 G/DL (ref 13.6–17.2)
IMM GRANULOCYTES # BLD AUTO: 0 K/UL (ref 0–0.5)
IMM GRANULOCYTES NFR BLD AUTO: 0 % (ref 0–5)
LYMPHOCYTES # BLD: 1.2 K/UL (ref 0.5–4.6)
LYMPHOCYTES NFR BLD: 28 % (ref 13–44)
MAGNESIUM SERPL-MCNC: 1.7 MG/DL (ref 1.8–2.4)
MCH RBC QN AUTO: 27.4 PG (ref 26.1–32.9)
MCHC RBC AUTO-ENTMCNC: 31.6 G/DL (ref 31.4–35)
MCV RBC AUTO: 86.7 FL (ref 82–102)
MONOCYTES # BLD: 0.4 K/UL (ref 0.1–1.3)
MONOCYTES NFR BLD: 9 % (ref 4–12)
NEUTS SEG # BLD: 2.3 K/UL (ref 1.7–8.2)
NEUTS SEG NFR BLD: 56 % (ref 43–78)
NRBC # BLD: 0 K/UL (ref 0–0.2)
PLATELET # BLD AUTO: 330 K/UL (ref 150–450)
PMV BLD AUTO: 10 FL (ref 9.4–12.3)
POTASSIUM SERPL-SCNC: 3.1 MMOL/L (ref 3.5–5.1)
PROT SERPL-MCNC: 5.4 G/DL (ref 6.3–8.2)
RBC # BLD AUTO: 3.39 M/UL (ref 4.23–5.6)
SODIUM SERPL-SCNC: 138 MMOL/L (ref 136–145)
WBC # BLD AUTO: 4.1 K/UL (ref 4.3–11.1)

## 2024-06-15 PROCEDURE — 85025 COMPLETE CBC W/AUTO DIFF WBC: CPT

## 2024-06-15 PROCEDURE — 6370000000 HC RX 637 (ALT 250 FOR IP): Performed by: NURSE PRACTITIONER

## 2024-06-15 PROCEDURE — C9113 INJ PANTOPRAZOLE SODIUM, VIA: HCPCS | Performed by: STUDENT IN AN ORGANIZED HEALTH CARE EDUCATION/TRAINING PROGRAM

## 2024-06-15 PROCEDURE — 36415 COLL VENOUS BLD VENIPUNCTURE: CPT

## 2024-06-15 PROCEDURE — 1100000000 HC RM PRIVATE

## 2024-06-15 PROCEDURE — 80053 COMPREHEN METABOLIC PANEL: CPT

## 2024-06-15 PROCEDURE — 6370000000 HC RX 637 (ALT 250 FOR IP): Performed by: FAMILY MEDICINE

## 2024-06-15 PROCEDURE — 94640 AIRWAY INHALATION TREATMENT: CPT

## 2024-06-15 PROCEDURE — 6360000002 HC RX W HCPCS: Performed by: FAMILY MEDICINE

## 2024-06-15 PROCEDURE — 2580000003 HC RX 258: Performed by: FAMILY MEDICINE

## 2024-06-15 PROCEDURE — 94760 N-INVAS EAR/PLS OXIMETRY 1: CPT

## 2024-06-15 PROCEDURE — 6360000002 HC RX W HCPCS: Performed by: STUDENT IN AN ORGANIZED HEALTH CARE EDUCATION/TRAINING PROGRAM

## 2024-06-15 PROCEDURE — 6370000000 HC RX 637 (ALT 250 FOR IP): Performed by: STUDENT IN AN ORGANIZED HEALTH CARE EDUCATION/TRAINING PROGRAM

## 2024-06-15 PROCEDURE — 2580000003 HC RX 258: Performed by: STUDENT IN AN ORGANIZED HEALTH CARE EDUCATION/TRAINING PROGRAM

## 2024-06-15 PROCEDURE — 83735 ASSAY OF MAGNESIUM: CPT

## 2024-06-15 PROCEDURE — 99232 SBSQ HOSP IP/OBS MODERATE 35: CPT | Performed by: SURGERY

## 2024-06-15 RX ORDER — MAGNESIUM HYDROXIDE/ALUMINUM HYDROXICE/SIMETHICONE 120; 1200; 1200 MG/30ML; MG/30ML; MG/30ML
30 SUSPENSION ORAL EVERY 6 HOURS PRN
Status: DISCONTINUED | OUTPATIENT
Start: 2024-06-15 | End: 2024-06-22 | Stop reason: HOSPADM

## 2024-06-15 RX ORDER — ZOLPIDEM TARTRATE 5 MG/1
5 TABLET ORAL ONCE
Status: COMPLETED | OUTPATIENT
Start: 2024-06-15 | End: 2024-06-15

## 2024-06-15 RX ORDER — OXYCODONE HYDROCHLORIDE 5 MG/1
5 TABLET ORAL EVERY 4 HOURS PRN
Status: DISCONTINUED | OUTPATIENT
Start: 2024-06-15 | End: 2024-06-22 | Stop reason: HOSPADM

## 2024-06-15 RX ORDER — LANOLIN ALCOHOL/MO/W.PET/CERES
3 CREAM (GRAM) TOPICAL NIGHTLY PRN
Status: DISCONTINUED | OUTPATIENT
Start: 2024-06-15 | End: 2024-06-22 | Stop reason: HOSPADM

## 2024-06-15 RX ORDER — CHLORTHALIDONE 25 MG/1
25 TABLET ORAL DAILY
Status: DISCONTINUED | OUTPATIENT
Start: 2024-06-15 | End: 2024-06-22 | Stop reason: HOSPADM

## 2024-06-15 RX ORDER — POTASSIUM CHLORIDE 20 MEQ/1
40 TABLET, EXTENDED RELEASE ORAL 2 TIMES DAILY WITH MEALS
Status: COMPLETED | OUTPATIENT
Start: 2024-06-15 | End: 2024-06-15

## 2024-06-15 RX ADMIN — POTASSIUM CHLORIDE 40 MEQ: 1500 TABLET, EXTENDED RELEASE ORAL at 08:16

## 2024-06-15 RX ADMIN — ALLOPURINOL 300 MG: 300 TABLET ORAL at 20:35

## 2024-06-15 RX ADMIN — SODIUM CHLORIDE, PRESERVATIVE FREE 10 ML: 5 INJECTION INTRAVENOUS at 20:38

## 2024-06-15 RX ADMIN — LOSARTAN POTASSIUM 50 MG: 50 TABLET, FILM COATED ORAL at 08:16

## 2024-06-15 RX ADMIN — ENOXAPARIN SODIUM 40 MG: 40 INJECTION SUBCUTANEOUS at 08:23

## 2024-06-15 RX ADMIN — SODIUM CHLORIDE, PRESERVATIVE FREE 10 ML: 5 INJECTION INTRAVENOUS at 08:17

## 2024-06-15 RX ADMIN — ALLOPURINOL 300 MG: 300 TABLET ORAL at 08:15

## 2024-06-15 RX ADMIN — Medication 3 MG: at 00:39

## 2024-06-15 RX ADMIN — TIOTROPIUM BROMIDE INHALATION SPRAY 2 PUFF: 3.12 SPRAY, METERED RESPIRATORY (INHALATION) at 07:49

## 2024-06-15 RX ADMIN — BUDESONIDE AND FORMOTEROL FUMARATE DIHYDRATE 2 PUFF: 160; 4.5 AEROSOL RESPIRATORY (INHALATION) at 07:49

## 2024-06-15 RX ADMIN — MORPHINE SULFATE 2 MG: 2 INJECTION, SOLUTION INTRAMUSCULAR; INTRAVENOUS at 13:12

## 2024-06-15 RX ADMIN — ZOLPIDEM TARTRATE 5 MG: 5 TABLET, COATED ORAL at 20:36

## 2024-06-15 RX ADMIN — PANTOPRAZOLE SODIUM 40 MG: 40 INJECTION, POWDER, FOR SOLUTION INTRAVENOUS at 14:03

## 2024-06-15 RX ADMIN — BUDESONIDE AND FORMOTEROL FUMARATE DIHYDRATE 2 PUFF: 160; 4.5 AEROSOL RESPIRATORY (INHALATION) at 20:45

## 2024-06-15 RX ADMIN — OXYBUTYNIN CHLORIDE 5 MG: 5 TABLET, EXTENDED RELEASE ORAL at 08:16

## 2024-06-15 RX ADMIN — CHLORTHALIDONE 25 MG: 25 TABLET ORAL at 14:05

## 2024-06-15 RX ADMIN — Medication 100 MG: at 08:16

## 2024-06-15 RX ADMIN — POTASSIUM CHLORIDE 40 MEQ: 1500 TABLET, EXTENDED RELEASE ORAL at 16:28

## 2024-06-15 RX ADMIN — FOLIC ACID 1 MG: 1 TABLET ORAL at 08:15

## 2024-06-15 ASSESSMENT — PAIN SCALES - GENERAL
PAINLEVEL_OUTOF10: 6
PAINLEVEL_OUTOF10: 0
PAINLEVEL_OUTOF10: 7
PAINLEVEL_OUTOF10: 2

## 2024-06-15 ASSESSMENT — PAIN DESCRIPTION - LOCATION: LOCATION: ABDOMEN

## 2024-06-15 ASSESSMENT — PAIN DESCRIPTION - PAIN TYPE: TYPE: CHRONIC PAIN

## 2024-06-15 ASSESSMENT — PAIN DESCRIPTION - FREQUENCY: FREQUENCY: INTERMITTENT

## 2024-06-15 ASSESSMENT — PAIN DESCRIPTION - DESCRIPTORS: DESCRIPTORS: DULL;SHARP

## 2024-06-15 ASSESSMENT — PAIN - FUNCTIONAL ASSESSMENT: PAIN_FUNCTIONAL_ASSESSMENT: ACTIVITIES ARE NOT PREVENTED

## 2024-06-15 ASSESSMENT — PAIN DESCRIPTION - ORIENTATION: ORIENTATION: MID

## 2024-06-15 NOTE — PROGRESS NOTES
Hospitalist Progress Note   Admit Date:  2024  9:24 PM   Name:  Reagan Sandra   Age:  73 y.o.  Sex:  male  :  1950   MRN:  [id number]   Room:      Presenting/Chief Complaint: Abdominal Pain     Reason(s) for Admission: Small bowel obstruction (HCC) [K56.609]  SBO (small bowel obstruction) (HCC) [K56.609]  Acute pancreatitis, unspecified complication status, unspecified pancreatitis type [K85.90]     Hospital Course:   Reagan Sandra is a 73 y.o. male with medical history of HTN, COPD, pancreatitis who presented to ED on  with abdominal pain.  Recent discharge for pancreatitis 2/2 pancreatic mass. Plan at discharge for outpt EUS with GI.  Vitals and labs stable in the ER. CT abd pelvis with sbo, acute pancreatitis, cbd dilation.  Surgery consulted. Plan for conservative management. GI consulted. Plan for EUS on .  NG tube removed .       Subjective & 24hr Events:   Ng tube removed yesterday. States he has not had a drink in 2 weeks and has no craving for it.  Passing gas and tolerating diet without issue.       Assessment & Plan:      SBO (small bowel obstruction) (HCC)  Ng tube removed    Surgery escalated to full liquids   PRN compazine for nausea/vomiting     Pancreatitis  Pancreatic mass   CBD dilation   CT re-demonstrates acute pancreatitis despite normal lipase   NPO  PRN pain medications  Advance diet as SBO and pancreatitis allows  Surgery reached out to GI, plan to have EUS on    Will make npo on  and hold lovenox     Hypertension  Restarted losartan and chlorthalidone   PRN IV hydralazine for elevated pressures     COPD (chronic obstructive pulmonary disease) (HCC)  Continue home nebulizers/inhalers     Tobacco use disorder  Cessation discussion had  Nicotine patch ordered       Alcohol use disorder  Thiamine and folic acid supplementation        PT/OT evals and PPD needed/ordered?  yes  Diet:  Diet NPO  ADULT DIET; Full Liquid  VTE  prophylaxis: Lovenox  Code status: Full Code      Non-peripheral Lines and Tubes (if present):            Telemetry (if present):           Hospital Problems:  Principal Problem:    SBO (small bowel obstruction) (HCC)  Active Problems:    Tobacco use disorder    COPD (chronic obstructive pulmonary disease) (HCC)    Hypertension    Pancreatitis    Small bowel obstruction (HCC)  Resolved Problems:    * No resolved hospital problems. *      Objective:   Patient Vitals for the past 24 hrs:   Temp Pulse Resp BP SpO2   06/15/24 1134 99 °F (37.2 °C) 71 18 (!) 153/94 96 %   06/15/24 0729 98.6 °F (37 °C) 74 18 126/80 97 %   06/15/24 0424 98.2 °F (36.8 °C) 77 18 106/69 97 %   06/14/24 2309 98.2 °F (36.8 °C) 65 18 (!) 147/77 98 %   06/14/24 1954 -- 76 18 -- 98 %   06/14/24 1936 99 °F (37.2 °C) 70 18 137/86 100 %   06/14/24 1521 97.9 °F (36.6 °C) 67 17 (!) 144/91 100 %       Oxygen Therapy  SpO2: 96 %  Pulse via Oximetry: 98 beats per minute  Pulse Oximeter Device Mode: Intermittent  O2 Device: None (Room air)    Estimated body mass index is 24.41 kg/m² as calculated from the following:    Height as of this encounter: 1.854 m (6' 1\").    Weight as of this encounter: 83.9 kg (185 lb).    Intake/Output Summary (Last 24 hours) at 6/15/2024 1214  Last data filed at 6/15/2024 0709  Gross per 24 hour   Intake 1715.46 ml   Output 750 ml   Net 965.46 ml         Physical Exam:     General:    Well nourished. Tired appearing   Head:  Normocephalic, atraumatic  Eyes:  Sclerae appear normal.  Pupils equally round.  ENT:   Nares appear normal.  Moist oral mucosa  Neck:  No restricted ROM.  Trachea midline   CV:   RRR.  No m/r/g.  No jugular venous distension.  Lungs:   CTAB.  No wheezing, rhonchi, or rales.  Symmetric expansion.  Abdomen:    Bowel sounds noted,no distention   Extremities: No cyanosis or clubbing.  No edema  Skin:     No rashes and normal coloration.   Warm and dry.    Neuro:  CN II-XII grossly intact.    Psych:  Normal mood

## 2024-06-15 NOTE — PLAN OF CARE
Problem: Respiratory - Adult  Goal: Achieves optimal ventilation and oxygenation  6/15/2024 1055 by Paramjit Keane RCP  Outcome: Progressing  6/15/2024 0033 by Leona White RN  Outcome: Progressing

## 2024-06-15 NOTE — PROGRESS NOTES
END OF SHIFT NOTE:    INTAKE/OUTPUT  06/14 0701 - 06/15 0700  In: 2055.5 [P.O.:1120; I.V.:839.4]  Out: 1895 [Urine:820]  Voiding: Yes  Catheter: No  Drain:        Flatus: Patient does not have flatus present.    Stool: 2 occurrences.    Characteristics:  Stool Appearance: Soft  Stool Color: Brown  Stool Amount: Small (very small)  Stool Assessment  Incontinence: No  Stool Appearance: Soft  Stool Color: Brown  Stool Amount: Small (very small)  Stool Source: Rectum  Last BM (including prior to admit): 06/15/24    Emesis: 0 occurrences.    Characteristics:       VITAL SIGNS  Patient Vitals for the past 12 hrs:   Temp Pulse Resp BP SpO2   06/15/24 1606 98.8 °F (37.1 °C) 65 18 (!) 146/90 99 %   06/15/24 1134 99 °F (37.2 °C) 71 18 (!) 153/94 96 %   06/15/24 0729 98.6 °F (37 °C) 74 18 126/80 97 %       Pain Assessment  Pain Level: 2 (06/15/24 1530)  Pain Location: Abdomen  Patient's Stated Pain Goal: 0 - No pain    Ambulating  Yes in room    Shift report given to oncoming nurse at the bedside.    Angela Quiroz RN

## 2024-06-15 NOTE — PLAN OF CARE
Problem: Discharge Planning  Goal: Discharge to home or other facility with appropriate resources  Outcome: Progressing     Problem: Pain  Goal: Verbalizes/displays adequate comfort level or baseline comfort level  Outcome: Progressing     Problem: Chronic Conditions and Co-morbidities  Goal: Patient's chronic conditions and co-morbidity symptoms are monitored and maintained or improved  Outcome: Progressing     Problem: Respiratory - Adult  Goal: Achieves optimal ventilation and oxygenation  Outcome: Progressing     Problem: Safety - Adult  Goal: Free from fall injury  Outcome: Progressing

## 2024-06-15 NOTE — PROGRESS NOTES
General Surgery Progress Note    6/15/2024    Admit Date: 6/12/2024    Subjective:   Surgery     The patient is passing flatus. He tolerated clear liquids yesterday.     Objective:     /80   Pulse 74   Temp 98.6 °F (37 °C) (Oral)   Resp 18   Ht 1.854 m (6' 1\")   Wt 83.9 kg (185 lb)   SpO2 97%   BMI 24.41 kg/m²       Intake/Output Summary (Last 24 hours) at 6/15/2024 0841  Last data filed at 6/15/2024 0709  Gross per 24 hour   Intake 2055.46 ml   Output 1350 ml   Net 705.46 ml        EXAM:  ABD soft, mild distention, active BS'S. No tenderness to palpation.        Data Review    Recent Results (from the past 24 hour(s))   Comprehensive Metabolic Panel w/ Reflex to MG    Collection Time: 06/15/24  3:53 AM   Result Value Ref Range    Sodium 138 136 - 145 mmol/L    Potassium 3.1 (L) 3.5 - 5.1 mmol/L    Chloride 104 98 - 107 mmol/L    CO2 26 20 - 28 mmol/L    Anion Gap 9 9 - 18 mmol/L    Glucose 95 70 - 99 mg/dL    BUN 7 (L) 8 - 23 MG/DL    Creatinine 0.84 0.80 - 1.30 MG/DL    Est, Glom Filt Rate >90 >60 ml/min/1.73m2    Calcium 8.9 8.8 - 10.2 MG/DL    Total Bilirubin 0.3 0.0 - 1.2 MG/DL    ALT 11 (L) 12 - 65 U/L    AST 18 15 - 37 U/L    Alk Phosphatase 94 40 - 129 U/L    Total Protein 5.4 (L) 6.3 - 8.2 g/dL    Albumin 2.4 (L) 3.2 - 4.6 g/dL    Globulin 3.0 2.3 - 3.5 g/dL    Albumin/Globulin Ratio 0.8 (L) 1.0 - 1.9     CBC with Auto Differential    Collection Time: 06/15/24  3:53 AM   Result Value Ref Range    WBC 4.1 (L) 4.3 - 11.1 K/uL    RBC 3.39 (L) 4.23 - 5.6 M/uL    Hemoglobin 9.3 (L) 13.6 - 17.2 g/dL    Hematocrit 29.4 (L) 41.1 - 50.3 %    MCV 86.7 82 - 102 FL    MCH 27.4 26.1 - 32.9 PG    MCHC 31.6 31.4 - 35.0 g/dL    RDW 14.7 (H) 11.9 - 14.6 %    Platelets 330 150 - 450 K/uL    MPV 10.0 9.4 - 12.3 FL    nRBC 0.00 0.0 - 0.2 K/uL    Differential Type AUTOMATED      Neutrophils % 56 43 - 78 %    Lymphocytes % 28 13 - 44 %    Monocytes % 9 4.0 - 12.0 %    Eosinophils % 6 0.5 - 7.8 %    Basophils % 1 0.0  - 2.0 %    Immature Granulocytes % 0 0.0 - 5.0 %    Neutrophils Absolute 2.3 1.7 - 8.2 K/UL    Lymphocytes Absolute 1.2 0.5 - 4.6 K/UL    Monocytes Absolute 0.4 0.1 - 1.3 K/UL    Eosinophils Absolute 0.3 0.0 - 0.8 K/UL    Basophils Absolute 0.0 0.0 - 0.2 K/UL    Immature Granulocytes Absolute 0.0 0.0 - 0.5 K/UL   Magnesium    Collection Time: 06/15/24  3:53 AM   Result Value Ref Range    Magnesium 1.7 (L) 1.8 - 2.4 mg/dL        Principal Problem:    SBO (small bowel obstruction) (HCC)  Active Problems:    Tobacco use disorder    COPD (chronic obstructive pulmonary disease) (HCC)    Hypertension    Pancreatitis    Small bowel obstruction (HCC)  Resolved Problems:    * No resolved hospital problems. *        Plan: 1. Full liquid diet  2. GI pancreatic procedure for 6/17/24.  MD Wally.

## 2024-06-16 LAB
ALBUMIN SERPL-MCNC: 2.4 G/DL (ref 3.2–4.6)
ANION GAP SERPL CALC-SCNC: 7 MMOL/L (ref 9–18)
BUN SERPL-MCNC: 4 MG/DL (ref 8–23)
CALCIUM SERPL-MCNC: 8.8 MG/DL (ref 8.8–10.2)
CHLORIDE SERPL-SCNC: 107 MMOL/L (ref 98–107)
CO2 SERPL-SCNC: 25 MMOL/L (ref 20–28)
CREAT SERPL-MCNC: 0.95 MG/DL (ref 0.8–1.3)
GLUCOSE SERPL-MCNC: 95 MG/DL (ref 70–99)
MAGNESIUM SERPL-MCNC: 1.3 MG/DL (ref 1.8–2.4)
PHOSPHATE SERPL-MCNC: 2.3 MG/DL (ref 2.5–4.5)
POTASSIUM SERPL-SCNC: 3.6 MMOL/L (ref 3.5–5.1)
SODIUM SERPL-SCNC: 139 MMOL/L (ref 136–145)

## 2024-06-16 PROCEDURE — 80069 RENAL FUNCTION PANEL: CPT

## 2024-06-16 PROCEDURE — 6370000000 HC RX 637 (ALT 250 FOR IP): Performed by: NURSE PRACTITIONER

## 2024-06-16 PROCEDURE — 99232 SBSQ HOSP IP/OBS MODERATE 35: CPT | Performed by: INTERNAL MEDICINE

## 2024-06-16 PROCEDURE — 6360000002 HC RX W HCPCS: Performed by: STUDENT IN AN ORGANIZED HEALTH CARE EDUCATION/TRAINING PROGRAM

## 2024-06-16 PROCEDURE — 6370000000 HC RX 637 (ALT 250 FOR IP): Performed by: STUDENT IN AN ORGANIZED HEALTH CARE EDUCATION/TRAINING PROGRAM

## 2024-06-16 PROCEDURE — C9113 INJ PANTOPRAZOLE SODIUM, VIA: HCPCS | Performed by: STUDENT IN AN ORGANIZED HEALTH CARE EDUCATION/TRAINING PROGRAM

## 2024-06-16 PROCEDURE — 94640 AIRWAY INHALATION TREATMENT: CPT

## 2024-06-16 PROCEDURE — 94761 N-INVAS EAR/PLS OXIMETRY MLT: CPT

## 2024-06-16 PROCEDURE — 2580000003 HC RX 258: Performed by: FAMILY MEDICINE

## 2024-06-16 PROCEDURE — 83735 ASSAY OF MAGNESIUM: CPT

## 2024-06-16 PROCEDURE — 36415 COLL VENOUS BLD VENIPUNCTURE: CPT

## 2024-06-16 PROCEDURE — 99232 SBSQ HOSP IP/OBS MODERATE 35: CPT | Performed by: SURGERY

## 2024-06-16 PROCEDURE — 1100000000 HC RM PRIVATE

## 2024-06-16 PROCEDURE — 6360000002 HC RX W HCPCS: Performed by: FAMILY MEDICINE

## 2024-06-16 PROCEDURE — 2580000003 HC RX 258: Performed by: STUDENT IN AN ORGANIZED HEALTH CARE EDUCATION/TRAINING PROGRAM

## 2024-06-16 RX ORDER — TRAZODONE HYDROCHLORIDE 50 MG/1
50 TABLET ORAL NIGHTLY PRN
Status: DISCONTINUED | OUTPATIENT
Start: 2024-06-16 | End: 2024-06-22 | Stop reason: HOSPADM

## 2024-06-16 RX ADMIN — OXYCODONE 5 MG: 5 TABLET ORAL at 23:33

## 2024-06-16 RX ADMIN — OXYBUTYNIN CHLORIDE 5 MG: 5 TABLET, EXTENDED RELEASE ORAL at 07:34

## 2024-06-16 RX ADMIN — FOLIC ACID 1 MG: 1 TABLET ORAL at 07:33

## 2024-06-16 RX ADMIN — PANTOPRAZOLE SODIUM 40 MG: 40 INJECTION, POWDER, FOR SOLUTION INTRAVENOUS at 07:34

## 2024-06-16 RX ADMIN — BUDESONIDE AND FORMOTEROL FUMARATE DIHYDRATE 2 PUFF: 160; 4.5 AEROSOL RESPIRATORY (INHALATION) at 08:13

## 2024-06-16 RX ADMIN — SODIUM CHLORIDE, PRESERVATIVE FREE 10 ML: 5 INJECTION INTRAVENOUS at 20:20

## 2024-06-16 RX ADMIN — MAGNESIUM SULFATE HEPTAHYDRATE 2000 MG: 40 INJECTION, SOLUTION INTRAVENOUS at 09:29

## 2024-06-16 RX ADMIN — MAGNESIUM SULFATE HEPTAHYDRATE 2000 MG: 40 INJECTION, SOLUTION INTRAVENOUS at 06:40

## 2024-06-16 RX ADMIN — ALLOPURINOL 300 MG: 300 TABLET ORAL at 07:33

## 2024-06-16 RX ADMIN — Medication 100 MG: at 07:34

## 2024-06-16 RX ADMIN — SODIUM CHLORIDE, PRESERVATIVE FREE 10 ML: 5 INJECTION INTRAVENOUS at 07:34

## 2024-06-16 RX ADMIN — BUDESONIDE AND FORMOTEROL FUMARATE DIHYDRATE 2 PUFF: 160; 4.5 AEROSOL RESPIRATORY (INHALATION) at 19:33

## 2024-06-16 RX ADMIN — TRAZODONE HYDROCHLORIDE 50 MG: 50 TABLET ORAL at 20:19

## 2024-06-16 RX ADMIN — LOSARTAN POTASSIUM 50 MG: 50 TABLET, FILM COATED ORAL at 07:34

## 2024-06-16 RX ADMIN — TIOTROPIUM BROMIDE INHALATION SPRAY 2 PUFF: 3.12 SPRAY, METERED RESPIRATORY (INHALATION) at 08:13

## 2024-06-16 RX ADMIN — Medication 3 MG: at 23:34

## 2024-06-16 RX ADMIN — ALLOPURINOL 300 MG: 300 TABLET ORAL at 20:18

## 2024-06-16 RX ADMIN — ENOXAPARIN SODIUM 40 MG: 40 INJECTION SUBCUTANEOUS at 07:36

## 2024-06-16 RX ADMIN — CHLORTHALIDONE 25 MG: 25 TABLET ORAL at 07:33

## 2024-06-16 ASSESSMENT — PAIN SCALES - GENERAL: PAINLEVEL_OUTOF10: 5

## 2024-06-16 ASSESSMENT — PAIN DESCRIPTION - LOCATION: LOCATION: ABDOMEN

## 2024-06-16 NOTE — PROGRESS NOTES
Reagan Sandra is 73 y.o. y/o male     Initial consult: See consult note from 6/13: Surgery requesting evaluation of CBD dilation IP given upcoming OP EUS for evaluation of CBD dilation/pancreatic cyst at uncinate process/ possible ampullary obstruction with normal labs. Patient initially admitted this admission for SBO and pancreatitis. Planning for EUS IP on 6/17 pending SBO has resolved.    Today: Patient passing several BM. States that he doesn't want to eat tomato soup which apparently causes worsening reflux issues.     Labs: Hgb 9.3 (10.6); TB and LFT remain unremarkable.     PE:   Vitals:    06/16/24 1112   BP: (!) 149/84   Pulse: 63   Resp: 18   Temp: 98.6 °F (37 °C)   SpO2: 98%      General:  The patient appears well-nourished, and is in no acute distress.    HEENT:  Normocephalic, atraumatic. No sclerae icterus.   Cardiovascular:  Regular rate and rhythm.     Abdomen:  Soft, non tender to palpation. No distention. Normoactive bowel sounds present.    Neurologic:  Alert and oriented x3.  Psychiatric: Appropriate mood and affect.    Assessment and Plan:   #CBD dilation/pancreatic cystic lesion:     SBO has resolved; patient passing BM.  Will remain IP for EUS on Monday. Advance diet as tolerated. NPO at MN.. Hold Lovenox at MN on Monday.        -Glen Brewer MD  [phone]   Gastroenterology/Hepatology

## 2024-06-16 NOTE — PROGRESS NOTES
END OF SHIFT NOTE:    INTAKE/OUTPUT  06/15 0701 - 06/16 0700  In: -   Out: 1025 [Urine:1025]  Voiding: Yes  Catheter: No  Drain:        Flatus: Patient does have flatus present.    Stool: 1 occurrences.    Characteristics:  Stool Appearance: Soft  Stool Color: Brown  Stool Amount: Small (very small)  Stool Assessment  Incontinence: No  Stool Appearance: Soft  Stool Color: Brown  Stool Amount: Small (very small)  Stool Source: Rectum  Last BM (including prior to admit): 06/15/24    Emesis: 0 occurrences.    Characteristics:   Emesis Appearance: Green    VITAL SIGNS  Patient Vitals for the past 12 hrs:   Temp Pulse Resp BP SpO2   06/16/24 1600 98.6 °F (37 °C) 62 16 (!) 149/87 98 %   06/16/24 1112 98.6 °F (37 °C) 63 18 (!) 149/84 98 %   06/16/24 0816 -- -- -- -- 98 %   06/16/24 0726 98.6 °F (37 °C) 62 18 (!) 150/82 98 %       Pain Assessment  Pain Level: 0 (06/15/24 2040)  Pain Location: Abdomen  Patient's Stated Pain Goal: 0 - No pain    Ambulating  Yes    Shift report given to oncoming nurse at the bedside.    Angela Quiroz RN

## 2024-06-16 NOTE — PROGRESS NOTES
General Surgery Progress Note    6/16/2024    Admit Date: 6/12/2024    Subjective:   Surgery     The patient is passing flatus. He tolerated full liquids yesterday.     Objective:     BP (!) 150/82   Pulse 62   Temp 98.6 °F (37 °C) (Oral)   Resp 18   Ht 1.854 m (6' 1\")   Wt 83.9 kg (185 lb)   SpO2 98%   BMI 24.41 kg/m²       Intake/Output Summary (Last 24 hours) at 6/16/2024 1011  Last data filed at 6/16/2024 0930  Gross per 24 hour   Intake 60.06 ml   Output 1200 ml   Net -1139.94 ml        EXAM:  ABD soft, mild distention, active BS'S. No tenderness to palpation.        Data Review    Recent Results (from the past 24 hour(s))   Renal Function Panel    Collection Time: 06/16/24  3:58 AM   Result Value Ref Range    Sodium 139 136 - 145 mmol/L    Potassium 3.6 3.5 - 5.1 mmol/L    Chloride 107 98 - 107 mmol/L    CO2 25 20 - 28 mmol/L    Anion Gap 7 (L) 9 - 18 mmol/L    Glucose 95 70 - 99 mg/dL    BUN 4 (L) 8 - 23 MG/DL    Creatinine 0.95 0.80 - 1.30 MG/DL    Est, Glom Filt Rate 85 >60 ml/min/1.73m2    Calcium 8.8 8.8 - 10.2 MG/DL    Phosphorus 2.3 (L) 2.5 - 4.5 MG/DL    Albumin 2.4 (L) 3.2 - 4.6 g/dL   Magnesium    Collection Time: 06/16/24  3:58 AM   Result Value Ref Range    Magnesium 1.3 (L) 1.8 - 2.4 mg/dL        Principal Problem:    SBO (small bowel obstruction) (HCC)  Active Problems:    Tobacco use disorder    COPD (chronic obstructive pulmonary disease) (HCC)    Hypertension    Pancreatitis    Small bowel obstruction (HCC)  Resolved Problems:    * No resolved hospital problems. *        Plan: 1. Full liquid diet  2. NPO after midnight for the procedure.  3. GI pancreatic procedure for 6/17/24.  MD Wally.

## 2024-06-16 NOTE — PROGRESS NOTES
Hospitalist Progress Note   Admit Date:  2024  9:24 PM   Name:  Reagan Sandra   Age:  73 y.o.  Sex:  male  :  1950   MRN:  [id number]   Room:  /    Presenting/Chief Complaint: Abdominal Pain     Reason(s) for Admission: Small bowel obstruction (HCC) [K56.609]  SBO (small bowel obstruction) (HCC) [K56.609]  Acute pancreatitis, unspecified complication status, unspecified pancreatitis type [K85.90]     Hospital Course:   Reagan Sandra is a 73 y.o. male with medical history of HTN, COPD, pancreatitis who presented to ED on  with abdominal pain.  Recent discharge for pancreatitis 2/2 pancreatic mass. Plan at discharge for outpt EUS with GI.  Vitals and labs stable in the ER. CT abd pelvis with sbo, acute pancreatitis, cbd dilation.  Surgery consulted. Plan for conservative management. GI consulted. Plan for EUS on .  NG tube removed .       Subjective & 24hr Events:   Family members at the bedside. Patient had questions about procedure tomorrow. Acid reflux set off by tomato soup.       Assessment & Plan:      SBO (small bowel obstruction) (HCC)  Ng tube removed    Surgery escalated to full liquids   PRN compazine for nausea/vomiting     Pancreatitis  Pancreatic mass   CBD dilation   CT re-demonstrates acute pancreatitis despite normal lipase   NPO  PRN pain medications  Advance diet as SBO and pancreatitis allows  Surgery reached out to GI, plan to have EUS on    Will make npo after midnight and hold lovenox     Hypertension  Restarted losartan and chlorthalidone   PRN IV hydralazine for elevated pressures     COPD (chronic obstructive pulmonary disease) (HCC)  Continue home nebulizers/inhalers     Tobacco use disorder  Cessation discussion had  Nicotine patch ordered       Alcohol use disorder  Thiamine and folic acid supplementation        PT/OT evals and PPD needed/ordered?  yes  Diet:  Diet NPO  ADULT DIET; Full Liquid  Diet NPO  Diet NPO  VTE  Magnesium 1.7 (L) 1.8 - 2.4 mg/dL   Renal Function Panel    Collection Time: 06/16/24  3:58 AM   Result Value Ref Range    Sodium 139 136 - 145 mmol/L    Potassium 3.6 3.5 - 5.1 mmol/L    Chloride 107 98 - 107 mmol/L    CO2 25 20 - 28 mmol/L    Anion Gap 7 (L) 9 - 18 mmol/L    Glucose 95 70 - 99 mg/dL    BUN 4 (L) 8 - 23 MG/DL    Creatinine 0.95 0.80 - 1.30 MG/DL    Est, Glom Filt Rate 85 >60 ml/min/1.73m2    Calcium 8.8 8.8 - 10.2 MG/DL    Phosphorus 2.3 (L) 2.5 - 4.5 MG/DL    Albumin 2.4 (L) 3.2 - 4.6 g/dL   Magnesium    Collection Time: 06/16/24  3:58 AM   Result Value Ref Range    Magnesium 1.3 (L) 1.8 - 2.4 mg/dL       Current Meds:  Current Facility-Administered Medications   Medication Dose Route Frequency    traZODone (DESYREL) tablet 50 mg  50 mg Oral Nightly PRN    melatonin tablet 3 mg  3 mg Oral Nightly PRN    chlorthalidone (HYGROTON) tablet 25 mg  25 mg Oral Daily    pantoprazole (PROTONIX) 40 mg in sodium chloride (PF) 0.9 % 10 mL injection  40 mg IntraVENous Daily    aluminum & magnesium hydroxide-simethicone (MAALOX) 200-200-20 MG/5ML suspension 30 mL  30 mL Oral Q6H PRN    oxyCODONE (ROXICODONE) immediate release tablet 5 mg  5 mg Oral Q4H PRN    thiamine tablet 100 mg  100 mg Oral Daily    folic acid (FOLVITE) tablet 1 mg  1 mg Oral Daily    losartan (COZAAR) tablet 50 mg  50 mg Oral Daily    allopurinol (ZYLOPRIM) tablet 300 mg  300 mg Oral BID    oxyBUTYnin (DITROPAN-XL) extended release tablet 5 mg  5 mg Oral Daily    albuterol (PROVENTIL) (2.5 MG/3ML) 0.083% nebulizer solution 2.5 mg  2.5 mg Nebulization Q6H PRN    hydrALAZINE (APRESOLINE) injection 10 mg  10 mg IntraVENous Q6H PRN    sodium chloride flush 0.9 % injection 5-40 mL  5-40 mL IntraVENous 2 times per day    sodium chloride flush 0.9 % injection 5-40 mL  5-40 mL IntraVENous PRN    0.9 % sodium chloride infusion   IntraVENous PRN    potassium chloride (KLOR-CON M) extended release tablet 40 mEq  40 mEq Oral PRN    Or    potassium

## 2024-06-17 ENCOUNTER — APPOINTMENT (OUTPATIENT)
Dept: GENERAL RADIOLOGY | Age: 74
DRG: 439 | End: 2024-06-17
Payer: MEDICARE

## 2024-06-17 ENCOUNTER — ANESTHESIA (OUTPATIENT)
Dept: ENDOSCOPY | Age: 74
End: 2024-06-17
Payer: MEDICARE

## 2024-06-17 ENCOUNTER — ANESTHESIA EVENT (OUTPATIENT)
Dept: ENDOSCOPY | Age: 74
End: 2024-06-17
Payer: MEDICARE

## 2024-06-17 PROBLEM — E83.39 HYPOPHOSPHATEMIA: Status: ACTIVE | Noted: 2024-06-17

## 2024-06-17 PROBLEM — E83.42 HYPOMAGNESEMIA: Status: ACTIVE | Noted: 2024-06-17

## 2024-06-17 PROBLEM — D64.9 NORMOCYTIC ANEMIA: Status: ACTIVE | Noted: 2024-06-17

## 2024-06-17 PROBLEM — E87.6 HYPOKALEMIA: Status: ACTIVE | Noted: 2024-06-17

## 2024-06-17 LAB
ALBUMIN SERPL-MCNC: 2.5 G/DL (ref 3.2–4.6)
ANION GAP SERPL CALC-SCNC: 9 MMOL/L (ref 9–18)
BASOPHILS # BLD: 0 K/UL (ref 0–0.2)
BASOPHILS NFR BLD: 1 % (ref 0–2)
BUN SERPL-MCNC: 4 MG/DL (ref 8–23)
CALCIUM SERPL-MCNC: 8.9 MG/DL (ref 8.8–10.2)
CHLORIDE SERPL-SCNC: 106 MMOL/L (ref 98–107)
CO2 SERPL-SCNC: 22 MMOL/L (ref 20–28)
CREAT SERPL-MCNC: 0.97 MG/DL (ref 0.8–1.3)
DIFFERENTIAL METHOD BLD: ABNORMAL
EOSINOPHIL # BLD: 0.2 K/UL (ref 0–0.8)
EOSINOPHIL NFR BLD: 5 % (ref 0.5–7.8)
ERYTHROCYTE [DISTWIDTH] IN BLOOD BY AUTOMATED COUNT: 14.5 % (ref 11.9–14.6)
GLUCOSE SERPL-MCNC: 89 MG/DL (ref 70–99)
HCT VFR BLD AUTO: 31.4 % (ref 41.1–50.3)
HGB BLD-MCNC: 9.8 G/DL (ref 13.6–17.2)
IMM GRANULOCYTES # BLD AUTO: 0 K/UL (ref 0–0.5)
IMM GRANULOCYTES NFR BLD AUTO: 0 % (ref 0–5)
LYMPHOCYTES # BLD: 1.3 K/UL (ref 0.5–4.6)
LYMPHOCYTES NFR BLD: 34 % (ref 13–44)
MAGNESIUM SERPL-MCNC: 1.7 MG/DL (ref 1.8–2.4)
MCH RBC QN AUTO: 27.2 PG (ref 26.1–32.9)
MCHC RBC AUTO-ENTMCNC: 31.2 G/DL (ref 31.4–35)
MCV RBC AUTO: 87.2 FL (ref 82–102)
MONOCYTES # BLD: 0.3 K/UL (ref 0.1–1.3)
MONOCYTES NFR BLD: 8 % (ref 4–12)
NEUTS SEG # BLD: 2 K/UL (ref 1.7–8.2)
NEUTS SEG NFR BLD: 52 % (ref 43–78)
NRBC # BLD: 0 K/UL (ref 0–0.2)
PHOSPHATE SERPL-MCNC: 3.1 MG/DL (ref 2.5–4.5)
PLATELET # BLD AUTO: 329 K/UL (ref 150–450)
PMV BLD AUTO: 10.1 FL (ref 9.4–12.3)
POTASSIUM SERPL-SCNC: 3.6 MMOL/L (ref 3.5–5.1)
RBC # BLD AUTO: 3.6 M/UL (ref 4.23–5.6)
SODIUM SERPL-SCNC: 137 MMOL/L (ref 136–145)
WBC # BLD AUTO: 3.8 K/UL (ref 4.3–11.1)

## 2024-06-17 PROCEDURE — 85025 COMPLETE CBC W/AUTO DIFF WBC: CPT

## 2024-06-17 PROCEDURE — 6370000000 HC RX 637 (ALT 250 FOR IP): Performed by: STUDENT IN AN ORGANIZED HEALTH CARE EDUCATION/TRAINING PROGRAM

## 2024-06-17 PROCEDURE — 0BH17EZ INSERTION OF ENDOTRACHEAL AIRWAY INTO TRACHEA, VIA NATURAL OR ARTIFICIAL OPENING: ICD-10-PCS | Performed by: INTERNAL MEDICINE

## 2024-06-17 PROCEDURE — 94760 N-INVAS EAR/PLS OXIMETRY 1: CPT

## 2024-06-17 PROCEDURE — 36415 COLL VENOUS BLD VENIPUNCTURE: CPT

## 2024-06-17 PROCEDURE — 2709999900 HC NON-CHARGEABLE SUPPLY: Performed by: INTERNAL MEDICINE

## 2024-06-17 PROCEDURE — C9113 INJ PANTOPRAZOLE SODIUM, VIA: HCPCS | Performed by: STUDENT IN AN ORGANIZED HEALTH CARE EDUCATION/TRAINING PROGRAM

## 2024-06-17 PROCEDURE — 2580000003 HC RX 258: Performed by: FAMILY MEDICINE

## 2024-06-17 PROCEDURE — 7100000000 HC PACU RECOVERY - FIRST 15 MIN: Performed by: INTERNAL MEDICINE

## 2024-06-17 PROCEDURE — 2580000003 HC RX 258: Performed by: INTERNAL MEDICINE

## 2024-06-17 PROCEDURE — 6360000002 HC RX W HCPCS: Performed by: STUDENT IN AN ORGANIZED HEALTH CARE EDUCATION/TRAINING PROGRAM

## 2024-06-17 PROCEDURE — 2500000003 HC RX 250 WO HCPCS: Performed by: NURSE ANESTHETIST, CERTIFIED REGISTERED

## 2024-06-17 PROCEDURE — 0DJ08ZZ INSPECTION OF UPPER INTESTINAL TRACT, VIA NATURAL OR ARTIFICIAL OPENING ENDOSCOPIC: ICD-10-PCS | Performed by: INTERNAL MEDICINE

## 2024-06-17 PROCEDURE — 2580000003 HC RX 258: Performed by: STUDENT IN AN ORGANIZED HEALTH CARE EDUCATION/TRAINING PROGRAM

## 2024-06-17 PROCEDURE — 7100000001 HC PACU RECOVERY - ADDTL 15 MIN: Performed by: INTERNAL MEDICINE

## 2024-06-17 PROCEDURE — 83735 ASSAY OF MAGNESIUM: CPT

## 2024-06-17 PROCEDURE — C1769 GUIDE WIRE: HCPCS | Performed by: INTERNAL MEDICINE

## 2024-06-17 PROCEDURE — 1100000000 HC RM PRIVATE

## 2024-06-17 PROCEDURE — C1753 CATH, INTRAVAS ULTRASOUND: HCPCS | Performed by: INTERNAL MEDICINE

## 2024-06-17 PROCEDURE — 94640 AIRWAY INHALATION TREATMENT: CPT

## 2024-06-17 PROCEDURE — 3700000000 HC ANESTHESIA ATTENDED CARE: Performed by: INTERNAL MEDICINE

## 2024-06-17 PROCEDURE — 3700000001 HC ADD 15 MINUTES (ANESTHESIA): Performed by: INTERNAL MEDICINE

## 2024-06-17 PROCEDURE — 3609018800 HC ERCP DX COLLECTION SPECIMEN BRUSHING/WASHING: Performed by: INTERNAL MEDICINE

## 2024-06-17 PROCEDURE — 6360000002 HC RX W HCPCS: Performed by: NURSE ANESTHETIST, CERTIFIED REGISTERED

## 2024-06-17 PROCEDURE — 80069 RENAL FUNCTION PANEL: CPT

## 2024-06-17 PROCEDURE — 3609018500 HC EGD US SCOPE W/ADJACENT STRUCTURES: Performed by: INTERNAL MEDICINE

## 2024-06-17 RX ORDER — LIDOCAINE HYDROCHLORIDE 20 MG/ML
INJECTION, SOLUTION EPIDURAL; INFILTRATION; INTRACAUDAL; PERINEURAL CONTINUOUS PRN
Status: DISCONTINUED | OUTPATIENT
Start: 2024-06-17 | End: 2024-06-17

## 2024-06-17 RX ORDER — PROPOFOL 10 MG/ML
INJECTION, EMULSION INTRAVENOUS PRN
Status: DISCONTINUED | OUTPATIENT
Start: 2024-06-17 | End: 2024-06-17 | Stop reason: SDUPTHER

## 2024-06-17 RX ORDER — SUCCINYLCHOLINE CHLORIDE 20 MG/ML
INJECTION INTRAMUSCULAR; INTRAVENOUS PRN
Status: DISCONTINUED | OUTPATIENT
Start: 2024-06-17 | End: 2024-06-17 | Stop reason: SDUPTHER

## 2024-06-17 RX ORDER — SODIUM CHLORIDE, SODIUM LACTATE, POTASSIUM CHLORIDE, CALCIUM CHLORIDE 600; 310; 30; 20 MG/100ML; MG/100ML; MG/100ML; MG/100ML
INJECTION, SOLUTION INTRAVENOUS CONTINUOUS
Status: DISCONTINUED | OUTPATIENT
Start: 2024-06-17 | End: 2024-06-22 | Stop reason: HOSPADM

## 2024-06-17 RX ORDER — ONDANSETRON 2 MG/ML
INJECTION INTRAMUSCULAR; INTRAVENOUS PRN
Status: DISCONTINUED | OUTPATIENT
Start: 2024-06-17 | End: 2024-06-17 | Stop reason: SDUPTHER

## 2024-06-17 RX ORDER — ROCURONIUM BROMIDE 10 MG/ML
INJECTION, SOLUTION INTRAVENOUS PRN
Status: DISCONTINUED | OUTPATIENT
Start: 2024-06-17 | End: 2024-06-17 | Stop reason: SDUPTHER

## 2024-06-17 RX ADMIN — PROPOFOL 60 MG: 10 INJECTION, EMULSION INTRAVENOUS at 15:52

## 2024-06-17 RX ADMIN — PROPOFOL 200 MCG/KG/MIN: 10 INJECTION, EMULSION INTRAVENOUS at 15:53

## 2024-06-17 RX ADMIN — ONDANSETRON 4 MG: 2 INJECTION INTRAMUSCULAR; INTRAVENOUS at 16:33

## 2024-06-17 RX ADMIN — SODIUM CHLORIDE, POTASSIUM CHLORIDE, SODIUM LACTATE AND CALCIUM CHLORIDE: 600; 310; 30; 20 INJECTION, SOLUTION INTRAVENOUS at 13:29

## 2024-06-17 RX ADMIN — SODIUM CHLORIDE, POTASSIUM CHLORIDE, SODIUM LACTATE AND CALCIUM CHLORIDE: 600; 310; 30; 20 INJECTION, SOLUTION INTRAVENOUS at 18:01

## 2024-06-17 RX ADMIN — PANTOPRAZOLE SODIUM 40 MG: 40 INJECTION, POWDER, FOR SOLUTION INTRAVENOUS at 08:46

## 2024-06-17 RX ADMIN — BUDESONIDE AND FORMOTEROL FUMARATE DIHYDRATE 2 PUFF: 160; 4.5 AEROSOL RESPIRATORY (INHALATION) at 07:30

## 2024-06-17 RX ADMIN — PROPOFOL 100 MG: 10 INJECTION, EMULSION INTRAVENOUS at 16:09

## 2024-06-17 RX ADMIN — BUDESONIDE AND FORMOTEROL FUMARATE DIHYDRATE 2 PUFF: 160; 4.5 AEROSOL RESPIRATORY (INHALATION) at 19:18

## 2024-06-17 RX ADMIN — SODIUM CHLORIDE, PRESERVATIVE FREE 10 ML: 5 INJECTION INTRAVENOUS at 19:50

## 2024-06-17 RX ADMIN — ROCURONIUM BROMIDE 5 MG: 10 INJECTION, SOLUTION INTRAVENOUS at 16:09

## 2024-06-17 RX ADMIN — ALLOPURINOL 300 MG: 300 TABLET ORAL at 19:49

## 2024-06-17 RX ADMIN — Medication 120 MG: at 16:09

## 2024-06-17 RX ADMIN — OXYCODONE 5 MG: 5 TABLET ORAL at 19:49

## 2024-06-17 RX ADMIN — TIOTROPIUM BROMIDE INHALATION SPRAY 2 PUFF: 3.12 SPRAY, METERED RESPIRATORY (INHALATION) at 07:30

## 2024-06-17 RX ADMIN — SODIUM CHLORIDE, PRESERVATIVE FREE 10 ML: 5 INJECTION INTRAVENOUS at 08:51

## 2024-06-17 ASSESSMENT — PAIN SCALES - GENERAL
PAINLEVEL_OUTOF10: 0
PAINLEVEL_OUTOF10: 6

## 2024-06-17 ASSESSMENT — PAIN - FUNCTIONAL ASSESSMENT
PAIN_FUNCTIONAL_ASSESSMENT: NONE - DENIES PAIN
PAIN_FUNCTIONAL_ASSESSMENT: 0-10
PAIN_FUNCTIONAL_ASSESSMENT: 0-10

## 2024-06-17 ASSESSMENT — PAIN DESCRIPTION - LOCATION: LOCATION: ABDOMEN

## 2024-06-17 NOTE — PERIOP NOTE
TRANSFER - OUT REPORT:    Verbal report given to Anne Marie ALBERTO on Reagan Sandra  being transferred to [facility] for routine post-op       Report consisted of patient’s Situation, Background, Assessment and   Recommendations(SBAR).     Information from the following report(s) Nurse Handoff Report, Adult Overview, Surgery Report, Intake/Output, and MAR was reviewed with the receiving nurse.    Lines:   Peripheral IV 06/17/24 Posterior;Right Hand (Active)   Site Assessment Clean, dry & intact 06/17/24 1645   Line Status Infusing 06/17/24 1645   Line Care Connections checked and tightened 06/17/24 1645   Phlebitis Assessment No symptoms 06/17/24 1645   Infiltration Assessment 0 06/17/24 1645   Alcohol Cap Used No 06/17/24 1645   Dressing Status Clean, dry & intact 06/17/24 1645   Dressing Type Transparent 06/17/24 1645        Opportunity for questions and clarification was provided.      Patient transported with:   Tech    VTE prophylaxis orders have been written for Reagan Sandra.    Patient and family given floor number and nurses name.  Family updated re: pt status after security code verified.

## 2024-06-17 NOTE — OP NOTE
Procedure: Endoscopic Ultrasound (EUS)    Indication: dilated CBD. pancreatitis    Date of Procedure: 6/17/2024     Patient profile: Refer to patient note in chart for documentation of history and physical    Providers: Clara Caro MD    Referring MD: Dr. Gaary    PCP: No primary care provider on file.    Medicines: General anesthesia    Complication: No immediate complications.     Estimated blood loss: Minimal    Procedure: After the risks including, but not limited to medication reaction, infection, bleeding, perforation, missed lesions, benefits and alternatives of the procedure were discussed with the patient and all questions were answered, informed consent was obtained. The gastroscope and the linear echoendoscope were passed under direct vision throughout the procedure, the patient's blood pressure pulse and oxygen saturation were monitored continuously. The scopes were introduced through the mouth and advanced to the second part of duodenum. The endoscopy was performed without difficulty. The patient tolerated the procedure well. The procedure was performed with the patient in the prone position.      Findings:   EUS Exam  [employer]ji [employer] Precision Ultrasound System was utilized for EUS imaging.     The linear echoendoscope (Olympus 180) was introduced from the mouth and advanced through the esophagus into the stomach to the level of the celiac axis.  The celiac axis was normal. There was no evidence of clinically significant celiac lymphadenopathy. The scope was then torqued counterclockwise to identify the pancreatic parenchyma. The pancreatic body and tail were normal. The pancreatic duct was identified. The PD measured 3 mm.     The scope was then introduced further toward the gastric antrum. The gallbladder was not seen consistent with patient history of prior cholecystectomy. The scope was then introduced into the duodenal bulb, where the common bile duct was identified. The CBD measured 12 mm. A 4-5 mm

## 2024-06-17 NOTE — PROGRESS NOTES
General Surgery Progress Note    6/17/2024    Admit Date: 6/12/2024    Subjective:   Surgery     The patient is passing flatus. He tolerated full liquids yesterday. He is currently NPO    Objective:     BP (!) 145/93   Pulse 85   Temp 98.1 °F (36.7 °C) (Oral)   Resp 19   Ht 1.854 m (6' 1\")   Wt 83.9 kg (185 lb)   SpO2 95%   BMI 24.41 kg/m²       Intake/Output Summary (Last 24 hours) at 6/17/2024 0940  Last data filed at 6/17/2024 0736  Gross per 24 hour   Intake 50 ml   Output 1025 ml   Net -975 ml        EXAM:  ABD soft, mild distention, active BS'S. No tenderness to palpation.        Data Review    Recent Results (from the past 24 hour(s))   CBC with Auto Differential    Collection Time: 06/17/24  4:32 AM   Result Value Ref Range    WBC 3.8 (L) 4.3 - 11.1 K/uL    RBC 3.60 (L) 4.23 - 5.6 M/uL    Hemoglobin 9.8 (L) 13.6 - 17.2 g/dL    Hematocrit 31.4 (L) 41.1 - 50.3 %    MCV 87.2 82 - 102 FL    MCH 27.2 26.1 - 32.9 PG    MCHC 31.2 (L) 31.4 - 35.0 g/dL    RDW 14.5 11.9 - 14.6 %    Platelets 329 150 - 450 K/uL    MPV 10.1 9.4 - 12.3 FL    nRBC 0.00 0.0 - 0.2 K/uL    Differential Type AUTOMATED      Neutrophils % 52 43 - 78 %    Lymphocytes % 34 13 - 44 %    Monocytes % 8 4.0 - 12.0 %    Eosinophils % 5 0.5 - 7.8 %    Basophils % 1 0.0 - 2.0 %    Immature Granulocytes % 0 0.0 - 5.0 %    Neutrophils Absolute 2.0 1.7 - 8.2 K/UL    Lymphocytes Absolute 1.3 0.5 - 4.6 K/UL    Monocytes Absolute 0.3 0.1 - 1.3 K/UL    Eosinophils Absolute 0.2 0.0 - 0.8 K/UL    Basophils Absolute 0.0 0.0 - 0.2 K/UL    Immature Granulocytes Absolute 0.0 0.0 - 0.5 K/UL   Renal Function Panel    Collection Time: 06/17/24  4:32 AM   Result Value Ref Range    Sodium 137 136 - 145 mmol/L    Potassium 3.6 3.5 - 5.1 mmol/L    Chloride 106 98 - 107 mmol/L    CO2 22 20 - 28 mmol/L    Anion Gap 9 9 - 18 mmol/L    Glucose 89 70 - 99 mg/dL    BUN 4 (L) 8 - 23 MG/DL    Creatinine 0.97 0.80 - 1.30 MG/DL    Est, Glom Filt Rate 83 >60 ml/min/1.73m2     Calcium 8.9 8.8 - 10.2 MG/DL    Phosphorus 3.1 2.5 - 4.5 MG/DL    Albumin 2.5 (L) 3.2 - 4.6 g/dL   Magnesium    Collection Time: 06/17/24  4:32 AM   Result Value Ref Range    Magnesium 1.7 (L) 1.8 - 2.4 mg/dL        Principal Problem:    SBO (small bowel obstruction) (HCC)  Active Problems:    Tobacco use disorder    COPD (chronic obstructive pulmonary disease) (HCC)    Hypertension    Pancreatitis    Small bowel obstruction (HCC)  Resolved Problems:    * No resolved hospital problems. *        Plan:   1. NPO after midnight for GI procedure today    If negative findings on EUS/ERCP today- may be able to dc from surgical standpoing today.

## 2024-06-17 NOTE — PROGRESS NOTES
Hospitalist Progress Note   Admit Date:  2024  9:24 PM   Name:  Reagan Sandra   Age:  73 y.o.  Sex:  male  :  1950   MRN:  [id number]   Room:      Presenting/Chief Complaint: Abdominal Pain     Reason(s) for Admission: Small bowel obstruction (HCC) [K56.609]  SBO (small bowel obstruction) (HCC) [K56.609]  Acute pancreatitis, unspecified complication status, unspecified pancreatitis type [K85.90]     Hospital Course:   Patient is a 72 y/o male with medical history of hypertension, COPD, alcohol use disorder, tobacco use disorder, pancreatitis with newly diagnosed pancreatic mass (upcoming o/p EUS planned) who presented to ED  with cc abdominal pain. Hemodynamics stable. Labs notable for Cr 1.46 Mg 1.2 Hgb 9.5. CT A/P with SBO, acute pancreatitis, and CBD dilation. Lipase, LFT's, and TB wnl. General surgery consulted: conservative management planned. Small bowel obstruction has resolved. NGT removed. IMAN resolved. Received electrolyte supplementation for deficiencies. GI consulted and patient is to undergo EUS today with GI.     Subjective & 24hr Events:   Scheduled for EUS today with GI for evaluation of pancreatic cyst lesion.  Small bowel obstruction has resolved.  Therapy evaluations with no needs identified  Alert, oriented, conversational, no acute distress. Denies abdominal pain, nausea, vomiting, chest pain, shortness of breath. \"I'm 73 and I still got it going on.\"  Disposition: home today vs tomorrow    Assessment & Plan:     Pancreatic cystic lesion  CBD Dilation  -CT re-demonstrates acute pancreatitis despite normal lipse  -TB and LFT's remain unremarkable  -EUS with GI today    COPD (chronic obstructive pulmonary disease) (HCC)  -No acute exacerbation, stable on RA  -Continue symbicort and spiriva    Hypertension  -Losartan, chlorthalidone, PRN hydralazine  -No BP medications were given as ordered today    Alcohol use disorder  Tobacco use disorder  -Nicotine patch,  acute distress  Head:  Normocephalic, atraumatic  CV:   RRR.  No m/r/g.    Lungs:   CTAB anterior. Respirations even/unlabored on RA  Abdomen:   Soft, nontender, nondistended. Normoactive bowel sounds.  Extremities: No edema  Skin:     Warm and dry.    Neuro:  A&O x 4. No focal deficits. Moves all extremities  Psych:  Normal mood and affect.      I have personally reviewed labs and tests:  Recent Labs:  Recent Results (from the past 48 hour(s))   Renal Function Panel    Collection Time: 06/16/24  3:58 AM   Result Value Ref Range    Sodium 139 136 - 145 mmol/L    Potassium 3.6 3.5 - 5.1 mmol/L    Chloride 107 98 - 107 mmol/L    CO2 25 20 - 28 mmol/L    Anion Gap 7 (L) 9 - 18 mmol/L    Glucose 95 70 - 99 mg/dL    BUN 4 (L) 8 - 23 MG/DL    Creatinine 0.95 0.80 - 1.30 MG/DL    Est, Glom Filt Rate 85 >60 ml/min/1.73m2    Calcium 8.8 8.8 - 10.2 MG/DL    Phosphorus 2.3 (L) 2.5 - 4.5 MG/DL    Albumin 2.4 (L) 3.2 - 4.6 g/dL   Magnesium    Collection Time: 06/16/24  3:58 AM   Result Value Ref Range    Magnesium 1.3 (L) 1.8 - 2.4 mg/dL   CBC with Auto Differential    Collection Time: 06/17/24  4:32 AM   Result Value Ref Range    WBC 3.8 (L) 4.3 - 11.1 K/uL    RBC 3.60 (L) 4.23 - 5.6 M/uL    Hemoglobin 9.8 (L) 13.6 - 17.2 g/dL    Hematocrit 31.4 (L) 41.1 - 50.3 %    MCV 87.2 82 - 102 FL    MCH 27.2 26.1 - 32.9 PG    MCHC 31.2 (L) 31.4 - 35.0 g/dL    RDW 14.5 11.9 - 14.6 %    Platelets 329 150 - 450 K/uL    MPV 10.1 9.4 - 12.3 FL    nRBC 0.00 0.0 - 0.2 K/uL    Differential Type AUTOMATED      Neutrophils % 52 43 - 78 %    Lymphocytes % 34 13 - 44 %    Monocytes % 8 4.0 - 12.0 %    Eosinophils % 5 0.5 - 7.8 %    Basophils % 1 0.0 - 2.0 %    Immature Granulocytes % 0 0.0 - 5.0 %    Neutrophils Absolute 2.0 1.7 - 8.2 K/UL    Lymphocytes Absolute 1.3 0.5 - 4.6 K/UL    Monocytes Absolute 0.3 0.1 - 1.3 K/UL    Eosinophils Absolute 0.2 0.0 - 0.8 K/UL    Basophils Absolute 0.0 0.0 - 0.2 K/UL    Immature Granulocytes Absolute 0.0 0.0 -

## 2024-06-17 NOTE — ANESTHESIA PROCEDURE NOTES
Airway  Date/Time: 6/17/2024 4:10 PM  Urgency: elective    Airway not difficult    General Information and Staff    Patient location during procedure: OR  Resident/CRNA: Mynor Beltran APRN - CRNA  Performed: resident/CRNA   Performed by: Mynor Beltran APRN - CRNA  Authorized by: Jesse Ng MD      Indications and Patient Condition  Indications for airway management: anesthesia  Spontaneous Ventilation: absent  Sedation level: deep  Preoxygenated: yes  Patient position: sniffing  MILS not maintained throughout  Mask difficulty assessment: vent by bag mask    Final Airway Details  Final airway type: endotracheal airway      Successful airway: ETT  Cuffed: yes   Successful intubation technique: direct laryngoscopy  Facilitating devices/methods: intubating stylet and cricoid pressure  Endotracheal tube insertion site: oral  Blade: Chong  Blade size: #4  ETT size (mm): 7.0  Cormack-Lehane Classification: grade I - full view of glottis  Placement verified by: chest auscultation and capnometry   Measured from: lips  ETT to lips (cm): 23  Number of attempts at approach: 1  Ventilation between attempts: bag mask  Number of other approaches attempted: 0

## 2024-06-17 NOTE — OP NOTE
Procedure: EGD    Indication: CBD stone.     Date of Procedure: 6/17/2024    Patient profile: Refer to patient note in chart for documentation of history and physical    Providers: Clara Caro MD    Referring MD: No ref. provider found    PCP: Ilia Wang MD    Medicines: Monitored anesthesia care    Complication: No immediate complications.     Estimated blood loss: Minimal    Procedure: After the risks including, but not limited to medication reaction, infection, bleeding, perforation, missed lesions, benefits and alternatives of the procedure were discussed with the patient and all questions were answered, informed consent was obtained. The gastroscope was passed under direct vision throughout the procedure, the patient's blood pressure pulse and oxygen saturation were monitored continuously. The scope was introduced through the mouth and advanced to the 2nd portion of the duodenum. The endoscopy was performed without difficulty. The patient tolerated the procedure well.       Findings:   --The duodenoscope was introduced from the mouth and advanced through the esophagus to the GE junction into the stomach and to level of the ampulla  --there was significant duodenal edema, making it extremely difficult to identify the ampulla. After multiple attempts including manipulating the duodenal folds, it was decided to abort the procedure at this time  The scope was pulled back, and the procedure was subsequently ended.    Impression:  --Significant duodenal edema making indentificaiton of the ampulla extremely difficult    Fluoroscopy: All fluoroscopic images were reviewed by myself and decisions were made based on my interpretation of the images. A total of 2 images were acquired and  < 1 minute of fluoroscopy time was utilized.      Recommendations:  -- Duodenal edema secondary to pancreatitis.  --recommend repeat ERCP attempt in 48 hours.  --continue IV hydration  --PPI BID  --clear liquid diet; if patient has  pain/nausea, recommend NPO status.    Clara Caro MD  Gastroenterology and Interventional Endoscopy

## 2024-06-17 NOTE — CARE COORDINATION
Chart reviewed by RNCM    CM following for continued stay. No discharge needs identified by RNCM. Please consult case management if any discharge needs arise.

## 2024-06-17 NOTE — ANESTHESIA PRE PROCEDURE
Department of Anesthesiology  Preprocedure Note       Name:  Reagan Sandra   Age:  73 y.o.  :  1950                                          MRN:  [id number]         Date:  2024      Surgeon: Surgeon(s):  Clara Caro MD    Procedure: Procedure(s):  ESOPHAGOGASTRODUODENOSCOPY ULTRASOUND  ENDOSCOPIC RETROGRADE CHOLANGIOPANCREATOGRAPHY    Medications prior to admission:   Prior to Admission medications    Medication Sig Start Date End Date Taking? Authorizing Provider   polyethylene glycol (GLYCOLAX) 17 GM/SCOOP powder Take 17 g by mouth daily 24  Xander Gonzalez DO   thiamine 100 MG tablet Take 1 tablet by mouth daily 24   Liam Olmstead MD   losartan (COZAAR) 50 MG tablet Take 1 tablet by mouth daily    Reuben Lee MD   chlorthalidone (HYGROTEN) 50 MG tablet Take 1 tablet by mouth daily    Reuben Lee MD   omeprazole (PRILOSEC) 20 MG delayed release capsule Take 2 capsules by mouth daily    Reuben Lee MD   fluticasone-umeclidin-vilant (TRELEGY ELLIPTA) 100-62.5-25 MCG/ACT AEPB inhaler INHALE 1 PUFF INTO THE LUNGS DAILY 24   Candie Villalba PA   albuterol sulfate HFA (PROVENTIL;VENTOLIN;PROAIR) 108 (90 Base) MCG/ACT inhaler Inhale 2 puffs into the lungs every 6 hours as needed for Wheezing 23   Hillary Saeed MD   albuterol (PROVENTIL) (2.5 MG/3ML) 0.083% nebulizer solution Take 3 mLs by nebulization every 6 hours as needed for Shortness of Breath or Wheezing 23   Annetta Ortega MD   allopurinol (ZYLOPRIM) 300 MG tablet TAKE 1 TABLET BY MOUTH TWICE DAILY 22   Reuben Lee MD   albuterol sulfate  (90 Base) MCG/ACT inhaler Inhale 1 puff into the lungs every 4 hours as needed 21   Automatic Reconciliation, Ar   fluticasone-umeclidin-vilant (TRELEGY ELLIPTA) 100-62.5-25 MCG/INH AEPB Inhale 1 puff into the lungs daily 10/14/21   Automatic Reconciliation, Ar   oxybutynin (DITROPAN-XL) 5 MG extended release

## 2024-06-17 NOTE — PROGRESS NOTES
END OF SHIFT NOTE:    INTAKE/OUTPUT  06/16 0701 - 06/17 0700  In: 110.1   Out: 1350 [Urine:1350]  Voiding: Yes  Catheter: No  Drain:              Flatus: Patient does have flatus present.    Stool:  occurrences.    Characteristics:  Stool Appearance: Soft  Stool Color: Brown  Stool Amount: Small (very small)  Stool Assessment  Incontinence: No  Stool Appearance: Soft  Stool Color: Brown  Stool Amount: Small (very small)  Stool Source: Rectum  Last BM (including prior to admit): 06/15/24    Emesis:  occurrences.    Characteristics:   Emesis Appearance: Green    VITAL SIGNS  Patient Vitals for the past 12 hrs:   Temp Pulse Resp BP SpO2   06/17/24 1916 -- 75 16 -- 96 %   06/17/24 1735 -- 68 -- (!) 148/91 98 %   06/17/24 1730 -- 79 18 (!) 150/88 94 %   06/17/24 1725 -- 62 18 (!) 151/89 95 %   06/17/24 1720 -- 62 18 (!) 151/80 95 %   06/17/24 1715 98.1 °F (36.7 °C) 68 16 138/77 95 %   06/17/24 1710 -- 63 16 (!) 141/77 97 %   06/17/24 1705 -- 76 18 (!) 154/80 92 %   06/17/24 1700 -- 67 16 (!) 154/80 97 %   06/17/24 1655 -- 72 16 (!) 140/83 96 %   06/17/24 1650 -- 95 16 (!) 155/73 96 %   06/17/24 1645 98 °F (36.7 °C) 94 15 126/80 97 %   06/17/24 1316 98.3 °F (36.8 °C) 67 15 (!) 184/88 97 %   06/17/24 1123 98.6 °F (37 °C) 72 18 (!) 147/87 99 %   06/17/24 0736 98.1 °F (36.7 °C) 85 19 (!) 145/93 95 %   06/17/24 0732 -- 83 18 -- 95 %       Pain Assessment  Pain Level: 0 (06/17/24 1840)  Pain Location: Abdomen  Patient's Stated Pain Goal: 0 - No pain    Ambulating  Yes    Shift report given to oncoming nurse at the bedside.    Anne Marie Barton RN

## 2024-06-17 NOTE — ANESTHESIA POSTPROCEDURE EVALUATION
Department of Anesthesiology  Postprocedure Note    Patient: Reagan Sandra  MRN: [id number]  YOB: 1950  Date of evaluation: 6/17/2024    Procedure Summary       Date: 06/17/24 Room / Location: [facility] ENDO FL[facility]O 1 / [facility] ENDOSCOPY    Anesthesia Start: 1547 Anesthesia Stop: 1646    Procedures:       ESOPHAGOGASTRODUODENOSCOPY ULTRASOUND (Upper GI Region)      ENDOSCOPIC RETROGRADE CHOLANGIOPANCREATOGRAPHY (Upper GI Region) Diagnosis:       Common bile duct stone      (Common bile duct stone [K80.50])    Surgeons: Clara Caro MD Responsible Provider: Jesse Ng MD    Anesthesia Type: TIVA ASA Status: 3            Anesthesia Type: TIVA    Rose Phase I: Rose Score: 10    Rose Phase II:      Anesthesia Post Evaluation    Patient location during evaluation: bedside  Patient participation: complete - patient participated  Level of consciousness: awake and alert  Airway patency: patent  Nausea & Vomiting: no vomiting  Cardiovascular status: hemodynamically stable  Respiratory status: acceptable  Hydration status: euvolemic  Pain management: adequate    No notable events documented.

## 2024-06-17 NOTE — H&P
Gastroenterology and Interventional Endoscopy Pre-procedure HPI    Date of visit   :  06/17/24      Patient name :  Reagan Sandra  YOB: 1950    HPI:    Patient is a 73 y.o. year old male, who has been referred for EUS. For dilated CBD, ; abnormal Lipase with normal LFTs               ____________________      Past Medical History:  Reviewed, and in prior HPI,documentation    Surgical History:    Reviewed, and in prior HPI,documentation    Medications:    Reviewed, and in prior HPI,documentation    Allergies:  Allergies   Allergen Reactions    Hydrochlorothiazide Anaphylaxis     Lips swelling    Lisinopril Swelling    Tramadol Itching and Rash       Social History:    Reviewed, and in prior HPI,documentation    Family History:    Reviewed, and in prior HPI,documentation  Physical Exam:    Vitals:    06/17/24 1316   BP: (!) 184/88   Pulse: 67   Resp: 15   Temp: 98.3 °F (36.8 °C)   SpO2: 97%     Body mass index is 24.41 kg/m².  [unfilled]      Constitutional: Alert, oriented.  No acute distress  Head: Normocephalic and Atraumatic  Eyes: No icterus, EOMI, no congestion  ENT: No deformity, no hearing loss   Cardiovascular: Regular rate and rhythm, S1-S2 normal, no murmur rub or gallop  Respiratory: Clear to auscultation bilaterally no wheezing rhonchi or crackles  Gastrointestinal:, No hepatosplenomegaly nontender nondistended bowel sounds present in all 4 quadrants  Musculoskeletal: No joint deformity, no swelling in larger joints including knees elbows hands shoulders  Skin: No new rash.  Neurologic: Alert oriented to time place and person , good affect  ____________________    Recommendations:  --Plan for EUS    Clara Caro MD  Gastroenterology and Interventional Endoscopy

## 2024-06-18 LAB
ALBUMIN SERPL-MCNC: 2.8 G/DL (ref 3.2–4.6)
ALBUMIN/GLOB SERPL: 0.9 (ref 1–1.9)
ALP SERPL-CCNC: 99 U/L (ref 40–129)
ALT SERPL-CCNC: 6 U/L (ref 12–65)
ANION GAP SERPL CALC-SCNC: 9 MMOL/L (ref 9–18)
AST SERPL-CCNC: 19 U/L (ref 15–37)
BILIRUB SERPL-MCNC: 0.3 MG/DL (ref 0–1.2)
BUN SERPL-MCNC: 5 MG/DL (ref 8–23)
CALCIUM SERPL-MCNC: 8.8 MG/DL (ref 8.8–10.2)
CHLORIDE SERPL-SCNC: 105 MMOL/L (ref 98–107)
CO2 SERPL-SCNC: 24 MMOL/L (ref 20–28)
CREAT SERPL-MCNC: 1.01 MG/DL (ref 0.8–1.3)
GLOBULIN SER CALC-MCNC: 3.1 G/DL (ref 2.3–3.5)
GLUCOSE SERPL-MCNC: 74 MG/DL (ref 70–99)
MAGNESIUM SERPL-MCNC: 1.4 MG/DL (ref 1.8–2.4)
POTASSIUM SERPL-SCNC: 3.9 MMOL/L (ref 3.5–5.1)
POTASSIUM SERPL-SCNC: 4 MMOL/L (ref 3.5–5.1)
PROT SERPL-MCNC: 5.9 G/DL (ref 6.3–8.2)
SODIUM SERPL-SCNC: 138 MMOL/L (ref 136–145)

## 2024-06-18 PROCEDURE — 36415 COLL VENOUS BLD VENIPUNCTURE: CPT

## 2024-06-18 PROCEDURE — 6360000002 HC RX W HCPCS: Performed by: STUDENT IN AN ORGANIZED HEALTH CARE EDUCATION/TRAINING PROGRAM

## 2024-06-18 PROCEDURE — 80053 COMPREHEN METABOLIC PANEL: CPT

## 2024-06-18 PROCEDURE — 6370000000 HC RX 637 (ALT 250 FOR IP): Performed by: STUDENT IN AN ORGANIZED HEALTH CARE EDUCATION/TRAINING PROGRAM

## 2024-06-18 PROCEDURE — 94761 N-INVAS EAR/PLS OXIMETRY MLT: CPT

## 2024-06-18 PROCEDURE — 83735 ASSAY OF MAGNESIUM: CPT

## 2024-06-18 PROCEDURE — 1100000000 HC RM PRIVATE

## 2024-06-18 PROCEDURE — 94640 AIRWAY INHALATION TREATMENT: CPT

## 2024-06-18 PROCEDURE — 94760 N-INVAS EAR/PLS OXIMETRY 1: CPT

## 2024-06-18 PROCEDURE — 84132 ASSAY OF SERUM POTASSIUM: CPT

## 2024-06-18 PROCEDURE — 2580000003 HC RX 258: Performed by: INTERNAL MEDICINE

## 2024-06-18 RX ORDER — MAGNESIUM SULFATE IN WATER 40 MG/ML
2000 INJECTION, SOLUTION INTRAVENOUS ONCE
Status: COMPLETED | OUTPATIENT
Start: 2024-06-18 | End: 2024-06-18

## 2024-06-18 RX ORDER — LANOLIN ALCOHOL/MO/W.PET/CERES
400 CREAM (GRAM) TOPICAL 2 TIMES DAILY
Status: DISCONTINUED | OUTPATIENT
Start: 2024-06-18 | End: 2024-06-22 | Stop reason: HOSPADM

## 2024-06-18 RX ADMIN — FOLIC ACID 1 MG: 1 TABLET ORAL at 09:04

## 2024-06-18 RX ADMIN — SODIUM CHLORIDE, POTASSIUM CHLORIDE, SODIUM LACTATE AND CALCIUM CHLORIDE: 600; 310; 30; 20 INJECTION, SOLUTION INTRAVENOUS at 03:40

## 2024-06-18 RX ADMIN — ALLOPURINOL 300 MG: 300 TABLET ORAL at 20:31

## 2024-06-18 RX ADMIN — CHLORTHALIDONE 25 MG: 25 TABLET ORAL at 09:04

## 2024-06-18 RX ADMIN — LOSARTAN POTASSIUM 50 MG: 50 TABLET, FILM COATED ORAL at 09:04

## 2024-06-18 RX ADMIN — OXYCODONE 5 MG: 5 TABLET ORAL at 03:38

## 2024-06-18 RX ADMIN — ALLOPURINOL 300 MG: 300 TABLET ORAL at 09:04

## 2024-06-18 RX ADMIN — TIOTROPIUM BROMIDE INHALATION SPRAY 2 PUFF: 3.12 SPRAY, METERED RESPIRATORY (INHALATION) at 07:16

## 2024-06-18 RX ADMIN — MAGNESIUM SULFATE HEPTAHYDRATE 2000 MG: 40 INJECTION, SOLUTION INTRAVENOUS at 15:20

## 2024-06-18 RX ADMIN — MAGNESIUM GLUCONATE 500 MG ORAL TABLET 400 MG: 500 TABLET ORAL at 15:14

## 2024-06-18 RX ADMIN — BUDESONIDE AND FORMOTEROL FUMARATE DIHYDRATE 2 PUFF: 160; 4.5 AEROSOL RESPIRATORY (INHALATION) at 07:16

## 2024-06-18 RX ADMIN — OXYBUTYNIN CHLORIDE 5 MG: 5 TABLET, EXTENDED RELEASE ORAL at 09:04

## 2024-06-18 RX ADMIN — MAGNESIUM GLUCONATE 500 MG ORAL TABLET 400 MG: 500 TABLET ORAL at 20:31

## 2024-06-18 RX ADMIN — Medication 100 MG: at 09:04

## 2024-06-18 RX ADMIN — BUDESONIDE AND FORMOTEROL FUMARATE DIHYDRATE 2 PUFF: 160; 4.5 AEROSOL RESPIRATORY (INHALATION) at 19:51

## 2024-06-18 ASSESSMENT — PAIN DESCRIPTION - LOCATION: LOCATION: ABDOMEN

## 2024-06-18 ASSESSMENT — PAIN SCALES - GENERAL: PAINLEVEL_OUTOF10: 6

## 2024-06-18 NOTE — PROGRESS NOTES
General Surgery Progress Note    6/18/2024    Admit Date: 6/12/2024    Subjective:   Surgery     The patient is passing flatus.   tolerating full liquids over weekend  He is currently CLD   ERCP not done 6/17 with plans to repeat tomorrow      Objective:     /89   Pulse 65   Temp 98.6 °F (37 °C) (Oral)   Resp 18   Ht 1.854 m (6' 1\")   Wt 83.9 kg (185 lb)   SpO2 98%   BMI 24.41 kg/m²       Intake/Output Summary (Last 24 hours) at 6/18/2024 0813  Last data filed at 6/18/2024 0715  Gross per 24 hour   Intake 320 ml   Output 1365 ml   Net -1045 ml        EXAM:  ABD soft, mild distention, active BS'S. No tenderness to palpation.        Data Review    Recent Results (from the past 24 hour(s))   Magnesium    Collection Time: 06/18/24  3:52 AM   Result Value Ref Range    Magnesium 1.4 (L) 1.8 - 2.4 mg/dL   Potassium    Collection Time: 06/18/24  3:52 AM   Result Value Ref Range    Potassium 3.9 3.5 - 5.1 mmol/L        Principal Problem:    SBO (small bowel obstruction) (HCC)  Active Problems:    Tobacco use disorder    COPD (chronic obstructive pulmonary disease) (HCC)    Hypertension    IMAN (acute kidney injury) (HCC)    Common bile duct stone    Pancreatitis    Small bowel obstruction (HCC)    Hypokalemia    Hypomagnesemia    Normocytic anemia    Hypophosphatemia  Resolved Problems:    * No resolved hospital problems. *        Plan:   1. EUS/ERCP planned for tomorrow   2. SBO resolved. Gen Surg to sign off. Please call us back for acute needs.    DAR LOMBARDI, APRN - CNP

## 2024-06-18 NOTE — PROGRESS NOTES
Therapy  SpO2: 97 %  Pulse via Oximetry: 70 beats per minute  Pulse Oximeter Device Mode: Intermittent  Pulse Oximeter Device Location: Left  O2 Device: None (Room air)  O2 Flow Rate (L/min): 4 L/min    Estimated body mass index is 24.41 kg/m² as calculated from the following:    Height as of this encounter: 1.854 m (6' 1\").    Weight as of this encounter: 83.9 kg (185 lb).    Intake/Output Summary (Last 24 hours) at 6/18/2024 1329  Last data filed at 6/18/2024 1134  Gross per 24 hour   Intake 570 ml   Output 1165 ml   Net -595 ml         Physical Exam:     General:    Alert, oriented, conversational, no acute distress  Head:  Normocephalic, atraumatic  CV:   RRR.  No m/r/g.    Lungs:   CTAB anterior. Respirations even/unlabored on RA  Abdomen:   Soft, nontender, nondistended. Normoactive bowel sounds.  Extremities: No edema  Skin:     Warm and dry.    Neuro:  A&O x 4. No focal deficits. Moves all extremities  Psych:  Normal mood and affect.      I have personally reviewed labs and tests:  Recent Labs:  Recent Results (from the past 48 hour(s))   CBC with Auto Differential    Collection Time: 06/17/24  4:32 AM   Result Value Ref Range    WBC 3.8 (L) 4.3 - 11.1 K/uL    RBC 3.60 (L) 4.23 - 5.6 M/uL    Hemoglobin 9.8 (L) 13.6 - 17.2 g/dL    Hematocrit 31.4 (L) 41.1 - 50.3 %    MCV 87.2 82 - 102 FL    MCH 27.2 26.1 - 32.9 PG    MCHC 31.2 (L) 31.4 - 35.0 g/dL    RDW 14.5 11.9 - 14.6 %    Platelets 329 150 - 450 K/uL    MPV 10.1 9.4 - 12.3 FL    nRBC 0.00 0.0 - 0.2 K/uL    Differential Type AUTOMATED      Neutrophils % 52 43 - 78 %    Lymphocytes % 34 13 - 44 %    Monocytes % 8 4.0 - 12.0 %    Eosinophils % 5 0.5 - 7.8 %    Basophils % 1 0.0 - 2.0 %    Immature Granulocytes % 0 0.0 - 5.0 %    Neutrophils Absolute 2.0 1.7 - 8.2 K/UL    Lymphocytes Absolute 1.3 0.5 - 4.6 K/UL    Monocytes Absolute 0.3 0.1 - 1.3 K/UL    Eosinophils Absolute 0.2 0.0 - 0.8 K/UL    Basophils Absolute 0.0 0.0 - 0.2 K/UL    Immature Granulocytes  Absolute 0.0 0.0 - 0.5 K/UL   Renal Function Panel    Collection Time: 06/17/24  4:32 AM   Result Value Ref Range    Sodium 137 136 - 145 mmol/L    Potassium 3.6 3.5 - 5.1 mmol/L    Chloride 106 98 - 107 mmol/L    CO2 22 20 - 28 mmol/L    Anion Gap 9 9 - 18 mmol/L    Glucose 89 70 - 99 mg/dL    BUN 4 (L) 8 - 23 MG/DL    Creatinine 0.97 0.80 - 1.30 MG/DL    Est, Glom Filt Rate 83 >60 ml/min/1.73m2    Calcium 8.9 8.8 - 10.2 MG/DL    Phosphorus 3.1 2.5 - 4.5 MG/DL    Albumin 2.5 (L) 3.2 - 4.6 g/dL   Magnesium    Collection Time: 06/17/24  4:32 AM   Result Value Ref Range    Magnesium 1.7 (L) 1.8 - 2.4 mg/dL   Magnesium    Collection Time: 06/18/24  3:52 AM   Result Value Ref Range    Magnesium 1.4 (L) 1.8 - 2.4 mg/dL   Potassium    Collection Time: 06/18/24  3:52 AM   Result Value Ref Range    Potassium 3.9 3.5 - 5.1 mmol/L       No results for input(s): \"COVID19\" in the last 72 hours.    Current Meds:  Current Facility-Administered Medications   Medication Dose Route Frequency    lactated ringers IV soln infusion   IntraVENous Continuous    traZODone (DESYREL) tablet 50 mg  50 mg Oral Nightly PRN    melatonin tablet 3 mg  3 mg Oral Nightly PRN    chlorthalidone (HYGROTON) tablet 25 mg  25 mg Oral Daily    pantoprazole (PROTONIX) 40 mg in sodium chloride (PF) 0.9 % 10 mL injection  40 mg IntraVENous Daily    aluminum & magnesium hydroxide-simethicone (MAALOX) 200-200-20 MG/5ML suspension 30 mL  30 mL Oral Q6H PRN    oxyCODONE (ROXICODONE) immediate release tablet 5 mg  5 mg Oral Q4H PRN    thiamine tablet 100 mg  100 mg Oral Daily    folic acid (FOLVITE) tablet 1 mg  1 mg Oral Daily    losartan (COZAAR) tablet 50 mg  50 mg Oral Daily    allopurinol (ZYLOPRIM) tablet 300 mg  300 mg Oral BID    oxyBUTYnin (DITROPAN-XL) extended release tablet 5 mg  5 mg Oral Daily    albuterol (PROVENTIL) (2.5 MG/3ML) 0.083% nebulizer solution 2.5 mg  2.5 mg Nebulization Q6H PRN    hydrALAZINE (APRESOLINE) injection 10 mg  10 mg

## 2024-06-18 NOTE — PLAN OF CARE
Problem: Respiratory - Adult  Goal: Achieves optimal ventilation and oxygenation  Outcome: Progressing  Flowsheets (Taken 6/18/2024 0752)  Achieves optimal ventilation and oxygenation:   Assess for changes in respiratory status   Position to facilitate oxygenation and minimize respiratory effort   Respiratory therapy support as indicated   Assess for changes in mentation and behavior   Oxygen supplementation based on oxygen saturation or arterial blood gases   Encourage broncho-pulmonary hygiene including cough, deep breathe, incentive spirometry   Assess and instruct to report shortness of breath or any respiratory difficulty

## 2024-06-18 NOTE — PROGRESS NOTES
Reagan Sandra is 73 y.o. y/o male     Initial consult: See consult note from 6/13: Surgery requesting evaluation of CBD dilation IP given upcoming OP EUS for evaluation of CBD dilation/pancreatic cyst at uncinate process/ possible ampullary obstruction with normal labs. Patient initially admitted this admission for SBO and pancreatitis. EUS on 6/17: noted to have CBD stones but unable to reach ampulla due to duodenal edema from pancreatitis.     Today: Spoke with Dr. Caro, will plan to repeat ERCP on 6/20. Patient still with mild epigastric pain; no vomiting or nausea. Hoping for     Labs: Pending.     PE:   Vitals:    06/18/24 1533   BP: 127/82   Pulse: 71   Resp: 19   Temp: 98.4 °F (36.9 °C)   SpO2: 99%      General:  The patient appears well-nourished, and is in no acute distress.    HEENT:  Normocephalic, atraumatic. No sclerae icterus.   Neurologic:  Alert and oriented x3.  Psychiatric: Appropriate mood and affect.    Assessment and Plan:   #CBD Stones/Duodenal thickening/Pancreatitis: Discussed with Dr. Caro after ERCP aborted due to unable to reach ampulla; will plan for repeat ERCP on 6/20 to allow time for duodenum to heal. He is okay for a bland diet as he is asking for food; NPO at [facility] on Thursday. Will see tomorrow to follow up on labs.     Electronically signed by Valentin Avila PA-C.

## 2024-06-18 NOTE — CASE COMMUNICATION
Original plan for repeat ERCP 6/19  Per GI, this is now planned for 6/20    Signed: Toña [place] [facility]-BC

## 2024-06-18 NOTE — PROGRESS NOTES
END OF SHIFT NOTE:    INTAKE/OUTPUT  06/17 0701 - 06/18 0700  In: 320 [P.O.:120; I.V.:200]  Out: 1165 [Urine:1165]  Voiding: Yes  Catheter: No  Drain:              Flatus: Patient does have flatus present.    Stool: 0 occurrences.    Characteristics:  Stool Appearance: Soft  Stool Color: Brown  Stool Amount: Small (very small)  Stool Assessment  Incontinence: No  Stool Appearance: Soft  Stool Color: Brown  Stool Amount: Small (very small)  Stool Source: Rectum  Last BM (including prior to admit): 06/15/24    Emesis:  0 occurrences.    Characteristics:   Emesis Appearance: Green    VITAL SIGNS  Patient Vitals for the past 12 hrs:   Temp Pulse Resp BP SpO2   06/18/24 0717 -- 65 18 -- 98 %   06/18/24 0715 98.6 °F (37 °C) 65 18 135/89 96 %   06/18/24 0408 -- -- 18 -- --   06/18/24 0318 98.6 °F (37 °C) 68 18 (!) 126/95 98 %   06/17/24 2309 98.4 °F (36.9 °C) 65 18 130/78 98 %   06/17/24 2019 -- -- 18 -- --       Pain Assessment  Pain Level: 6 (06/18/24 0338)  Pain Location: Abdomen  Patient's Stated Pain Goal: 0 - No pain    Ambulating  No    Shift report given to oncoming nurse at the bedside.    Ida Ray RN

## 2024-06-19 ENCOUNTER — ANESTHESIA EVENT (OUTPATIENT)
Dept: ENDOSCOPY | Age: 74
End: 2024-06-19
Payer: MEDICARE

## 2024-06-19 LAB
ALBUMIN SERPL-MCNC: 2.6 G/DL (ref 3.2–4.6)
ALBUMIN/GLOB SERPL: 0.8 (ref 1–1.9)
ALP SERPL-CCNC: 91 U/L (ref 40–129)
ALT SERPL-CCNC: 7 U/L (ref 12–65)
ANION GAP SERPL CALC-SCNC: 8 MMOL/L (ref 9–18)
AST SERPL-CCNC: 16 U/L (ref 15–37)
BASOPHILS # BLD: 0 K/UL (ref 0–0.2)
BASOPHILS NFR BLD: 1 % (ref 0–2)
BILIRUB SERPL-MCNC: <0.2 MG/DL (ref 0–1.2)
BUN SERPL-MCNC: 6 MG/DL (ref 8–23)
CALCIUM SERPL-MCNC: 8.8 MG/DL (ref 8.8–10.2)
CHLORIDE SERPL-SCNC: 108 MMOL/L (ref 98–107)
CO2 SERPL-SCNC: 23 MMOL/L (ref 20–28)
CREAT SERPL-MCNC: 1.03 MG/DL (ref 0.8–1.3)
DIFFERENTIAL METHOD BLD: ABNORMAL
EOSINOPHIL # BLD: 0.2 K/UL (ref 0–0.8)
EOSINOPHIL NFR BLD: 5 % (ref 0.5–7.8)
ERYTHROCYTE [DISTWIDTH] IN BLOOD BY AUTOMATED COUNT: 14.7 % (ref 11.9–14.6)
GLOBULIN SER CALC-MCNC: 3.1 G/DL (ref 2.3–3.5)
GLUCOSE SERPL-MCNC: 89 MG/DL (ref 70–99)
HCT VFR BLD AUTO: 30.2 % (ref 41.1–50.3)
HGB BLD-MCNC: 9.5 G/DL (ref 13.6–17.2)
IMM GRANULOCYTES # BLD AUTO: 0 K/UL (ref 0–0.5)
IMM GRANULOCYTES NFR BLD AUTO: 1 % (ref 0–5)
LYMPHOCYTES # BLD: 1.4 K/UL (ref 0.5–4.6)
LYMPHOCYTES NFR BLD: 35 % (ref 13–44)
MAGNESIUM SERPL-MCNC: 1.6 MG/DL (ref 1.8–2.4)
MCH RBC QN AUTO: 27.4 PG (ref 26.1–32.9)
MCHC RBC AUTO-ENTMCNC: 31.5 G/DL (ref 31.4–35)
MCV RBC AUTO: 87 FL (ref 82–102)
MONOCYTES # BLD: 0.3 K/UL (ref 0.1–1.3)
MONOCYTES NFR BLD: 7 % (ref 4–12)
NEUTS SEG # BLD: 2.2 K/UL (ref 1.7–8.2)
NEUTS SEG NFR BLD: 51 % (ref 43–78)
NRBC # BLD: 0 K/UL (ref 0–0.2)
PLATELET # BLD AUTO: 326 K/UL (ref 150–450)
PMV BLD AUTO: 10.2 FL (ref 9.4–12.3)
POTASSIUM SERPL-SCNC: 3.7 MMOL/L (ref 3.5–5.1)
PROT SERPL-MCNC: 5.7 G/DL (ref 6.3–8.2)
RBC # BLD AUTO: 3.47 M/UL (ref 4.23–5.6)
SODIUM SERPL-SCNC: 139 MMOL/L (ref 136–145)
WBC # BLD AUTO: 4.2 K/UL (ref 4.3–11.1)

## 2024-06-19 PROCEDURE — 6370000000 HC RX 637 (ALT 250 FOR IP): Performed by: STUDENT IN AN ORGANIZED HEALTH CARE EDUCATION/TRAINING PROGRAM

## 2024-06-19 PROCEDURE — 2580000003 HC RX 258: Performed by: STUDENT IN AN ORGANIZED HEALTH CARE EDUCATION/TRAINING PROGRAM

## 2024-06-19 PROCEDURE — 2580000003 HC RX 258: Performed by: INTERNAL MEDICINE

## 2024-06-19 PROCEDURE — 6360000002 HC RX W HCPCS: Performed by: FAMILY MEDICINE

## 2024-06-19 PROCEDURE — 94760 N-INVAS EAR/PLS OXIMETRY 1: CPT

## 2024-06-19 PROCEDURE — 85025 COMPLETE CBC W/AUTO DIFF WBC: CPT

## 2024-06-19 PROCEDURE — 94640 AIRWAY INHALATION TREATMENT: CPT

## 2024-06-19 PROCEDURE — 83735 ASSAY OF MAGNESIUM: CPT

## 2024-06-19 PROCEDURE — C9113 INJ PANTOPRAZOLE SODIUM, VIA: HCPCS | Performed by: STUDENT IN AN ORGANIZED HEALTH CARE EDUCATION/TRAINING PROGRAM

## 2024-06-19 PROCEDURE — 80053 COMPREHEN METABOLIC PANEL: CPT

## 2024-06-19 PROCEDURE — 36415 COLL VENOUS BLD VENIPUNCTURE: CPT

## 2024-06-19 PROCEDURE — 1100000000 HC RM PRIVATE

## 2024-06-19 PROCEDURE — 2580000003 HC RX 258: Performed by: FAMILY MEDICINE

## 2024-06-19 PROCEDURE — 6360000002 HC RX W HCPCS: Performed by: STUDENT IN AN ORGANIZED HEALTH CARE EDUCATION/TRAINING PROGRAM

## 2024-06-19 RX ADMIN — FOLIC ACID 1 MG: 1 TABLET ORAL at 08:43

## 2024-06-19 RX ADMIN — ALLOPURINOL 300 MG: 300 TABLET ORAL at 20:41

## 2024-06-19 RX ADMIN — MAGNESIUM SULFATE HEPTAHYDRATE 2000 MG: 40 INJECTION, SOLUTION INTRAVENOUS at 12:05

## 2024-06-19 RX ADMIN — SODIUM CHLORIDE, PRESERVATIVE FREE 10 ML: 5 INJECTION INTRAVENOUS at 20:43

## 2024-06-19 RX ADMIN — PANTOPRAZOLE SODIUM 40 MG: 40 INJECTION, POWDER, FOR SOLUTION INTRAVENOUS at 08:44

## 2024-06-19 RX ADMIN — CHLORTHALIDONE 25 MG: 25 TABLET ORAL at 08:43

## 2024-06-19 RX ADMIN — LOSARTAN POTASSIUM 50 MG: 50 TABLET, FILM COATED ORAL at 08:43

## 2024-06-19 RX ADMIN — BUDESONIDE AND FORMOTEROL FUMARATE DIHYDRATE 2 PUFF: 160; 4.5 AEROSOL RESPIRATORY (INHALATION) at 08:35

## 2024-06-19 RX ADMIN — MAGNESIUM GLUCONATE 500 MG ORAL TABLET 400 MG: 500 TABLET ORAL at 20:41

## 2024-06-19 RX ADMIN — BUDESONIDE AND FORMOTEROL FUMARATE DIHYDRATE 2 PUFF: 160; 4.5 AEROSOL RESPIRATORY (INHALATION) at 20:38

## 2024-06-19 RX ADMIN — OXYBUTYNIN CHLORIDE 5 MG: 5 TABLET, EXTENDED RELEASE ORAL at 08:43

## 2024-06-19 RX ADMIN — ALLOPURINOL 300 MG: 300 TABLET ORAL at 08:43

## 2024-06-19 RX ADMIN — SODIUM CHLORIDE, POTASSIUM CHLORIDE, SODIUM LACTATE AND CALCIUM CHLORIDE: 600; 310; 30; 20 INJECTION, SOLUTION INTRAVENOUS at 04:56

## 2024-06-19 RX ADMIN — Medication 100 MG: at 08:43

## 2024-06-19 RX ADMIN — MAGNESIUM GLUCONATE 500 MG ORAL TABLET 400 MG: 500 TABLET ORAL at 08:43

## 2024-06-19 RX ADMIN — TIOTROPIUM BROMIDE INHALATION SPRAY 2 PUFF: 3.12 SPRAY, METERED RESPIRATORY (INHALATION) at 08:34

## 2024-06-19 RX ADMIN — OXYCODONE 5 MG: 5 TABLET ORAL at 20:41

## 2024-06-19 RX ADMIN — SODIUM CHLORIDE, PRESERVATIVE FREE 10 ML: 5 INJECTION INTRAVENOUS at 08:44

## 2024-06-19 RX ADMIN — TRAZODONE HYDROCHLORIDE 50 MG: 50 TABLET ORAL at 22:01

## 2024-06-19 RX ADMIN — SODIUM CHLORIDE, POTASSIUM CHLORIDE, SODIUM LACTATE AND CALCIUM CHLORIDE: 600; 310; 30; 20 INJECTION, SOLUTION INTRAVENOUS at 15:53

## 2024-06-19 ASSESSMENT — PAIN DESCRIPTION - LOCATION: LOCATION: ABDOMEN

## 2024-06-19 ASSESSMENT — PAIN DESCRIPTION - DESCRIPTORS: DESCRIPTORS: DULL;ACHING

## 2024-06-19 ASSESSMENT — PAIN DESCRIPTION - PAIN TYPE: TYPE: ACUTE PAIN

## 2024-06-19 ASSESSMENT — PAIN - FUNCTIONAL ASSESSMENT: PAIN_FUNCTIONAL_ASSESSMENT: ACTIVITIES ARE NOT PREVENTED

## 2024-06-19 ASSESSMENT — PAIN DESCRIPTION - ORIENTATION: ORIENTATION: ANTERIOR

## 2024-06-19 ASSESSMENT — PAIN SCALES - GENERAL: PAINLEVEL_OUTOF10: 6

## 2024-06-19 NOTE — PLAN OF CARE
Problem: Discharge Planning  Goal: Discharge to home or other facility with appropriate resources  Outcome: Progressing     Problem: Pain  Goal: Verbalizes/displays adequate comfort level or baseline comfort level  Outcome: Progressing     Problem: Chronic Conditions and Co-morbidities  Goal: Patient's chronic conditions and co-morbidity symptoms are monitored and maintained or improved  Outcome: Progressing     Problem: Respiratory - Adult  Goal: Achieves optimal ventilation and oxygenation  6/19/2024 0935 by Mavis Bains RN  Outcome: Progressing  6/19/2024 0836 by Dio Moore RCP  Outcome: Progressing  Flowsheets (Taken 6/19/2024 0836)  Achieves optimal ventilation and oxygenation:   Assess for changes in respiratory status   Position to facilitate oxygenation and minimize respiratory effort   Respiratory therapy support as indicated   Assess for changes in mentation and behavior   Oxygen supplementation based on oxygen saturation or arterial blood gases   Encourage broncho-pulmonary hygiene including cough, deep breathe, incentive spirometry   Assess and instruct to report shortness of breath or any respiratory difficulty     Problem: Safety - Adult  Goal: Free from fall injury  Outcome: Progressing  Flowsheets (Taken 6/19/2024 0724)  Free From Fall Injury: Instruct family/caregiver on patient safety

## 2024-06-19 NOTE — PROGRESS NOTES
END OF SHIFT NOTE:    INTAKE/OUTPUT  06/18 0701 - 06/19 0700  In: 370 [P.O.:370]  Out: 1875 [Urine:1875]  Voiding: Yes  Catheter: No  Drain:              Flatus: Patient does have flatus present.    Stool:  0 occurrences.    Characteristics:  Stool Appearance: Soft  Stool Color: Brown  Stool Amount: Small (very small)  Stool Assessment  Incontinence: No  Stool Appearance: Soft  Stool Color: Brown  Stool Amount: Small (very small)  Stool Source: Rectum  Last BM (including prior to admit): 06/17/24    Emesis: 0 occurrences.    Characteristics:   Emesis Appearance: Green    VITAL SIGNS  Patient Vitals for the past 12 hrs:   Temp Pulse Resp BP SpO2   06/19/24 0308 98.8 °F (37.1 °C) 80 20 105/63 98 %   06/18/24 2303 98.6 °F (37 °C) 80 18 128/84 98 %   06/18/24 1958 98.2 °F (36.8 °C) 77 18 130/81 98 %   06/18/24 1949 -- 70 16 -- 97 %       Pain Assessment  Pain Level: 6 (06/18/24 0338)  Pain Location: Abdomen  Patient's Stated Pain Goal: 0 - No pain    Ambulating  No    Shift report given to oncoming nurse at the bedside.    Ida Ray RN

## 2024-06-19 NOTE — PERIOP NOTE
Advised Mavis ALBERTO of the pt scheduled procedure tomorrow preop arrival of 1245 for 1431 start.

## 2024-06-19 NOTE — PLAN OF CARE
Problem: Respiratory - Adult  Goal: Achieves optimal ventilation and oxygenation  Outcome: Progressing  Flowsheets (Taken 6/19/2024 0836)  Achieves optimal ventilation and oxygenation:   Assess for changes in respiratory status   Position to facilitate oxygenation and minimize respiratory effort   Respiratory therapy support as indicated   Assess for changes in mentation and behavior   Oxygen supplementation based on oxygen saturation or arterial blood gases   Encourage broncho-pulmonary hygiene including cough, deep breathe, incentive spirometry   Assess and instruct to report shortness of breath or any respiratory difficulty

## 2024-06-19 NOTE — PROGRESS NOTES
Spiritual Care Visit, initial visit.    Visited with patient at bedside.    Prayed for patient's healing and health.    Visit by Keegan Reagan, Staff . Elana., Leann.ENA., B.A.

## 2024-06-19 NOTE — PROGRESS NOTES
use disorder  Tobacco use disorder  -Nicotine patch, thiamine and folic acid supplementation  -Cessation counseled    Hypomagnesemia  -Continue to replete oral and IV    Normocytic anemia  -H/H at baseline, no evidence of overt blood loss  -Remains stable    Small bowel obstruction (HCC), resolved  Hypokalemia, resolved  Hypophosphatemia, resolved  IMAN, resolved    Discharge planning: home pending repeat ERCP 6/20  Diet:  ADULT DIET; Regular; GI Otoe (GERD/Peptic Ulcer)  Diet NPO  VTE prophylaxis: Lovenox  Code status: Full Code      Hospital Problems:  Principal Problem:    SBO (small bowel obstruction) (HCC)  Active Problems:    Tobacco use disorder    COPD (chronic obstructive pulmonary disease) (HCC)    Hypertension    IMAN (acute kidney injury) (HCC)    Common bile duct stone    Pancreatitis    Small bowel obstruction (HCC)    Hypokalemia    Hypomagnesemia    Normocytic anemia    Hypophosphatemia  Resolved Problems:    * No resolved hospital problems. *      Objective:   Patient Vitals for the past 24 hrs:   Temp Pulse Resp BP SpO2   06/19/24 1142 98.1 °F (36.7 °C) 81 18 112/76 98 %   06/19/24 0835 -- 82 14 -- 96 %   06/19/24 0726 98.6 °F (37 °C) 72 18 120/75 95 %   06/19/24 0308 98.8 °F (37.1 °C) 80 20 105/63 98 %   06/18/24 2303 98.6 °F (37 °C) 80 18 128/84 98 %   06/18/24 1958 98.2 °F (36.8 °C) 77 18 130/81 98 %   06/18/24 1949 -- 70 16 -- 97 %   06/18/24 1533 98.4 °F (36.9 °C) 71 19 127/82 99 %       Oxygen Therapy  SpO2: 98 %  Pulse via Oximetry: 70 beats per minute  Pulse Oximeter Device Mode: Intermittent  Pulse Oximeter Device Location: Finger  O2 Device: None (Room air)  O2 Flow Rate (L/min): 4 L/min    Estimated body mass index is 24.41 kg/m² as calculated from the following:    Height as of this encounter: 1.854 m (6' 1\").    Weight as of this encounter: 83.9 kg (185 lb).    Intake/Output Summary (Last 24 hours) at 6/19/2024 9739  Last data filed at 6/19/2024 0726  Gross per 24 hour   Intake --     AST 16 15 - 37 U/L    Alk Phosphatase 91 40 - 129 U/L    Total Protein 5.7 (L) 6.3 - 8.2 g/dL    Albumin 2.6 (L) 3.2 - 4.6 g/dL    Globulin 3.1 2.3 - 3.5 g/dL    Albumin/Globulin Ratio 0.8 (L) 1.0 - 1.9     Magnesium    Collection Time: 06/19/24  4:39 AM   Result Value Ref Range    Magnesium 1.6 (L) 1.8 - 2.4 mg/dL   CBC with Auto Differential    Collection Time: 06/19/24  4:39 AM   Result Value Ref Range    WBC 4.2 (L) 4.3 - 11.1 K/uL    RBC 3.47 (L) 4.23 - 5.6 M/uL    Hemoglobin 9.5 (L) 13.6 - 17.2 g/dL    Hematocrit 30.2 (L) 41.1 - 50.3 %    MCV 87.0 82 - 102 FL    MCH 27.4 26.1 - 32.9 PG    MCHC 31.5 31.4 - 35.0 g/dL    RDW 14.7 (H) 11.9 - 14.6 %    Platelets 326 150 - 450 K/uL    MPV 10.2 9.4 - 12.3 FL    nRBC 0.00 0.0 - 0.2 K/uL    Differential Type AUTOMATED      Neutrophils % 51 43 - 78 %    Lymphocytes % 35 13 - 44 %    Monocytes % 7 4.0 - 12.0 %    Eosinophils % 5 0.5 - 7.8 %    Basophils % 1 0.0 - 2.0 %    Immature Granulocytes % 1 0.0 - 5.0 %    Neutrophils Absolute 2.2 1.7 - 8.2 K/UL    Lymphocytes Absolute 1.4 0.5 - 4.6 K/UL    Monocytes Absolute 0.3 0.1 - 1.3 K/UL    Eosinophils Absolute 0.2 0.0 - 0.8 K/UL    Basophils Absolute 0.0 0.0 - 0.2 K/UL    Immature Granulocytes Absolute 0.0 0.0 - 0.5 K/UL       No results for input(s): \"COVID19\" in the last 72 hours.    Current Meds:  Current Facility-Administered Medications   Medication Dose Route Frequency    magnesium oxide (MAG-OX) tablet 400 mg  400 mg Oral BID    lactated ringers IV soln infusion   IntraVENous Continuous    traZODone (DESYREL) tablet 50 mg  50 mg Oral Nightly PRN    melatonin tablet 3 mg  3 mg Oral Nightly PRN    chlorthalidone (HYGROTON) tablet 25 mg  25 mg Oral Daily    pantoprazole (PROTONIX) 40 mg in sodium chloride (PF) 0.9 % 10 mL injection  40 mg IntraVENous Daily    aluminum & magnesium hydroxide-simethicone (MAALOX) 200-200-20 MG/5ML suspension 30 mL  30 mL Oral Q6H PRN    oxyCODONE (ROXICODONE) immediate release tablet 5

## 2024-06-19 NOTE — PROGRESS NOTES
Reagan Sandra is 73 y.o. y/o male     Initial consult: See consult note from 6/13: Surgery requesting evaluation of CBD dilation IP given upcoming OP EUS for evaluation of CBD dilation/pancreatic cyst at uncinate process/ possible ampullary obstruction with normal labs. Patient initially admitted this admission for SBO and pancreatitis. EUS on 6/17: noted to have CBD stones but unable to reach ampulla due to duodenal edema from pancreatitis.     Today: ERCP planned for 6/20; resting in bed without any complaints. Happy that he could finally eat food and tolerating without pain, vomiting, nausea.     Labs: TB and LFT unremarkable.     PE:   Vitals:    06/19/24 1142   BP: 112/76   Pulse: 81   Resp: 18   Temp:    SpO2: 98%      General:  The patient appears well-nourished, and is in no acute distress.    Neurologic:  Alert and oriented x3.  Psychiatric: Appropriate mood and affect.    Assessment and Plan:   #CBD Stones/Duodenal Thickening/Pancreatitis: Patient without complaints today; his labs are unremarkable. Pending ERCP tomorrow; NPO at MN.     Electronically signed by Valentin Avila PA-C.

## 2024-06-20 ENCOUNTER — ANESTHESIA (OUTPATIENT)
Dept: ENDOSCOPY | Age: 74
End: 2024-06-20
Payer: MEDICARE

## 2024-06-20 ENCOUNTER — ANESTHESIA EVENT (OUTPATIENT)
Dept: INTERVENTIONAL RADIOLOGY/VASCULAR | Age: 74
End: 2024-06-20
Payer: MEDICARE

## 2024-06-20 ENCOUNTER — APPOINTMENT (OUTPATIENT)
Dept: GENERAL RADIOLOGY | Age: 74
DRG: 439 | End: 2024-06-20
Payer: MEDICARE

## 2024-06-20 LAB
MAGNESIUM SERPL-MCNC: 1.6 MG/DL (ref 1.8–2.4)
POTASSIUM SERPL-SCNC: 3.9 MMOL/L (ref 3.5–5.1)

## 2024-06-20 PROCEDURE — 2580000003 HC RX 258: Performed by: NURSE ANESTHETIST, CERTIFIED REGISTERED

## 2024-06-20 PROCEDURE — 2709999900 HC NON-CHARGEABLE SUPPLY: Performed by: INTERNAL MEDICINE

## 2024-06-20 PROCEDURE — 0FJB8ZZ INSPECTION OF HEPATOBILIARY DUCT, VIA NATURAL OR ARTIFICIAL OPENING ENDOSCOPIC: ICD-10-PCS | Performed by: INTERNAL MEDICINE

## 2024-06-20 PROCEDURE — 3700000001 HC ADD 15 MINUTES (ANESTHESIA): Performed by: INTERNAL MEDICINE

## 2024-06-20 PROCEDURE — 6360000002 HC RX W HCPCS: Performed by: NURSE ANESTHETIST, CERTIFIED REGISTERED

## 2024-06-20 PROCEDURE — 7100000000 HC PACU RECOVERY - FIRST 15 MIN: Performed by: INTERNAL MEDICINE

## 2024-06-20 PROCEDURE — 6370000000 HC RX 637 (ALT 250 FOR IP): Performed by: STUDENT IN AN ORGANIZED HEALTH CARE EDUCATION/TRAINING PROGRAM

## 2024-06-20 PROCEDURE — 84132 ASSAY OF SERUM POTASSIUM: CPT

## 2024-06-20 PROCEDURE — 6360000002 HC RX W HCPCS: Performed by: FAMILY MEDICINE

## 2024-06-20 PROCEDURE — 94640 AIRWAY INHALATION TREATMENT: CPT

## 2024-06-20 PROCEDURE — C1769 GUIDE WIRE: HCPCS | Performed by: INTERNAL MEDICINE

## 2024-06-20 PROCEDURE — 2580000003 HC RX 258: Performed by: INTERNAL MEDICINE

## 2024-06-20 PROCEDURE — C9113 INJ PANTOPRAZOLE SODIUM, VIA: HCPCS | Performed by: STUDENT IN AN ORGANIZED HEALTH CARE EDUCATION/TRAINING PROGRAM

## 2024-06-20 PROCEDURE — 3700000000 HC ANESTHESIA ATTENDED CARE: Performed by: INTERNAL MEDICINE

## 2024-06-20 PROCEDURE — 94760 N-INVAS EAR/PLS OXIMETRY 1: CPT

## 2024-06-20 PROCEDURE — C1889 IMPLANT/INSERT DEVICE, NOC: HCPCS | Performed by: INTERNAL MEDICINE

## 2024-06-20 PROCEDURE — 6360000004 HC RX CONTRAST MEDICATION: Performed by: INTERNAL MEDICINE

## 2024-06-20 PROCEDURE — 6370000000 HC RX 637 (ALT 250 FOR IP): Performed by: INTERNAL MEDICINE

## 2024-06-20 PROCEDURE — 2500000003 HC RX 250 WO HCPCS: Performed by: NURSE ANESTHETIST, CERTIFIED REGISTERED

## 2024-06-20 PROCEDURE — 6360000002 HC RX W HCPCS: Performed by: STUDENT IN AN ORGANIZED HEALTH CARE EDUCATION/TRAINING PROGRAM

## 2024-06-20 PROCEDURE — 1100000000 HC RM PRIVATE

## 2024-06-20 PROCEDURE — 83735 ASSAY OF MAGNESIUM: CPT

## 2024-06-20 PROCEDURE — 2580000003 HC RX 258: Performed by: STUDENT IN AN ORGANIZED HEALTH CARE EDUCATION/TRAINING PROGRAM

## 2024-06-20 PROCEDURE — 3609018800 HC ERCP DX COLLECTION SPECIMEN BRUSHING/WASHING: Performed by: INTERNAL MEDICINE

## 2024-06-20 PROCEDURE — 7100000001 HC PACU RECOVERY - ADDTL 15 MIN: Performed by: INTERNAL MEDICINE

## 2024-06-20 PROCEDURE — 36415 COLL VENOUS BLD VENIPUNCTURE: CPT

## 2024-06-20 PROCEDURE — 2580000003 HC RX 258: Performed by: FAMILY MEDICINE

## 2024-06-20 PROCEDURE — 2580000003 HC RX 258: Performed by: ANESTHESIOLOGY

## 2024-06-20 DEVICE — CLIP
Type: IMPLANTABLE DEVICE | Status: FUNCTIONAL
Brand: RESOLUTION 360™ ULTRA CLIP

## 2024-06-20 RX ORDER — SODIUM CHLORIDE 0.9 % (FLUSH) 0.9 %
5-40 SYRINGE (ML) INJECTION PRN
Status: DISCONTINUED | OUTPATIENT
Start: 2024-06-20 | End: 2024-06-20 | Stop reason: HOSPADM

## 2024-06-20 RX ORDER — MIDAZOLAM HYDROCHLORIDE 2 MG/2ML
2 INJECTION, SOLUTION INTRAMUSCULAR; INTRAVENOUS
Status: DISCONTINUED | OUTPATIENT
Start: 2024-06-20 | End: 2024-06-20 | Stop reason: HOSPADM

## 2024-06-20 RX ORDER — ACETAMINOPHEN 500 MG
1000 TABLET ORAL ONCE
Status: DISCONTINUED | OUTPATIENT
Start: 2024-06-20 | End: 2024-06-20 | Stop reason: HOSPADM

## 2024-06-20 RX ORDER — ONDANSETRON 2 MG/ML
INJECTION INTRAMUSCULAR; INTRAVENOUS PRN
Status: DISCONTINUED | OUTPATIENT
Start: 2024-06-20 | End: 2024-06-20 | Stop reason: SDUPTHER

## 2024-06-20 RX ORDER — LIDOCAINE HYDROCHLORIDE 10 MG/ML
1 INJECTION, SOLUTION INFILTRATION; PERINEURAL
Status: DISCONTINUED | OUTPATIENT
Start: 2024-06-20 | End: 2024-06-20 | Stop reason: HOSPADM

## 2024-06-20 RX ORDER — LIDOCAINE HYDROCHLORIDE 20 MG/ML
INJECTION, SOLUTION EPIDURAL; INFILTRATION; INTRACAUDAL; PERINEURAL PRN
Status: DISCONTINUED | OUTPATIENT
Start: 2024-06-20 | End: 2024-06-20 | Stop reason: SDUPTHER

## 2024-06-20 RX ORDER — INDOMETHACIN 100 MG
SUPPOSITORY, RECTAL RECTAL PRN
Status: DISCONTINUED | OUTPATIENT
Start: 2024-06-20 | End: 2024-06-20 | Stop reason: ALTCHOICE

## 2024-06-20 RX ORDER — DEXAMETHASONE SODIUM PHOSPHATE 4 MG/ML
INJECTION, SOLUTION INTRA-ARTICULAR; INTRALESIONAL; INTRAMUSCULAR; INTRAVENOUS; SOFT TISSUE PRN
Status: DISCONTINUED | OUTPATIENT
Start: 2024-06-20 | End: 2024-06-20 | Stop reason: SDUPTHER

## 2024-06-20 RX ORDER — DEXTROSE MONOHYDRATE 100 MG/ML
INJECTION, SOLUTION INTRAVENOUS CONTINUOUS PRN
Status: DISCONTINUED | OUTPATIENT
Start: 2024-06-20 | End: 2024-06-20 | Stop reason: HOSPADM

## 2024-06-20 RX ORDER — HYDROMORPHONE HYDROCHLORIDE 2 MG/ML
0.25 INJECTION, SOLUTION INTRAMUSCULAR; INTRAVENOUS; SUBCUTANEOUS EVERY 5 MIN PRN
Status: DISCONTINUED | OUTPATIENT
Start: 2024-06-20 | End: 2024-06-20 | Stop reason: HOSPADM

## 2024-06-20 RX ORDER — PROPOFOL 10 MG/ML
INJECTION, EMULSION INTRAVENOUS PRN
Status: DISCONTINUED | OUTPATIENT
Start: 2024-06-20 | End: 2024-06-20 | Stop reason: SDUPTHER

## 2024-06-20 RX ORDER — NALOXONE HYDROCHLORIDE 0.4 MG/ML
INJECTION, SOLUTION INTRAMUSCULAR; INTRAVENOUS; SUBCUTANEOUS PRN
Status: DISCONTINUED | OUTPATIENT
Start: 2024-06-20 | End: 2024-06-20 | Stop reason: HOSPADM

## 2024-06-20 RX ORDER — SODIUM CHLORIDE, SODIUM LACTATE, POTASSIUM CHLORIDE, CALCIUM CHLORIDE 600; 310; 30; 20 MG/100ML; MG/100ML; MG/100ML; MG/100ML
INJECTION, SOLUTION INTRAVENOUS CONTINUOUS
Status: DISCONTINUED | OUTPATIENT
Start: 2024-06-20 | End: 2024-06-20 | Stop reason: HOSPADM

## 2024-06-20 RX ORDER — ONDANSETRON 2 MG/ML
4 INJECTION INTRAMUSCULAR; INTRAVENOUS
Status: DISCONTINUED | OUTPATIENT
Start: 2024-06-20 | End: 2024-06-20 | Stop reason: HOSPADM

## 2024-06-20 RX ORDER — HALOPERIDOL 5 MG/ML
1 INJECTION INTRAMUSCULAR
Status: DISCONTINUED | OUTPATIENT
Start: 2024-06-20 | End: 2024-06-20 | Stop reason: HOSPADM

## 2024-06-20 RX ORDER — SODIUM CHLORIDE 0.9 % (FLUSH) 0.9 %
5-40 SYRINGE (ML) INJECTION EVERY 12 HOURS SCHEDULED
Status: DISCONTINUED | OUTPATIENT
Start: 2024-06-20 | End: 2024-06-20 | Stop reason: HOSPADM

## 2024-06-20 RX ORDER — SODIUM CHLORIDE 9 MG/ML
INJECTION, SOLUTION INTRAVENOUS PRN
Status: DISCONTINUED | OUTPATIENT
Start: 2024-06-20 | End: 2024-06-20 | Stop reason: HOSPADM

## 2024-06-20 RX ORDER — SUCCINYLCHOLINE CHLORIDE 20 MG/ML
INJECTION INTRAMUSCULAR; INTRAVENOUS PRN
Status: DISCONTINUED | OUTPATIENT
Start: 2024-06-20 | End: 2024-06-20 | Stop reason: SDUPTHER

## 2024-06-20 RX ORDER — OXYCODONE HYDROCHLORIDE 5 MG/1
5 TABLET ORAL
Status: DISCONTINUED | OUTPATIENT
Start: 2024-06-20 | End: 2024-06-20 | Stop reason: HOSPADM

## 2024-06-20 RX ORDER — EPHEDRINE SULFATE 5 MG/ML
INJECTION INTRAVENOUS PRN
Status: DISCONTINUED | OUTPATIENT
Start: 2024-06-20 | End: 2024-06-20 | Stop reason: SDUPTHER

## 2024-06-20 RX ORDER — IBUPROFEN 600 MG/1
1 TABLET ORAL PRN
Status: DISCONTINUED | OUTPATIENT
Start: 2024-06-20 | End: 2024-06-20 | Stop reason: HOSPADM

## 2024-06-20 RX ADMIN — LOSARTAN POTASSIUM 50 MG: 50 TABLET, FILM COATED ORAL at 08:19

## 2024-06-20 RX ADMIN — EPHEDRINE SULFATE 10 MG: 5 INJECTION INTRAVENOUS at 15:52

## 2024-06-20 RX ADMIN — TIOTROPIUM BROMIDE INHALATION SPRAY 2 PUFF: 3.12 SPRAY, METERED RESPIRATORY (INHALATION) at 07:47

## 2024-06-20 RX ADMIN — DEXAMETHASONE SODIUM PHOSPHATE 4 MG: 4 INJECTION, SOLUTION INTRAMUSCULAR; INTRAVENOUS at 15:32

## 2024-06-20 RX ADMIN — CHLORTHALIDONE 25 MG: 25 TABLET ORAL at 08:19

## 2024-06-20 RX ADMIN — OXYCODONE 5 MG: 5 TABLET ORAL at 20:26

## 2024-06-20 RX ADMIN — PHENYLEPHRINE HYDROCHLORIDE 100 MCG: 10 INJECTION INTRAVENOUS at 15:49

## 2024-06-20 RX ADMIN — PHENYLEPHRINE HYDROCHLORIDE 200 MCG: 10 INJECTION INTRAVENOUS at 15:40

## 2024-06-20 RX ADMIN — SODIUM CHLORIDE, POTASSIUM CHLORIDE, SODIUM LACTATE AND CALCIUM CHLORIDE: 600; 310; 30; 20 INJECTION, SOLUTION INTRAVENOUS at 01:56

## 2024-06-20 RX ADMIN — FOLIC ACID 1 MG: 1 TABLET ORAL at 08:19

## 2024-06-20 RX ADMIN — MAGNESIUM GLUCONATE 500 MG ORAL TABLET 400 MG: 500 TABLET ORAL at 08:19

## 2024-06-20 RX ADMIN — SODIUM CHLORIDE, PRESERVATIVE FREE 10 ML: 5 INJECTION INTRAVENOUS at 20:29

## 2024-06-20 RX ADMIN — Medication 100 MG: at 08:19

## 2024-06-20 RX ADMIN — PROPOFOL 120 MG: 10 INJECTION, EMULSION INTRAVENOUS at 15:12

## 2024-06-20 RX ADMIN — MAGNESIUM SULFATE HEPTAHYDRATE 2000 MG: 40 INJECTION, SOLUTION INTRAVENOUS at 08:24

## 2024-06-20 RX ADMIN — Medication 200 MG: at 15:12

## 2024-06-20 RX ADMIN — BUDESONIDE AND FORMOTEROL FUMARATE DIHYDRATE 2 PUFF: 160; 4.5 AEROSOL RESPIRATORY (INHALATION) at 07:47

## 2024-06-20 RX ADMIN — SODIUM CHLORIDE, POTASSIUM CHLORIDE, SODIUM LACTATE AND CALCIUM CHLORIDE: 600; 310; 30; 20 INJECTION, SOLUTION INTRAVENOUS at 13:55

## 2024-06-20 RX ADMIN — BUDESONIDE AND FORMOTEROL FUMARATE DIHYDRATE 2 PUFF: 160; 4.5 AEROSOL RESPIRATORY (INHALATION) at 20:41

## 2024-06-20 RX ADMIN — SODIUM CHLORIDE, PRESERVATIVE FREE 10 ML: 5 INJECTION INTRAVENOUS at 08:20

## 2024-06-20 RX ADMIN — TRAZODONE HYDROCHLORIDE 50 MG: 50 TABLET ORAL at 22:19

## 2024-06-20 RX ADMIN — PANTOPRAZOLE SODIUM 40 MG: 40 INJECTION, POWDER, FOR SOLUTION INTRAVENOUS at 08:19

## 2024-06-20 RX ADMIN — ALLOPURINOL 300 MG: 300 TABLET ORAL at 08:19

## 2024-06-20 RX ADMIN — ALLOPURINOL 300 MG: 300 TABLET ORAL at 20:27

## 2024-06-20 RX ADMIN — OXYBUTYNIN CHLORIDE 5 MG: 5 TABLET, EXTENDED RELEASE ORAL at 08:19

## 2024-06-20 RX ADMIN — MAGNESIUM GLUCONATE 500 MG ORAL TABLET 400 MG: 500 TABLET ORAL at 20:26

## 2024-06-20 RX ADMIN — LIDOCAINE HYDROCHLORIDE 80 MG: 20 INJECTION, SOLUTION EPIDURAL; INFILTRATION; INTRACAUDAL; PERINEURAL at 15:12

## 2024-06-20 RX ADMIN — PHENYLEPHRINE HYDROCHLORIDE 200 MCG: 10 INJECTION INTRAVENOUS at 15:43

## 2024-06-20 RX ADMIN — ONDANSETRON 4 MG: 2 INJECTION INTRAMUSCULAR; INTRAVENOUS at 15:32

## 2024-06-20 ASSESSMENT — PAIN - FUNCTIONAL ASSESSMENT
PAIN_FUNCTIONAL_ASSESSMENT: ACTIVITIES ARE NOT PREVENTED
PAIN_FUNCTIONAL_ASSESSMENT: 0-10

## 2024-06-20 ASSESSMENT — PAIN DESCRIPTION - LOCATION: LOCATION: ABDOMEN

## 2024-06-20 ASSESSMENT — PAIN DESCRIPTION - PAIN TYPE: TYPE: CHRONIC PAIN

## 2024-06-20 ASSESSMENT — PAIN SCALES - GENERAL: PAINLEVEL_OUTOF10: 7

## 2024-06-20 ASSESSMENT — PAIN DESCRIPTION - ORIENTATION: ORIENTATION: ANTERIOR

## 2024-06-20 ASSESSMENT — PAIN DESCRIPTION - DESCRIPTORS: DESCRIPTORS: ACHING

## 2024-06-20 NOTE — ANESTHESIA PRE PROCEDURE
Department of Anesthesiology  Preprocedure Note       Name:  Reagan Sandra   Age:  73 y.o.  :  1950                                          MRN:  [id number]         Date:  2024      Surgeon: Surgeon(s):  Clara Caro MD    Procedure: Procedure(s):  ENDOSCOPIC RETROGRADE CHOLANGIOPANCREATOGRAPHY    Medications prior to admission:   Prior to Admission medications    Medication Sig Start Date End Date Taking? Authorizing Provider   polyethylene glycol (GLYCOLAX) 17 GM/SCOOP powder Take 17 g by mouth daily 24  Xander Gonzalez DO   thiamine 100 MG tablet Take 1 tablet by mouth daily 24   Liam Olmstead MD   losartan (COZAAR) 50 MG tablet Take 1 tablet by mouth daily    Reuben Lee MD   chlorthalidone (HYGROTEN) 50 MG tablet Take 1 tablet by mouth daily    Reuben Lee MD   omeprazole (PRILOSEC) 20 MG delayed release capsule Take 2 capsules by mouth daily    Reuben Lee MD   fluticasone-umeclidin-vilant (TRELEGY ELLIPTA) 100-62.5-25 MCG/ACT AEPB inhaler INHALE 1 PUFF INTO THE LUNGS DAILY 24   Candie Villalba PA   albuterol sulfate HFA (PROVENTIL;VENTOLIN;PROAIR) 108 (90 Base) MCG/ACT inhaler Inhale 2 puffs into the lungs every 6 hours as needed for Wheezing 23   Hillary Saeed MD   albuterol (PROVENTIL) (2.5 MG/3ML) 0.083% nebulizer solution Take 3 mLs by nebulization every 6 hours as needed for Shortness of Breath or Wheezing 23   Annetta Ortega MD   allopurinol (ZYLOPRIM) 300 MG tablet TAKE 1 TABLET BY MOUTH TWICE DAILY 22   Reuben Lee MD   albuterol sulfate  (90 Base) MCG/ACT inhaler Inhale 1 puff into the lungs every 4 hours as needed 21   Automatic Reconciliation, Ar   fluticasone-umeclidin-vilant (TRELEGY ELLIPTA) 100-62.5-25 MCG/INH AEPB Inhale 1 puff into the lungs daily 10/14/21   Automatic Reconciliation, Ar   oxybutynin (DITROPAN-XL) 5 MG extended release tablet Take 1 tablet by mouth daily

## 2024-06-20 NOTE — ANESTHESIA PRE PROCEDURE
(gastroesophageal reflux disease)     on med for control    • GI bleed 2019   • Hyperlipidemia 2015    on med   • Hypertension     on med for control    • MI (myocardial infarction) (HCC) 2013    NSTEMI--- stent x 1---   • Multiple renal cysts    • Other ill-defined conditions(799.89)     hernia, pt unsure of site (resolved with surery)   • Poor historian     pt \" acted very disinterested at time of phone assess. pt states didnt know his meds. would not go get them for us to review. denied anybody that helps him that i could call   • Prostate cancer (HCC) 2018    surg only   • Pulmonary emboli (HCC) 2019   • Stroke (HCC) 2012    TIA; no residual        Past Surgical History:        Procedure Laterality Date   • COLONOSCOPY     • ERCP N/A 2024    ENDOSCOPIC RETROGRADE CHOLANGIOPANCREATOGRAPHY performed by Clara Caro MD at [facility] ENDOSCOPY   • HERNIA REPAIR     • ND UNLISTED PROCEDURE CARDIAC SURGERY      stent   • PROSTATECTOMY  2018   • PTCA      x 1   • TOTAL COLECTOMY      due to polyp that could not be excised   • UPPER GASTROINTESTINAL ENDOSCOPY N/A 2024    ESOPHAGOGASTRODUODENOSCOPY ULTRASOUND performed by Clara Caro MD at [facility] ENDOSCOPY       Social History:    Social History     Tobacco Use   • Smoking status: Former     Current packs/day: 0.00     Average packs/day: 0.5 packs/day for 50.0 years (25.0 ttl pk-yrs)     Types: Cigarettes     Start date: 1969     Quit date: 2019     Years since quittin.1   • Smokeless tobacco: Never   Substance Use Topics   • Alcohol use: Yes     Alcohol/week: 3.0 standard drinks of alcohol     Comment: none since end of may 2024-- pt unsure of date--- was heavy drinker prior                                Counseling given: Not Answered      Vital Signs (Current):   There were no vitals filed for this visit.                                             BP Readings from Last 3 Encounters:   24 (!) 175/94   24 123/65

## 2024-06-20 NOTE — ANESTHESIA POSTPROCEDURE EVALUATION
Department of Anesthesiology  Postprocedure Note    Patient: Reagan Sandra  MRN: [id number]  YOB: 1950  Date of evaluation: 6/20/2024    Procedure Summary       Date: 06/20/24 Room / Location: [facility] ENDO [facility] 1 / [facility] ENDOSCOPY    Anesthesia Start: 1505 Anesthesia Stop: 1613    Procedure: ENDOSCOPIC RETROGRADE CHOLANGIOPANCREATOGRAPHY (Upper GI Region) Diagnosis:       Common bile duct stone      (Common bile duct stone [K80.50])    Surgeons: Clara Caro MD Responsible Provider: Shona Holland MD    Anesthesia Type: General ASA Status: 3            Anesthesia Type: General    Rose Phase I: Rose Score: 9    Rose Phase II:      Anesthesia Post Evaluation    Patient location during evaluation: PACU  Patient participation: complete - patient participated  Level of consciousness: awake and alert  Airway patency: patent  Nausea: well controlled.  Cardiovascular status: acceptable.  Respiratory status: acceptable  Hydration status: stable  Pain management: adequate    No notable events documented.

## 2024-06-20 NOTE — PROGRESS NOTES
TRANSFER - OUT REPORT:    Verbal report given to DOLLY Meyers on Reagan Sandra  being transferred to Pre-Op for ordered procedure       Report consisted of patient's Situation, Background, Assessment and   Recommendations(SBAR).     Information from the following report(s) Nurse Handoff Report, Index, Adult Overview, Intake/Output, MAR, and Recent Results was reviewed with the receiving nurse.           Lines:   Peripheral IV 06/17/24 Posterior;Right Hand (Active)   Site Assessment Clean, dry & intact 06/19/24 2041   Line Status Flushed;Infusing 06/19/24 2041   Line Care Connections checked and tightened 06/19/24 2041   Phlebitis Assessment No symptoms 06/19/24 2041   Infiltration Assessment 0 06/19/24 2041   Alcohol Cap Used Yes 06/19/24 2041   Dressing Status Clean, dry & intact 06/19/24 2041   Dressing Type Transparent 06/19/24 2041        Opportunity for questions and clarification was provided.      Patient transported with:  transport

## 2024-06-20 NOTE — PERIOP NOTE
TRANSFER - OUT REPORT:    Verbal report given to DOLLY Gamble on Reagan Sandra  being transferred to [facility] for routine post-op       Report consisted of patient’s Situation, Background, Assessment and   Recommendations(SBAR).     Information from the following report(s) Nurse Handoff Report, Adult Overview, Surgery Report, Intake/Output, and MAR was reviewed with the receiving nurse.    Opportunity for questions and clarification was provided.      Patient transported with:   Tech

## 2024-06-20 NOTE — OP NOTE
Procedure: ERCP     Indication: CBD stone.      Date of Procedure: 6/20/2024     Patient profile: Refer to patient note in chart for documentation of history and physical     Providers: Clara Caro MD     Referring MD: No ref. provider found     PCP: Ilia Wang MD     Medicines: Monitored anesthesia care     Complication: No immediate complications.      Estimated blood loss: Minimal     Procedure: After the risks including, but not limited to medication reaction, infection, bleeding, perforation, missed lesions, benefits and alternatives of the procedure were discussed with the patient and all questions were answered, informed consent was obtained. The gastroscope was passed under direct vision throughout the procedure, the patient's blood pressure pulse and oxygen saturation were monitored continuously. The scope was introduced through the mouth and advanced to the 2nd portion of the duodenum. The endoscopy was performed without difficulty. The patient tolerated the procedure well.         Findings:   --The duodenoscope was introduced from the mouth and advanced through the esophagus to the GE junction into the stomach and to level of the ampulla  --there was significant duodenal edema, making it extremely difficult to identify the ampulla. Nonetheless  this actually appears improved from prior exam 2 days prior. After multiple attempts including manipulating the duodenal folds,, there appears to be a site that is most consistent with the ampulla. Cannulation attempted with Rx44 and then Rx 39 sphincterotome, but due to angulation from underlying edema, this was unsuccessful.    The scope was pulled back, and the procedure was subsequently ended.     Impression:  --Significant duodenal edema making indentificaiton of the ampulla extremely difficult     Fluoroscopy: All fluoroscopic images were reviewed by myself and decisions were made based on my interpretation of the images. A total of 2 images were acquired and

## 2024-06-20 NOTE — PROGRESS NOTES
TRANSFER - IN REPORT:    Verbal report received from DOLLY Dean on Reagan Sandra  being received from PACU for ordered procedure      Report consisted of patient's Situation, Background, Assessment and   Recommendations(SBAR).     Information from the following report(s) Nurse Handoff Report, Index, Adult Overview, Surgery Report, and MAR was reviewed with the receiving nurse.    Opportunity for questions and clarification was provided.      Assessment completed upon patient's arrival to unit and care assumed.

## 2024-06-20 NOTE — PROGRESS NOTES
TRANSFER - IN REPORT:    Verbal report received from Mavis ALBERTO on Reagan Sandra  being received from 219 for routine progression of patient care      Report consisted of patient's Situation, Background, Assessment and   Recommendations(SBAR).     Information from the following report(s) Nurse Handoff Report was reviewed with the receiving nurse.    Opportunity for questions and clarification was provided.      Assessment completed upon patient's arrival to unit and care assumed.

## 2024-06-20 NOTE — PLAN OF CARE
Problem: Discharge Planning  Goal: Discharge to home or other facility with appropriate resources  Outcome: Progressing     Problem: Pain  Goal: Verbalizes/displays adequate comfort level or baseline comfort level  Outcome: Progressing     Problem: Chronic Conditions and Co-morbidities  Goal: Patient's chronic conditions and co-morbidity symptoms are monitored and maintained or improved  Outcome: Progressing     Problem: Respiratory - Adult  Goal: Achieves optimal ventilation and oxygenation  Outcome: Progressing     Problem: Safety - Adult  Goal: Free from fall injury  Outcome: Progressing  Flowsheets (Taken 6/20/2024 5190)  Free From Fall Injury: Instruct family/caregiver on patient safety

## 2024-06-20 NOTE — PROGRESS NOTES
4 Eyes Skin Assessment     NAME:  Reagan Sandra  YOB: 1950  MEDICAL RECORD NUMBER:  [id number]    The patient is being assessed for  Post-Op Surgical    I agree that at least one RN has performed a thorough Head to Toe Skin Assessment on the patient. ALL assessment sites listed below have been assessed.      Areas assessed by both nurses:    Head, Face, Ears, Shoulders, Back, Chest, Arms, Elbows, Hands, Sacrum. Buttock, Coccyx, Ischium, Legs. Feet and Heels, and Under Medical Devices         Does the Patient have a Wound? No noted wound(s)       Preston Prevention initiated by RN: Yes  Wound Care Orders initiated by RN: No    Pressure Injury (Stage 3,4, Unstageable, DTI, NWPT, and Complex wounds) if present, place Wound referral order by RN under : No    New Ostomies, if present place, Ostomy referral order under : No     Nurse 1 eSignature: Electronically signed by Mavis Bains RN on 6/20/24 at 5:19 PM EDT    **SHARE this note so that the co-signing nurse can place an eSignature**    Nurse 2 eSignature: Electronically signed by Angella Zhou RN on 6/20/24 at 7:19 PM EDT

## 2024-06-21 ENCOUNTER — HOSPITAL ENCOUNTER (OUTPATIENT)
Dept: INTERVENTIONAL RADIOLOGY/VASCULAR | Age: 74
DRG: 439 | End: 2024-06-21
Attending: INTERNAL MEDICINE
Payer: MEDICARE

## 2024-06-21 ENCOUNTER — ANESTHESIA (OUTPATIENT)
Dept: INTERVENTIONAL RADIOLOGY/VASCULAR | Age: 74
End: 2024-06-21
Payer: MEDICARE

## 2024-06-21 VITALS
OXYGEN SATURATION: 95 % | RESPIRATION RATE: 18 BRPM | HEART RATE: 73 BPM | TEMPERATURE: 97.3 F | DIASTOLIC BLOOD PRESSURE: 86 MMHG | SYSTOLIC BLOOD PRESSURE: 155 MMHG

## 2024-06-21 LAB
ANION GAP SERPL CALC-SCNC: 10 MMOL/L (ref 9–18)
BUN SERPL-MCNC: 10 MG/DL (ref 8–23)
CALCIUM SERPL-MCNC: 8.9 MG/DL (ref 8.8–10.2)
CHLORIDE SERPL-SCNC: 106 MMOL/L (ref 98–107)
CO2 SERPL-SCNC: 21 MMOL/L (ref 20–28)
CREAT SERPL-MCNC: 1.07 MG/DL (ref 0.8–1.3)
ERYTHROCYTE [DISTWIDTH] IN BLOOD BY AUTOMATED COUNT: 14.4 % (ref 11.9–14.6)
GLUCOSE SERPL-MCNC: 117 MG/DL (ref 70–99)
HCT VFR BLD AUTO: 30.8 % (ref 41.1–50.3)
HGB BLD-MCNC: 9.8 G/DL (ref 13.6–17.2)
MAGNESIUM SERPL-MCNC: 1.8 MG/DL (ref 1.8–2.4)
MCH RBC QN AUTO: 28.1 PG (ref 26.1–32.9)
MCHC RBC AUTO-ENTMCNC: 31.8 G/DL (ref 31.4–35)
MCV RBC AUTO: 88.3 FL (ref 82–102)
NRBC # BLD: 0 K/UL (ref 0–0.2)
PLATELET # BLD AUTO: 317 K/UL (ref 150–450)
PMV BLD AUTO: 10.4 FL (ref 9.4–12.3)
POTASSIUM SERPL-SCNC: 4.5 MMOL/L (ref 3.5–5.1)
RBC # BLD AUTO: 3.49 M/UL (ref 4.23–5.6)
SODIUM SERPL-SCNC: 138 MMOL/L (ref 136–145)
WBC # BLD AUTO: 3.5 K/UL (ref 4.3–11.1)

## 2024-06-21 PROCEDURE — C9113 INJ PANTOPRAZOLE SODIUM, VIA: HCPCS | Performed by: STUDENT IN AN ORGANIZED HEALTH CARE EDUCATION/TRAINING PROGRAM

## 2024-06-21 PROCEDURE — 6360000004 HC RX CONTRAST MEDICATION: Performed by: RADIOLOGY

## 2024-06-21 PROCEDURE — 47534 PLMT BILIARY DRAINAGE CATH: CPT

## 2024-06-21 PROCEDURE — 6370000000 HC RX 637 (ALT 250 FOR IP): Performed by: STUDENT IN AN ORGANIZED HEALTH CARE EDUCATION/TRAINING PROGRAM

## 2024-06-21 PROCEDURE — 1100000000 HC RM PRIVATE

## 2024-06-21 PROCEDURE — 3700000001 HC ADD 15 MINUTES (ANESTHESIA)

## 2024-06-21 PROCEDURE — 83735 ASSAY OF MAGNESIUM: CPT

## 2024-06-21 PROCEDURE — 87102 FUNGUS ISOLATION CULTURE: CPT

## 2024-06-21 PROCEDURE — 6360000002 HC RX W HCPCS

## 2024-06-21 PROCEDURE — 2580000003 HC RX 258

## 2024-06-21 PROCEDURE — 6360000002 HC RX W HCPCS: Performed by: FAMILY MEDICINE

## 2024-06-21 PROCEDURE — 87206 SMEAR FLUORESCENT/ACID STAI: CPT

## 2024-06-21 PROCEDURE — 36415 COLL VENOUS BLD VENIPUNCTURE: CPT

## 2024-06-21 PROCEDURE — 94760 N-INVAS EAR/PLS OXIMETRY 1: CPT

## 2024-06-21 PROCEDURE — 7100000001 HC PACU RECOVERY - ADDTL 15 MIN

## 2024-06-21 PROCEDURE — 87186 SC STD MICRODIL/AGAR DIL: CPT

## 2024-06-21 PROCEDURE — 87070 CULTURE OTHR SPECIMN AEROBIC: CPT

## 2024-06-21 PROCEDURE — 87077 CULTURE AEROBIC IDENTIFY: CPT

## 2024-06-21 PROCEDURE — 2500000003 HC RX 250 WO HCPCS: Performed by: RADIOLOGY

## 2024-06-21 PROCEDURE — 85027 COMPLETE CBC AUTOMATED: CPT

## 2024-06-21 PROCEDURE — 47534 PLMT BILIARY DRAINAGE CATH: CPT | Performed by: RADIOLOGY

## 2024-06-21 PROCEDURE — 2580000003 HC RX 258: Performed by: STUDENT IN AN ORGANIZED HEALTH CARE EDUCATION/TRAINING PROGRAM

## 2024-06-21 PROCEDURE — 87205 SMEAR GRAM STAIN: CPT

## 2024-06-21 PROCEDURE — 80048 BASIC METABOLIC PNL TOTAL CA: CPT

## 2024-06-21 PROCEDURE — 2500000003 HC RX 250 WO HCPCS

## 2024-06-21 PROCEDURE — 6370000000 HC RX 637 (ALT 250 FOR IP): Performed by: ANESTHESIOLOGY

## 2024-06-21 PROCEDURE — 94640 AIRWAY INHALATION TREATMENT: CPT

## 2024-06-21 PROCEDURE — 7100000000 HC PACU RECOVERY - FIRST 15 MIN

## 2024-06-21 PROCEDURE — 2580000003 HC RX 258: Performed by: INTERNAL MEDICINE

## 2024-06-21 PROCEDURE — BF131ZZ FLUOROSCOPY OF GALLBLADDER AND BILE DUCTS USING LOW OSMOLAR CONTRAST: ICD-10-PCS | Performed by: RADIOLOGY

## 2024-06-21 PROCEDURE — 2580000003 HC RX 258: Performed by: FAMILY MEDICINE

## 2024-06-21 PROCEDURE — 6360000002 HC RX W HCPCS: Performed by: STUDENT IN AN ORGANIZED HEALTH CARE EDUCATION/TRAINING PROGRAM

## 2024-06-21 PROCEDURE — 3700000000 HC ANESTHESIA ATTENDED CARE

## 2024-06-21 PROCEDURE — 0F9930Z DRAINAGE OF COMMON BILE DUCT WITH DRAINAGE DEVICE, PERCUTANEOUS APPROACH: ICD-10-PCS | Performed by: RADIOLOGY

## 2024-06-21 RX ORDER — SODIUM CHLORIDE 0.9 % (FLUSH) 0.9 %
5-40 SYRINGE (ML) INJECTION PRN
OUTPATIENT
Start: 2024-06-21

## 2024-06-21 RX ORDER — LIDOCAINE HYDROCHLORIDE 20 MG/ML
INJECTION, SOLUTION EPIDURAL; INFILTRATION; INTRACAUDAL; PERINEURAL PRN
Status: DISCONTINUED | OUTPATIENT
Start: 2024-06-21 | End: 2024-06-21 | Stop reason: SDUPTHER

## 2024-06-21 RX ORDER — LEVOFLOXACIN 5 MG/ML
INJECTION, SOLUTION INTRAVENOUS PRN
Status: DISCONTINUED | OUTPATIENT
Start: 2024-06-21 | End: 2024-06-21 | Stop reason: SDUPTHER

## 2024-06-21 RX ORDER — OXYCODONE HYDROCHLORIDE 5 MG/1
10 TABLET ORAL EVERY 4 HOURS PRN
Status: DISCONTINUED | OUTPATIENT
Start: 2024-06-21 | End: 2024-06-25 | Stop reason: HOSPADM

## 2024-06-21 RX ORDER — LIDOCAINE HYDROCHLORIDE 20 MG/ML
INJECTION, SOLUTION INFILTRATION; PERINEURAL PRN
Status: DISCONTINUED | OUTPATIENT
Start: 2024-06-21 | End: 2024-06-25 | Stop reason: HOSPADM

## 2024-06-21 RX ORDER — SODIUM CHLORIDE 0.9 % (FLUSH) 0.9 %
5-40 SYRINGE (ML) INJECTION EVERY 12 HOURS SCHEDULED
OUTPATIENT
Start: 2024-06-21

## 2024-06-21 RX ORDER — HYDROMORPHONE HYDROCHLORIDE 2 MG/ML
0.5 INJECTION, SOLUTION INTRAMUSCULAR; INTRAVENOUS; SUBCUTANEOUS EVERY 5 MIN PRN
Status: DISCONTINUED | OUTPATIENT
Start: 2024-06-21 | End: 2024-06-25 | Stop reason: HOSPADM

## 2024-06-21 RX ORDER — FENTANYL CITRATE 50 UG/ML
INJECTION, SOLUTION INTRAMUSCULAR; INTRAVENOUS PRN
Status: DISCONTINUED | OUTPATIENT
Start: 2024-06-21 | End: 2024-06-21 | Stop reason: SDUPTHER

## 2024-06-21 RX ORDER — PROPOFOL 10 MG/ML
INJECTION, EMULSION INTRAVENOUS PRN
Status: DISCONTINUED | OUTPATIENT
Start: 2024-06-21 | End: 2024-06-21 | Stop reason: SDUPTHER

## 2024-06-21 RX ORDER — OXYCODONE HYDROCHLORIDE 5 MG/1
5 TABLET ORAL
Status: COMPLETED | OUTPATIENT
Start: 2024-06-21 | End: 2024-06-21

## 2024-06-21 RX ORDER — SODIUM CHLORIDE, SODIUM LACTATE, POTASSIUM CHLORIDE, CALCIUM CHLORIDE 600; 310; 30; 20 MG/100ML; MG/100ML; MG/100ML; MG/100ML
INJECTION, SOLUTION INTRAVENOUS CONTINUOUS
OUTPATIENT
Start: 2024-06-21

## 2024-06-21 RX ORDER — NALOXONE HYDROCHLORIDE 0.4 MG/ML
INJECTION, SOLUTION INTRAMUSCULAR; INTRAVENOUS; SUBCUTANEOUS PRN
Status: DISCONTINUED | OUTPATIENT
Start: 2024-06-21 | End: 2024-06-25 | Stop reason: HOSPADM

## 2024-06-21 RX ORDER — PROCHLORPERAZINE EDISYLATE 5 MG/ML
5 INJECTION INTRAMUSCULAR; INTRAVENOUS
Status: ACTIVE | OUTPATIENT
Start: 2024-06-21 | End: 2024-06-22

## 2024-06-21 RX ORDER — OXYCODONE HYDROCHLORIDE 5 MG/1
5 TABLET ORAL EVERY 4 HOURS PRN
Status: DISCONTINUED | OUTPATIENT
Start: 2024-06-21 | End: 2024-06-25 | Stop reason: HOSPADM

## 2024-06-21 RX ORDER — DEXAMETHASONE SODIUM PHOSPHATE 4 MG/ML
INJECTION, SOLUTION INTRA-ARTICULAR; INTRALESIONAL; INTRAMUSCULAR; INTRAVENOUS; SOFT TISSUE PRN
Status: DISCONTINUED | OUTPATIENT
Start: 2024-06-21 | End: 2024-06-21 | Stop reason: SDUPTHER

## 2024-06-21 RX ORDER — ONDANSETRON 2 MG/ML
INJECTION INTRAMUSCULAR; INTRAVENOUS PRN
Status: DISCONTINUED | OUTPATIENT
Start: 2024-06-21 | End: 2024-06-21 | Stop reason: SDUPTHER

## 2024-06-21 RX ORDER — SODIUM CHLORIDE 9 MG/ML
INJECTION, SOLUTION INTRAVENOUS PRN
OUTPATIENT
Start: 2024-06-21

## 2024-06-21 RX ORDER — MIDAZOLAM HYDROCHLORIDE 2 MG/2ML
2 INJECTION, SOLUTION INTRAMUSCULAR; INTRAVENOUS
OUTPATIENT
Start: 2024-06-21 | End: 2024-06-22

## 2024-06-21 RX ADMIN — Medication 100 MG: at 08:45

## 2024-06-21 RX ADMIN — LIDOCAINE HYDROCHLORIDE 100 MG: 20 INJECTION, SOLUTION EPIDURAL; INFILTRATION; INTRACAUDAL; PERINEURAL at 13:07

## 2024-06-21 RX ADMIN — OXYBUTYNIN CHLORIDE 5 MG: 5 TABLET, EXTENDED RELEASE ORAL at 08:45

## 2024-06-21 RX ADMIN — TRAZODONE HYDROCHLORIDE 50 MG: 50 TABLET ORAL at 22:39

## 2024-06-21 RX ADMIN — PROPOFOL 160 MG: 10 INJECTION, EMULSION INTRAVENOUS at 13:10

## 2024-06-21 RX ADMIN — BUDESONIDE AND FORMOTEROL FUMARATE DIHYDRATE 2 PUFF: 160; 4.5 AEROSOL RESPIRATORY (INHALATION) at 19:29

## 2024-06-21 RX ADMIN — FENTANYL CITRATE 50 MCG: 50 INJECTION, SOLUTION INTRAMUSCULAR; INTRAVENOUS at 13:07

## 2024-06-21 RX ADMIN — ALLOPURINOL 300 MG: 300 TABLET ORAL at 08:45

## 2024-06-21 RX ADMIN — LEVOFLOXACIN 500 MG: 5 INJECTION, SOLUTION INTRAVENOUS at 13:17

## 2024-06-21 RX ADMIN — MAGNESIUM GLUCONATE 500 MG ORAL TABLET 400 MG: 500 TABLET ORAL at 20:01

## 2024-06-21 RX ADMIN — SODIUM CHLORIDE, POTASSIUM CHLORIDE, SODIUM LACTATE AND CALCIUM CHLORIDE: 600; 310; 30; 20 INJECTION, SOLUTION INTRAVENOUS at 15:51

## 2024-06-21 RX ADMIN — SODIUM CHLORIDE, PRESERVATIVE FREE 10 ML: 5 INJECTION INTRAVENOUS at 08:49

## 2024-06-21 RX ADMIN — FOLIC ACID 1 MG: 1 TABLET ORAL at 08:46

## 2024-06-21 RX ADMIN — LOSARTAN POTASSIUM 50 MG: 50 TABLET, FILM COATED ORAL at 08:45

## 2024-06-21 RX ADMIN — ALLOPURINOL 300 MG: 300 TABLET ORAL at 20:01

## 2024-06-21 RX ADMIN — ONDANSETRON 4 MG: 2 INJECTION INTRAMUSCULAR; INTRAVENOUS at 13:44

## 2024-06-21 RX ADMIN — FENTANYL CITRATE 50 MCG: 50 INJECTION, SOLUTION INTRAMUSCULAR; INTRAVENOUS at 13:40

## 2024-06-21 RX ADMIN — SODIUM CHLORIDE, POTASSIUM CHLORIDE, SODIUM LACTATE AND CALCIUM CHLORIDE: 600; 310; 30; 20 INJECTION, SOLUTION INTRAVENOUS at 03:12

## 2024-06-21 RX ADMIN — MORPHINE SULFATE 2 MG: 2 INJECTION, SOLUTION INTRAMUSCULAR; INTRAVENOUS at 18:06

## 2024-06-21 RX ADMIN — OXYCODONE 5 MG: 5 TABLET ORAL at 15:45

## 2024-06-21 RX ADMIN — IOPAMIDOL 15 ML: 612 INJECTION, SOLUTION INTRAVENOUS at 13:48

## 2024-06-21 RX ADMIN — BUDESONIDE AND FORMOTEROL FUMARATE DIHYDRATE 2 PUFF: 160; 4.5 AEROSOL RESPIRATORY (INHALATION) at 08:10

## 2024-06-21 RX ADMIN — PHENYLEPHRINE HYDROCHLORIDE 100 MCG: 10 INJECTION INTRAVENOUS at 13:22

## 2024-06-21 RX ADMIN — MAGNESIUM GLUCONATE 500 MG ORAL TABLET 400 MG: 500 TABLET ORAL at 08:45

## 2024-06-21 RX ADMIN — SODIUM CHLORIDE, PRESERVATIVE FREE 10 ML: 5 INJECTION INTRAVENOUS at 21:00

## 2024-06-21 RX ADMIN — CHLORTHALIDONE 25 MG: 25 TABLET ORAL at 08:45

## 2024-06-21 RX ADMIN — TIOTROPIUM BROMIDE INHALATION SPRAY 2 PUFF: 3.12 SPRAY, METERED RESPIRATORY (INHALATION) at 08:10

## 2024-06-21 RX ADMIN — OXYCODONE 5 MG: 5 TABLET ORAL at 20:01

## 2024-06-21 RX ADMIN — OXYCODONE 5 MG: 5 TABLET ORAL at 14:26

## 2024-06-21 RX ADMIN — LIDOCAINE HYDROCHLORIDE 8 ML: 20 INJECTION, SOLUTION INFILTRATION; PERINEURAL at 13:25

## 2024-06-21 RX ADMIN — PANTOPRAZOLE SODIUM 40 MG: 40 INJECTION, POWDER, FOR SOLUTION INTRAVENOUS at 08:46

## 2024-06-21 RX ADMIN — DEXAMETHASONE SODIUM PHOSPHATE 4 MG: 4 INJECTION INTRA-ARTICULAR; INTRALESIONAL; INTRAMUSCULAR; INTRAVENOUS; SOFT TISSUE at 13:44

## 2024-06-21 ASSESSMENT — ENCOUNTER SYMPTOMS
BACK PAIN: 1
EYE DISCHARGE: 0
DIARRHEA: 0
SHORTNESS OF BREATH: 0
EYE REDNESS: 0
ABDOMINAL PAIN: 1
COLOR CHANGE: 0
COUGH: 0
NAUSEA: 1
ABDOMINAL DISTENTION: 1
VOMITING: 0
RHINORRHEA: 0
FACIAL SWELLING: 0

## 2024-06-21 ASSESSMENT — PAIN SCALES - GENERAL
PAINLEVEL_OUTOF10: 8
PAINLEVEL_OUTOF10: 0
PAINLEVEL_OUTOF10: 4
PAINLEVEL_OUTOF10: 7
PAINLEVEL_OUTOF10: 0
PAINLEVEL_OUTOF10: 6
PAINLEVEL_OUTOF10: 7

## 2024-06-21 ASSESSMENT — PAIN DESCRIPTION - LOCATION
LOCATION: ABDOMEN

## 2024-06-21 ASSESSMENT — PAIN DESCRIPTION - ORIENTATION
ORIENTATION: RIGHT;UPPER
ORIENTATION: RIGHT
ORIENTATION: RIGHT;ANTERIOR
ORIENTATION: RIGHT;UPPER
ORIENTATION: RIGHT;ANTERIOR

## 2024-06-21 ASSESSMENT — PAIN DESCRIPTION - DESCRIPTORS
DESCRIPTORS: SORE
DESCRIPTORS: ACHING
DESCRIPTORS: ACHING;DISCOMFORT
DESCRIPTORS: SORE
DESCRIPTORS: ACHING

## 2024-06-21 NOTE — PERIOP NOTE
TRANSFER - OUT REPORT:    Verbal report given to DOLLY Carpenter on Reagan Sandra  being transferred to [facility] for routine post-op       Report consisted of patient’s Situation, Background, Assessment and   Recommendations(SBAR).     Information from the following report(s) Nurse Handoff Report, Surgery Report, Cardiac Rhythm SR, and Neuro Assessment was reviewed with the receiving nurse.    Lines:   Peripheral IV 06/20/24 Distal;Posterior;Right Wrist (Active)   Site Assessment Clean, dry & intact 06/21/24 1402   Line Status Infusing 06/21/24 1402   Line Care Connections checked and tightened 06/21/24 1402   Phlebitis Assessment No symptoms 06/21/24 1402   Infiltration Assessment 0 06/21/24 1402   Alcohol Cap Used Yes 06/21/24 1402   Dressing Status Clean, dry & intact 06/21/24 1402   Dressing Type Transparent 06/21/24 1402        Opportunity for questions and clarification was provided.      Patient transported with:   Tech    VTE prophylaxis orders have been written for Reagan Sandra.    Patient and family given floor number and nurses name.  Family updated re: pt status after security code verified.

## 2024-06-21 NOTE — PRE SEDATION
50 MG tablet Take 1 tablet by mouth daily    Reuben Lee MD   chlorthalidone (HYGROTEN) 50 MG tablet Take 1 tablet by mouth daily    Reuben Lee MD   omeprazole (PRILOSEC) 20 MG delayed release capsule Take 2 capsules by mouth daily    Reuben Lee MD   fluticasone-umeclidin-vilant (TRELEGY ELLIPTA) 100-62.5-25 MCG/ACT AEPB inhaler INHALE 1 PUFF INTO THE LUNGS DAILY 4/17/24   Candie Villalba PA   albuterol sulfate HFA (PROVENTIL;VENTOLIN;PROAIR) 108 (90 Base) MCG/ACT inhaler Inhale 2 puffs into the lungs every 6 hours as needed for Wheezing 11/28/23   Hillary Saeed MD   albuterol (PROVENTIL) (2.5 MG/3ML) 0.083% nebulizer solution Take 3 mLs by nebulization every 6 hours as needed for Shortness of Breath or Wheezing 7/1/23   Annetta Ortega MD   allopurinol (ZYLOPRIM) 300 MG tablet TAKE 1 TABLET BY MOUTH TWICE DAILY 12/21/22   Reuben Lee MD   albuterol sulfate  (90 Base) MCG/ACT inhaler Inhale 1 puff into the lungs every 4 hours as needed 5/31/21   Automatic Reconciliation, Ar   fluticasone-umeclidin-vilant (TRELEGY ELLIPTA) 100-62.5-25 MCG/INH AEPB Inhale 1 puff into the lungs daily 10/14/21   Automatic Reconciliation, Ar   oxybutynin (DITROPAN-XL) 5 MG extended release tablet Take 1 tablet by mouth daily    Automatic Reconciliation, Ar     PLAN:  PTC w drain placement.  GETA vs TIVA.      Electronically signed by ELBA QUINTANA MD on 6/21/2024 at 1:03 PM

## 2024-06-21 NOTE — ED PROVIDER NOTES
head  similar to prior exam.  A 1.3 cm focus of fluid attenuation is   again  seen in the uncinate process of the pancreas.  No ductal   dilation.    Spleen:  A small 1 cm splenic cyst is seen superiorly.    Adrenals:  Unremarkable.  No mass.    Kidneys and ureters:  Bilateral renal cysts are seen.  No   follow-up  imaging is recommended as incidental lesions are likely benign.    No  hydronephrosis.    Stomach and bowel:  Moderate dilation of proximal small bowel is   seen.  A transition point is seen in the left lower abdomen on image 41   of  series 2.  The distal small bowel and the colon are not dilated.  Scattered diverticula are seen in the colon.  No mucosal   thickening.    PELVIS:    Appendix:  The appendix is not visualized in this exam.    Bladder:  Unremarkable.  No mass.    Reproductive:  Unremarkable as visualized.    ABDOMEN and PELVIS:    Intraperitoneal space:  Unremarkable.  No free air.  No   significant  fluid collection.    Bones/joints:  Degenerative spurring is seen throughout the   lumbar  spine.  No acute fracture.  No dislocation.    Soft tissues:  Unremarkable.    Vasculature:  Vascular calcifications are seen in the abdominal   aorta.  No abdominal aortic aneurysm.    Lymph nodes:  Unremarkable.  No enlarged lymph nodes.    Impression  1.  Small bowel obstruction is identified new since last exam.  2.  Findings of acute pancreatitis is again seen.  A small 1.3 cm  focus of fluid attenuation is again seen in the uncinate process  region.  3.  Cholelithiasis is identified.  4.  Common duct dilation is again seen.        Automatic exposure control was used as a dose lowering technique.    Radiation Dose: CTDI is 10.49 mGy. DLP is 532.85 mGy-cm.    Contrast Type: iopamidol (ISOVUE-370) 76 . Contrast Volume: 100   mL    Report signed on 06/12/2024 (23:03 Eastern Time)  Signed by: Arnold Guerrero M.D.  Reading Location: 46    US Result (most recent):  US VENOUS DOPPLER LOWER EXTREMITIES BILATERAL    CBC:  Recent Labs     06/12/24 2138 06/13/24  0352   WBC 4.6 4.6   RBC 3.44* 3.85*   HGB 9.5* 10.6*   HCT 31.3* 35.1*   MCV 91.0 91.2   RDW 15.1* 15.0*    335     CHEMISTRIES:  Recent Labs     06/12/24 2138 06/13/24  0352    137   K 3.7 3.9    106   CO2 24 23   BUN 16 14   CREATININE 1.46* 1.41*   GLUCOSE 105* 101*   MG 1.2*  --      PT/INR:No results for input(s): \"PROTIME\", \"INR\" in the last 72   hours.  APTT:No results for input(s): \"APTT\" in the last 72 hours.  LIVER PROFILE:  Recent Labs     06/12/24 2138 06/13/24 0352   AST 20 21   ALT 14 14   BILITOT 0.4 0.4   ALKPHOS 103 115       Imaging/Diagnostics   XR ABDOMEN (KUB) (SINGLE AP VIEW)    Result Date: 6/13/2024  1.  Dilated loops of small bowel are seen in the upper abdomen.   2.  NG tube tip is in the body of the stomach. Report signed on   06/13/2024 (01:49 Eastern Time) Signed by: Arnold Guerrero M.D.   Reading Location: 46    CT ABDOMEN PELVIS W IV CONTRAST Additional Contrast? None    Result Date: 6/12/2024  1.  Small bowel obstruction is identified new since last exam. 2.    Findings of acute pancreatitis is again seen.  A small 1.3 cm   focus of fluid attenuation is again seen in the uncinate process   region. 3.  Cholelithiasis is identified. 4.  Common duct   dilation is again seen. Automatic exposure control was used as a   dose lowering technique. Radiation Dose: CTDI is 10.49 mGy. DLP   is 532.85 mGy-cm. Contrast Type: iopamidol (ISOVUE-370) 76 .   Contrast Volume: 100 mL Report signed on 06/12/2024 (23:03   Eastern Time) Signed by: Arnold Guerrero M.D. Reading Location: 46    XR ACUTE ABD SERIES CHEST 1 VW    Result Date: 6/8/2024  No acute findings in the chest or abdomen. Electronically signed   by Amir M Russell      Assessment      Hospital Problems             Last Modified POA    * (Principal) SBO (small bowel obstruction) (HCC) 6/13/2024 Yes    Tobacco use disorder 6/13/2024 Yes    COPD (chronic obstructive pulmonary disease)

## 2024-06-21 NOTE — PROGRESS NOTES
Hospitalist Progress Note   Admit Date:  2024  9:24 PM   Name:  Reagan Sandra   Age:  73 y.o.  Sex:  male  :  1950   MRN:  [id number]   Room:      Presenting/Chief Complaint: Abdominal Pain     Reason(s) for Admission: Small bowel obstruction (HCC) [K56.609]  SBO (small bowel obstruction) (HCC) [K56.609]  Acute pancreatitis, unspecified complication status, unspecified pancreatitis type [K85.90]     Hospital Course:   Patient is a 74 y/o male with medical history of hypertension, COPD, alcohol use disorder, tobacco use disorder, pancreatitis with newly diagnosed pancreatic mass (upcoming o/p EUS planned) who presented to ED  with cc abdominal pain. Hemodynamics stable. Labs notable for Cr 1.46 Mg 1.2 Hgb 9.5. CT A/P with SBO, acute pancreatitis, and CBD dilation. Lipase, LFT's, and TB wnl. General surgery consulted: conservative management planned. Small bowel obstruction has resolved. NGT removed. IMAN resolved. Therapy evaluations with no needs identified. Received electrolyte supplementation for deficiencies. GI consulted. Patient underwent EUS  with findings of CBD stone. ERCP was then attempted but unable to completed 2/2 duodenal edema. Repeat ERCP planned . Continue PPI IV BID and IV hydration. Home on discharge, hopefully  if ERCP successful.    Subjective & 24hr Events:   Alert, oriented, conversational, no acute distress.  First meeting with patient.  Met with patient with cousin at bedside.  He had just had drain placed in IR.  Discussed plan to monitor overnight and likely discharge home tomorrow if no fever and counts stable.    Review of Systems - Negative except as noted above     Assessment & Plan:     Pancreatic cystic lesion  CBD Dilation  CBD Stone  -CT re-demonstrates acute pancreatitis despite normal lipse  -TB and LFT's remain unremarkable  -EUS with CBD stone, ERCP attempted unsuccessful , re-attempt  also unsuccessful  -Internal /  Chloride 106 98 - 107 mmol/L    CO2 21 20 - 28 mmol/L    Anion Gap 10 9 - 18 mmol/L    Glucose 117 (H) 70 - 99 mg/dL    BUN 10 8 - 23 MG/DL    Creatinine 1.07 0.80 - 1.30 MG/DL    Est, Glom Filt Rate 74 >60 ml/min/1.73m2    Calcium 8.9 8.8 - 10.2 MG/DL   Magnesium    Collection Time: 06/21/24  4:32 AM   Result Value Ref Range    Magnesium 1.8 1.8 - 2.4 mg/dL   CBC    Collection Time: 06/21/24  4:32 AM   Result Value Ref Range    WBC 3.5 (L) 4.3 - 11.1 K/uL    RBC 3.49 (L) 4.23 - 5.6 M/uL    Hemoglobin 9.8 (L) 13.6 - 17.2 g/dL    Hematocrit 30.8 (L) 41.1 - 50.3 %    MCV 88.3 82 - 102 FL    MCH 28.1 26.1 - 32.9 PG    MCHC 31.8 31.4 - 35.0 g/dL    RDW 14.4 11.9 - 14.6 %    Platelets 317 150 - 450 K/uL    MPV 10.4 9.4 - 12.3 FL    nRBC 0.00 0.0 - 0.2 K/uL       No results for input(s): \"COVID19\" in the last 72 hours.    Current Meds:  Current Facility-Administered Medications   Medication Dose Route Frequency    magnesium oxide (MAG-OX) tablet 400 mg  400 mg Oral BID    lactated ringers IV soln infusion   IntraVENous Continuous    traZODone (DESYREL) tablet 50 mg  50 mg Oral Nightly PRN    melatonin tablet 3 mg  3 mg Oral Nightly PRN    chlorthalidone (HYGROTON) tablet 25 mg  25 mg Oral Daily    pantoprazole (PROTONIX) 40 mg in sodium chloride (PF) 0.9 % 10 mL injection  40 mg IntraVENous Daily    aluminum & magnesium hydroxide-simethicone (MAALOX) 200-200-20 MG/5ML suspension 30 mL  30 mL Oral Q6H PRN    oxyCODONE (ROXICODONE) immediate release tablet 5 mg  5 mg Oral Q4H PRN    thiamine tablet 100 mg  100 mg Oral Daily    folic acid (FOLVITE) tablet 1 mg  1 mg Oral Daily    losartan (COZAAR) tablet 50 mg  50 mg Oral Daily    allopurinol (ZYLOPRIM) tablet 300 mg  300 mg Oral BID    oxyBUTYnin (DITROPAN-XL) extended release tablet 5 mg  5 mg Oral Daily    albuterol (PROVENTIL) (2.5 MG/3ML) 0.083% nebulizer solution 2.5 mg  2.5 mg Nebulization Q6H PRN    hydrALAZINE (APRESOLINE) injection 10 mg  10 mg IntraVENous Q6H PRN

## 2024-06-21 NOTE — BRIEF OP NOTE
[facility]  Department of Interventional Radiology  [phone]        Brief Procedure Note    Patient: Reagan Sandra MRN: [id number]  SSN: [id number]    YOB: 1950  Age: 73 y.o.  Sex: male      Date of Procedure: 6/21/2024     Pre-Procedure Diagnosis:   CBD obstruction, gallstone pancreatitis.     Post-Procedure Diagnosis:  SAME    Procedure(s):  Percutaneous Transhepatic Cholangiogram with Internal/External Biliary Drain Placement    Brief Description of Procedure:  Temporary gallbladder drain placement.     Performed By:  ELBA QUINTANA MD     Assistants:  None    Anesthesia:  General    Estimated Blood Loss:  Less than 10ml    Specimens:  Microbiology    Implants:  Int/Ext Biliary Drain    Findings:  Mild intrahepatic biliary dilation, moderate extrahepatic biliary dilation, CBD obstruction.  Gallstone.  Patent cystic duct.      Complications:  None    Recommendations:  Remove cap and attach bag for F > 101, Rising T. Bili, Severe liver pain.       Follow Up:  8 weeks.      Signed By: ELBA QUINTANA MD     June 21, 2024

## 2024-06-21 NOTE — PLAN OF CARE
Problem: Discharge Planning  Goal: Discharge to home or other facility with appropriate resources  Outcome: Progressing     Problem: Pain  Goal: Verbalizes/displays adequate comfort level or baseline comfort level  Outcome: Progressing  Flowsheets (Taken 6/21/2024 7107)  Verbalizes/displays adequate comfort level or baseline comfort level:   Encourage patient to monitor pain and request assistance   Assess pain using appropriate pain scale   Implement non-pharmacological measures as appropriate and evaluate response     Problem: Chronic Conditions and Co-morbidities  Goal: Patient's chronic conditions and co-morbidity symptoms are monitored and maintained or improved  Outcome: Progressing     Problem: Respiratory - Adult  Goal: Achieves optimal ventilation and oxygenation  Outcome: Progressing     Problem: Safety - Adult  Goal: Free from fall injury  Outcome: Progressing     Problem: Skin/Tissue Integrity  Goal: Absence of new skin breakdown  Description: 1.  Monitor for areas of redness and/or skin breakdown  2.  Assess vascular access sites hourly  3.  Every 4-6 hours minimum:  Change oxygen saturation probe site  4.  Every 4-6 hours:  If on nasal continuous positive airway pressure, respiratory therapy assess nares and determine need for appliance change or resting period.  Outcome: Progressing

## 2024-06-21 NOTE — ANESTHESIA PROCEDURE NOTES
Airway  Date/Time: 6/21/2024 1:12 PM  Urgency: elective    Airway not difficult    General Information and Staff    Patient location during procedure: OR  Resident/CRNA: Carmen Franco APRN - CRNA  Performed: resident/CRNA   Performed by: Carmen Franco APRN - CRNA  Authorized by: Vivek Villegas MD      Indications and Patient Condition  Indications for airway management: anesthesia  Spontaneous ventilation: present  Sedation level: deep  Preoxygenated: yes  Patient position: sniffing  MILS not maintained throughout  Mask difficulty assessment: not attempted    Final Airway Details  Final airway type: supraglottic airway      Successful airway: oropharyngeal  Size 4     Number of attempts at approach: 1  Ventilation between attempts: supraglottic airway  Number of other approaches attempted: 0    Additional Comments  Lips and gums unchanged, edentulous.  no

## 2024-06-21 NOTE — PROGRESS NOTES
4 Eyes Skin Assessment     NAME:  Reagan Sandra  YOB: 1950  MEDICAL RECORD NUMBER:  [id number]    The patient is being assessed for  Post-Op Surgical    I agree that at least one RN has performed a thorough Head to Toe Skin Assessment on the patient. ALL assessment sites listed below have been assessed.      Areas assessed by both nurses:    Head, Face, Ears, Shoulders, Back, Chest, Arms, Elbows, Hands, Sacrum. Buttock, Coccyx, Ischium, Legs. Feet and Heels, and Under Medical Devices         Does the Patient have a Wound? No noted wound(s)       Preston Prevention initiated by RN: Yes  Wound Care Orders initiated by RN: No    Pressure Injury (Stage 3,4, Unstageable, DTI, NWPT, and Complex wounds) if present, place Wound referral order by RN under : No    New Ostomies, if present place, Ostomy referral order under : No     Nurse 1 eSignature: Electronically signed by Pauline Small RN on 6/21/24 at 3:20 PM EDT    **SHARE this note so that the co-signing nurse can place an eSignature**    Nurse 2 eSignature: Electronically signed by Sugar Brenner RN on 6/21/24 at 6:41 PM EDT

## 2024-06-21 NOTE — ANESTHESIA POSTPROCEDURE EVALUATION
Department of Anesthesiology  Postprocedure Note    Patient: Reagan Sandra  MRN: [id number]  YOB: 1950  Date of evaluation: 6/21/2024    Procedure Summary       Date: 06/21/24 Room / Location: [facility]    Anesthesia Start: 1300 Anesthesia Stop: 1403    Procedure: IR BILIARY DRAIN INT AND EXT Diagnosis: (PTC)    Scheduled Providers: Evelin Sweeney MD; Carmen Franco APRN - CRNA Responsible Provider: Vivek Villegas MD    Anesthesia Type: general ASA Status: 3            Anesthesia Type: No value filed.    Rose Phase I: Rose Score: 10    Rose Phase II:      Anesthesia Post Evaluation    Patient location during evaluation: PACU  Patient participation: complete - patient participated  Level of consciousness: awake  Airway patency: patent  Nausea & Vomiting: no nausea and no vomiting  Cardiovascular status: blood pressure returned to baseline  Respiratory status: acceptable  Hydration status: euvolemic  Comments: ------------------------------------------------------------               06/21/24 06/21/24 06/21/24 06/21/24                  1421          1425      1426      1430     ------------------------------------------------------------   BP:        (!) 150/80    (!) 155/80(!) 155/80(!) 155/86   Pulse:         67            62        63        73       Resp:          16            16        16        18       Temp:   97.3 °F (36.3 °C)                                 TempSrc:    Temporal                                      SpO2:          96%          97%       98%       95%      ------------------------------------------------------------    Pain management: adequate    No notable events documented.

## 2024-06-21 NOTE — PROGRESS NOTES
Reagan Sandra is 73 y.o. y/o male     Initial consult: See consult note from 6/13: Surgery requesting evaluation of CBD dilation IP given upcoming OP EUS for evaluation of CBD dilation/pancreatic cyst at uncinate process/ possible ampullary obstruction with normal labs. Patient initially admitted this admission for SBO and pancreatitis. EUS on 6/17: noted to have CBD stones but unable to reach ampulla due to duodenal edema from pancreatitis.     Today: Repeat ERCP on 6/20; still with significant duodenal edema and unable to reach ampulla. IR to see today for Int/Ext drain placement with plans for endoscopic internalization/stone removal in 6 weeks.     Patient without any complaints at this time; states that he is hungry. Hopeful to be discharged soon so that he can go to [employer] on Sunday.     PE:   Vitals:    06/21/24 0745   BP:    Pulse: 64   Resp: 16   Temp:    SpO2: 98%      General:  The patient appears well-nourished, and is in no acute distress.    HEENT:  Normocephalic, atraumatic. No sclerae icterus.   Neurologic:  Alert and oriented x3.  Psychiatric: Appropriate mood and affect.    Assessment and Plan:   #CBD Stone: 2x unsuccessful ERCP due to duodenal edema; IR to see today for drain placement with plans to endoscopically internalize drain/remove stone in 6x weeks. GI will sign off at this time. Please reconsult as needed.     Electronically signed by Valentin Avila PA-C.

## 2024-06-21 NOTE — PROGRESS NOTES
Comprehensive Nutrition Assessment    Type and Reason for Visit: Initial, RD Nutrition Re-Screen/LOS  Length of Stay    Nutrition Recommendations/Plan:   Meals and Snacks:  Diet: Continue current order     Malnutrition Assessment:  Malnutrition Status: No malnutrition     Nutrition Assessment:  Nutrition History: Pt reports he eats great at home. He stated all he does is eat.      Do You Have Any Cultural, Synagogue, or Ethnic Food Preferences?: No   Weight History: Pt reports wt gain recently, he does not say how mucj. No EMR wt hx.   Nutrition Background:       PMH significant for HTN, COPD, alcohol use disorder, tobacco use disorder, and pancreatitis with newly diagnosed pancreatic mass. Pt admitted wit pancreatic cystic lesion and CBD stone. S/p gallbladder drain placement 6/21.  Nutrition Interval:  Pt seen reclined in bed with cousin present. Pt reports when he gets a meal tray he eats all of it. He reports he has not had anything to eat thus far today and he is hungry. Pt was NPO for procedure now with diet. RD provided pt with a snack. Discussed with Pauline ALBERTO.    Current Nutrition Therapies:  ADULT DIET; Regular; GI [place] (GERD/Peptic Ulcer)    Current Intake:   Average Meal Intake: %        Anthropometric Measures:  Height: 185.4 cm (6' 1\")  Current Body Wt: 83.9 kg (185 lb) (6/12), Weight source: Stated  BMI: 24.4, Normal Weight (BMI 22.0 to 24.9) age over 65  Admission Body Weight: 83.9 kg (185 lb) (6/12- stated)  Ideal Body Weight (Kg) (Calculated): 84 kg (184 lbs),    BMI Category Normal Weight (BMI 22.0 to 24.9) age over 65  Estimated Daily Nutrient Needs:  Energy (kcal/day): 3737-3806 (25-30 kcal/kg) (Kcal/kg Weight used: 83.9 kg Current  Protein (g/day):  (1-1.2 g/kg) Weight Used: (Current) 83.9 kg  Fluid (ml/day):   (1 ml/kcal)    Nutrition Diagnosis:   No nutrition diagnosis at this time   Nutrition Interventions:   Food and/or Nutrient Delivery: Continue Current Diet     Coordination  of Nutrition Care: Continue to monitor while inpatient      Goals:      Active Goal: Meet at least 75% of estimated needs, by next RD assessment       Nutrition Monitoring and Evaluation:      Food/Nutrient Intake Outcomes: Food and Nutrient Intake  Physical Signs/Symptoms Outcomes: Weight, Meal Time Behavior    Discharge Planning:    Continue current diet    Kimmy Larios RD

## 2024-06-22 VITALS
HEIGHT: 73 IN | DIASTOLIC BLOOD PRESSURE: 93 MMHG | WEIGHT: 185 LBS | RESPIRATION RATE: 19 BRPM | HEART RATE: 98 BPM | BODY MASS INDEX: 24.52 KG/M2 | SYSTOLIC BLOOD PRESSURE: 136 MMHG | TEMPERATURE: 98.4 F | OXYGEN SATURATION: 99 %

## 2024-06-22 PROBLEM — N17.9 AKI (ACUTE KIDNEY INJURY) (HCC): Status: RESOLVED | Noted: 2024-05-22 | Resolved: 2024-06-22

## 2024-06-22 PROBLEM — E87.6 HYPOKALEMIA: Status: RESOLVED | Noted: 2024-06-17 | Resolved: 2024-06-22

## 2024-06-22 PROBLEM — E83.39 HYPOPHOSPHATEMIA: Status: RESOLVED | Noted: 2024-06-17 | Resolved: 2024-06-22

## 2024-06-22 PROBLEM — K56.609 SMALL BOWEL OBSTRUCTION (HCC): Status: RESOLVED | Noted: 2024-06-14 | Resolved: 2024-06-22

## 2024-06-22 PROBLEM — K56.609 SBO (SMALL BOWEL OBSTRUCTION) (HCC): Status: RESOLVED | Noted: 2024-06-12 | Resolved: 2024-06-22

## 2024-06-22 PROBLEM — E83.42 HYPOMAGNESEMIA: Status: RESOLVED | Noted: 2024-06-17 | Resolved: 2024-06-22

## 2024-06-22 LAB
ALBUMIN SERPL-MCNC: 3.2 G/DL (ref 3.2–4.6)
ALBUMIN/GLOB SERPL: 0.9 (ref 1–1.9)
ALP SERPL-CCNC: 107 U/L (ref 40–129)
ALT SERPL-CCNC: 11 U/L (ref 12–65)
ANION GAP SERPL CALC-SCNC: 10 MMOL/L (ref 9–18)
AST SERPL-CCNC: 22 U/L (ref 15–37)
BILIRUB DIRECT SERPL-MCNC: <0.2 MG/DL (ref 0–0.4)
BILIRUB SERPL-MCNC: 0.3 MG/DL (ref 0–1.2)
BUN SERPL-MCNC: 9 MG/DL (ref 8–23)
CALCIUM SERPL-MCNC: 9.7 MG/DL (ref 8.8–10.2)
CHLORIDE SERPL-SCNC: 104 MMOL/L (ref 98–107)
CO2 SERPL-SCNC: 24 MMOL/L (ref 20–28)
CREAT SERPL-MCNC: 1.09 MG/DL (ref 0.8–1.3)
ERYTHROCYTE [DISTWIDTH] IN BLOOD BY AUTOMATED COUNT: 14.4 % (ref 11.9–14.6)
GLOBULIN SER CALC-MCNC: 3.4 G/DL (ref 2.3–3.5)
GLUCOSE SERPL-MCNC: 133 MG/DL (ref 70–99)
HCT VFR BLD AUTO: 36.7 % (ref 41.1–50.3)
HGB BLD-MCNC: 11.3 G/DL (ref 13.6–17.2)
MCH RBC QN AUTO: 27.3 PG (ref 26.1–32.9)
MCHC RBC AUTO-ENTMCNC: 30.8 G/DL (ref 31.4–35)
MCV RBC AUTO: 88.6 FL (ref 82–102)
NRBC # BLD: 0 K/UL (ref 0–0.2)
PLATELET # BLD AUTO: 368 K/UL (ref 150–450)
PMV BLD AUTO: 10.7 FL (ref 9.4–12.3)
POTASSIUM SERPL-SCNC: 4.2 MMOL/L (ref 3.5–5.1)
PROT SERPL-MCNC: 6.6 G/DL (ref 6.3–8.2)
RBC # BLD AUTO: 4.14 M/UL (ref 4.23–5.6)
SODIUM SERPL-SCNC: 138 MMOL/L (ref 136–145)
WBC # BLD AUTO: 7.2 K/UL (ref 4.3–11.1)

## 2024-06-22 PROCEDURE — 6370000000 HC RX 637 (ALT 250 FOR IP): Performed by: STUDENT IN AN ORGANIZED HEALTH CARE EDUCATION/TRAINING PROGRAM

## 2024-06-22 PROCEDURE — 6360000002 HC RX W HCPCS: Performed by: FAMILY MEDICINE

## 2024-06-22 PROCEDURE — 80076 HEPATIC FUNCTION PANEL: CPT

## 2024-06-22 PROCEDURE — C9113 INJ PANTOPRAZOLE SODIUM, VIA: HCPCS | Performed by: STUDENT IN AN ORGANIZED HEALTH CARE EDUCATION/TRAINING PROGRAM

## 2024-06-22 PROCEDURE — 2580000003 HC RX 258: Performed by: FAMILY MEDICINE

## 2024-06-22 PROCEDURE — 6360000002 HC RX W HCPCS: Performed by: STUDENT IN AN ORGANIZED HEALTH CARE EDUCATION/TRAINING PROGRAM

## 2024-06-22 PROCEDURE — 94640 AIRWAY INHALATION TREATMENT: CPT

## 2024-06-22 PROCEDURE — 36415 COLL VENOUS BLD VENIPUNCTURE: CPT

## 2024-06-22 PROCEDURE — 2580000003 HC RX 258: Performed by: STUDENT IN AN ORGANIZED HEALTH CARE EDUCATION/TRAINING PROGRAM

## 2024-06-22 PROCEDURE — 80048 BASIC METABOLIC PNL TOTAL CA: CPT

## 2024-06-22 PROCEDURE — 85027 COMPLETE CBC AUTOMATED: CPT

## 2024-06-22 PROCEDURE — 94760 N-INVAS EAR/PLS OXIMETRY 1: CPT

## 2024-06-22 RX ORDER — OXYCODONE HYDROCHLORIDE 5 MG/1
5 TABLET ORAL EVERY 8 HOURS PRN
Qty: 12 TABLET | Refills: 0 | Status: ON HOLD | OUTPATIENT
Start: 2024-06-22 | End: 2024-06-27 | Stop reason: HOSPADM

## 2024-06-22 RX ADMIN — OXYBUTYNIN CHLORIDE 5 MG: 5 TABLET, EXTENDED RELEASE ORAL at 09:29

## 2024-06-22 RX ADMIN — MAGNESIUM GLUCONATE 500 MG ORAL TABLET 400 MG: 500 TABLET ORAL at 09:29

## 2024-06-22 RX ADMIN — MORPHINE SULFATE 2 MG: 2 INJECTION, SOLUTION INTRAMUSCULAR; INTRAVENOUS at 10:14

## 2024-06-22 RX ADMIN — BUDESONIDE AND FORMOTEROL FUMARATE DIHYDRATE 2 PUFF: 160; 4.5 AEROSOL RESPIRATORY (INHALATION) at 08:42

## 2024-06-22 RX ADMIN — FOLIC ACID 1 MG: 1 TABLET ORAL at 09:29

## 2024-06-22 RX ADMIN — MORPHINE SULFATE 2 MG: 2 INJECTION, SOLUTION INTRAMUSCULAR; INTRAVENOUS at 01:06

## 2024-06-22 RX ADMIN — ALLOPURINOL 300 MG: 300 TABLET ORAL at 09:28

## 2024-06-22 RX ADMIN — Medication 100 MG: at 09:28

## 2024-06-22 RX ADMIN — CHLORTHALIDONE 25 MG: 25 TABLET ORAL at 09:29

## 2024-06-22 RX ADMIN — SODIUM CHLORIDE, PRESERVATIVE FREE 10 ML: 5 INJECTION INTRAVENOUS at 09:30

## 2024-06-22 RX ADMIN — HYDRALAZINE HYDROCHLORIDE 10 MG: 20 INJECTION INTRAMUSCULAR; INTRAVENOUS at 04:02

## 2024-06-22 RX ADMIN — TIOTROPIUM BROMIDE INHALATION SPRAY 2 PUFF: 3.12 SPRAY, METERED RESPIRATORY (INHALATION) at 08:42

## 2024-06-22 RX ADMIN — MORPHINE SULFATE 2 MG: 2 INJECTION, SOLUTION INTRAMUSCULAR; INTRAVENOUS at 05:17

## 2024-06-22 RX ADMIN — OXYCODONE 5 MG: 5 TABLET ORAL at 04:04

## 2024-06-22 RX ADMIN — LOSARTAN POTASSIUM 50 MG: 50 TABLET, FILM COATED ORAL at 09:29

## 2024-06-22 RX ADMIN — PANTOPRAZOLE SODIUM 40 MG: 40 INJECTION, POWDER, FOR SOLUTION INTRAVENOUS at 09:28

## 2024-06-22 ASSESSMENT — PAIN DESCRIPTION - LOCATION
LOCATION: ABDOMEN

## 2024-06-22 ASSESSMENT — PAIN - FUNCTIONAL ASSESSMENT
PAIN_FUNCTIONAL_ASSESSMENT: ACTIVITIES ARE NOT PREVENTED
PAIN_FUNCTIONAL_ASSESSMENT: ACTIVITIES ARE NOT PREVENTED

## 2024-06-22 ASSESSMENT — PAIN DESCRIPTION - DESCRIPTORS
DESCRIPTORS: SHARP
DESCRIPTORS: SHARP
DESCRIPTORS: ACHING;SORE
DESCRIPTORS: SHARP
DESCRIPTORS: SHARP

## 2024-06-22 ASSESSMENT — PAIN SCALES - GENERAL
PAINLEVEL_OUTOF10: 8
PAINLEVEL_OUTOF10: 6
PAINLEVEL_OUTOF10: 7
PAINLEVEL_OUTOF10: 8

## 2024-06-22 ASSESSMENT — PAIN DESCRIPTION - ORIENTATION
ORIENTATION: RIGHT;ANTERIOR
ORIENTATION: ANTERIOR;RIGHT
ORIENTATION: ANTERIOR

## 2024-06-22 ASSESSMENT — PAIN DESCRIPTION - PAIN TYPE: TYPE: CHRONIC PAIN

## 2024-06-22 NOTE — PLAN OF CARE
response   Implement non-pharmacological measures as appropriate and evaluate response     Problem: Chronic Conditions and Co-morbidities  Goal: Patient's chronic conditions and co-morbidity symptoms are monitored and maintained or improved  6/22/2024 1045 by Natalee Carrillo RN  Outcome: Adequate for Discharge  6/22/2024 0356 by Magdi Cardenas RN  Outcome: Not Progressing

## 2024-06-22 NOTE — CARE COORDINATION
Pt is for discharge home today by [employer] at 12:30 pm to verified address. IMM2 given to pt. No needs/supportive care orders recieved for MSW at this time.       06/13/24 1349   Service Assessment   Patient Orientation Alert and Oriented   Cognition Alert   History Provided By Patient   Primary Caregiver Self   Accompanied By/Relationship n/a   Support Systems Family Members;Home Care Staff   Patient's Healthcare Decision Maker is: Legal Next of Kin   PCP Verified by CM Yes   Last Visit to PCP Within last 3 months   Prior Functional Level Assistance with the following:;Cooking;Housework;Shopping   Current Functional Level Shopping;Housework;Cooking;Assistance with the following:   Can patient return to prior living arrangement Yes   Ability to make needs known: Good   Family able to assist with home care needs: No   Would you like for me to discuss the discharge plan with any other family members/significant others, and if so, who? No   Financial Resources Medicare;Medicaid   Community Resources ECF/Home Care   Social/Functional History   Lives With Alone   Type of Home Apartment   Home Access Level entry   Bathroom Equipment Grab bars around toilet;Grab bars in shower   Receives Help From Personal care attendant   ADL Assistance Independent   Ambulation Assistance Independent   Transfer Assistance Independent   Active  No   Occupation Retired   Discharge Planning   Type of Residence Apartment   Living Arrangements Alone   Current Services Prior To Admission Home Care   Type of Home Care Services Aide Services   Patient expects to be discharged to: Apartment   One/Two Story Residence One story   Services At/After Discharge   Transition of Care Consult (CM Consult) Discharge Planning   Services At/After Discharge None    Resource Information Provided? No   Mode of Transport at Discharge Other (see comment)  ([employer])   Hospital Transport Time of Discharge 1230   Confirm Follow Up Transport Family   Condition  of Participation: Discharge Planning   The Plan for Transition of Care is related to the following treatment goals: Pt will return home at discharge by [employer]   The Patient and/Or Patient Representative agree with the Discharge Plan? Yes   Freedom of Choice list was provided with basic dialogue that supports the patient's individualized plan of care/goals, treatment preferences, and shares the quality data associated with the providers?  Yes

## 2024-06-22 NOTE — PLAN OF CARE
Problem: Pain  Goal: Verbalizes/displays adequate comfort level or baseline comfort level  6/22/2024 1046 by Natalee Carrillo RN  Outcome: Completed  6/22/2024 1045 by Natalee Carrillo RN  Outcome: Adequate for Discharge  6/22/2024 0356 by Magdi Cardenas RN  Outcome: Not Progressing  Flowsheets  Taken 6/21/2024 1806 by Pauline Small RN  Verbalizes/displays adequate comfort level or baseline comfort level:   Encourage patient to monitor pain and request assistance   Assess pain using appropriate pain scale   Administer analgesics based on type and severity of pain and evaluate response   Implement non-pharmacological measures as appropriate and evaluate response  Taken 6/21/2024 1615 by Pauline Small RN  Verbalizes/displays adequate comfort level or baseline comfort level:   Encourage patient to monitor pain and request assistance   Assess pain using appropriate pain scale   Implement non-pharmacological measures as appropriate and evaluate response  Taken 6/21/2024 1545 by Pauline Small RN  Verbalizes/displays adequate comfort level or baseline comfort level:   Encourage patient to monitor pain and request assistance   Assess pain using appropriate pain scale   Administer analgesics based on type and severity of pain and evaluate response   Implement non-pharmacological measures as appropriate and evaluate response     Problem: Chronic Conditions and Co-morbidities  Goal: Patient's chronic conditions and co-morbidity symptoms are monitored and maintained or improved  6/22/2024 1046 by Natalee Carrillo RN  Outcome: Completed  6/22/2024 1045 by Natalee Carrillo RN  Outcome: Adequate for Discharge  6/22/2024 0356 by Magdi Cardenas RN  Outcome: Not Progressing     Problem: Discharge Planning  Goal: Discharge to home or other facility with appropriate resources  6/22/2024 1046 by Natalee Carrillo RN  Outcome: Completed  6/22/2024 1045 by Natalee Carrillo RN  Outcome: Adequate for Discharge    Encourage patient to monitor pain and request assistance   Assess pain using appropriate pain scale   Implement non-pharmacological measures as appropriate and evaluate response  Taken 6/21/2024 1545 by Pauline Small RN  Verbalizes/displays adequate comfort level or baseline comfort level:   Encourage patient to monitor pain and request assistance   Assess pain using appropriate pain scale   Administer analgesics based on type and severity of pain and evaluate response   Implement non-pharmacological measures as appropriate and evaluate response     Problem: Chronic Conditions and Co-morbidities  Goal: Patient's chronic conditions and co-morbidity symptoms are monitored and maintained or improved  6/22/2024 1046 by Natalee Carrillo RN  Outcome: Completed  6/22/2024 1045 by Natalee Carrillo RN  Outcome: Adequate for Discharge  6/22/2024 0356 by Magdi Cardenas RN  Outcome: Not Progressing

## 2024-06-22 NOTE — DISCHARGE SUMMARY
Hospitalist Discharge Summary   Admit Date:  2024  9:24 PM   DC Note date: 2024  Name:  Reagan Sandra   Age:  73 y.o.  Sex:  male  :  1950   MRN:  [id number]   Room:    PCP:  Ilia Wang MD    Presenting Complaint: Abdominal Pain     Initial Admission Diagnosis: Small bowel obstruction (HCC) [K56.609]  SBO (small bowel obstruction) (HCC) [K56.609]  Acute pancreatitis, unspecified complication status, unspecified pancreatitis type [K85.90]     Problem List for this Hospitalization (present on admission):    Principal Problem (Resolved):    SBO (small bowel obstruction) (HCC)  Active Problems:    Tobacco use disorder    COPD (chronic obstructive pulmonary disease) (HCC)    Hypertension    Common bile duct stone    Pancreatitis    Normocytic anemia  Resolved Problems:    IMAN (acute kidney injury) (HCC)    Small bowel obstruction (HCC)    Hypokalemia    Hypomagnesemia    Hypophosphatemia      Hospital Course:    Patient is a 74 y/o man with medical history of hypertension, COPD, alcohol use disorder, tobacco use disorder, pancreatitis with newly diagnosed pancreatic mass (upcoming o/p EUS planned) who presented to ED  with cc abdominal pain. Hemodynamics stable. Labs notable for Cr 1.46 Mg 1.2 Hgb 9.5. CT A/P with SBO, acute pancreatitis, and CBD dilation. Lipase, LFT's, and TB wnl. General Surgery consulted: conservative management planned. Small bowel obstruction has resolved. NGT removed. IMAN resolved. Therapy evaluations with no needs identified. Received electrolyte supplementation for deficiencies. GI consulted. Patient underwent EUS  with findings of CBD stone. ERCP was then attempted but unable to completed 2/2 duodenal edema. Repeat ERCP   was also unsuccessful due to duodenal edema.  He therefore was seen by IR with internal/external biliary duct placement on .  He will follow-up with Gastroenterology in 6 weeks to internalize drain/remove stone.  He was given  (L/min): 4 L/min    Estimated body mass index is 24.41 kg/m² as calculated from the following:    Height as of this encounter: 1.854 m (6' 1\").    Weight as of this encounter: 83.9 kg (185 lb).    Intake/Output Summary (Last 24 hours) at 6/22/2024 1024  Last data filed at 6/22/2024 0918  Gross per 24 hour   Intake 1765.9 ml   Output 2330 ml   Net -564.1 ml         Physical Exam:    General:          Alert, oriented, conversational, no acute distress  Head:               Normocephalic, atraumatic  CV:                  RRR.  No m/r/g.    Lungs:             CTAB anterior. Respirations even/unlabored on RA  Abdomen:        Soft, nontender, nondistended. Normoactive bowel sounds.  Drain protruding from right abdomen, site covered with c/d/I dressing.   Extremities:     No edema  Skin:                Warm and dry.    Neuro:             A&O x 4. No focal deficits. Moves all extremities  Psych:             Normal mood and affect.       Signed:  Nile Walsh MD    Part of this note may have been written by using a voice dictation software.  The note has been proof read but may still contain some grammatical/other typographical errors.

## 2024-06-22 NOTE — PLAN OF CARE
Problem: Pain  Goal: Verbalizes/displays adequate comfort level or baseline comfort level  Outcome: Not Progressing  Flowsheets  Taken 6/21/2024 1806 by Pauline Small RN  Verbalizes/displays adequate comfort level or baseline comfort level:   Encourage patient to monitor pain and request assistance   Assess pain using appropriate pain scale   Administer analgesics based on type and severity of pain and evaluate response   Implement non-pharmacological measures as appropriate and evaluate response  Taken 6/21/2024 1615 by Pauline Small RN  Verbalizes/displays adequate comfort level or baseline comfort level:   Encourage patient to monitor pain and request assistance   Assess pain using appropriate pain scale   Implement non-pharmacological measures as appropriate and evaluate response  Taken 6/21/2024 1545 by Pauline Small RN  Verbalizes/displays adequate comfort level or baseline comfort level:   Encourage patient to monitor pain and request assistance   Assess pain using appropriate pain scale   Administer analgesics based on type and severity of pain and evaluate response   Implement non-pharmacological measures as appropriate and evaluate response     Problem: Chronic Conditions and Co-morbidities  Goal: Patient's chronic conditions and co-morbidity symptoms are monitored and maintained or improved  Outcome: Not Progressing

## 2024-06-23 ENCOUNTER — HOSPITAL ENCOUNTER (INPATIENT)
Age: 74
LOS: 4 days | Discharge: HOME | DRG: 907 | End: 2024-06-27
Attending: GENERAL PRACTICE | Admitting: INTERNAL MEDICINE
Payer: MEDICARE

## 2024-06-23 ENCOUNTER — APPOINTMENT (OUTPATIENT)
Dept: GENERAL RADIOLOGY | Age: 74
DRG: 907 | End: 2024-06-23
Payer: MEDICARE

## 2024-06-23 ENCOUNTER — APPOINTMENT (OUTPATIENT)
Dept: CT IMAGING | Age: 74
DRG: 907 | End: 2024-06-23
Payer: MEDICARE

## 2024-06-23 DIAGNOSIS — N17.9 ACUTE KIDNEY INJURY (HCC): ICD-10-CM

## 2024-06-23 DIAGNOSIS — A41.9 SEPTICEMIA (HCC): ICD-10-CM

## 2024-06-23 DIAGNOSIS — K85.90 ACUTE PANCREATITIS, UNSPECIFIED COMPLICATION STATUS, UNSPECIFIED PANCREATITIS TYPE: Primary | ICD-10-CM

## 2024-06-23 LAB
ALBUMIN SERPL-MCNC: 2.5 G/DL (ref 3.2–4.6)
ALBUMIN SERPL-MCNC: 3.2 G/DL (ref 3.2–4.6)
ALBUMIN/GLOB SERPL: 0.7 (ref 1–1.9)
ALBUMIN/GLOB SERPL: 1.1 (ref 1–1.9)
ALP SERPL-CCNC: 105 U/L (ref 40–129)
ALP SERPL-CCNC: 92 U/L (ref 40–129)
ALT SERPL-CCNC: 11 U/L (ref 12–65)
ALT SERPL-CCNC: <5 U/L (ref 12–65)
ANION GAP SERPL CALC-SCNC: 10 MMOL/L (ref 9–18)
ANION GAP SERPL CALC-SCNC: 10 MMOL/L (ref 9–18)
APPEARANCE UR: CLEAR
AST SERPL-CCNC: 16 U/L (ref 15–37)
AST SERPL-CCNC: 21 U/L (ref 15–37)
BACTERIA SPEC CULT: ABNORMAL
BACTERIA URNS QL MICRO: NEGATIVE /HPF
BASOPHILS # BLD: 0 K/UL (ref 0–0.2)
BASOPHILS # BLD: 0 K/UL (ref 0–0.2)
BASOPHILS NFR BLD: 0 % (ref 0–2)
BASOPHILS NFR BLD: 0 % (ref 0–2)
BILIRUB SERPL-MCNC: 0.4 MG/DL (ref 0–1.2)
BILIRUB SERPL-MCNC: 0.4 MG/DL (ref 0–1.2)
BILIRUB UR QL: NEGATIVE
BUN SERPL-MCNC: 14 MG/DL (ref 8–23)
BUN SERPL-MCNC: 16 MG/DL (ref 8–23)
CALCIUM SERPL-MCNC: 8.6 MG/DL (ref 8.8–10.2)
CALCIUM SERPL-MCNC: 9.2 MG/DL (ref 8.8–10.2)
CASTS URNS QL MICRO: ABNORMAL /LPF
CHLORIDE SERPL-SCNC: 106 MMOL/L (ref 98–107)
CHLORIDE SERPL-SCNC: 107 MMOL/L (ref 98–107)
CO2 SERPL-SCNC: 22 MMOL/L (ref 20–28)
CO2 SERPL-SCNC: 22 MMOL/L (ref 20–28)
COLOR UR: ABNORMAL
CREAT SERPL-MCNC: 1.66 MG/DL (ref 0.8–1.3)
CREAT SERPL-MCNC: 1.97 MG/DL (ref 0.8–1.3)
DIFFERENTIAL METHOD BLD: ABNORMAL
DIFFERENTIAL METHOD BLD: ABNORMAL
EKG ATRIAL RATE: 116 BPM
EKG DIAGNOSIS: NORMAL
EKG P AXIS: 29 DEGREES
EKG P-R INTERVAL: 156 MS
EKG Q-T INTERVAL: 350 MS
EKG QRS DURATION: 85 MS
EKG QTC CALCULATION (BAZETT): 487 MS
EKG R AXIS: -61 DEGREES
EKG T AXIS: 73 DEGREES
EKG VENTRICULAR RATE: 116 BPM
EOSINOPHIL # BLD: 0 K/UL (ref 0–0.8)
EOSINOPHIL # BLD: 0 K/UL (ref 0–0.8)
EOSINOPHIL NFR BLD: 0 % (ref 0.5–7.8)
EOSINOPHIL NFR BLD: 0 % (ref 0.5–7.8)
EPI CELLS #/AREA URNS HPF: ABNORMAL /HPF
ERYTHROCYTE [DISTWIDTH] IN BLOOD BY AUTOMATED COUNT: 15.1 % (ref 11.9–14.6)
ERYTHROCYTE [DISTWIDTH] IN BLOOD BY AUTOMATED COUNT: 15.2 % (ref 11.9–14.6)
GLOBULIN SER CALC-MCNC: 2.8 G/DL (ref 2.3–3.5)
GLOBULIN SER CALC-MCNC: 3.5 G/DL (ref 2.3–3.5)
GLUCOSE SERPL-MCNC: 117 MG/DL (ref 70–99)
GLUCOSE SERPL-MCNC: 122 MG/DL (ref 70–99)
GLUCOSE UR STRIP.AUTO-MCNC: NEGATIVE MG/DL
GRAM STN SPEC: ABNORMAL
GRAM STN SPEC: ABNORMAL
HCT VFR BLD AUTO: 36.3 % (ref 41.1–50.3)
HCT VFR BLD AUTO: 38.5 % (ref 41.1–50.3)
HGB BLD-MCNC: 11.4 G/DL (ref 13.6–17.2)
HGB BLD-MCNC: 11.7 G/DL (ref 13.6–17.2)
HGB UR QL STRIP: NEGATIVE
HYALINE CASTS URNS QL MICRO: ABNORMAL /LPF
IMM GRANULOCYTES # BLD AUTO: 0 K/UL (ref 0–0.5)
IMM GRANULOCYTES # BLD AUTO: 0.2 K/UL (ref 0–0.5)
IMM GRANULOCYTES NFR BLD AUTO: 0 % (ref 0–5)
IMM GRANULOCYTES NFR BLD AUTO: 1 % (ref 0–5)
KETONES UR QL STRIP.AUTO: NEGATIVE MG/DL
LACTATE SERPL-SCNC: 1.2 MMOL/L (ref 0.5–2)
LACTATE SERPL-SCNC: 1.5 MMOL/L (ref 0.5–2)
LACTATE SERPL-SCNC: 1.6 MMOL/L (ref 0.5–2)
LEUKOCYTE ESTERASE UR QL STRIP.AUTO: NEGATIVE
LIPASE SERPL-CCNC: 591 U/L (ref 13–60)
LYMPHOCYTES # BLD: 0.7 K/UL (ref 0.5–4.6)
LYMPHOCYTES # BLD: 0.8 K/UL (ref 0.5–4.6)
LYMPHOCYTES NFR BLD: 3 % (ref 13–44)
LYMPHOCYTES NFR BLD: 9 % (ref 13–44)
MCH RBC QN AUTO: 27.3 PG (ref 26.1–32.9)
MCH RBC QN AUTO: 27.8 PG (ref 26.1–32.9)
MCHC RBC AUTO-ENTMCNC: 30.4 G/DL (ref 31.4–35)
MCHC RBC AUTO-ENTMCNC: 31.4 G/DL (ref 31.4–35)
MCV RBC AUTO: 87.1 FL (ref 82–102)
MCV RBC AUTO: 91.4 FL (ref 82–102)
MONOCYTES # BLD: 0.6 K/UL (ref 0.1–1.3)
MONOCYTES # BLD: 0.8 K/UL (ref 0.1–1.3)
MONOCYTES NFR BLD: 4 % (ref 4–12)
MONOCYTES NFR BLD: 6 % (ref 4–12)
MUCOUS THREADS URNS QL MICRO: 0 /LPF
NEUTS SEG # BLD: 20 K/UL (ref 1.7–8.2)
NEUTS SEG # BLD: 7.4 K/UL (ref 1.7–8.2)
NEUTS SEG NFR BLD: 85 % (ref 43–78)
NEUTS SEG NFR BLD: 92 % (ref 43–78)
NITRITE UR QL STRIP.AUTO: NEGATIVE
NRBC # BLD: 0 K/UL (ref 0–0.2)
NRBC # BLD: 0 K/UL (ref 0–0.2)
PH UR STRIP: 5.5 (ref 5–9)
PLATELET # BLD AUTO: 310 K/UL (ref 150–450)
PLATELET # BLD AUTO: 330 K/UL (ref 150–450)
PMV BLD AUTO: 10.6 FL (ref 9.4–12.3)
PMV BLD AUTO: 10.6 FL (ref 9.4–12.3)
POTASSIUM SERPL-SCNC: 4.1 MMOL/L (ref 3.5–5.1)
POTASSIUM SERPL-SCNC: 4.3 MMOL/L (ref 3.5–5.1)
PROT SERPL-MCNC: 6 G/DL (ref 6.3–8.2)
PROT SERPL-MCNC: 6 G/DL (ref 6.3–8.2)
PROT UR STRIP-MCNC: 30 MG/DL
RBC # BLD AUTO: 4.17 M/UL (ref 4.23–5.6)
RBC # BLD AUTO: 4.21 M/UL (ref 4.23–5.6)
RBC #/AREA URNS HPF: ABNORMAL /HPF
SERVICE CMNT-IMP: ABNORMAL
SODIUM SERPL-SCNC: 138 MMOL/L (ref 136–145)
SODIUM SERPL-SCNC: 139 MMOL/L (ref 136–145)
SP GR UR REFRACTOMETRY: >1.035 (ref 1–1.02)
URINE CULTURE IF INDICATED: ABNORMAL
UROBILINOGEN UR QL STRIP.AUTO: 0.2 EU/DL (ref 0.2–1)
WBC # BLD AUTO: 21.7 K/UL (ref 4.3–11.1)
WBC # BLD AUTO: 8.8 K/UL (ref 4.3–11.1)
WBC URNS QL MICRO: ABNORMAL /HPF

## 2024-06-23 PROCEDURE — 93005 ELECTROCARDIOGRAM TRACING: CPT | Performed by: GENERAL PRACTICE

## 2024-06-23 PROCEDURE — 80053 COMPREHEN METABOLIC PANEL: CPT

## 2024-06-23 PROCEDURE — 6360000004 HC RX CONTRAST MEDICATION: Performed by: GENERAL PRACTICE

## 2024-06-23 PROCEDURE — 87186 SC STD MICRODIL/AGAR DIL: CPT

## 2024-06-23 PROCEDURE — 87077 CULTURE AEROBIC IDENTIFY: CPT

## 2024-06-23 PROCEDURE — 81001 URINALYSIS AUTO W/SCOPE: CPT

## 2024-06-23 PROCEDURE — 2580000003 HC RX 258: Performed by: GENERAL PRACTICE

## 2024-06-23 PROCEDURE — 6370000000 HC RX 637 (ALT 250 FOR IP): Performed by: INTERNAL MEDICINE

## 2024-06-23 PROCEDURE — 2580000003 HC RX 258: Performed by: INTERNAL MEDICINE

## 2024-06-23 PROCEDURE — 87070 CULTURE OTHR SPECIMN AEROBIC: CPT

## 2024-06-23 PROCEDURE — 1100000000 HC RM PRIVATE

## 2024-06-23 PROCEDURE — 2500000003 HC RX 250 WO HCPCS: Performed by: GENERAL PRACTICE

## 2024-06-23 PROCEDURE — 6360000002 HC RX W HCPCS: Performed by: GENERAL PRACTICE

## 2024-06-23 PROCEDURE — 96375 TX/PRO/DX INJ NEW DRUG ADDON: CPT

## 2024-06-23 PROCEDURE — 96376 TX/PRO/DX INJ SAME DRUG ADON: CPT

## 2024-06-23 PROCEDURE — 87205 SMEAR GRAM STAIN: CPT

## 2024-06-23 PROCEDURE — 96374 THER/PROPH/DIAG INJ IV PUSH: CPT

## 2024-06-23 PROCEDURE — 71045 X-RAY EXAM CHEST 1 VIEW: CPT

## 2024-06-23 PROCEDURE — 93010 ELECTROCARDIOGRAM REPORT: CPT | Performed by: INTERNAL MEDICINE

## 2024-06-23 PROCEDURE — 2500000003 HC RX 250 WO HCPCS: Performed by: INTERNAL MEDICINE

## 2024-06-23 PROCEDURE — 85025 COMPLETE CBC W/AUTO DIFF WBC: CPT

## 2024-06-23 PROCEDURE — 83605 ASSAY OF LACTIC ACID: CPT

## 2024-06-23 PROCEDURE — 87040 BLOOD CULTURE FOR BACTERIA: CPT

## 2024-06-23 PROCEDURE — 83690 ASSAY OF LIPASE: CPT

## 2024-06-23 PROCEDURE — 36415 COLL VENOUS BLD VENIPUNCTURE: CPT

## 2024-06-23 PROCEDURE — 6360000002 HC RX W HCPCS: Performed by: INTERNAL MEDICINE

## 2024-06-23 PROCEDURE — 74177 CT ABD & PELVIS W/CONTRAST: CPT

## 2024-06-23 PROCEDURE — 99285 EMERGENCY DEPT VISIT HI MDM: CPT

## 2024-06-23 RX ORDER — HYDROMORPHONE HYDROCHLORIDE 1 MG/ML
1 INJECTION, SOLUTION INTRAMUSCULAR; INTRAVENOUS; SUBCUTANEOUS
Status: COMPLETED | OUTPATIENT
Start: 2024-06-23 | End: 2024-06-23

## 2024-06-23 RX ORDER — 0.9 % SODIUM CHLORIDE 0.9 %
30 INTRAVENOUS SOLUTION INTRAVENOUS ONCE
Status: COMPLETED | OUTPATIENT
Start: 2024-06-23 | End: 2024-06-23

## 2024-06-23 RX ORDER — HYDROMORPHONE HYDROCHLORIDE 1 MG/ML
0.5 INJECTION, SOLUTION INTRAMUSCULAR; INTRAVENOUS; SUBCUTANEOUS
Status: DISCONTINUED | OUTPATIENT
Start: 2024-06-23 | End: 2024-06-23

## 2024-06-23 RX ORDER — LANOLIN ALCOHOL/MO/W.PET/CERES
100 CREAM (GRAM) TOPICAL DAILY
Status: DISCONTINUED | OUTPATIENT
Start: 2024-06-24 | End: 2024-06-27 | Stop reason: HOSPADM

## 2024-06-23 RX ORDER — ONDANSETRON 2 MG/ML
4 INJECTION INTRAMUSCULAR; INTRAVENOUS ONCE
Status: COMPLETED | OUTPATIENT
Start: 2024-06-23 | End: 2024-06-23

## 2024-06-23 RX ORDER — ALLOPURINOL 300 MG/1
300 TABLET ORAL 2 TIMES DAILY
Status: DISCONTINUED | OUTPATIENT
Start: 2024-06-23 | End: 2024-06-24

## 2024-06-23 RX ORDER — ONDANSETRON 2 MG/ML
4 INJECTION INTRAMUSCULAR; INTRAVENOUS EVERY 6 HOURS PRN
Status: DISCONTINUED | OUTPATIENT
Start: 2024-06-23 | End: 2024-06-27 | Stop reason: HOSPADM

## 2024-06-23 RX ORDER — MAGNESIUM SULFATE IN WATER 40 MG/ML
2000 INJECTION, SOLUTION INTRAVENOUS PRN
Status: DISCONTINUED | OUTPATIENT
Start: 2024-06-23 | End: 2024-06-27

## 2024-06-23 RX ORDER — SODIUM CHLORIDE, SODIUM LACTATE, POTASSIUM CHLORIDE, CALCIUM CHLORIDE 600; 310; 30; 20 MG/100ML; MG/100ML; MG/100ML; MG/100ML
INJECTION, SOLUTION INTRAVENOUS CONTINUOUS
Status: ACTIVE | OUTPATIENT
Start: 2024-06-23 | End: 2024-06-24

## 2024-06-23 RX ORDER — POTASSIUM CHLORIDE 7.45 MG/ML
10 INJECTION INTRAVENOUS PRN
Status: DISCONTINUED | OUTPATIENT
Start: 2024-06-23 | End: 2024-06-26

## 2024-06-23 RX ORDER — ONDANSETRON 4 MG/1
4 TABLET, ORALLY DISINTEGRATING ORAL EVERY 8 HOURS PRN
Status: DISCONTINUED | OUTPATIENT
Start: 2024-06-23 | End: 2024-06-27 | Stop reason: HOSPADM

## 2024-06-23 RX ORDER — SODIUM CHLORIDE, SODIUM LACTATE, POTASSIUM CHLORIDE, CALCIUM CHLORIDE 600; 310; 30; 20 MG/100ML; MG/100ML; MG/100ML; MG/100ML
INJECTION, SOLUTION INTRAVENOUS CONTINUOUS
Status: DISCONTINUED | OUTPATIENT
Start: 2024-06-24 | End: 2024-06-26

## 2024-06-23 RX ORDER — PANTOPRAZOLE SODIUM 40 MG/1
40 TABLET, DELAYED RELEASE ORAL
Status: DISCONTINUED | OUTPATIENT
Start: 2024-06-23 | End: 2024-06-24

## 2024-06-23 RX ORDER — OXYCODONE HYDROCHLORIDE 5 MG/1
5 TABLET ORAL
Status: DISCONTINUED | OUTPATIENT
Start: 2024-06-23 | End: 2024-06-27 | Stop reason: HOSPADM

## 2024-06-23 RX ORDER — SODIUM CHLORIDE 0.9 % (FLUSH) 0.9 %
5-40 SYRINGE (ML) INJECTION EVERY 12 HOURS SCHEDULED
Status: DISCONTINUED | OUTPATIENT
Start: 2024-06-23 | End: 2024-06-27 | Stop reason: HOSPADM

## 2024-06-23 RX ORDER — SODIUM CHLORIDE 0.9 % (FLUSH) 0.9 %
5-40 SYRINGE (ML) INJECTION PRN
Status: DISCONTINUED | OUTPATIENT
Start: 2024-06-23 | End: 2024-06-27 | Stop reason: HOSPADM

## 2024-06-23 RX ORDER — ACETAMINOPHEN 325 MG/1
650 TABLET ORAL EVERY 4 HOURS PRN
Status: DISCONTINUED | OUTPATIENT
Start: 2024-06-23 | End: 2024-06-27 | Stop reason: HOSPADM

## 2024-06-23 RX ORDER — PANTOPRAZOLE SODIUM 40 MG/1
40 TABLET, DELAYED RELEASE ORAL
Status: DISCONTINUED | OUTPATIENT
Start: 2024-06-24 | End: 2024-06-23

## 2024-06-23 RX ORDER — HYDROMORPHONE HYDROCHLORIDE 1 MG/ML
1 INJECTION, SOLUTION INTRAMUSCULAR; INTRAVENOUS; SUBCUTANEOUS
Status: DISCONTINUED | OUTPATIENT
Start: 2024-06-23 | End: 2024-06-24

## 2024-06-23 RX ORDER — ALBUTEROL SULFATE 90 UG/1
2 AEROSOL, METERED RESPIRATORY (INHALATION) EVERY 6 HOURS PRN
Status: DISCONTINUED | OUTPATIENT
Start: 2024-06-23 | End: 2024-06-27 | Stop reason: HOSPADM

## 2024-06-23 RX ORDER — SODIUM CHLORIDE 9 MG/ML
INJECTION, SOLUTION INTRAVENOUS PRN
Status: DISCONTINUED | OUTPATIENT
Start: 2024-06-23 | End: 2024-06-27 | Stop reason: HOSPADM

## 2024-06-23 RX ORDER — BACLOFEN 10 MG/1
5 TABLET ORAL 3 TIMES DAILY
Status: DISCONTINUED | OUTPATIENT
Start: 2024-06-23 | End: 2024-06-23

## 2024-06-23 RX ORDER — ENOXAPARIN SODIUM 100 MG/ML
40 INJECTION SUBCUTANEOUS DAILY
Status: DISCONTINUED | OUTPATIENT
Start: 2024-06-23 | End: 2024-06-27 | Stop reason: HOSPADM

## 2024-06-23 RX ORDER — OXYBUTYNIN CHLORIDE 5 MG/1
5 TABLET, EXTENDED RELEASE ORAL DAILY
Status: DISCONTINUED | OUTPATIENT
Start: 2024-06-24 | End: 2024-06-27 | Stop reason: HOSPADM

## 2024-06-23 RX ORDER — POTASSIUM CHLORIDE 29.8 MG/ML
20 INJECTION INTRAVENOUS PRN
Status: DISCONTINUED | OUTPATIENT
Start: 2024-06-23 | End: 2024-06-26

## 2024-06-23 RX ORDER — ALBUTEROL SULFATE 2.5 MG/3ML
2.5 SOLUTION RESPIRATORY (INHALATION) EVERY 6 HOURS PRN
Status: DISCONTINUED | OUTPATIENT
Start: 2024-06-23 | End: 2024-06-27 | Stop reason: HOSPADM

## 2024-06-23 RX ORDER — LOSARTAN POTASSIUM 50 MG/1
50 TABLET ORAL DAILY
Status: CANCELLED | OUTPATIENT
Start: 2024-06-23

## 2024-06-23 RX ORDER — BACLOFEN 10 MG/1
5 TABLET ORAL 3 TIMES DAILY PRN
Status: DISCONTINUED | OUTPATIENT
Start: 2024-06-23 | End: 2024-06-24

## 2024-06-23 RX ORDER — CALCIUM CARBONATE 500 MG/1
500 TABLET, CHEWABLE ORAL 3 TIMES DAILY PRN
Status: DISCONTINUED | OUTPATIENT
Start: 2024-06-23 | End: 2024-06-27 | Stop reason: HOSPADM

## 2024-06-23 RX ADMIN — SODIUM CHLORIDE, PRESERVATIVE FREE 10 ML: 5 INJECTION INTRAVENOUS at 19:27

## 2024-06-23 RX ADMIN — ONDANSETRON 4 MG: 2 INJECTION INTRAMUSCULAR; INTRAVENOUS at 08:11

## 2024-06-23 RX ADMIN — SODIUM CHLORIDE, POTASSIUM CHLORIDE, SODIUM LACTATE AND CALCIUM CHLORIDE: 600; 310; 30; 20 INJECTION, SOLUTION INTRAVENOUS at 22:40

## 2024-06-23 RX ADMIN — HYDROMORPHONE HYDROCHLORIDE 1 MG: 1 INJECTION, SOLUTION INTRAMUSCULAR; INTRAVENOUS; SUBCUTANEOUS at 10:58

## 2024-06-23 RX ADMIN — HYDROMORPHONE HYDROCHLORIDE 1 MG: 1 INJECTION, SOLUTION INTRAMUSCULAR; INTRAVENOUS; SUBCUTANEOUS at 19:26

## 2024-06-23 RX ADMIN — SODIUM CHLORIDE 2586 ML: 9 INJECTION, SOLUTION INTRAVENOUS at 08:41

## 2024-06-23 RX ADMIN — HYDROMORPHONE HYDROCHLORIDE 1 MG: 1 INJECTION, SOLUTION INTRAMUSCULAR; INTRAVENOUS; SUBCUTANEOUS at 22:45

## 2024-06-23 RX ADMIN — ENOXAPARIN SODIUM 40 MG: 100 INJECTION SUBCUTANEOUS at 14:07

## 2024-06-23 RX ADMIN — CALCIUM CARBONATE (ANTACID) CHEW TAB 500 MG 500 MG: 500 CHEW TAB at 14:07

## 2024-06-23 RX ADMIN — IOPAMIDOL 100 ML: 755 INJECTION, SOLUTION INTRAVENOUS at 08:18

## 2024-06-23 RX ADMIN — HYDROMORPHONE HYDROCHLORIDE 1 MG: 1 INJECTION, SOLUTION INTRAMUSCULAR; INTRAVENOUS; SUBCUTANEOUS at 16:22

## 2024-06-23 RX ADMIN — BACLOFEN 5 MG: 10 TABLET ORAL at 17:14

## 2024-06-23 RX ADMIN — SODIUM CHLORIDE, POTASSIUM CHLORIDE, SODIUM LACTATE AND CALCIUM CHLORIDE: 600; 310; 30; 20 INJECTION, SOLUTION INTRAVENOUS at 14:08

## 2024-06-23 RX ADMIN — SODIUM CHLORIDE: 9 INJECTION, SOLUTION INTRAVENOUS at 16:56

## 2024-06-23 RX ADMIN — PIPERACILLIN AND TAZOBACTAM 4500 MG: 4; .5 INJECTION, POWDER, LYOPHILIZED, FOR SOLUTION INTRAVENOUS at 08:14

## 2024-06-23 RX ADMIN — HYDROMORPHONE HYDROCHLORIDE 0.5 MG: 1 INJECTION, SOLUTION INTRAMUSCULAR; INTRAVENOUS; SUBCUTANEOUS at 13:35

## 2024-06-23 RX ADMIN — HYDROMORPHONE HYDROCHLORIDE 1 MG: 1 INJECTION, SOLUTION INTRAMUSCULAR; INTRAVENOUS; SUBCUTANEOUS at 08:11

## 2024-06-23 RX ADMIN — PANTOPRAZOLE SODIUM 40 MG: 40 TABLET, DELAYED RELEASE ORAL at 17:14

## 2024-06-23 RX ADMIN — OXYCODONE 5 MG: 5 TABLET ORAL at 21:13

## 2024-06-23 RX ADMIN — Medication: at 17:14

## 2024-06-23 RX ADMIN — ALLOPURINOL 300 MG: 300 TABLET ORAL at 19:27

## 2024-06-23 RX ADMIN — PIPERACILLIN AND TAZOBACTAM 3375 MG: 3; .375 INJECTION, POWDER, LYOPHILIZED, FOR SOLUTION INTRAVENOUS at 16:58

## 2024-06-23 ASSESSMENT — PAIN DESCRIPTION - LOCATION
LOCATION: ABDOMEN

## 2024-06-23 ASSESSMENT — PAIN SCALES - GENERAL
PAINLEVEL_OUTOF10: 8
PAINLEVEL_OUTOF10: 5
PAINLEVEL_OUTOF10: 10
PAINLEVEL_OUTOF10: 9
PAINLEVEL_OUTOF10: 9

## 2024-06-23 ASSESSMENT — PAIN DESCRIPTION - DESCRIPTORS
DESCRIPTORS: ACHING;DISCOMFORT;CRAMPING
DESCRIPTORS: ACHING;BURNING
DESCRIPTORS: NAGGING;ACHING

## 2024-06-23 ASSESSMENT — PAIN - FUNCTIONAL ASSESSMENT
PAIN_FUNCTIONAL_ASSESSMENT: PREVENTS OR INTERFERES SOME ACTIVE ACTIVITIES AND ADLS
PAIN_FUNCTIONAL_ASSESSMENT: PREVENTS OR INTERFERES SOME ACTIVE ACTIVITIES AND ADLS

## 2024-06-23 ASSESSMENT — PAIN DESCRIPTION - ORIENTATION
ORIENTATION: RIGHT;LEFT;MID
ORIENTATION: RIGHT;LEFT;LOWER;MID;ANTERIOR

## 2024-06-23 NOTE — H&P
Hospitalist History and Physical   Admit Date:  2024  7:38 AM   Name:  Reagan Sandra   Age:  73 y.o.  Sex:  male  :  1950   MRN:  [id number]   Room:  ER03/03    Presenting/Chief Complaint: Abdominal Pain     Reason(s) for Admission: Acute pancreatitis without infection or necrosis, unspecified pancreatitis type [K85.90]     History of Present Illness:     Reagan Sandra is a 73-year-old man with a history of hypertension, COPD, alcohol use disorder, tobacco use disorder, and pancreatitis with newly diagnosed pancreatic mass who was recently admitted from -2024 with abdominal pain secondary to SBO, acute pancreatitis, and CBD dilation.  He was seen in consultation for SBO by General Surgery with conservative management.  SBO resolved with NGT decompression.  He was followed by Gastroenterology who made to attempt to perform ERCP to remove CBD stone on  and  but were unsuccessful due to duodenal edema.  And internal/external biliary drain was placed on 2024 by Internal Radiology.  Symptoms were greatly improved with patient denying any abdominal pain on 2024 and he was discharged home with plan to follow-up with Gastroenterology in 6 weeks to internalize the drain and remove the stone.    He reports that he was feeling well yesterday after discharge but developed abdominal pain early this morning.  He stated the pain was so severe that he was unable to move.  He denies any nausea, emesis, fever, dyspnea, or any other acute symptoms.  On presentation to the ED heart rate was elevated at 117 with temperature elevated at 100.9 degrees, heart rate was 117, respiratory rate was 24, and blood pressure was 115/78.  He was saturating 95% on ambient oxygen.  Initial labs showed serum creatinine elevated at 1.97 (recent baseline ~1.1).  Lipase was elevated at 591.  EKG showed sinus tachycardia without any other acute changes.  Chest x-ray showed small left basilar effusion with

## 2024-06-23 NOTE — ED NOTES
Pt arrives via [facility] coming from [facility] c/o abd pain for the last month.      Robby Lei, DOLLY  06/23/24 1619

## 2024-06-23 NOTE — PLAN OF CARE
Problem: Discharge Planning  Goal: Discharge to home or other facility with appropriate resources  6/23/2024 1941 by Nelly Acuna RN  Outcome: Progressing  6/23/2024 1600 by Rosa Cronin RN  Outcome: Progressing     Problem: Safety - Adult  Goal: Free from fall injury  6/23/2024 1941 by Nelly Acuna RN  Outcome: Progressing  6/23/2024 1600 by Rosa Cronin, RN  Outcome: Progressing

## 2024-06-23 NOTE — ED PROVIDER NOTES
interpreted the CT Scan CT scan abdomen pelvis shows resolution of the previously seen biliary obstruction.  Worsening findings consistent with pancreatitis with fat stranding around the duodenum.  No fluid collections.  I have reviewed and agree with radiology report.  My Independent EKG Interpretation: sinus rhythm, no evidence of arrhythmia      ST Segments:Normal ST segments - NO STEMI   Rate: 116  The patient was admitted and I have discussed patient management with the admitting provider.    Exclusion criteria - the patient is NOT to be included for SEP-1 Core Measure due to: May have criteria for sepsis, but does not meet criteria for severe sepsis or septic shock       History     Patient presents with abdominal pain.  He also has some associated nausea.  Patient has low-grade fever.  Patient was found to be tachycardic as well.  He recently had pancreatitis and small bowel obstruction and biliary ductal dilatation.  He had a stent placed.        ROS     Review of Systems   All other systems reviewed and are negative.       Physical Exam     Vitals signs and nursing note reviewed:  Vitals:    06/23/24 0730 06/23/24 0815 06/23/24 0845   BP: 115/78  125/78   Pulse: (!) 117 (!) 110 (!) 109   Resp: 24 23 27   Temp: (!) 100.9 °F (38.3 °C)     SpO2: 95% 96% 92%   Weight:  86.2 kg (190 lb)       Physical Exam  Constitutional:       General: He is not in acute distress.  HENT:      Head: Normocephalic and atraumatic.      Right Ear: External ear normal.      Left Ear: External ear normal.      Nose: No congestion or rhinorrhea.      Mouth/Throat:      Mouth: Mucous membranes are moist.   Eyes:      Extraocular Movements: Extraocular movements intact.      Pupils: Pupils are equal, round, and reactive to light.   Cardiovascular:      Rate and Rhythm: Regular rhythm. Tachycardia present.      Heart sounds: Normal heart sounds.   Pulmonary:      Effort: Pulmonary effort is normal.      Breath sounds: Normal breath

## 2024-06-24 ENCOUNTER — TELEPHONE (OUTPATIENT)
Dept: GASTROENTEROLOGY | Age: 74
End: 2024-06-24

## 2024-06-24 ENCOUNTER — APPOINTMENT (OUTPATIENT)
Dept: INTERVENTIONAL RADIOLOGY/VASCULAR | Age: 74
DRG: 907 | End: 2024-06-24
Attending: RADIOLOGY
Payer: MEDICARE

## 2024-06-24 ENCOUNTER — APPOINTMENT (OUTPATIENT)
Dept: CT IMAGING | Age: 74
DRG: 907 | End: 2024-06-24
Payer: MEDICARE

## 2024-06-24 ENCOUNTER — APPOINTMENT (OUTPATIENT)
Dept: GENERAL RADIOLOGY | Age: 74
DRG: 907 | End: 2024-06-24
Payer: MEDICARE

## 2024-06-24 PROBLEM — R65.10 SIRS (SYSTEMIC INFLAMMATORY RESPONSE SYNDROME) (HCC): Status: ACTIVE | Noted: 2024-06-24

## 2024-06-24 LAB
ABO + RH BLD: NORMAL
ALBUMIN SERPL-MCNC: 2.3 G/DL (ref 3.2–4.6)
ALBUMIN/GLOB SERPL: 0.7 (ref 1–1.9)
ALP SERPL-CCNC: 89 U/L (ref 40–129)
ALT SERPL-CCNC: 6 U/L (ref 12–65)
ANION GAP SERPL CALC-SCNC: 10 MMOL/L (ref 9–18)
AST SERPL-CCNC: 17 U/L (ref 15–37)
BASOPHILS # BLD: 0.1 K/UL (ref 0–0.2)
BASOPHILS NFR BLD: 0 % (ref 0–2)
BILIRUB SERPL-MCNC: 0.5 MG/DL (ref 0–1.2)
BLOOD GROUP ANTIBODIES SERPL: NORMAL
BUN SERPL-MCNC: 29 MG/DL (ref 8–23)
CALCIUM SERPL-MCNC: 8.3 MG/DL (ref 8.8–10.2)
CHLORIDE SERPL-SCNC: 107 MMOL/L (ref 98–107)
CO2 SERPL-SCNC: 24 MMOL/L (ref 20–28)
CREAT SERPL-MCNC: 1.6 MG/DL (ref 0.8–1.3)
DIFFERENTIAL METHOD BLD: ABNORMAL
EOSINOPHIL # BLD: 0 K/UL (ref 0–0.8)
EOSINOPHIL NFR BLD: 0 % (ref 0.5–7.8)
ERYTHROCYTE [DISTWIDTH] IN BLOOD BY AUTOMATED COUNT: 15.1 % (ref 11.9–14.6)
GLOBULIN SER CALC-MCNC: 3.4 G/DL (ref 2.3–3.5)
GLUCOSE SERPL-MCNC: 106 MG/DL (ref 70–99)
HCT VFR BLD AUTO: 35 % (ref 41.1–50.3)
HGB BLD-MCNC: 10.8 G/DL (ref 13.6–17.2)
IMM GRANULOCYTES # BLD AUTO: 0.3 K/UL (ref 0–0.5)
IMM GRANULOCYTES NFR BLD AUTO: 1 % (ref 0–5)
INR PPP: 1.2
LACTATE SERPL-SCNC: 1.9 MMOL/L (ref 0.5–2)
LIPASE SERPL-CCNC: 115 U/L (ref 13–60)
LYMPHOCYTES # BLD: 0.6 K/UL (ref 0.5–4.6)
LYMPHOCYTES NFR BLD: 3 % (ref 13–44)
MCH RBC QN AUTO: 27.5 PG (ref 26.1–32.9)
MCHC RBC AUTO-ENTMCNC: 30.9 G/DL (ref 31.4–35)
MCV RBC AUTO: 89.1 FL (ref 82–102)
MONOCYTES # BLD: 0.8 K/UL (ref 0.1–1.3)
MONOCYTES NFR BLD: 3 % (ref 4–12)
NEUTS SEG # BLD: 21.6 K/UL (ref 1.7–8.2)
NEUTS SEG NFR BLD: 93 % (ref 43–78)
NRBC # BLD: 0 K/UL (ref 0–0.2)
PLATELET # BLD AUTO: 359 K/UL (ref 150–450)
PMV BLD AUTO: 10.8 FL (ref 9.4–12.3)
POTASSIUM SERPL-SCNC: 3.8 MMOL/L (ref 3.5–5.1)
PROCALCITONIN SERPL-MCNC: 4.23 NG/ML (ref 0–0.1)
PROT SERPL-MCNC: 5.7 G/DL (ref 6.3–8.2)
PROTHROMBIN TIME: 15.4 SEC (ref 11.3–14.9)
RBC # BLD AUTO: 3.93 M/UL (ref 4.23–5.6)
SODIUM SERPL-SCNC: 141 MMOL/L (ref 136–145)
SPECIMEN EXP DATE BLD: NORMAL
TRIGL SERPL-MCNC: 68 MG/DL (ref 0–150)
WBC # BLD AUTO: 23.3 K/UL (ref 4.3–11.1)

## 2024-06-24 PROCEDURE — 2580000003 HC RX 258: Performed by: FAMILY MEDICINE

## 2024-06-24 PROCEDURE — 80053 COMPREHEN METABOLIC PANEL: CPT

## 2024-06-24 PROCEDURE — 6360000002 HC RX W HCPCS: Performed by: RADIOLOGY

## 2024-06-24 PROCEDURE — 83605 ASSAY OF LACTIC ACID: CPT

## 2024-06-24 PROCEDURE — 6370000000 HC RX 637 (ALT 250 FOR IP): Performed by: INTERNAL MEDICINE

## 2024-06-24 PROCEDURE — 84145 PROCALCITONIN (PCT): CPT

## 2024-06-24 PROCEDURE — 84478 ASSAY OF TRIGLYCERIDES: CPT

## 2024-06-24 PROCEDURE — 2500000003 HC RX 250 WO HCPCS

## 2024-06-24 PROCEDURE — 6360000002 HC RX W HCPCS

## 2024-06-24 PROCEDURE — 6370000000 HC RX 637 (ALT 250 FOR IP): Performed by: FAMILY MEDICINE

## 2024-06-24 PROCEDURE — 71045 X-RAY EXAM CHEST 1 VIEW: CPT

## 2024-06-24 PROCEDURE — 85610 PROTHROMBIN TIME: CPT

## 2024-06-24 PROCEDURE — 6360000002 HC RX W HCPCS: Performed by: FAMILY MEDICINE

## 2024-06-24 PROCEDURE — 0F793DZ DILATION OF COMMON BILE DUCT WITH INTRALUMINAL DEVICE, PERCUTANEOUS APPROACH: ICD-10-PCS | Performed by: RADIOLOGY

## 2024-06-24 PROCEDURE — 2500000003 HC RX 250 WO HCPCS: Performed by: INTERNAL MEDICINE

## 2024-06-24 PROCEDURE — 2500000003 HC RX 250 WO HCPCS: Performed by: RADIOLOGY

## 2024-06-24 PROCEDURE — 47536 EXCHANGE BILIARY DRG CATH: CPT | Performed by: RADIOLOGY

## 2024-06-24 PROCEDURE — 49424 ASSESS CYST CONTRAST INJECT: CPT

## 2024-06-24 PROCEDURE — 36415 COLL VENOUS BLD VENIPUNCTURE: CPT

## 2024-06-24 PROCEDURE — 74018 RADEX ABDOMEN 1 VIEW: CPT

## 2024-06-24 PROCEDURE — 2500000003 HC RX 250 WO HCPCS: Performed by: FAMILY MEDICINE

## 2024-06-24 PROCEDURE — 86900 BLOOD TYPING SEROLOGIC ABO: CPT

## 2024-06-24 PROCEDURE — 2580000003 HC RX 258: Performed by: INTERNAL MEDICINE

## 2024-06-24 PROCEDURE — 47536 EXCHANGE BILIARY DRG CATH: CPT

## 2024-06-24 PROCEDURE — 1100000003 HC PRIVATE W/ TELEMETRY

## 2024-06-24 PROCEDURE — 2700000000 HC OXYGEN THERAPY PER DAY

## 2024-06-24 PROCEDURE — 83690 ASSAY OF LIPASE: CPT

## 2024-06-24 PROCEDURE — 6370000000 HC RX 637 (ALT 250 FOR IP)

## 2024-06-24 PROCEDURE — 86850 RBC ANTIBODY SCREEN: CPT

## 2024-06-24 PROCEDURE — 0FPB3DZ REMOVAL OF INTRALUMINAL DEVICE FROM HEPATOBILIARY DUCT, PERCUTANEOUS APPROACH: ICD-10-PCS | Performed by: RADIOLOGY

## 2024-06-24 PROCEDURE — 94760 N-INVAS EAR/PLS OXIMETRY 1: CPT

## 2024-06-24 PROCEDURE — 86901 BLOOD TYPING SEROLOGIC RH(D): CPT

## 2024-06-24 PROCEDURE — 6360000004 HC RX CONTRAST MEDICATION: Performed by: RADIOLOGY

## 2024-06-24 PROCEDURE — 99223 1ST HOSP IP/OBS HIGH 75: CPT

## 2024-06-24 PROCEDURE — 74178 CT ABD&PLV WO CNTR FLWD CNTR: CPT

## 2024-06-24 PROCEDURE — 6360000002 HC RX W HCPCS: Performed by: INTERNAL MEDICINE

## 2024-06-24 PROCEDURE — 6360000004 HC RX CONTRAST MEDICATION: Performed by: SURGERY

## 2024-06-24 PROCEDURE — 85025 COMPLETE CBC W/AUTO DIFF WBC: CPT

## 2024-06-24 PROCEDURE — 94640 AIRWAY INHALATION TREATMENT: CPT

## 2024-06-24 PROCEDURE — C9113 INJ PANTOPRAZOLE SODIUM, VIA: HCPCS | Performed by: FAMILY MEDICINE

## 2024-06-24 RX ORDER — MORPHINE SULFATE 4 MG/ML
INJECTION, SOLUTION INTRAMUSCULAR; INTRAVENOUS
Status: COMPLETED
Start: 2024-06-24 | End: 2024-06-24

## 2024-06-24 RX ORDER — MORPHINE SULFATE 2 MG/ML
2 INJECTION, SOLUTION INTRAMUSCULAR; INTRAVENOUS ONCE
Status: COMPLETED | OUTPATIENT
Start: 2024-06-24 | End: 2024-06-24

## 2024-06-24 RX ORDER — HYDROMORPHONE HYDROCHLORIDE 1 MG/ML
0.25 INJECTION, SOLUTION INTRAMUSCULAR; INTRAVENOUS; SUBCUTANEOUS
Status: DISCONTINUED | OUTPATIENT
Start: 2024-06-24 | End: 2024-06-27 | Stop reason: HOSPADM

## 2024-06-24 RX ORDER — ACETAMINOPHEN 650 MG/1
650 SUPPOSITORY RECTAL EVERY 6 HOURS PRN
Status: DISCONTINUED | OUTPATIENT
Start: 2024-06-24 | End: 2024-06-26

## 2024-06-24 RX ORDER — MORPHINE SULFATE 4 MG/ML
4 INJECTION, SOLUTION INTRAMUSCULAR; INTRAVENOUS ONCE
Status: COMPLETED | OUTPATIENT
Start: 2024-06-24 | End: 2024-06-24

## 2024-06-24 RX ORDER — LORAZEPAM 2 MG/ML
INJECTION INTRAMUSCULAR
Status: COMPLETED
Start: 2024-06-24 | End: 2024-06-24

## 2024-06-24 RX ORDER — LIDOCAINE HYDROCHLORIDE 20 MG/ML
INJECTION, SOLUTION INFILTRATION; PERINEURAL PRN
Status: COMPLETED | OUTPATIENT
Start: 2024-06-24 | End: 2024-06-24

## 2024-06-24 RX ORDER — FENTANYL CITRATE 50 UG/ML
INJECTION, SOLUTION INTRAMUSCULAR; INTRAVENOUS PRN
Status: COMPLETED | OUTPATIENT
Start: 2024-06-24 | End: 2024-06-24

## 2024-06-24 RX ORDER — DIPHENHYDRAMINE HCL 25 MG
50 CAPSULE ORAL EVERY 6 HOURS PRN
Status: DISCONTINUED | OUTPATIENT
Start: 2024-06-24 | End: 2024-06-24

## 2024-06-24 RX ORDER — BUDESONIDE AND FORMOTEROL FUMARATE DIHYDRATE 160; 4.5 UG/1; UG/1
2 AEROSOL RESPIRATORY (INHALATION)
Status: DISCONTINUED | OUTPATIENT
Start: 2024-06-24 | End: 2024-06-27 | Stop reason: HOSPADM

## 2024-06-24 RX ORDER — MORPHINE SULFATE 2 MG/ML
2 INJECTION, SOLUTION INTRAMUSCULAR; INTRAVENOUS ONCE
Status: DISCONTINUED | OUTPATIENT
Start: 2024-06-24 | End: 2024-06-26

## 2024-06-24 RX ORDER — 0.9 % SODIUM CHLORIDE 0.9 %
500 INTRAVENOUS SOLUTION INTRAVENOUS ONCE
Status: COMPLETED | OUTPATIENT
Start: 2024-06-24 | End: 2024-06-24

## 2024-06-24 RX ADMIN — PIPERACILLIN AND TAZOBACTAM 3375 MG: 3; .375 INJECTION, POWDER, LYOPHILIZED, FOR SOLUTION INTRAVENOUS at 09:52

## 2024-06-24 RX ADMIN — MORPHINE SULFATE 4 MG: 4 INJECTION INTRAVENOUS at 04:24

## 2024-06-24 RX ADMIN — ACETAMINOPHEN 650 MG: 650 SUPPOSITORY RECTAL at 05:38

## 2024-06-24 RX ADMIN — OXYCODONE 5 MG: 5 TABLET ORAL at 20:46

## 2024-06-24 RX ADMIN — IOHEXOL 20 ML: 300 INJECTION, SOLUTION INTRAVENOUS at 09:00

## 2024-06-24 RX ADMIN — MORPHINE SULFATE 2 MG: 2 INJECTION, SOLUTION INTRAMUSCULAR; INTRAVENOUS at 03:33

## 2024-06-24 RX ADMIN — SODIUM CHLORIDE 500 ML: 9 INJECTION, SOLUTION INTRAVENOUS at 03:50

## 2024-06-24 RX ADMIN — SODIUM CHLORIDE, PRESERVATIVE FREE 10 ML: 5 INJECTION INTRAVENOUS at 20:46

## 2024-06-24 RX ADMIN — SODIUM CHLORIDE, PRESERVATIVE FREE 40 MG: 5 INJECTION INTRAVENOUS at 20:46

## 2024-06-24 RX ADMIN — SODIUM CHLORIDE, POTASSIUM CHLORIDE, SODIUM LACTATE AND CALCIUM CHLORIDE: 600; 310; 30; 20 INJECTION, SOLUTION INTRAVENOUS at 21:18

## 2024-06-24 RX ADMIN — SODIUM CHLORIDE, PRESERVATIVE FREE 40 MG: 5 INJECTION INTRAVENOUS at 09:45

## 2024-06-24 RX ADMIN — ACETAMINOPHEN 650 MG: 325 TABLET ORAL at 00:45

## 2024-06-24 RX ADMIN — OXYCODONE 5 MG: 5 TABLET ORAL at 00:55

## 2024-06-24 RX ADMIN — LORAZEPAM 1 MG: 2 INJECTION INTRAMUSCULAR; INTRAVENOUS at 05:37

## 2024-06-24 RX ADMIN — BACLOFEN 5 MG: 10 TABLET ORAL at 00:47

## 2024-06-24 RX ADMIN — LIDOCAINE HYDROCHLORIDE 5 ML: 20 INJECTION, SOLUTION INFILTRATION; PERINEURAL at 08:53

## 2024-06-24 RX ADMIN — PIPERACILLIN AND TAZOBACTAM 3375 MG: 3; .375 INJECTION, POWDER, LYOPHILIZED, FOR SOLUTION INTRAVENOUS at 00:52

## 2024-06-24 RX ADMIN — SODIUM CHLORIDE, POTASSIUM CHLORIDE, SODIUM LACTATE AND CALCIUM CHLORIDE: 600; 310; 30; 20 INJECTION, SOLUTION INTRAVENOUS at 06:07

## 2024-06-24 RX ADMIN — ONDANSETRON 4 MG: 2 INJECTION INTRAMUSCULAR; INTRAVENOUS at 00:45

## 2024-06-24 RX ADMIN — SODIUM CHLORIDE, POTASSIUM CHLORIDE, SODIUM LACTATE AND CALCIUM CHLORIDE: 600; 310; 30; 20 INJECTION, SOLUTION INTRAVENOUS at 14:15

## 2024-06-24 RX ADMIN — ALBUTEROL SULFATE 2.5 MG: 2.5 SOLUTION RESPIRATORY (INHALATION) at 00:20

## 2024-06-24 RX ADMIN — SODIUM CHLORIDE: 9 INJECTION, SOLUTION INTRAVENOUS at 09:49

## 2024-06-24 RX ADMIN — DIPHENHYDRAMINE HYDROCHLORIDE 50 MG: 25 CAPSULE ORAL at 00:45

## 2024-06-24 RX ADMIN — MORPHINE SULFATE 2 MG: 2 INJECTION, SOLUTION INTRAMUSCULAR; INTRAVENOUS at 04:24

## 2024-06-24 RX ADMIN — SODIUM CHLORIDE, PRESERVATIVE FREE 10 ML: 5 INJECTION INTRAVENOUS at 09:53

## 2024-06-24 RX ADMIN — ONDANSETRON 4 MG: 4 TABLET, ORALLY DISINTEGRATING ORAL at 20:47

## 2024-06-24 RX ADMIN — PIPERACILLIN AND TAZOBACTAM 3375 MG: 3; .375 INJECTION, POWDER, LYOPHILIZED, FOR SOLUTION INTRAVENOUS at 16:37

## 2024-06-24 RX ADMIN — IOPAMIDOL 100 ML: 755 INJECTION, SOLUTION INTRAVENOUS at 04:44

## 2024-06-24 RX ADMIN — MORPHINE SULFATE 4 MG: 4 INJECTION, SOLUTION INTRAMUSCULAR; INTRAVENOUS at 04:24

## 2024-06-24 RX ADMIN — BUDESONIDE AND FORMOTEROL FUMARATE DIHYDRATE 2 PUFF: 160; 4.5 AEROSOL RESPIRATORY (INHALATION) at 20:40

## 2024-06-24 RX ADMIN — HYDROMORPHONE HYDROCHLORIDE 1 MG: 1 INJECTION, SOLUTION INTRAMUSCULAR; INTRAVENOUS; SUBCUTANEOUS at 01:56

## 2024-06-24 RX ADMIN — FENTANYL CITRATE 25 MCG: 50 INJECTION, SOLUTION INTRAMUSCULAR; INTRAVENOUS at 08:45

## 2024-06-24 RX ADMIN — SODIUM CHLORIDE, POTASSIUM CHLORIDE, SODIUM LACTATE AND CALCIUM CHLORIDE: 600; 310; 30; 20 INJECTION, SOLUTION INTRAVENOUS at 00:07

## 2024-06-24 ASSESSMENT — PAIN DESCRIPTION - ORIENTATION
ORIENTATION: RIGHT;MID
ORIENTATION: RIGHT;MID
ORIENTATION: MID;RIGHT

## 2024-06-24 ASSESSMENT — PAIN DESCRIPTION - PAIN TYPE
TYPE: ACUTE PAIN
TYPE: CHRONIC PAIN
TYPE: CHRONIC PAIN

## 2024-06-24 ASSESSMENT — PAIN DESCRIPTION - LOCATION
LOCATION: ABDOMEN

## 2024-06-24 ASSESSMENT — PAIN DESCRIPTION - DESCRIPTORS
DESCRIPTORS: ACHING;BURNING
DESCRIPTORS: ACHING;TEARING
DESCRIPTORS: ACHING;BURNING

## 2024-06-24 ASSESSMENT — PAIN SCALES - GENERAL
PAINLEVEL_OUTOF10: 0
PAINLEVEL_OUTOF10: 6
PAINLEVEL_OUTOF10: 10
PAINLEVEL_OUTOF10: 4
PAINLEVEL_OUTOF10: 8
PAINLEVEL_OUTOF10: 0
PAINLEVEL_OUTOF10: 2
PAINLEVEL_OUTOF10: 9
PAINLEVEL_OUTOF10: 0
PAINLEVEL_OUTOF10: 0
PAINLEVEL_OUTOF10: 9
PAINLEVEL_OUTOF10: 0
PAINLEVEL_OUTOF10: 0

## 2024-06-24 ASSESSMENT — PAIN - FUNCTIONAL ASSESSMENT
PAIN_FUNCTIONAL_ASSESSMENT: ACTIVITIES ARE NOT PREVENTED
PAIN_FUNCTIONAL_ASSESSMENT: ACTIVITIES ARE NOT PREVENTED
PAIN_FUNCTIONAL_ASSESSMENT: PREVENTS OR INTERFERES WITH ALL ACTIVE AND SOME PASSIVE ACTIVITIES
PAIN_FUNCTIONAL_ASSESSMENT: NONE - DENIES PAIN

## 2024-06-24 ASSESSMENT — PAIN DESCRIPTION - FREQUENCY
FREQUENCY: CONTINUOUS

## 2024-06-24 ASSESSMENT — PAIN DESCRIPTION - ONSET
ONSET: ON-GOING
ONSET: ON-GOING

## 2024-06-24 NOTE — OR NURSING
Patient transported back to 3107 with this RN and DOLLY Rios. Patient transported on cardiac monitor without incident. Primary RN at bedside.

## 2024-06-24 NOTE — PLAN OF CARE
Problem: Discharge Planning  Goal: Discharge to home or other facility with appropriate resources  6/24/2024 0847 by Mavis Menendez RN  Outcome: Progressing  Flowsheets  Taken 6/24/2024 0720 by Mavis Menendez, RN  Discharge to home or other facility with appropriate resources:   Identify barriers to discharge with patient and caregiver   Arrange for needed discharge resources and transportation as appropriate   Refer to discharge planning if patient needs post-hospital services based on physician order or complex needs related to functional status, cognitive ability or social support system   Identify discharge learning needs (meds, wound care, etc)   Arrange for interpreters to assist at discharge as needed  Taken 6/23/2024 1945 by Nelly Acuna RN  Discharge to home or other facility with appropriate resources:   Identify barriers to discharge with patient and caregiver   Arrange for needed discharge resources and transportation as appropriate   Identify discharge learning needs (meds, wound care, etc)   Arrange for interpreters to assist at discharge as needed  6/23/2024 1941 by Nelly Acuna RN  Outcome: Progressing     Problem: Pain  Goal: Verbalizes/displays adequate comfort level or baseline comfort level  Outcome: Progressing

## 2024-06-24 NOTE — OP NOTE
[facility]  Department of Interventional Radiology  [phone]        Interventional Radiology Brief Procedure Note    Patient: Reagan Sandra MRN: [id number]  SSN: [id number]    YOB: 1950  Age: 73 y.o.  Sex: male      Date of Procedure: 6/24/2024    Pre-Procedure Diagnosis: biliary obstruction    Post-Procedure Diagnosis: SAME    Procedure(s): Percutaneous Transhepatic Cholangiogram Drain Exchange    Brief Description of Procedure: as above    Performed By: THAD KATZ MD     Assistants: None    Anesthesia:Lidocaine    Estimated Blood Loss: None    Specimens:  None    Implants:  Biliary Drain    Findings: existing tube had pulled back to the peritoneum.  New drain adequately placed    Complications: None    Recommendations: external drainage     Follow Up: few weeks    Signed By: THAD KATZ MD     June 24, 2024

## 2024-06-24 NOTE — ICUWATCH
RRT Clinical Rounding Nurse Progress Report      SUBJECTIVE: Patient assessed secondary to RN/provider concern - tachycardia, bili drain issues.      Vitals:    06/24/24 0400 06/24/24 0547 06/24/24 0600 06/24/24 0615   BP:  (!) 142/77 121/63 114/68   Pulse: (!) 130 (!) 126 (!) 126 (!) 120   Resp: 24 19 20 16   Temp:  (!) 100.7 °F (38.2 °C)     TempSrc:  Oral     SpO2: 95% 98% 98% 98%   Weight:       Height:            DETERIORATION INDEX SCORE: 41    ASSESSMENT:  Upon arrival to room, pt sitting on side of bed with primary RN at bedside. Pt with R bili drain in place, dark brown/green draining from bag and site, unclear if drain in place. Abdomen rigid and painful, pt complaining of 10/10 abd pain. Appears unwell, HR sustaining 130s. .    Hospitalist to bedside, orders received for STAT NGT placement, labs, ICU consult, CXR, CT a/p. Hospitalist MD notifying IR/Gen surg.    ICU MD to bedside, orders received for ICU transfer.     Pt transported to CT with RT then to ICU 3107.    PLAN:  Will discharge from RRT Clinical Rounding Program per protocol. Please call if needed. Pt transferring to ICU.     Caitlin Monique RN  [facility]: [phone]  [facility]: [phone]

## 2024-06-24 NOTE — CONSULTS
PULMONARY/CRITICAL CARE CONSULT NOTE           6/24/2024    Reagan Sandra                        Date of Admission:  6/23/2024    The patient's chart is reviewed and the patient is discussed with the staff.    Subjective:     Patient is a 73 y.o.  male seen and evaluated at the request of Dr. Fritz Pinto 73-year-old male COPD, asthma, CAD, HTN, HLD, dyspepsia, alcohol use disorder, and has a history of TIA and GIB in the past who was admitted for pancreatitis and SBO. SBO resolved with NGT decompression.  Patient had 2 unsuccessful ERCP technically difficult due to duodenal swelling.  IR placed an internal/external biliary drain on 6/21/2024.  Patient was discharged but returned within 24 hours secondary to increased abdominal pain.  A rapid response was called due to the patient having increasing abdominal pain and increasing abdominal distention.  The biliary drain was working very well earlier however quit working prior to the onset of increased pain.  It is possible the tube was dislodged.  An NG tube was placed and 1500 cc of black gastric emesis was returned immediately.  Patient did have some relief of his pain and less distention of his abdomen.  Hospitalist at bedside and critical care was consulted for transfer to the ICU for close observation.  Also IR and general surgery were notified by the hospitalist with patient's condition.  Plan is to obtain CT abdomen pelvis and transferred to ICU      Review of Systems: Comprehensive ROS negative except in HPI    Current Outpatient Medications   Medication Instructions    albuterol (PROVENTIL) 2.5 mg, Nebulization, EVERY 6 HOURS PRN    albuterol sulfate HFA (PROVENTIL;VENTOLIN;PROAIR) 108 (90 Base) MCG/ACT inhaler 2 puffs, Inhalation, EVERY 6 HOURS PRN    allopurinol (ZYLOPRIM) 300 MG tablet TAKE 1 TABLET BY MOUTH TWICE DAILY    chlorthalidone (HYGROTEN) 50 mg, Oral, DAILY    fluticasone-umeclidin-vilant (TRELEGY 
the head of the pancreas.  4.  Trace fluid in the peritoneal cavity.  No free air.      Automatic exposure control was used as a dose lowering technique.    Radiation Dose: CTDI is 21.15 mGy. DLP is 1326.78 mGy-cm.    Contrast Type: iopamidol (ISOVUE-370) 76 . Contrast Volume: 100 mL    Report signed on 06/24/2024 (08:18 Eastern Time)  Signed by: Shilpi James M.D.  Reading Location: 4    US Result (most recent):  US VENOUS DOPPLER LOWER EXTREMITIES BILATERAL 06/12/2019    Narrative  Bilateral lower extremity venous ultrasound    INDICATION: Pulmonary embolus    Doppler ultrasound of both lower extremities was performed.    FINDINGS:  There is normal flow in the greater saphenous, common femoral,  superficial femoral, and popliteal veins. Normal compression and augmentation is  demonstrated. The proximal calf veins are also patent.    Impression  IMPRESSION: No evidence of deep venous thrombosis in either lower extremity        Admission date (for inpatients): 6/23/2024   * No surgery found *  * No surgery found *    ASSESSMENT/PLAN:     Principal Problem:    Acute pancreatitis without infection or necrosis, unspecified pancreatitis type  Resolved Problems:    * No resolved hospital problems. *     It appears that Mr Sandra's abdominal pain has improved with Today's IR int/ext biliary drain replacement and back into biliary system (drain had pulled back into the peritoneum).   Continue to monitor for abdominal pain and treat.  Likely ok to advance diet as tolerated.    Patient seen in consultation with Dr Lo.      Signed:  ANNMARIE CRONIN - CNP   Will eventually need a completion cholecystectomy.  MD Wally.

## 2024-06-24 NOTE — SIGNIFICANT EVENT
Called to bedside for patient with rapidly distending abd with increasing pain and biliary tube output up to 1600 ml tonite after only 125 on days. No BM since 6/18, had SBO prev admit. Now tube with no output, large bile drainage around tube on abd wall and spitting up dark material. Is tachycardic and SOB. Ordered NGT, CXR, ABD/Pelvis CT. Added labs when had 700 ml dark coffee ground material out of NGT. Have notified IR that biliary tube appears not to be functioning and Gen surgery that patient appears to have worsening problems in the abdomen and they may be needed to assist with this patient.  Ordered IV protonix, it is just BID push per formulary.  Have spoken at bedside with Dr Nath who agrees that patient appears to be acutely decompensating and accepts in transfer to ICU

## 2024-06-24 NOTE — PRE SEDATION
allopurinol (ZYLOPRIM) 300 MG tablet TAKE 1 TABLET BY MOUTH TWICE DAILY 12/21/22   Provider, MD Reuben   oxybutynin (DITROPAN-XL) 5 MG extended release tablet Take 1 tablet by mouth daily    Automatic Reconciliation, Ar        Pre-Sedation Documentation and Exam:   I have personally completed a history, physical exam & review of systems for this patient (see notes).  Vital signs have been reviewed (see flow sheet for vitals).    Mallampati Airway Assessment:  normal, dentition not prohibitive, Mallampati Class III - (soft palate & base of uvula are visible)  Edentulous    Prior History of Anesthesia Complications:   none    ASA Classification:  Class 3 - A patient with severe systemic disease that limits activity but is not incapacitating    Sedation/ Anesthesia Plan:   intravenous sedation    Medications Planned:   midazolam (Versed) intravenously and fentanyl intravenously    Patient is an appropriate candidate for plan of sedation: yes    Electronically signed by OWEN Moya on 6/24/2024 at 8:04 AM

## 2024-06-24 NOTE — INTERDISCIPLINARY ROUNDS
Multi-D Rounds/Checklist (leapfrog):  Lines: can any be removed?: None    Biliary Tube 06/21/24 RUQ (Active)       NG/OG/NJ/NE Tube Nasogastric Right nostril (Active)      DVT Prophylaxis: Ordered  Vent: N/A  Nutrition Ordered/appropriate: Per Primary Team  Can antibiotics or other drugs be stopped? Yes/End Date set Yes  Inpat Anti-Infectives (From admission, onward)       Start     Ordered Stop    06/23/24 1700  piperacillin-tazobactam (ZOSYN) 3,375 mg in sodium chloride 0.9 % 50 mL IVPB (mini-bag)  3,375 mg,   IntraVENous,   EVERY 8 HOURS         06/23/24 1602 --                  Consults needed: None  A: Is pain control adequate? (has PRNs? Stop drip?) Yes  B: Sedation break and SBT? N/A  C: Is sedation choice appropriate? N/A  D: Delirium/CAM-ICU? No  E: Mobility goals/appropriateness? Yes  F: Family update and plan? ? is surrogate decision maker and is being updated daily by primary attending and nursing staff.    Mee Bailey, APRN - CNP

## 2024-06-25 ENCOUNTER — TELEPHONE (OUTPATIENT)
Dept: GASTROENTEROLOGY | Age: 74
End: 2024-06-25

## 2024-06-25 PROBLEM — A41.9 SEPSIS (HCC): Status: ACTIVE | Noted: 2024-06-25

## 2024-06-25 LAB
ALBUMIN SERPL-MCNC: 1.7 G/DL (ref 3.2–4.6)
ALBUMIN/GLOB SERPL: 0.5 (ref 1–1.9)
ALP SERPL-CCNC: 61 U/L (ref 40–129)
ALT SERPL-CCNC: <5 U/L (ref 12–65)
ANION GAP SERPL CALC-SCNC: 9 MMOL/L (ref 9–18)
AST SERPL-CCNC: 14 U/L (ref 15–37)
BASOPHILS # BLD: 0 K/UL (ref 0–0.2)
BASOPHILS NFR BLD: 0 % (ref 0–2)
BILIRUB SERPL-MCNC: 0.5 MG/DL (ref 0–1.2)
BUN SERPL-MCNC: 30 MG/DL (ref 8–23)
CALCIUM SERPL-MCNC: 8.5 MG/DL (ref 8.8–10.2)
CHLORIDE SERPL-SCNC: 104 MMOL/L (ref 98–107)
CO2 SERPL-SCNC: 24 MMOL/L (ref 20–28)
CREAT SERPL-MCNC: 1.59 MG/DL (ref 0.8–1.3)
DIFFERENTIAL METHOD BLD: ABNORMAL
EOSINOPHIL # BLD: 0.2 K/UL (ref 0–0.8)
EOSINOPHIL NFR BLD: 2 % (ref 0.5–7.8)
ERYTHROCYTE [DISTWIDTH] IN BLOOD BY AUTOMATED COUNT: 15.2 % (ref 11.9–14.6)
GASTROCULT GAST QL: NEGATIVE
GLOBULIN SER CALC-MCNC: 3.2 G/DL (ref 2.3–3.5)
GLUCOSE SERPL-MCNC: 87 MG/DL (ref 70–99)
HCT VFR BLD AUTO: 25.1 % (ref 41.1–50.3)
HGB BLD-MCNC: 7.8 G/DL (ref 13.6–17.2)
IMM GRANULOCYTES # BLD AUTO: 0.1 K/UL (ref 0–0.5)
IMM GRANULOCYTES NFR BLD AUTO: 1 % (ref 0–5)
LYMPHOCYTES # BLD: 0.7 K/UL (ref 0.5–4.6)
LYMPHOCYTES NFR BLD: 6 % (ref 13–44)
MAGNESIUM SERPL-MCNC: 1.5 MG/DL (ref 1.8–2.4)
MCH RBC QN AUTO: 27.7 PG (ref 26.1–32.9)
MCHC RBC AUTO-ENTMCNC: 31.1 G/DL (ref 31.4–35)
MCV RBC AUTO: 89 FL (ref 82–102)
MONOCYTES # BLD: 0.5 K/UL (ref 0.1–1.3)
MONOCYTES NFR BLD: 5 % (ref 4–12)
NEUTS SEG # BLD: 9.9 K/UL (ref 1.7–8.2)
NEUTS SEG NFR BLD: 87 % (ref 43–78)
NRBC # BLD: 0 K/UL (ref 0–0.2)
PH GAST: 3 (ref 1.5–3.5)
PLATELET # BLD AUTO: 233 K/UL (ref 150–450)
PMV BLD AUTO: 11.6 FL (ref 9.4–12.3)
POTASSIUM SERPL-SCNC: 3.5 MMOL/L (ref 3.5–5.1)
PROT SERPL-MCNC: 4.8 G/DL (ref 6.3–8.2)
RBC # BLD AUTO: 2.82 M/UL (ref 4.23–5.6)
SODIUM SERPL-SCNC: 138 MMOL/L (ref 136–145)
WBC # BLD AUTO: 11.4 K/UL (ref 4.3–11.1)

## 2024-06-25 PROCEDURE — 6360000002 HC RX W HCPCS: Performed by: FAMILY MEDICINE

## 2024-06-25 PROCEDURE — 83735 ASSAY OF MAGNESIUM: CPT

## 2024-06-25 PROCEDURE — 6370000000 HC RX 637 (ALT 250 FOR IP): Performed by: FAMILY MEDICINE

## 2024-06-25 PROCEDURE — 2700000000 HC OXYGEN THERAPY PER DAY

## 2024-06-25 PROCEDURE — 2580000003 HC RX 258: Performed by: FAMILY MEDICINE

## 2024-06-25 PROCEDURE — C9113 INJ PANTOPRAZOLE SODIUM, VIA: HCPCS | Performed by: FAMILY MEDICINE

## 2024-06-25 PROCEDURE — 1100000003 HC PRIVATE W/ TELEMETRY

## 2024-06-25 PROCEDURE — 94760 N-INVAS EAR/PLS OXIMETRY 1: CPT

## 2024-06-25 PROCEDURE — 82271 OCCULT BLOOD OTHER SOURCES: CPT

## 2024-06-25 PROCEDURE — 85025 COMPLETE CBC W/AUTO DIFF WBC: CPT

## 2024-06-25 PROCEDURE — 2500000003 HC RX 250 WO HCPCS: Performed by: FAMILY MEDICINE

## 2024-06-25 PROCEDURE — 94640 AIRWAY INHALATION TREATMENT: CPT

## 2024-06-25 PROCEDURE — 36415 COLL VENOUS BLD VENIPUNCTURE: CPT

## 2024-06-25 PROCEDURE — 80053 COMPREHEN METABOLIC PANEL: CPT

## 2024-06-25 RX ADMIN — SODIUM CHLORIDE, PRESERVATIVE FREE 10 ML: 5 INJECTION INTRAVENOUS at 22:26

## 2024-06-25 RX ADMIN — SODIUM CHLORIDE, POTASSIUM CHLORIDE, SODIUM LACTATE AND CALCIUM CHLORIDE: 600; 310; 30; 20 INJECTION, SOLUTION INTRAVENOUS at 18:22

## 2024-06-25 RX ADMIN — PIPERACILLIN AND TAZOBACTAM 3375 MG: 3; .375 INJECTION, POWDER, LYOPHILIZED, FOR SOLUTION INTRAVENOUS at 08:30

## 2024-06-25 RX ADMIN — PIPERACILLIN AND TAZOBACTAM 3375 MG: 3; .375 INJECTION, POWDER, LYOPHILIZED, FOR SOLUTION INTRAVENOUS at 18:16

## 2024-06-25 RX ADMIN — TIOTROPIUM BROMIDE INHALATION SPRAY 2 PUFF: 3.12 SPRAY, METERED RESPIRATORY (INHALATION) at 09:15

## 2024-06-25 RX ADMIN — BUDESONIDE AND FORMOTEROL FUMARATE DIHYDRATE 2 PUFF: 160; 4.5 AEROSOL RESPIRATORY (INHALATION) at 20:54

## 2024-06-25 RX ADMIN — PIPERACILLIN AND TAZOBACTAM 3375 MG: 3; .375 INJECTION, POWDER, LYOPHILIZED, FOR SOLUTION INTRAVENOUS at 01:30

## 2024-06-25 RX ADMIN — Medication 100 MG: at 07:42

## 2024-06-25 RX ADMIN — BUDESONIDE AND FORMOTEROL FUMARATE DIHYDRATE 2 PUFF: 160; 4.5 AEROSOL RESPIRATORY (INHALATION) at 09:15

## 2024-06-25 RX ADMIN — OXYCODONE 5 MG: 5 TABLET ORAL at 13:30

## 2024-06-25 RX ADMIN — OXYCODONE 5 MG: 5 TABLET ORAL at 22:23

## 2024-06-25 RX ADMIN — SODIUM CHLORIDE, PRESERVATIVE FREE 40 MG: 5 INJECTION INTRAVENOUS at 22:26

## 2024-06-25 RX ADMIN — SODIUM CHLORIDE, PRESERVATIVE FREE 10 ML: 5 INJECTION INTRAVENOUS at 07:43

## 2024-06-25 RX ADMIN — MAGNESIUM SULFATE HEPTAHYDRATE 2000 MG: 40 INJECTION, SOLUTION INTRAVENOUS at 06:04

## 2024-06-25 RX ADMIN — OXYBUTYNIN CHLORIDE 5 MG: 5 TABLET, EXTENDED RELEASE ORAL at 07:42

## 2024-06-25 RX ADMIN — HYDROMORPHONE HYDROCHLORIDE 0.25 MG: 1 INJECTION, SOLUTION INTRAMUSCULAR; INTRAVENOUS; SUBCUTANEOUS at 01:48

## 2024-06-25 RX ADMIN — SODIUM CHLORIDE, PRESERVATIVE FREE 40 MG: 5 INJECTION INTRAVENOUS at 07:43

## 2024-06-25 RX ADMIN — OXYCODONE 5 MG: 5 TABLET ORAL at 18:11

## 2024-06-25 RX ADMIN — OXYCODONE 5 MG: 5 TABLET ORAL at 03:45

## 2024-06-25 ASSESSMENT — PAIN - FUNCTIONAL ASSESSMENT
PAIN_FUNCTIONAL_ASSESSMENT: ACTIVITIES ARE NOT PREVENTED

## 2024-06-25 ASSESSMENT — PAIN DESCRIPTION - ONSET
ONSET: ON-GOING

## 2024-06-25 ASSESSMENT — PAIN SCALES - GENERAL
PAINLEVEL_OUTOF10: 0
PAINLEVEL_OUTOF10: 4
PAINLEVEL_OUTOF10: 0
PAINLEVEL_OUTOF10: 0
PAINLEVEL_OUTOF10: 5
PAINLEVEL_OUTOF10: 0
PAINLEVEL_OUTOF10: 7
PAINLEVEL_OUTOF10: 6
PAINLEVEL_OUTOF10: 6
PAINLEVEL_OUTOF10: 0
PAINLEVEL_OUTOF10: 8
PAINLEVEL_OUTOF10: 7
PAINLEVEL_OUTOF10: 0

## 2024-06-25 ASSESSMENT — PAIN DESCRIPTION - LOCATION
LOCATION: ABDOMEN

## 2024-06-25 ASSESSMENT — PAIN DESCRIPTION - PAIN TYPE
TYPE: CHRONIC PAIN

## 2024-06-25 ASSESSMENT — PAIN DESCRIPTION - DESCRIPTORS
DESCRIPTORS: ACHING;BURNING
DESCRIPTORS: ACHING;BURNING
DESCRIPTORS: ACHING
DESCRIPTORS: ACHING;BURNING
DESCRIPTORS: ACHING

## 2024-06-25 ASSESSMENT — PAIN DESCRIPTION - FREQUENCY
FREQUENCY: CONTINUOUS

## 2024-06-25 ASSESSMENT — PAIN DESCRIPTION - ORIENTATION
ORIENTATION: RIGHT;MID

## 2024-06-25 NOTE — PLAN OF CARE
Problem: Discharge Planning  Goal: Discharge to home or other facility with appropriate resources  Outcome: Progressing  Flowsheets  Taken 6/25/2024 0730 by Mavis Menendez RN  Discharge to home or other facility with appropriate resources:   Identify barriers to discharge with patient and caregiver   Arrange for needed discharge resources and transportation as appropriate   Identify discharge learning needs (meds, wound care, etc)   Arrange for interpreters to assist at discharge as needed   Refer to discharge planning if patient needs post-hospital services based on physician order or complex needs related to functional status, cognitive ability or social support system  Taken 6/24/2024 1930 by Meena Yanes, RN  Discharge to home or other facility with appropriate resources: Identify barriers to discharge with patient and caregiver     Problem: Pain  Goal: Verbalizes/displays adequate comfort level or baseline comfort level  Outcome: Progressing  Flowsheets (Taken 6/24/2024 1930 by Meena Yanes RN)  Verbalizes/displays adequate comfort level or baseline comfort level: Assess pain using appropriate pain scale     Problem: Chronic Conditions and Co-morbidities  Goal: Patient's chronic conditions and co-morbidity symptoms are monitored and maintained or improved  Outcome: Progressing  Flowsheets  Taken 6/25/2024 0730 by Mavis Menendez RN  Care Plan - Patient's Chronic Conditions and Co-Morbidity Symptoms are Monitored and Maintained or Improved: Monitor and assess patient's chronic conditions and comorbid symptoms for stability, deterioration, or improvement  Taken 6/24/2024 1930 by Meena Yanes RN  Care Plan - Patient's Chronic Conditions and Co-Morbidity Symptoms are Monitored and Maintained or Improved: Monitor and assess patient's chronic conditions and comorbid symptoms for stability, deterioration, or improvement

## 2024-06-26 LAB
ALBUMIN SERPL-MCNC: 1.7 G/DL (ref 3.2–4.6)
ALBUMIN/GLOB SERPL: 0.6 (ref 1–1.9)
ALP SERPL-CCNC: 59 U/L (ref 40–129)
ALT SERPL-CCNC: <5 U/L (ref 12–65)
ANION GAP SERPL CALC-SCNC: 9 MMOL/L (ref 9–18)
AST SERPL-CCNC: 15 U/L (ref 15–37)
BACTERIA SPEC CULT: ABNORMAL
BASOPHILS # BLD: 0 K/UL (ref 0–0.2)
BASOPHILS NFR BLD: 0 % (ref 0–2)
BILIRUB SERPL-MCNC: 0.4 MG/DL (ref 0–1.2)
BUN SERPL-MCNC: 23 MG/DL (ref 8–23)
CALCIUM SERPL-MCNC: 8.4 MG/DL (ref 8.8–10.2)
CHLORIDE SERPL-SCNC: 104 MMOL/L (ref 98–107)
CO2 SERPL-SCNC: 23 MMOL/L (ref 20–28)
CREAT SERPL-MCNC: 1.22 MG/DL (ref 0.8–1.3)
DIFFERENTIAL METHOD BLD: ABNORMAL
EOSINOPHIL # BLD: 0.2 K/UL (ref 0–0.8)
EOSINOPHIL NFR BLD: 3 % (ref 0.5–7.8)
ERYTHROCYTE [DISTWIDTH] IN BLOOD BY AUTOMATED COUNT: 15.2 % (ref 11.9–14.6)
FUNGAL CULT/SMEAR: NORMAL
GLOBULIN SER CALC-MCNC: 2.6 G/DL (ref 2.3–3.5)
GLUCOSE SERPL-MCNC: 88 MG/DL (ref 70–99)
GRAM STN SPEC: ABNORMAL
GRAM STN SPEC: ABNORMAL
HCT VFR BLD AUTO: 24.1 % (ref 41.1–50.3)
HGB BLD-MCNC: 7.6 G/DL (ref 13.6–17.2)
IMM GRANULOCYTES # BLD AUTO: 0 K/UL (ref 0–0.5)
IMM GRANULOCYTES NFR BLD AUTO: 1 % (ref 0–5)
LYMPHOCYTES # BLD: 0.5 K/UL (ref 0.5–4.6)
LYMPHOCYTES NFR BLD: 8 % (ref 13–44)
MCH RBC QN AUTO: 27.5 PG (ref 26.1–32.9)
MCHC RBC AUTO-ENTMCNC: 31.5 G/DL (ref 31.4–35)
MCV RBC AUTO: 87.3 FL (ref 82–102)
MONOCYTES # BLD: 0.6 K/UL (ref 0.1–1.3)
MONOCYTES NFR BLD: 9 % (ref 4–12)
NEUTS SEG # BLD: 5.2 K/UL (ref 1.7–8.2)
NEUTS SEG NFR BLD: 79 % (ref 43–78)
NRBC # BLD: 0 K/UL (ref 0–0.2)
PLATELET # BLD AUTO: 263 K/UL (ref 150–450)
PMV BLD AUTO: 11.2 FL (ref 9.4–12.3)
POTASSIUM SERPL-SCNC: 3.7 MMOL/L (ref 3.5–5.1)
PROT SERPL-MCNC: 4.3 G/DL (ref 6.3–8.2)
RBC # BLD AUTO: 2.76 M/UL (ref 4.23–5.6)
SERVICE CMNT-IMP: ABNORMAL
SODIUM SERPL-SCNC: 137 MMOL/L (ref 136–145)
SPECIMEN PROCESSING: NORMAL
SPECIMEN SOURCE: NORMAL
WBC # BLD AUTO: 6.5 K/UL (ref 4.3–11.1)

## 2024-06-26 PROCEDURE — 80053 COMPREHEN METABOLIC PANEL: CPT

## 2024-06-26 PROCEDURE — 6360000002 HC RX W HCPCS: Performed by: FAMILY MEDICINE

## 2024-06-26 PROCEDURE — 6370000000 HC RX 637 (ALT 250 FOR IP): Performed by: FAMILY MEDICINE

## 2024-06-26 PROCEDURE — 2580000003 HC RX 258: Performed by: FAMILY MEDICINE

## 2024-06-26 PROCEDURE — 94640 AIRWAY INHALATION TREATMENT: CPT

## 2024-06-26 PROCEDURE — 2500000003 HC RX 250 WO HCPCS: Performed by: FAMILY MEDICINE

## 2024-06-26 PROCEDURE — 94760 N-INVAS EAR/PLS OXIMETRY 1: CPT

## 2024-06-26 PROCEDURE — 6360000002 HC RX W HCPCS: Performed by: NURSE PRACTITIONER

## 2024-06-26 PROCEDURE — 2580000003 HC RX 258: Performed by: NURSE PRACTITIONER

## 2024-06-26 PROCEDURE — 1100000003 HC PRIVATE W/ TELEMETRY

## 2024-06-26 PROCEDURE — 36415 COLL VENOUS BLD VENIPUNCTURE: CPT

## 2024-06-26 PROCEDURE — C9113 INJ PANTOPRAZOLE SODIUM, VIA: HCPCS | Performed by: FAMILY MEDICINE

## 2024-06-26 PROCEDURE — 94761 N-INVAS EAR/PLS OXIMETRY MLT: CPT

## 2024-06-26 PROCEDURE — 85025 COMPLETE CBC W/AUTO DIFF WBC: CPT

## 2024-06-26 RX ADMIN — SODIUM CHLORIDE, PRESERVATIVE FREE 10 ML: 5 INJECTION INTRAVENOUS at 08:30

## 2024-06-26 RX ADMIN — SODIUM CHLORIDE, PRESERVATIVE FREE 40 MG: 5 INJECTION INTRAVENOUS at 20:38

## 2024-06-26 RX ADMIN — BUDESONIDE AND FORMOTEROL FUMARATE DIHYDRATE 2 PUFF: 160; 4.5 AEROSOL RESPIRATORY (INHALATION) at 07:27

## 2024-06-26 RX ADMIN — PIPERACILLIN AND TAZOBACTAM 3375 MG: 3; .375 INJECTION, POWDER, LYOPHILIZED, FOR SOLUTION INTRAVENOUS at 08:39

## 2024-06-26 RX ADMIN — SODIUM CHLORIDE, PRESERVATIVE FREE 10 ML: 5 INJECTION INTRAVENOUS at 20:38

## 2024-06-26 RX ADMIN — PIPERACILLIN AND TAZOBACTAM 3375 MG: 3; .375 INJECTION, POWDER, LYOPHILIZED, FOR SOLUTION INTRAVENOUS at 00:33

## 2024-06-26 RX ADMIN — HYDROMORPHONE HYDROCHLORIDE 0.25 MG: 1 INJECTION, SOLUTION INTRAMUSCULAR; INTRAVENOUS; SUBCUTANEOUS at 03:59

## 2024-06-26 RX ADMIN — OXYBUTYNIN CHLORIDE 5 MG: 5 TABLET, EXTENDED RELEASE ORAL at 08:31

## 2024-06-26 RX ADMIN — Medication 100 MG: at 08:31

## 2024-06-26 RX ADMIN — OXYCODONE 5 MG: 5 TABLET ORAL at 20:38

## 2024-06-26 RX ADMIN — TIOTROPIUM BROMIDE INHALATION SPRAY 2 PUFF: 3.12 SPRAY, METERED RESPIRATORY (INHALATION) at 07:27

## 2024-06-26 RX ADMIN — SODIUM CHLORIDE, POTASSIUM CHLORIDE, SODIUM LACTATE AND CALCIUM CHLORIDE: 600; 310; 30; 20 INJECTION, SOLUTION INTRAVENOUS at 00:31

## 2024-06-26 RX ADMIN — BUDESONIDE AND FORMOTEROL FUMARATE DIHYDRATE 2 PUFF: 160; 4.5 AEROSOL RESPIRATORY (INHALATION) at 21:11

## 2024-06-26 RX ADMIN — OXYCODONE 5 MG: 5 TABLET ORAL at 12:11

## 2024-06-26 RX ADMIN — VANCOMYCIN HYDROCHLORIDE 2000 MG: 10 INJECTION, POWDER, LYOPHILIZED, FOR SOLUTION INTRAVENOUS at 17:56

## 2024-06-26 RX ADMIN — CEFTRIAXONE 1000 MG: 1 INJECTION, POWDER, FOR SOLUTION INTRAMUSCULAR; INTRAVENOUS at 17:23

## 2024-06-26 RX ADMIN — SODIUM CHLORIDE, PRESERVATIVE FREE 40 MG: 5 INJECTION INTRAVENOUS at 08:31

## 2024-06-26 RX ADMIN — HYDROMORPHONE HYDROCHLORIDE 0.25 MG: 1 INJECTION, SOLUTION INTRAMUSCULAR; INTRAVENOUS; SUBCUTANEOUS at 08:29

## 2024-06-26 ASSESSMENT — PAIN DESCRIPTION - ORIENTATION: ORIENTATION: INNER

## 2024-06-26 ASSESSMENT — PAIN DESCRIPTION - LOCATION
LOCATION: ABDOMEN

## 2024-06-26 ASSESSMENT — PAIN SCALES - GENERAL
PAINLEVEL_OUTOF10: 8
PAINLEVEL_OUTOF10: 8
PAINLEVEL_OUTOF10: 9
PAINLEVEL_OUTOF10: 6
PAINLEVEL_OUTOF10: 9
PAINLEVEL_OUTOF10: 6

## 2024-06-26 ASSESSMENT — PAIN DESCRIPTION - DESCRIPTORS
DESCRIPTORS: ACHING

## 2024-06-26 NOTE — PLAN OF CARE
Problem: Respiratory - Adult  Goal: Achieves optimal ventilation and oxygenation  6/26/2024 0730 by Dio Moore, RCKELLI  Outcome: Progressing  Flowsheets (Taken 6/26/2024 0730)  Achieves optimal ventilation and oxygenation:   Assess for changes in respiratory status   Position to facilitate oxygenation and minimize respiratory effort   Respiratory therapy support as indicated   Assess for changes in mentation and behavior   Oxygen supplementation based on oxygen saturation or arterial blood gases   Encourage broncho-pulmonary hygiene including cough, deep breathe, incentive spirometry   Assess and instruct to report shortness of breath or any respiratory difficulty

## 2024-06-26 NOTE — DISCHARGE INSTRUCTIONS
Low fat diet until your gallbladder has been removed.  Call interventional radiology-IR  with questions or problems with your abdominal drain.       DISCHARGE SUMMARY from Nurse    PATIENT INSTRUCTIONS:    After general anesthesia or intravenous sedation, for 24 hours or while taking prescription Narcotics:  Limit your activities  Do not drive and operate hazardous machinery  Do not make important personal or business decisions  Do  not drink alcoholic beverages  If you have not urinated within 8 hours after discharge, please contact your surgeon on call.    Report the following to your surgeon:  Excessive pain, swelling, redness or odor of or around the surgical area  Temperature over 100.5  Nausea and vomiting lasting longer than 4 hours or if unable to take medications  Any signs of decreased circulation or nerve impairment to extremity: change in color, persistent  numbness, tingling, coldness or increase pain  Any questions    What to do at Home:  Recommended activity: activity as tolerated,     If you experience any of the following symptoms shortness of breath not relieved by rest, pain not relieved by medication, chest pain or pressure, increase in swelling or weakness, temperature greater than 101, nausea, vomiting or diarrhea, please follow up with MD.    *  Please give a list of your current medications to your Primary Care Provider.    *  Please update this list whenever your medications are discontinued, doses are      changed, or new medications (including over-the-counter products) are added.    *  Please carry medication information at all times in case of emergency situations.    These are general instructions for a healthy lifestyle:    No smoking/ No tobacco products/ Avoid exposure to second hand smoke  Surgeon General's Warning:  Quitting smoking now greatly reduces serious risk to your health.    Obesity, smoking, and sedentary lifestyle greatly increases your risk for illness    A healthy

## 2024-06-26 NOTE — PLAN OF CARE
Problem: Respiratory - Adult  Goal: Achieves optimal ventilation and oxygenation  Flowsheets  Taken 6/25/2024 2059 by Maribell James RCP  Achieves optimal ventilation and oxygenation:   Assess for changes in respiratory status   Respiratory therapy support as indicated   Assess for changes in mentation and behavior   Oxygen supplementation based on oxygen saturation or arterial blood gases   Encourage broncho-pulmonary hygiene including cough, deep breathe, incentive spirometry   Assess and instruct to report shortness of breath or any respiratory difficulty

## 2024-06-27 VITALS
BODY MASS INDEX: 23.46 KG/M2 | HEIGHT: 73 IN | DIASTOLIC BLOOD PRESSURE: 80 MMHG | OXYGEN SATURATION: 96 % | SYSTOLIC BLOOD PRESSURE: 138 MMHG | HEART RATE: 86 BPM | WEIGHT: 177 LBS | RESPIRATION RATE: 18 BRPM | TEMPERATURE: 98.8 F

## 2024-06-27 PROBLEM — Z12.11 ENCOUNTER FOR SCREENING COLONOSCOPY: Status: RESOLVED | Noted: 2024-05-28 | Resolved: 2024-06-27

## 2024-06-27 LAB
BACTERIA SPEC CULT: ABNORMAL
BACTERIA SPEC CULT: ABNORMAL
FUNGUS SMEAR: NORMAL
GRAM STN SPEC: ABNORMAL
GRAM STN SPEC: ABNORMAL
HCT VFR BLD AUTO: 22 % (ref 41.1–50.3)
HGB BLD-MCNC: 7.1 G/DL (ref 13.6–17.2)
MAGNESIUM SERPL-MCNC: 1.6 MG/DL (ref 1.8–2.4)
SERVICE CMNT-IMP: ABNORMAL
SPECIMEN SOURCE: NORMAL

## 2024-06-27 PROCEDURE — C9113 INJ PANTOPRAZOLE SODIUM, VIA: HCPCS | Performed by: FAMILY MEDICINE

## 2024-06-27 PROCEDURE — 85014 HEMATOCRIT: CPT

## 2024-06-27 PROCEDURE — 2500000003 HC RX 250 WO HCPCS: Performed by: FAMILY MEDICINE

## 2024-06-27 PROCEDURE — 2580000003 HC RX 258: Performed by: FAMILY MEDICINE

## 2024-06-27 PROCEDURE — 6360000002 HC RX W HCPCS: Performed by: FAMILY MEDICINE

## 2024-06-27 PROCEDURE — 94640 AIRWAY INHALATION TREATMENT: CPT

## 2024-06-27 PROCEDURE — 94760 N-INVAS EAR/PLS OXIMETRY 1: CPT

## 2024-06-27 PROCEDURE — 6360000002 HC RX W HCPCS: Performed by: NURSE PRACTITIONER

## 2024-06-27 PROCEDURE — 85018 HEMOGLOBIN: CPT

## 2024-06-27 PROCEDURE — 83735 ASSAY OF MAGNESIUM: CPT

## 2024-06-27 PROCEDURE — 36415 COLL VENOUS BLD VENIPUNCTURE: CPT

## 2024-06-27 PROCEDURE — 6370000000 HC RX 637 (ALT 250 FOR IP): Performed by: FAMILY MEDICINE

## 2024-06-27 PROCEDURE — 2580000003 HC RX 258: Performed by: NURSE PRACTITIONER

## 2024-06-27 RX ORDER — LINEZOLID 600 MG/1
600 TABLET, FILM COATED ORAL 2 TIMES DAILY
Qty: 12 TABLET | Refills: 0 | Status: SHIPPED | OUTPATIENT
Start: 2024-06-27 | End: 2024-07-03

## 2024-06-27 RX ORDER — MAGNESIUM SULFATE IN WATER 40 MG/ML
2000 INJECTION, SOLUTION INTRAVENOUS ONCE
Status: COMPLETED | OUTPATIENT
Start: 2024-06-27 | End: 2024-06-27

## 2024-06-27 RX ORDER — CIPROFLOXACIN 500 MG/1
500 TABLET, FILM COATED ORAL 2 TIMES DAILY
Qty: 4 TABLET | Refills: 0 | Status: SHIPPED | OUTPATIENT
Start: 2024-06-27 | End: 2024-06-29

## 2024-06-27 RX ADMIN — HYDROMORPHONE HYDROCHLORIDE 0.25 MG: 1 INJECTION, SOLUTION INTRAMUSCULAR; INTRAVENOUS; SUBCUTANEOUS at 00:53

## 2024-06-27 RX ADMIN — OXYCODONE 5 MG: 5 TABLET ORAL at 06:12

## 2024-06-27 RX ADMIN — SODIUM CHLORIDE, PRESERVATIVE FREE 40 MG: 5 INJECTION INTRAVENOUS at 09:02

## 2024-06-27 RX ADMIN — SODIUM CHLORIDE, PRESERVATIVE FREE 10 ML: 5 INJECTION INTRAVENOUS at 09:02

## 2024-06-27 RX ADMIN — HYDROMORPHONE HYDROCHLORIDE 0.25 MG: 1 INJECTION, SOLUTION INTRAMUSCULAR; INTRAVENOUS; SUBCUTANEOUS at 09:04

## 2024-06-27 RX ADMIN — MAGNESIUM SULFATE HEPTAHYDRATE 2000 MG: 40 INJECTION, SOLUTION INTRAVENOUS at 09:04

## 2024-06-27 RX ADMIN — BUDESONIDE AND FORMOTEROL FUMARATE DIHYDRATE 2 PUFF: 160; 4.5 AEROSOL RESPIRATORY (INHALATION) at 07:40

## 2024-06-27 RX ADMIN — Medication 100 MG: at 08:53

## 2024-06-27 RX ADMIN — TIOTROPIUM BROMIDE INHALATION SPRAY 2 PUFF: 3.12 SPRAY, METERED RESPIRATORY (INHALATION) at 07:40

## 2024-06-27 RX ADMIN — OXYBUTYNIN CHLORIDE 5 MG: 5 TABLET, EXTENDED RELEASE ORAL at 08:53

## 2024-06-27 RX ADMIN — CEFTRIAXONE 1000 MG: 1 INJECTION, POWDER, FOR SOLUTION INTRAMUSCULAR; INTRAVENOUS at 15:08

## 2024-06-27 ASSESSMENT — PAIN DESCRIPTION - ORIENTATION
ORIENTATION: INNER
ORIENTATION: RIGHT
ORIENTATION: INNER

## 2024-06-27 ASSESSMENT — PAIN SCALES - GENERAL
PAINLEVEL_OUTOF10: 8
PAINLEVEL_OUTOF10: 9
PAINLEVEL_OUTOF10: 7
PAINLEVEL_OUTOF10: 7

## 2024-06-27 ASSESSMENT — PAIN DESCRIPTION - DESCRIPTORS
DESCRIPTORS: ACHING
DESCRIPTORS: DISCOMFORT
DESCRIPTORS: ACHING

## 2024-06-27 ASSESSMENT — PAIN DESCRIPTION - LOCATION
LOCATION: ABDOMEN

## 2024-06-27 NOTE — PLAN OF CARE
Problem: Discharge Planning  Goal: Discharge to home or other facility with appropriate resources  6/27/2024 1452 by Mee Sal, RN  Outcome: Adequate for Discharge  6/27/2024 0333 by Silvia Morgan RN  Outcome: Progressing  6/27/2024 0332 by Silvia Morgan RN  Outcome: Progressing  Flowsheets (Taken 6/26/2024 2038)  Discharge to home or other facility with appropriate resources:   Identify barriers to discharge with patient and caregiver   Arrange for needed discharge resources and transportation as appropriate   Identify discharge learning needs (meds, wound care, etc)   Arrange for interpreters to assist at discharge as needed   Refer to discharge planning if patient needs post-hospital services based on physician order or complex needs related to functional status, cognitive ability or social support system     Problem: Safety - Adult  Goal: Free from fall injury  6/27/2024 1452 by Mee Sal, RN  Outcome: Adequate for Discharge  6/27/2024 0333 by Silvia Morgan RN  Outcome: Progressing  6/27/2024 0332 by Silvia Morgan RN  Outcome: Progressing     Problem: Pain  Goal: Verbalizes/displays adequate comfort level or baseline comfort level  6/27/2024 1452 by Mee Sal, RN  Outcome: Adequate for Discharge  6/27/2024 0333 by Silvia Morgan RN  Outcome: Progressing  6/27/2024 0332 by Silvia Morgan RN  Outcome: Progressing  Flowsheets (Taken 6/26/2024 2038)  Verbalizes/displays adequate comfort level or baseline comfort level:   Encourage patient to monitor pain and request assistance   Assess pain using appropriate pain scale   Administer analgesics based on type and severity of pain and evaluate response   Implement non-pharmacological measures as appropriate and evaluate response   Consider cultural and social influences on pain and pain management   Notify Licensed Independent Practitioner if interventions unsuccessful or patient reports new pain     Problem: Chronic

## 2024-06-27 NOTE — CARE COORDINATION
CM reviewed clinical notes. Patient transferred from ICU on 6. 24 for normal progression of care. Admitted with pancreatitis. Sepsis. Pt had 10 day hospital stay earlier in June for acute pancreatis. Pancreatic mass newly diagnosed. ERCP attempted to remover CBD stone but unsuccessful. Biliary drain placed 6/21 and remains in place. GI planned to follow up in 6 weeks.   Patient requiring IV pain medication. IV antibiotics.   ETOH abuse disorder. Per admitting CM, patient has declined speaking with [facility].     Patient lives alone at [facility]. [facility] 3 hrs/ 5 day/wk. Patient denies family support. Friends as contacts.   CM will follow to assist with transition of care needs.  LOS 3D  
Per MD notes, pt was discharged 6/22/24 with a biliary drain and was taught self care prior to discharge. Was readmitted 6/23 for recurrent pancreatis. Had a new drain placed 6/24. Pt will need transportation home. He has [facility] services in place 3 hr/day, 5 days/week. Pt is alert and oriented, a little difficult to understand. CM will continue to follow.   
(P) No  Would you like Case Management to discuss the discharge plan with any other family members/significant others, and if so, who? (P) Yes  Plans to Return to Present Housing: (P) Unknown at present  Other Identified Issues/Barriers to RETURNING to current housing: pending  Potential Assistance needed at discharge: (P) Skilled Nursing Facility, Home Care, Transportation            Potential DME:  pending  Patient expects to discharge to: Apartment  Plan for transportation at discharge: (P) Other (see comment) (states will need transport)    Financial    Payor: [employer] / Plan: [employer] MMP DUAL / Product Type: *No Product type* /     Does insurance require precert for SNF: Yes    Potential assistance Purchasing Medications: (P) No  Meds-to-Beds request:  n/a      [id number] - [place], [place] -  [facility] [phone] - F [phone]   [facility] [address]  Phone: [phone] Fax: [phone]      Notes:    Factors facilitating achievement of predicted outcomes: Caregiver support, Cooperative, and Pleasant    Barriers to discharge: pending medical treatment    Additional Case Management Notes: Chart reviewed and pt seen in ICU s/p admission septicemia. Re-admission. Lives alone. Which is [facility] per notes. Assist with [facility] aid 5 days week , 3 hours. NO current DME's per pt. Transport via [facility] aid he states. PCP and insurance confirmed. [facility] has seen pt in past. Pt not interested in [facility]. States he will need transport home again at d/c. Pt states not , no children. Parents . Would encourage HCPOA with . CM will continue following for [facility] needs/POC.         
no

## 2024-06-27 NOTE — DISCHARGE SUMMARY
Component Value Date/Time    TRIG 68 06/24/2024 03:56 AM      Thyroid  No results found for: \"TSHELE\", \"[id number]\"     Most Recent UA Lab Results   Component Value Date/Time    COLORU YELLOW/STRAW 06/23/2024 07:08 PM    APPEARANCE CLEAR 06/23/2024 07:08 PM    PROTEINU 30 06/23/2024 07:08 PM    GLUCOSEU Negative 06/23/2024 07:08 PM    KETUA Negative 06/23/2024 07:08 PM    BILIRUBINUR Negative 06/23/2024 07:08 PM    BLOODU Negative 06/23/2024 07:08 PM    UROBILINOGEN 0.2 06/23/2024 07:08 PM    NITRU Negative 06/23/2024 07:08 PM    LEUKOCYTESUR Negative 06/23/2024 07:08 PM    WBCUA 0-4 06/23/2024 07:08 PM    RBCUA 0-5 06/23/2024 07:08 PM    BACTERIA Negative 06/23/2024 07:08 PM    LABCAST 0-2 06/23/2024 07:08 PM    MUCUS 0 06/23/2024 07:08 PM        Microbiology:  Results       Procedure Component Value Units Date/Time    Culture, Body Fluid [[id number]]  (Abnormal)  (Susceptibility) Collected: 06/23/24 1638    Order Status: Completed Specimen: Body Fluid from Bile Updated: 06/27/24 0724     Special Requests NO SPECIAL REQUESTS        Gram Stain NO DEFINITE ORGANISM SEEN         FEW Gram positive cocci        Culture       MODERATE Enterococcus faecium                  MODERATE Methicillin Resistant Staphylococcus aureus CORRECTED ON 06/27 AT 0724: PREVIOUSLY REPORTED AS MODERATE Staphylococcus aureus          Susceptibility        Enterococcus faecium      BACTERIAL SUSCEPTIBILITY PANEL PONCHO      ampicillin >=32 ug/mL Resistant      gentamicin-syn  Sensitive      Penicillin G >=64 ug/mL Resistant      streptomycin-syn  Sensitive      vancomycin <=0.5 ug/mL Sensitive                       Susceptibility        Methicillin-Resistant Staphylococcus aureus      BACTERIAL SUSCEPTIBILITY PANEL PONCHO      clindamycin <=0.25 ug/mL Resistant      oxacillin >=4 ug/mL Resistant      rifampin <=0.5 ug/mL Sensitive  [1]       tetracycline <=1 ug/mL Sensitive      trimethoprim-sulfamethoxazole <=10 ug/mL Sensitive      vancomycin

## 2024-06-27 NOTE — PLAN OF CARE
Problem: Respiratory - Adult  Goal: Achieves optimal ventilation and oxygenation  6/27/2024 0741 by Dio Moore, RCP  Outcome: Progressing  Flowsheets (Taken 6/27/2024 0741)  Achieves optimal ventilation and oxygenation:   Assess for changes in respiratory status   Respiratory therapy support as indicated   Assess for changes in mentation and behavior   Oxygen supplementation based on oxygen saturation or arterial blood gases   Assess and instruct to report shortness of breath or any respiratory difficulty   Encourage broncho-pulmonary hygiene including cough, deep breathe, incentive spirometry   Position to facilitate oxygenation and minimize respiratory effort

## 2024-06-28 NOTE — PROGRESS NOTES
Hospitalist Progress Note   Admit Date:  2024  7:38 AM   Name:  Reagan Sandra   Age:  73 y.o.  Sex:  male  :  1950   MRN:  [id number]   Room:  9/    Presenting/Chief Complaint: Abdominal Pain     Reason(s) for Admission: Septicemia (HCC) [A41.9]  Acute kidney injury (HCC) [N17.9]  Acute pancreatitis, unspecified complication status, unspecified pancreatitis type [K85.90]  Acute pancreatitis without infection or necrosis, unspecified pancreatitis type [K85.90]     Hospital Course:   Reagan Sandra is a 73-year-old AAM w/ a PMH of HTN, COPD, alcohol use disorder, tobacco use disorder, and pancreatitis with newly diagnosed pancreatic mass who was recently admitted from -2024 with abdominal pain secondary to SBO, acute pancreatitis, and CBD dilation.  He was seen in consultation for SBO by General Surgery with conservative management.  SBO resolved with NGT decompression.  He was followed by Gastroenterology who made to attempt to perform ERCP to remove CBD stone on  and  but were unsuccessful due to duodenal edema.  And internal/external biliary drain was placed on 2024 by Internal Radiology.  Symptoms were greatly improved with patient denying any abdominal pain on 2024 and he was discharged home with plan to follow-up with Gastroenterology in 6 weeks to internalize the drain and remove the stone.     He reports that he was feeling well yesterday after discharge but developed abdominal pain early this morning.  He stated the pain was so severe that he was unable to move.  He denies any nausea, emesis, fever, dyspnea, or any other acute symptoms.  On presentation to the ED heart rate was elevated at 117 with temperature elevated at 100.9 degrees, heart rate was 117, respiratory rate was 24, and blood pressure was 115/78.  He was saturating 95% on ambient oxygen.  Initial labs showed serum creatinine elevated at 1.97 (recent baseline ~1.1).  Lipase was elevated at 
       Hospitalist Progress Note   Admit Date:  2024  7:38 AM   Name:  Reagan Sandra   Age:  73 y.o.  Sex:  male  :  1950   MRN:  [id number]   Room:  [facility]/    Presenting/Chief Complaint: Abdominal Pain     Reason(s) for Admission: Septicemia (HCC) [A41.9]  Acute kidney injury (HCC) [N17.9]  Acute pancreatitis, unspecified complication status, unspecified pancreatitis type [K85.90]  Acute pancreatitis without infection or necrosis, unspecified pancreatitis type [K85.90]     Hospital Course:   Reagan Sandra is a 73-year-old man with a history of hypertension, COPD, alcohol use disorder, tobacco use disorder, and pancreatitis with newly diagnosed pancreatic mass who was recently admitted from -2024 with abdominal pain secondary to SBO, acute pancreatitis, and CBD dilation.  He was seen in consultation for SBO by General Surgery with conservative management.  SBO resolved with NGT decompression.  He was followed by Gastroenterology who made to attempt to perform ERCP to remove CBD stone on  and  but were unsuccessful due to duodenal edema.  And internal/external biliary drain was placed on 2024 by Internal Radiology.  Symptoms were greatly improved with patient denying any abdominal pain on 2024 and he was discharged home with plan to follow-up with Gastroenterology in 6 weeks to internalize the drain and remove the stone.     He reports that he was feeling well yesterday after discharge but developed abdominal pain early this morning.  He stated the pain was so severe that he was unable to move.  He denies any nausea, emesis, fever, dyspnea, or any other acute symptoms.  On presentation to the ED heart rate was elevated at 117 with temperature elevated at 100.9 degrees, heart rate was 117, respiratory rate was 24, and blood pressure was 115/78.  He was saturating 95% on ambient oxygen.  Initial labs showed serum creatinine elevated at 1.97 (recent baseline ~1.1).  Lipase 
  Physician Progress Note      PATIENT:               SARAH GILBERT  CSN #:                  [id number]  :                       1950  ADMIT DATE:       2024 7:38 AM  DISCH DATE:        2024 3:28 PM  RESPONDING  PROVIDER #:        Adi Estrada MD          QUERY TEXT:    Pt admitted with sepsis. Pt noted to have biliary drain with fluid +moderate   enterococcus faecium and staphylococcus aureus. If possible, please document   in progress notes and discharge summary the relationship, if any, between   infection and the following:    The medical record reflects the following:  Risk Factors: biliary drain, sepsis  Clinical Indicators: presented with acute pancreatitis, noted to have sepsis   with source biliary drain; post ERCP with biliary drain in place, drain fluid   +moderate enterococcus faecium and staphylococcus aureus  Treatment: Labs, imaging, monitoring, Zosyn  Options provided:  -- Infection due to biliary drain.  -- Infection due to ERCP and drain placement procedure.  -- Other - I will add my own diagnosis  -- Disagree - Not applicable / Not valid  -- Disagree - Clinically unable to determine / Unknown  -- Refer to Clinical Documentation Reviewer    PROVIDER RESPONSE TEXT:    This patient has infection due to biliary drain.    Query created by: Princess Plunkett on 2024 8:08 AM      Electronically signed by:  Adi Estrada MD 2024 7:20 AM          
 attempted to visit patient.  Patient appeared to be sleeping comfortably at time and did not wake to name being called.   provided prayer and is available to follow up.  Peace be with you,  Signed by  MARISABEL ChandlerDiv.   [phone]  
4 Eyes Skin Assessment     NAME:  Reagan Sandra  YOB: 1950  MEDICAL RECORD NUMBER:  [id number]    The patient is being assessed for  Transfer to New Unit    I agree that at least one RN has performed a thorough Head to Toe Skin Assessment on the patient. ALL assessment sites listed below have been assessed.      Areas assessed by both nurses:    Head, Face, Ears, Shoulders, Back, Chest, Arms, Elbows, Hands, Sacrum. Buttock, Coccyx, Ischium, Legs. Feet and Heels, and Under Medical Devices         Does the Patient have a Wound? No noted wound(s)    R biliary drain in place, skin moist with bile leaking around insertion site. Cleansed with CHG and dressed with 4x4 and paper tape. MD aware.    New Allevyn dressing placed to sacrum.     Bilateral heels elevated.       Preston Prevention initiated by RN: Yes  Wound Care Orders initiated by RN: No    Pressure Injury (Stage 3,4, Unstageable, DTI, NWPT, and Complex wounds) if present, place Wound referral order by RN under : No    New Ostomies, if present place, Ostomy referral order under : No     Nurse 1 eSignature: Electronically signed by Caitlin Monique RN on 6/24/24 at 6:25 AM EDT    **SHARE this note so that the co-signing nurse can place an eSignature**    Nurse 2 eSignature: Electronically signed by HAYDE CH RN on 6/24/24 at 6:30 AM EDT   
4 Eyes Skin Assessment     NAME:  Reagan Sandra  YOB: 1950  MEDICAL RECORD NUMBER:  [id number]    The patient is being assessed for  {Reason for Assessment:90490}    I agree that at least one RN has performed a thorough Head to Toe Skin Assessment on the patient. ALL assessment sites listed below have been assessed.      Areas assessed by both nurses:    Head, Face, Ears, Shoulders, Back, Chest, Arms, Elbows, Hands, Sacrum. Buttock, Coccyx, Ischium, Legs. Feet and Heels, and Under Medical Devices         Does the Patient have a Wound? No noted wound(s)       Preston Prevention initiated by RN: Yes  Wound Care Orders initiated by RN: No    Pressure Injury (Stage 3,4, Unstageable, DTI, NWPT, and Complex wounds) if present, place Wound referral order by RN under : No    New Ostomies, if present place, Ostomy referral order under : No     Nurse 1 eSignature: Electronically signed by Rosa Cronin RN on 6/23/24 at 7:29 PM EDT    **SHARE this note so that the co-signing nurse can place an eSignature**    Nurse 2 eSignature: {Esignature:[id number]}   
Admit Date: 2024    General surgery following for recurrent pancreatitis, recent biliary drain placement, and severe abdominal pain.    2024-IR PTC drain exchange for Findings: existing tube had pulled back to the peritoneum.  New drain adequately placed     --pt tolerating diet with controllable pain. PTC IR drain functional.   Subjective:     A/O x 4 with NAD noted.  Respirations even and unlabored on room air.  Tolerating full liquid diet with no nausea or vomiting.    RUQ ABD IR PTC drain 620 mL bilious output past 24 H.    Voiding 1250 mL UOP past 24 H.  Positive flatus.  No bowel movement.  Abdominal pain well-controlled with as needed pain medication.  TMAX 99.3 . VSS  WBC 6.5  from 11.4 from 23.3-on ceftriaxone    Objective:       Vitals:    24 0429 24 0727 24 0745 24 1130   BP:   119/86 117/77   Pulse:  88 85 86   Resp: 20 16 16 16   Temp:   99 °F (37.2 °C) 98.9 °F (37.2 °C)   TempSrc:       SpO2:  95% 98% 96%   Weight:       Height:           Temp (24hrs), Av.9 °F (37.2 °C), Min:98.2 °F (36.8 °C), Max:99.3 °F (37.4 °C)  .  I&O reviewed as documented.    Physical Exam  Constitutional:       General: He is not in acute distress.  HENT:      Nose:      Comments: NGT       Mouth/Throat:      Mouth: Mucous membranes are moist.   Cardiovascular:      Rate and Rhythm: Normal rate and regular rhythm.      Pulses: Normal pulses.      Heart sounds: Normal heart sounds. No murmur heard.     No friction rub. No gallop.   Pulmonary:      Effort: Pulmonary effort is normal. No respiratory distress.      Breath sounds: Normal breath sounds.   Abdominal:      General: Bowel sounds are normal. There is no distension.      Palpations: Abdomen is soft.   Genitourinary:     Comments: Voiding  Musculoskeletal:         General: No swelling. Normal range of motion.      Cervical back: No rigidity.   Skin:     General: Skin is warm and dry.      Capillary Refill: Capillary refill 
Admit Date: 2024    General surgery following for recurrent pancreatitis, recent biliary drain placement, and severe abdominal pain.    2024-IR PTC drain exchange for Findings: existing tube had pulled back to the peritoneum.  New drain adequately placed   Subjective:     A/O x 4 with NAD noted.  Respirations even and unlabored on 2L/M NC.  Tolerating full liquid diet with no nausea or vomiting.    RUQ ABD IR PTC drain 875 mL bilious output past 24 H.    Voiding 1500 mL UOP past 24 H.  Positive flatus.  No bowel movement.  Abdominal pain well-controlled with as needed pain medication.  Afebrile.  VSS past 9H.  WBC 11.4 from 23.3-on Zosyn    Objective:       Vitals:    24 0930 24 1000 24 1030 24 1100   BP: 118/66 124/66 (!) 118/59 124/62   Pulse: 85 81 80 81   Resp:   23   Temp:    98.5 °F (36.9 °C)   TempSrc:    Oral   SpO2: 99% 98% 100% 97%   Weight:       Height:           Temp (24hrs), Av.3 °F (36.8 °C), Min:97.4 °F (36.3 °C), Max:98.7 °F (37.1 °C)  .  I&O reviewed as documented.    Physical Exam  Constitutional:       General: He is not in acute distress.  HENT:      Nose:      Comments: NGT       Mouth/Throat:      Mouth: Mucous membranes are moist.   Cardiovascular:      Rate and Rhythm: Normal rate and regular rhythm.      Pulses: Normal pulses.      Heart sounds: Normal heart sounds. No murmur heard.     No friction rub. No gallop.   Pulmonary:      Effort: Pulmonary effort is normal. No respiratory distress.      Breath sounds: Normal breath sounds.   Abdominal:      General: Bowel sounds are normal. There is no distension.      Palpations: Abdomen is soft.   Genitourinary:     Comments: Voiding  Musculoskeletal:         General: No swelling. Normal range of motion.      Cervical back: No rigidity.   Skin:     General: Skin is warm and dry.      Capillary Refill: Capillary refill takes less than 2 seconds.      Comments: RUQ ABD IR PTC drain with bilious output no 
Assumed care of patient. Assessment completed and documented, see docflow. Patient awake in bed. A/O in all spheres, asking \"when am I going home\". Explained to patient doctors would access and make rounds shortly. Patient pulled out NGT. \"Not doing that again\".  NAD noted at present. Respirations even and non labored.  Call light within reach. Will monitor.    
At assessment pt's Biliary tube was closed, MD notified & drain opened, unfortunately it is unknown if pt pain was decreased due to pain med given at about the same time.  Biliary tube put out 175mL post opening of tubing & and additional 10mL was sent to lab for testing. Urine sample sent to lab this shift. Pt is resting. Hourly rounds completed and all needs met. Bed is low, locked, bed alarm on, call light is in reach and pt is encouraged to call for assistance.  
Dilaudid given slow IVP for c/o 08/10 abdominal pain. Will monitor.  
Discharge instructions and paperwork reviewed with patient. Meds reviewed. Pt voiced understanding. IV removed. Pt to be discharged when ride arrives.  
Patient admitted to the floor with O2 applied. Patient on 2L O2 with O2 sat of 100%. No complaints of chest pain or SHOB. Respirations are even and unlabored. Patient does not complain of any pain at this time. Vital signs stable. Patient was assessed for primary RN dealing with emergency in another room. Primary RN to assume care of patient once available.   
Patient is in bed, axox4 generalized pain noted, no distress noted, NGT in place clamped. respirations are even and unlabored on RA. Call light is within reach.  
Patient stable no acute additional ICU intervention needed. Will not see. No charge.     Maikel Feldman MD    
Patient's NGT came out, Dr. Garay notified, ordered to leave NGT out and wait for SLP eval today.   
Pt transported to patient discharge area via wheelchair to go home with family.  
Pt was experiencing a lot of pain at 1900, biliary drain now draining to gravity, since being emptied x 2 and prn pain meds, pt resting in bed and much more comfortable.   Axox4, pleasant  Weak,   Assist x 1?   Bed alarm on, uses urinal,   Vss wh some tachycardia   Needs in reach bed locked lowered    
TRANSFER - IN REPORT:    Verbal report received from DOLLY Collins on Reagan Sandra  being received from IR for routine post-op      Report consisted of patient's Situation, Background, Assessment and   Recommendations(SBAR).     Information from the following report(s) Nurse Handoff Report, Surgery Report, Intake/Output, MAR, Recent Results, Med Rec Status, and Cardiac Rhythm ST  was reviewed with the receiving nurse.    Opportunity for questions and clarification was provided.      Assessment completed upon patient's arrival to unit and care assumed.    
TRANSFER - IN REPORT:    Verbal report received from DOLLY Gamble on Reagan Sandra  being received from 3107/ICU for routine progression of patient care      Report consisted of patient's Situation, Background, Assessment and   Recommendations(SBAR).     Information from the following report(s) Nurse Handoff Report, Index, ED Encounter Summary, ED SBAR, Adult Overview, Surgery Report, Intake/Output, MAR, Recent Results, and Med Rec Status was reviewed with the receiving nurse.    Opportunity for questions and clarification was provided.      Assessment completed upon patient's arrival to unit and care assumed.    
TRANSFER - IN REPORT:    Verbal report received from Robby Newton on Reagan Sandra  being received from Ed for routine progression of patient care      Report consisted of patient's Situation, Background, Assessment and   Recommendations(SBAR).     Information from the following report(s) Nurse Handoff Report was reviewed with the receiving nurse.    Opportunity for questions and clarification was provided.      Assessment to be completed upon patient's arrival to unit and then care will be assumed.    
TRANSFER - OUT REPORT:           Verbal report given to DOLLY Gamble(name) on Reagan Sandra  being transferred to [facility](unit) for  routine progression of patient care            Report consisted of patient’s Situation, Background, Assessment and      Recommendations(SBAR).          Information from the following report(s) MAR, SBAR and Procedure Summary was reviewed with the receiving nurse.       Opportunity for questions and clarification was provided.      25 Mcg of Fentanyl administered           Pt tolerated procedure well.        VITALS:  /65   Pulse (!) 105   Temp 97.5 °F (36.4 °C) (Infrared)   Resp 17   Ht 1.854 m (6' 1\")   Wt 80.3 kg (177 lb)   SpO2 100%   BMI 23.35 kg/m²     
TRANSFER - OUT REPORT:    Verbal report given to DOLLY Rivas on Reagan Sandra  being transferred to 8th floor room 809 for routine progression of patient care       Report consisted of patient's Situation, Background, Assessment and   Recommendations(SBAR).     Information from the following report(s) Nurse Handoff Report, Adult Overview, Surgery Report, Intake/Output, MAR, Recent Results, Med Rec Status, and Cardiac Rhythm NSR  was reviewed with the receiving nurse.           Lines:   Peripheral IV 06/20/24 Distal;Posterior;Right Wrist (Active)   Site Assessment Clean, dry & intact 06/25/24 1530   Line Status Flushed;Infusing;No blood return 06/25/24 1530   Line Care Connections checked and tightened;Ports disinfected 06/25/24 1530   Phlebitis Assessment No symptoms 06/25/24 1530   Infiltration Assessment 0 06/25/24 1530   Alcohol Cap Used Yes 06/25/24 1530   Dressing Status Clean, dry & intact 06/25/24 1530   Dressing Type Transparent 06/25/24 1530       Peripheral IV 06/23/24 Left;Ventral;Anterior Forearm (Active)   Site Assessment Clean, dry & intact 06/25/24 1530   Line Status Flushed;No blood return;Infusing 06/25/24 1530   Line Care Connections checked and tightened;Ports disinfected 06/25/24 1530   Phlebitis Assessment No symptoms 06/25/24 1530   Infiltration Assessment 0 06/25/24 1530   Alcohol Cap Used Yes 06/25/24 1530   Dressing Status Clean, dry & intact 06/25/24 1530   Dressing Type Transparent 06/25/24 1530   Dressing Intervention New 06/23/24 1717       Peripheral IV 06/24/24 Anterior;Right Cephalic (Active)   Site Assessment Clean, dry & intact 06/25/24 1530   Line Status Flushed;Normal saline locked;Capped;No blood return 06/25/24 1530   Line Care Connections checked and tightened;Ports disinfected 06/25/24 1530   Phlebitis Assessment No symptoms 06/25/24 1530   Infiltration Assessment 0 06/25/24 1530   Alcohol Cap Used Yes 06/25/24 1530   Dressing Status Clean, dry & intact 06/25/24 1530 
TRANSFER - OUT REPORT:    Verbal report given to DOLLY Zelaya on Reagan Sandra  being transferred to IR for ordered procedure       Report consisted of patient's Situation, Background, Assessment and   Recommendations(SBAR).     Information from the following report(s) Nurse Handoff Report, Adult Overview, Intake/Output, MAR, Recent Results, Med Rec Status, and Cardiac Rhythm ST  was reviewed with the receiving nurse.           Lines:   Peripheral IV 06/20/24 Distal;Posterior;Right Wrist (Active)   Site Assessment Clean, dry & intact 06/24/24 0547   Line Status Flushed;Normal saline locked;Capped 06/24/24 0547   Line Care Connections checked and tightened;Ports disinfected 06/24/24 0547   Phlebitis Assessment No symptoms 06/24/24 0547   Infiltration Assessment 0 06/24/24 0547   Alcohol Cap Used Yes 06/24/24 0547   Dressing Status Clean, dry & intact 06/24/24 0547   Dressing Type Transparent 06/24/24 0547       Peripheral IV 06/23/24 Left;Ventral;Anterior Forearm (Active)   Site Assessment Clean, dry & intact 06/24/24 0547   Line Status Flushed;Normal saline locked;Capped 06/24/24 0547   Line Care Connections checked and tightened;Ports disinfected 06/24/24 0547   Phlebitis Assessment No symptoms 06/24/24 0547   Infiltration Assessment 0 06/24/24 0547   Alcohol Cap Used Yes 06/24/24 0547   Dressing Status Clean, dry & intact 06/24/24 0547   Dressing Type Transparent 06/24/24 0547   Dressing Intervention New 06/23/24 1717       Peripheral IV 06/24/24 Anterior;Right Cephalic (Active)   Site Assessment Clean, dry & intact 06/24/24 0547   Line Status Flushed;Normal saline locked;Capped 06/24/24 0547   Line Care Connections checked and tightened;Ports disinfected 06/24/24 0547   Phlebitis Assessment No symptoms 06/24/24 0547   Infiltration Assessment 0 06/24/24 0547   Alcohol Cap Used Yes 06/24/24 0547   Dressing Status Clean, dry & intact 06/24/24 0547   Dressing Type Transparent 06/24/24 0547        Opportunity for 
This RN noticed that patient pain had been increasing, and biliary drain was increasing in volume overnight substantially.   ~1600cc compared to 175 during the day. Pt abdomen was becoming ridged, then patient started to cough up what looked dark brown. Put pt on 2L02, due to coughing, and increased breathing, messaged on call, then in house. Respiratory was called, due to this RN hearing fluid in lower bases of back, to rule out lungs, sounding what was like fluid.   Asked md to come to bedside to assess, and tiffany as well. Within a small timeframe the patient went from flowing quickly to stopping, and coming out of the incision site instead of the drain, and patient abdomen becoming more distended and ridged.   Tiffany, ICU charge, MD, and respiratory, and this unit quickly made interventions and patient was eventually sent to CT then ICU.  NG tube was placed between this time and ~700cc of dark coffee ground emesis retrieved from stomach. Dr. Nath then came to bedside.       Pt requested that \"joce\" in contacts be called, this RN called and left voicemail to call back.          
VANCO DAILY FOLLOW UP NOTE  [facility]   Pharmacy Pharmacokinetic Monitoring Service - Vancomycin    Consulting Provider: Jesse Olvia NP   Indication: IAI  Target Concentration: Goal AUC/PONCHO 400-600 mg*hr/L  Day of Therapy: 1  Additional Antimicrobials: ceftriaxone    Pertinent Laboratory Values:   Wt Readings from Last 1 Encounters:   06/24/24 80.3 kg (177 lb)     Temp Readings from Last 1 Encounters:   06/26/24 98.9 °F (37.2 °C)     Recent Labs     06/23/24  1729 06/24/24  0356 06/25/24  0340 06/26/24  0347   BUN 16 29* 30* 23   CREATININE 1.66* 1.60* 1.59* 1.22   WBC 21.7* 23.3* 11.4* 6.5   PROCAL  --  4.23*  --   --    LACTA 1.6 1.9  --   --      Estimated Creatinine Clearance: 62 mL/min (based on SCr of 1.22 mg/dL).    No results found for: \"VANCOTROUGH\", \"VANCORANDOM\"    MRSA Nasal Swab: N/A. Non-respiratory infection    Assessment:  Date/Time Dose Concentration AUC         Note: Serum concentrations collected for AUC dosing may appear elevated if collected in close proximity to the dose administered, this is not necessarily an indication of toxicity    Plan:  Dosing recommendations based on Bayesian software  Start vancomycin with 2000 mg x 1 and then 1500 mg q24h  Anticipated AUC of 513 and trough concentration of 12.8 at steady state  Renal labs as indicated   Vancomycin concentrations will be ordered as clinically appropriate  Pharmacy will continue to monitor patient and adjust therapy as indicated    Thank you for the consult,  Raphael Cox, PharmD, BCOP  Clinical Pharmacist  Contact [employer]      
VANCO DAILY FOLLOW UP NOTE  [facility]   Pharmacy Pharmacokinetic Monitoring Service - Vancomycin    Consulting Provider: Jesse Oliva NP   Indication: IAI  Target Concentration: Goal AUC/PONCHO 400-600 mg*hr/L  Day of Therapy: 2  Additional Antimicrobials: ceftriaxone    Pertinent Laboratory Values:   Wt Readings from Last 1 Encounters:   06/24/24 80.3 kg (177 lb)     Temp Readings from Last 1 Encounters:   06/27/24 98.8 °F (37.1 °C) (Oral)     Recent Labs     06/25/24  0340 06/26/24  0347   BUN 30* 23   CREATININE 1.59* 1.22   WBC 11.4* 6.5     Estimated Creatinine Clearance: 62 mL/min (based on SCr of 1.22 mg/dL).    No results found for: \"VANCOTROUGH\", \"VANCORANDOM\"    MRSA Nasal Swab: N/A. Non-respiratory infection    Assessment:  Date/Time Dose Concentration AUC         Note: Serum concentrations collected for AUC dosing may appear elevated if collected in close proximity to the dose administered, this is not necessarily an indication of toxicity    Plan:  Dosing recommendations based on Bayesian software  Will continue the current vancomycin dose of 1500 mg IV every 24 hours  Anticipated AUC of 513 and trough concentration of 12.8 at steady state  Renal labs as indicated   Vancomycin concentrations will be ordered as clinically appropriate  Pharmacy will continue to monitor patient and adjust therapy as indicated    Thank you for the consult,  Kumar Downs RPH      
0.0 - 0.2 K/UL    Immature Granulocytes Absolute 0.1 0.0 - 0.5 K/UL   Magnesium    Collection Time: 06/25/24  3:40 AM   Result Value Ref Range    Magnesium 1.5 (L) 1.8 - 2.4 mg/dL   Occult blood gastric / duodenum    Collection Time: 06/25/24 10:02 AM   Result Value Ref Range    Occult Blood, Gastric Negative NEG      pH, Gastric 3 1.5 - 3.5         No results for input(s): \"COVID19\" in the last 72 hours.    Current Meds:  Current Facility-Administered Medications   Medication Dose Route Frequency    morphine (PF) injection 2 mg  2 mg IntraVENous Once    pantoprazole (PROTONIX) 40 mg in sodium chloride (PF) 0.9 % 10 mL injection  40 mg IntraVENous Q12H    acetaminophen (TYLENOL) suppository 650 mg  650 mg Rectal Q6H PRN    LORazepam (ATIVAN) 1 mg in sodium chloride (PF) 0.9 % 10 mL injection  1 mg IntraVENous Once    HYDROmorphone HCl PF (DILAUDID) injection 0.25 mg  0.25 mg IntraVENous Q3H PRN    budesonide-formoterol (SYMBICORT) 160-4.5 MCG/ACT inhaler 2 puff  2 puff Inhalation BID RT    lactated ringers IV soln infusion   IntraVENous Continuous    sodium chloride flush 0.9 % injection 5-40 mL  5-40 mL IntraVENous 2 times per day    sodium chloride flush 0.9 % injection 5-40 mL  5-40 mL IntraVENous PRN    0.9 % sodium chloride infusion   IntraVENous PRN    potassium chloride 20 mEq/50 mL IVPB (Central Line)  20 mEq IntraVENous PRN    Or    potassium chloride 10 mEq/100 mL IVPB (Peripheral Line)  10 mEq IntraVENous PRN    magnesium sulfate 2000 mg in 50 mL IVPB premix  2,000 mg IntraVENous PRN    ondansetron (ZOFRAN-ODT) disintegrating tablet 4 mg  4 mg Oral Q8H PRN    Or    ondansetron (ZOFRAN) injection 4 mg  4 mg IntraVENous Q6H PRN    [Held by provider] enoxaparin (LOVENOX) injection 40 mg  40 mg SubCUTAneous Daily    oxyCODONE (ROXICODONE) immediate release tablet 5 mg  5 mg Oral Q3H PRN    acetaminophen (TYLENOL) tablet 650 mg  650 mg Oral Q4H PRN    albuterol (PROVENTIL) (2.5 MG/3ML) 0.083% nebulizer

## 2024-07-02 PROBLEM — Z12.11 ENCOUNTER FOR SCREENING COLONOSCOPY: Status: ACTIVE | Noted: 2024-05-28

## 2024-07-02 NOTE — PROGRESS NOTES
Patient verified name, , and procedure.    Type: 1a; abbreviated assessment per anesthesia guidelines    Labs per anesthesia: none    Instructed pt that they will be notified the day before their procedure by the GI Lab for time of arrival if their procedure is [place] and [place]op for [facility]. Arrival times should be called by 5 pm. If no phone is received the patient should contact their respective hospital. The GI lab telephone number is [phone] and ES Pre-op is [phone].     Follow diet and prep instructions per office including NPO status.      Bath or shower the night before and the am of surgery with non-moisturizing soap. No lotions, oils, powders, cologne on skin. No make up, eye make up or jewelry. Wear loose fitting comfortable, clean clothing.     Must have adult present in building the entire time .     Medications for the day of procedure inhalers prilosec, allopurinol , patient to hold hygroten, linezolid losartan oxybutin thiamine per anesthesia guidelines.     The following discharge instructions reviewed with patient: medication given during procedure may cause drowsiness for several hours, therefore, do not drive or operate machinery for remainder of the day. You may not drink alcohol on the day of your procedure, please resume regular diet and activity unless otherwise directed. You may experience abdominal distention for several hours that is relieved by the passage of gas. Contact your physician if you have any of the following: fever or chills, severe abdominal pain or excessive amount of bleeding or a large amount when having a bowel movement. Occasional specks of blood with bowel movement would not be unusual.

## 2024-07-05 NOTE — PROGRESS NOTES
Patient contacted to verify procedure, doctor, NPO orders, and place/time (0830) of arrival. Contact made with patient. Patient informed that their ride must stay at the hospital throughout procedure. Medications and history reviewed in chart by this RN.

## 2024-07-08 ENCOUNTER — HOSPITAL ENCOUNTER (OUTPATIENT)
Age: 74
Setting detail: OUTPATIENT SURGERY
Discharge: HOME | End: 2024-07-08
Attending: STUDENT IN AN ORGANIZED HEALTH CARE EDUCATION/TRAINING PROGRAM | Admitting: STUDENT IN AN ORGANIZED HEALTH CARE EDUCATION/TRAINING PROGRAM
Payer: MEDICARE

## 2024-07-08 ENCOUNTER — ANESTHESIA EVENT (OUTPATIENT)
Dept: ENDOSCOPY | Age: 74
End: 2024-07-08
Payer: MEDICARE

## 2024-07-08 ENCOUNTER — ANESTHESIA (OUTPATIENT)
Dept: ENDOSCOPY | Age: 74
End: 2024-07-08
Payer: MEDICARE

## 2024-07-08 VITALS
HEART RATE: 66 BPM | SYSTOLIC BLOOD PRESSURE: 136 MMHG | TEMPERATURE: 98.2 F | BODY MASS INDEX: 23.86 KG/M2 | DIASTOLIC BLOOD PRESSURE: 72 MMHG | WEIGHT: 180 LBS | RESPIRATION RATE: 19 BRPM | HEIGHT: 73 IN | OXYGEN SATURATION: 98 %

## 2024-07-08 PROCEDURE — 2580000003 HC RX 258: Performed by: STUDENT IN AN ORGANIZED HEALTH CARE EDUCATION/TRAINING PROGRAM

## 2024-07-08 PROCEDURE — 3609008400 HC SIGMOIDOSCOPY DIAGNOSTIC: Performed by: STUDENT IN AN ORGANIZED HEALTH CARE EDUCATION/TRAINING PROGRAM

## 2024-07-08 PROCEDURE — 6360000002 HC RX W HCPCS

## 2024-07-08 PROCEDURE — 2709999900 HC NON-CHARGEABLE SUPPLY: Performed by: STUDENT IN AN ORGANIZED HEALTH CARE EDUCATION/TRAINING PROGRAM

## 2024-07-08 PROCEDURE — G0104 CA SCREEN;FLEXI SIGMOIDSCOPE: HCPCS | Performed by: STUDENT IN AN ORGANIZED HEALTH CARE EDUCATION/TRAINING PROGRAM

## 2024-07-08 PROCEDURE — 7100000010 HC PHASE II RECOVERY - FIRST 15 MIN: Performed by: STUDENT IN AN ORGANIZED HEALTH CARE EDUCATION/TRAINING PROGRAM

## 2024-07-08 PROCEDURE — 7100000011 HC PHASE II RECOVERY - ADDTL 15 MIN: Performed by: STUDENT IN AN ORGANIZED HEALTH CARE EDUCATION/TRAINING PROGRAM

## 2024-07-08 PROCEDURE — 3700000000 HC ANESTHESIA ATTENDED CARE: Performed by: STUDENT IN AN ORGANIZED HEALTH CARE EDUCATION/TRAINING PROGRAM

## 2024-07-08 PROCEDURE — 2500000003 HC RX 250 WO HCPCS

## 2024-07-08 RX ORDER — SODIUM CHLORIDE 0.9 % (FLUSH) 0.9 %
5-40 SYRINGE (ML) INJECTION EVERY 12 HOURS SCHEDULED
Status: DISCONTINUED | OUTPATIENT
Start: 2024-07-08 | End: 2024-07-08 | Stop reason: HOSPADM

## 2024-07-08 RX ORDER — SODIUM CHLORIDE 9 MG/ML
25 INJECTION, SOLUTION INTRAVENOUS PRN
Status: DISCONTINUED | OUTPATIENT
Start: 2024-07-08 | End: 2024-07-08 | Stop reason: HOSPADM

## 2024-07-08 RX ORDER — LIDOCAINE HYDROCHLORIDE 20 MG/ML
INJECTION, SOLUTION EPIDURAL; INFILTRATION; INTRACAUDAL; PERINEURAL PRN
Status: DISCONTINUED | OUTPATIENT
Start: 2024-07-08 | End: 2024-07-08 | Stop reason: SDUPTHER

## 2024-07-08 RX ORDER — PROPOFOL 10 MG/ML
INJECTION, EMULSION INTRAVENOUS PRN
Status: DISCONTINUED | OUTPATIENT
Start: 2024-07-08 | End: 2024-07-08 | Stop reason: SDUPTHER

## 2024-07-08 RX ORDER — SODIUM CHLORIDE, SODIUM LACTATE, POTASSIUM CHLORIDE, CALCIUM CHLORIDE 600; 310; 30; 20 MG/100ML; MG/100ML; MG/100ML; MG/100ML
INJECTION, SOLUTION INTRAVENOUS CONTINUOUS
Status: DISCONTINUED | OUTPATIENT
Start: 2024-07-08 | End: 2024-07-08 | Stop reason: HOSPADM

## 2024-07-08 RX ORDER — SODIUM CHLORIDE 0.9 % (FLUSH) 0.9 %
5-40 SYRINGE (ML) INJECTION PRN
Status: DISCONTINUED | OUTPATIENT
Start: 2024-07-08 | End: 2024-07-08 | Stop reason: HOSPADM

## 2024-07-08 RX ADMIN — LIDOCAINE HYDROCHLORIDE 100 MG: 20 INJECTION, SOLUTION EPIDURAL; INFILTRATION; INTRACAUDAL; PERINEURAL at 09:23

## 2024-07-08 RX ADMIN — PROPOFOL 100 MG: 10 INJECTION, EMULSION INTRAVENOUS at 09:23

## 2024-07-08 RX ADMIN — PROPOFOL 140 MCG/KG/MIN: 10 INJECTION, EMULSION INTRAVENOUS at 09:24

## 2024-07-08 RX ADMIN — SODIUM CHLORIDE, POTASSIUM CHLORIDE, SODIUM LACTATE AND CALCIUM CHLORIDE: 600; 310; 30; 20 INJECTION, SOLUTION INTRAVENOUS at 08:54

## 2024-07-08 ASSESSMENT — PAIN SCALES - GENERAL: PAINLEVEL_OUTOF10: 0

## 2024-07-08 ASSESSMENT — COPD QUESTIONNAIRES: CAT_SEVERITY: NO INTERVAL CHANGE

## 2024-07-08 ASSESSMENT — PAIN - FUNCTIONAL ASSESSMENT: PAIN_FUNCTIONAL_ASSESSMENT: 0-10

## 2024-07-08 NOTE — OP NOTE
Operative Report    Patient: Reagan Sandra MRN: [id number]      YOB: 1950  Age: 73 y.o.  Sex: male            Indications:  Screening    Preoperative Evaluation: The patient was evaluated prior to the procedure in the GI lab admission area, the patient ASA was recorded .  Consent was obtained from the patient with the risk of perforation bleeding and aspiration.    Anesthesia: CHARBEL-per anesthesia    Complications: None; patient tolerated the procedure well.    EBL -insignificant      Procedure: The patient was sedated in the left lateral decubitus position.  Scope was advanced from the rectum to the sigmoid colon.  The scope was withdrawn to the rectum, retroflexed view was performed.  The rectal exam was normal.  Preparation was inadequate. White score not applicable.    Findings:    Incomplete bowel preparation.     Postoperative Diagnosis:   Incomplete bowel preparation.     Recommendations:   Repeat colonoscopy with 2 days of clear liquid diet with double amount of bowel preparation.     Signed By:  Julien Garay MD     July 8, 2024

## 2024-07-08 NOTE — ANESTHESIA POSTPROCEDURE EVALUATION
Department of Anesthesiology  Postprocedure Note    Patient: Reagan Sandra  MRN: [id number]  YOB: 1950  Date of evaluation: 7/8/2024    Procedure Summary       Date: 07/08/24 Room / Location: [facility] ENDO 04 / [facility] ENDOSCOPY    Anesthesia Start: 0920 Anesthesia Stop: 0936    Procedure: SIGMOIDOSCOPY DIAGNOSTIC FLEXIBLE Diagnosis:       Encounter for screening colonoscopy      (Encounter for screening colonoscopy [Z12.11])    Surgeons: Julien Garay MD Responsible Provider: Evelin Sweeney MD    Anesthesia Type: TIVA ASA Status: 3            Anesthesia Type: No value filed.    Rose Phase I: Rose Score: 10    Rose Phase II: Rose Score: 10    Anesthesia Post Evaluation    Patient location during evaluation: PACU  Patient participation: complete - patient participated  Level of consciousness: awake and alert  Airway patency: patent  Nausea & Vomiting: no nausea and no vomiting  Cardiovascular status: hemodynamically stable  Respiratory status: acceptable, nonlabored ventilation and spontaneous ventilation  Hydration status: euvolemic  Comments: /68   Pulse 74   Temp 98.2 °F (36.8 °C) (Temporal)   Resp 18   Ht 1.854 m (6' 0.99\")   Wt 81.6 kg (180 lb)   SpO2 98%   BMI 23.75 kg/m²     Multimodal analgesia pain management approach  Pain management: adequate and satisfactory to patient    No notable events documented.

## 2024-07-08 NOTE — ANESTHESIA PRE PROCEDURE
Department of Anesthesiology  Preprocedure Note       Name:  Reagan Sandra   Age:  73 y.o.  :  1950                                          MRN:  [id number]         Date:  2024      Surgeon: Surgeon(s):  Julien Garay MD    Procedure: Procedure(s):  COLORECTAL CANCER SCREENING, NOT HIGH RISK    Medications prior to admission:   Prior to Admission medications    Medication Sig Start Date End Date Taking? Authorizing Provider   polyethylene glycol (GLYCOLAX) 17 GM/SCOOP powder Take 17 g by mouth daily 24  Xander Gonzalez DO   thiamine 100 MG tablet Take 1 tablet by mouth daily 24   Liam Olmstead MD   losartan (COZAAR) 50 MG tablet Take 1 tablet by mouth daily    Reuben Lee MD   chlorthalidone (HYGROTEN) 50 MG tablet Take 1 tablet by mouth daily    Reuben Lee MD   omeprazole (PRILOSEC) 20 MG delayed release capsule Take 2 capsules by mouth daily    Reuebn Lee MD   fluticasone-umeclidin-vilant (TRELEGY ELLIPTA) 100-62.5-25 MCG/ACT AEPB inhaler INHALE 1 PUFF INTO THE LUNGS DAILY 24   Candie Villalba PA   albuterol sulfate HFA (PROVENTIL;VENTOLIN;PROAIR) 108 (90 Base) MCG/ACT inhaler Inhale 2 puffs into the lungs every 6 hours as needed for Wheezing 23   Hillary Saeed MD   albuterol (PROVENTIL) (2.5 MG/3ML) 0.083% nebulizer solution Take 3 mLs by nebulization every 6 hours as needed for Shortness of Breath or Wheezing 23   Annetta Ortega MD   allopurinol (ZYLOPRIM) 300 MG tablet TAKE 1 TABLET BY MOUTH TWICE DAILY 22   Reuben Lee MD   oxybutynin (DITROPAN-XL) 5 MG extended release tablet Take 1 tablet by mouth daily    Automatic Reconciliation, Ar       Current medications:    Current Facility-Administered Medications   Medication Dose Route Frequency Provider Last Rate Last Admin    sodium chloride flush 0.9 % injection 5-40 mL  5-40 mL IntraVENous 2 times per day Julien Garay MD        sodium chloride

## 2024-07-08 NOTE — H&P
Reagan Sandra is 73 y.o. y/o male here for screening colonoscopy.    Hx of colon surgery per him and father with colon cancer, no acute symptoms.     Past Medical History:   Diagnosis Date    Alcoholic pancreatitis 5/20/2021    per pt-- stopped drinking 5/2024--pt unsure exact dates- was admitted with dx 5/25/24    Arthritis     Asthma     albuterol prn (uses 1-2 times per day)    CAD (coronary artery disease) 2013    STENT X 1-- SAW DR GAUCHER. --after reviewing records not seen since 2020    Chronic obstructive pulmonary disease (HCC)     Colon cancer (HCC) 2011    SURG ONLY    Current smoker on some days     Dental disorder     Dyspepsia and other specified disorders of function of stomach     GERD (gastroesophageal reflux disease)     on med for control     GI bleed 6/29/2019    Hyperlipidemia 11/18/2015    on med    Hypertension     on med for control     MI (myocardial infarction) (HCC) 2013    NSTEMI--- stent x 1---    Multiple renal cysts     Other ill-defined conditions(539.89)     hernia, pt unsure of site (resolved with surery)    Poor historian     pt \" acted very disinterested at time of phone assess. pt states didnt know his meds. would not go get them for us to review. denied anybody that helps him that i could call    Prostate cancer (HCC) 2018    surg only    Pulmonary emboli (HCC) 6/12/2019    Stroke (HCC) 2012    TIA; no residual      Past Surgical History:   Procedure Laterality Date    COLONOSCOPY  2018    ERCP N/A 6/17/2024    ENDOSCOPIC RETROGRADE CHOLANGIOPANCREATOGRAPHY performed by Clara Caro MD at [facility] ENDOSCOPY    ERCP N/A 6/20/2024    ENDOSCOPIC RETROGRADE CHOLANGIOPANCREATOGRAPHY/clip placement performed by Clara Caro MD at [facility] ENDOSCOPY    HERNIA REPAIR      IR BILIARY DRAIN INT AND EXT  6/21/2024    IR BILIARY DRAIN INT AND EXT 6/21/2024 [facility] RADIOLOGY SPECIALS    [place] UNLISTED PROCEDURE CARDIAC SURGERY  2013    stent    PROSTATECTOMY  2018    PTCA      x 1    TOTAL COLECTOMY

## 2024-07-22 LAB
FUNGAL CULT/SMEAR: NORMAL
FUNGUS (MYCOLOGY) CULTURE: NORMAL
FUNGUS SMEAR: NORMAL
REFLEX TO ID: NORMAL
SPECIMEN PROCESSING: NORMAL
SPECIMEN SOURCE: NORMAL
SPECIMEN SOURCE: NORMAL

## 2024-07-22 NOTE — PROGRESS NOTES
Patient verified name, , and procedure.    Type: 1a; abbreviated assessment per anesthesia guidelines    Labs per anesthesia: none    Instructed pt that they will be notified the day before their procedure by the GI Lab for time of arrival if their procedure is [place] and Pre[place]op for [place] cases. Arrival times should be called by 5 pm. If no phone is received the patient should contact their respective hospital. The GI lab telephone number is [phone] and ES Pre-op is [phone].     Follow diet and prep instructions per office including NPO status.      Bath or shower the night before and the am of surgery with non-moisturizing soap. No lotions, oils, powders, cologne on skin. No make up, eye make up or jewelry. Wear loose fitting comfortable, clean clothing.     Must have adult present in building the entire time .     Medications for the day of procedure inhaler allopurinol prilosec , patient to hold hygroten losartan oxybutyrin thiamine per anesthesia guidelines.     The following discharge instructions reviewed with patient: medication given during procedure may cause drowsiness for several hours, therefore, do not drive or operate machinery for remainder of the day. You may not drink alcohol on the day of your procedure, please resume regular diet and activity unless otherwise directed. You may experience abdominal distention for several hours that is relieved by the passage of gas. Contact your physician if you have any of the following: fever or chills, severe abdominal pain or excessive amount of bleeding or a large amount when having a bowel movement. Occasional specks of blood with bowel movement would not be unusual.

## 2024-07-28 RX ORDER — SODIUM CHLORIDE 0.9 % (FLUSH) 0.9 %
5-40 SYRINGE (ML) INJECTION EVERY 12 HOURS SCHEDULED
Status: CANCELLED | OUTPATIENT
Start: 2024-07-28

## 2024-07-28 RX ORDER — SODIUM CHLORIDE 0.9 % (FLUSH) 0.9 %
5-40 SYRINGE (ML) INJECTION PRN
Status: CANCELLED | OUTPATIENT
Start: 2024-07-28

## 2024-07-28 RX ORDER — SODIUM CHLORIDE 9 MG/ML
25 INJECTION, SOLUTION INTRAVENOUS PRN
Status: CANCELLED | OUTPATIENT
Start: 2024-07-28

## 2024-07-29 ENCOUNTER — ANESTHESIA EVENT (OUTPATIENT)
Dept: ENDOSCOPY | Age: 74
End: 2024-07-29
Payer: MEDICARE

## 2024-07-29 ENCOUNTER — APPOINTMENT (OUTPATIENT)
Dept: GENERAL RADIOLOGY | Age: 74
End: 2024-07-29
Attending: INTERNAL MEDICINE
Payer: MEDICARE

## 2024-07-29 ENCOUNTER — ANESTHESIA (OUTPATIENT)
Dept: ENDOSCOPY | Age: 74
End: 2024-07-29
Payer: MEDICARE

## 2024-07-29 ENCOUNTER — HOSPITAL ENCOUNTER (OUTPATIENT)
Age: 74
Setting detail: OUTPATIENT SURGERY
Discharge: HOME | End: 2024-07-29
Attending: INTERNAL MEDICINE | Admitting: INTERNAL MEDICINE
Payer: MEDICARE

## 2024-07-29 VITALS
OXYGEN SATURATION: 96 % | DIASTOLIC BLOOD PRESSURE: 84 MMHG | HEART RATE: 76 BPM | RESPIRATION RATE: 16 BRPM | BODY MASS INDEX: 23.46 KG/M2 | SYSTOLIC BLOOD PRESSURE: 164 MMHG | TEMPERATURE: 97.8 F | HEIGHT: 73 IN | WEIGHT: 177 LBS

## 2024-07-29 LAB
HGB BLD-MCNC: 8.2 G/DL (ref 13.6–17.2)
POTASSIUM BLD-SCNC: 3.7 MMOL/L (ref 3.5–5.1)

## 2024-07-29 PROCEDURE — 3700000000 HC ANESTHESIA ATTENDED CARE: Performed by: INTERNAL MEDICINE

## 2024-07-29 PROCEDURE — 6360000004 HC RX CONTRAST MEDICATION: Performed by: INTERNAL MEDICINE

## 2024-07-29 PROCEDURE — 84132 ASSAY OF SERUM POTASSIUM: CPT

## 2024-07-29 PROCEDURE — 3700000001 HC ADD 15 MINUTES (ANESTHESIA): Performed by: INTERNAL MEDICINE

## 2024-07-29 PROCEDURE — 74328 X-RAY BILE DUCT ENDOSCOPY: CPT | Performed by: INTERNAL MEDICINE

## 2024-07-29 PROCEDURE — 6360000002 HC RX W HCPCS: Performed by: ANESTHESIOLOGY

## 2024-07-29 PROCEDURE — 7100000000 HC PACU RECOVERY - FIRST 15 MIN: Performed by: INTERNAL MEDICINE

## 2024-07-29 PROCEDURE — 6370000000 HC RX 637 (ALT 250 FOR IP): Performed by: ANESTHESIOLOGY

## 2024-07-29 PROCEDURE — 43274 ERCP DUCT STENT PLACEMENT: CPT | Performed by: INTERNAL MEDICINE

## 2024-07-29 PROCEDURE — 85018 HEMOGLOBIN: CPT

## 2024-07-29 PROCEDURE — 3609018800 HC ERCP DX COLLECTION SPECIMEN BRUSHING/WASHING: Performed by: INTERNAL MEDICINE

## 2024-07-29 PROCEDURE — 7100000001 HC PACU RECOVERY - ADDTL 15 MIN: Performed by: INTERNAL MEDICINE

## 2024-07-29 PROCEDURE — C1874 STENT, COATED/COV W/DEL SYS: HCPCS | Performed by: INTERNAL MEDICINE

## 2024-07-29 PROCEDURE — C1769 GUIDE WIRE: HCPCS | Performed by: INTERNAL MEDICINE

## 2024-07-29 PROCEDURE — 43264 ERCP REMOVE DUCT CALCULI: CPT | Performed by: INTERNAL MEDICINE

## 2024-07-29 PROCEDURE — 6360000002 HC RX W HCPCS

## 2024-07-29 PROCEDURE — C2617 STENT, NON-COR, TEM W/O DEL: HCPCS | Performed by: INTERNAL MEDICINE

## 2024-07-29 PROCEDURE — 2500000003 HC RX 250 WO HCPCS

## 2024-07-29 PROCEDURE — 2709999900 HC NON-CHARGEABLE SUPPLY: Performed by: INTERNAL MEDICINE

## 2024-07-29 PROCEDURE — 7100000011 HC PHASE II RECOVERY - ADDTL 15 MIN: Performed by: INTERNAL MEDICINE

## 2024-07-29 PROCEDURE — 2580000003 HC RX 258: Performed by: ANESTHESIOLOGY

## 2024-07-29 PROCEDURE — 7100000010 HC PHASE II RECOVERY - FIRST 15 MIN: Performed by: INTERNAL MEDICINE

## 2024-07-29 PROCEDURE — 2720000010 HC SURG SUPPLY STERILE: Performed by: INTERNAL MEDICINE

## 2024-07-29 DEVICE — BILIARY STENT
Type: IMPLANTABLE DEVICE | Site: BILE DUCT | Status: FUNCTIONAL
Brand: ADVANIX™ BILIARY

## 2024-07-29 DEVICE — STENT SYSTEM RMV
Type: IMPLANTABLE DEVICE | Site: BILE DUCT | Status: FUNCTIONAL
Brand: WALLFLEX BILIARY

## 2024-07-29 RX ORDER — CIPROFLOXACIN 2 MG/ML
INJECTION, SOLUTION INTRAVENOUS PRN
Status: DISCONTINUED | OUTPATIENT
Start: 2024-07-29 | End: 2024-07-29 | Stop reason: SDUPTHER

## 2024-07-29 RX ORDER — NALOXONE HYDROCHLORIDE 0.4 MG/ML
INJECTION, SOLUTION INTRAMUSCULAR; INTRAVENOUS; SUBCUTANEOUS PRN
Status: DISCONTINUED | OUTPATIENT
Start: 2024-07-29 | End: 2024-07-29 | Stop reason: HOSPADM

## 2024-07-29 RX ORDER — PROCHLORPERAZINE EDISYLATE 5 MG/ML
5 INJECTION INTRAMUSCULAR; INTRAVENOUS
Status: DISCONTINUED | OUTPATIENT
Start: 2024-07-29 | End: 2024-07-29 | Stop reason: HOSPADM

## 2024-07-29 RX ORDER — SODIUM CHLORIDE 0.9 % (FLUSH) 0.9 %
5-40 SYRINGE (ML) INJECTION PRN
Status: DISCONTINUED | OUTPATIENT
Start: 2024-07-29 | End: 2024-07-29 | Stop reason: HOSPADM

## 2024-07-29 RX ORDER — MIDAZOLAM HYDROCHLORIDE 2 MG/2ML
2 INJECTION, SOLUTION INTRAMUSCULAR; INTRAVENOUS
Status: DISCONTINUED | OUTPATIENT
Start: 2024-07-29 | End: 2024-07-29 | Stop reason: HOSPADM

## 2024-07-29 RX ORDER — DEXTROSE MONOHYDRATE 100 MG/ML
INJECTION, SOLUTION INTRAVENOUS CONTINUOUS PRN
Status: DISCONTINUED | OUTPATIENT
Start: 2024-07-29 | End: 2024-07-29 | Stop reason: HOSPADM

## 2024-07-29 RX ORDER — SODIUM CHLORIDE 9 MG/ML
INJECTION, SOLUTION INTRAVENOUS PRN
Status: DISCONTINUED | OUTPATIENT
Start: 2024-07-29 | End: 2024-07-29 | Stop reason: HOSPADM

## 2024-07-29 RX ORDER — DEXAMETHASONE SODIUM PHOSPHATE 4 MG/ML
INJECTION, SOLUTION INTRA-ARTICULAR; INTRALESIONAL; INTRAMUSCULAR; INTRAVENOUS; SOFT TISSUE PRN
Status: DISCONTINUED | OUTPATIENT
Start: 2024-07-29 | End: 2024-07-29 | Stop reason: SDUPTHER

## 2024-07-29 RX ORDER — IBUPROFEN 600 MG/1
1 TABLET ORAL PRN
Status: DISCONTINUED | OUTPATIENT
Start: 2024-07-29 | End: 2024-07-29 | Stop reason: HOSPADM

## 2024-07-29 RX ORDER — HYDROMORPHONE HYDROCHLORIDE 2 MG/ML
0.5 INJECTION, SOLUTION INTRAMUSCULAR; INTRAVENOUS; SUBCUTANEOUS EVERY 5 MIN PRN
Status: DISCONTINUED | OUTPATIENT
Start: 2024-07-29 | End: 2024-07-29 | Stop reason: HOSPADM

## 2024-07-29 RX ORDER — ACETAMINOPHEN 500 MG
1000 TABLET ORAL ONCE
Status: COMPLETED | OUTPATIENT
Start: 2024-07-29 | End: 2024-07-29

## 2024-07-29 RX ORDER — SUCCINYLCHOLINE CHLORIDE 20 MG/ML
INJECTION INTRAMUSCULAR; INTRAVENOUS PRN
Status: DISCONTINUED | OUTPATIENT
Start: 2024-07-29 | End: 2024-07-29 | Stop reason: SDUPTHER

## 2024-07-29 RX ORDER — OXYCODONE HYDROCHLORIDE 5 MG/1
5 TABLET ORAL
Status: DISCONTINUED | OUTPATIENT
Start: 2024-07-29 | End: 2024-07-29 | Stop reason: HOSPADM

## 2024-07-29 RX ORDER — SODIUM CHLORIDE, SODIUM LACTATE, POTASSIUM CHLORIDE, CALCIUM CHLORIDE 600; 310; 30; 20 MG/100ML; MG/100ML; MG/100ML; MG/100ML
INJECTION, SOLUTION INTRAVENOUS CONTINUOUS
Status: DISCONTINUED | OUTPATIENT
Start: 2024-07-29 | End: 2024-07-29 | Stop reason: HOSPADM

## 2024-07-29 RX ORDER — ASPIRIN 81 MG/1
81 TABLET, CHEWABLE ORAL DAILY
COMMUNITY

## 2024-07-29 RX ORDER — DIPHENHYDRAMINE HYDROCHLORIDE 50 MG/ML
12.5 INJECTION INTRAMUSCULAR; INTRAVENOUS
Status: DISCONTINUED | OUTPATIENT
Start: 2024-07-29 | End: 2024-07-29 | Stop reason: HOSPADM

## 2024-07-29 RX ORDER — FENTANYL CITRATE 50 UG/ML
25 INJECTION, SOLUTION INTRAMUSCULAR; INTRAVENOUS EVERY 5 MIN PRN
Status: DISCONTINUED | OUTPATIENT
Start: 2024-07-29 | End: 2024-07-29 | Stop reason: HOSPADM

## 2024-07-29 RX ORDER — SODIUM CHLORIDE 0.9 % (FLUSH) 0.9 %
5-40 SYRINGE (ML) INJECTION EVERY 12 HOURS SCHEDULED
Status: DISCONTINUED | OUTPATIENT
Start: 2024-07-29 | End: 2024-07-29 | Stop reason: HOSPADM

## 2024-07-29 RX ORDER — LIDOCAINE HYDROCHLORIDE 20 MG/ML
INJECTION, SOLUTION EPIDURAL; INFILTRATION; INTRACAUDAL; PERINEURAL PRN
Status: DISCONTINUED | OUTPATIENT
Start: 2024-07-29 | End: 2024-07-29 | Stop reason: SDUPTHER

## 2024-07-29 RX ORDER — ONDANSETRON 2 MG/ML
4 INJECTION INTRAMUSCULAR; INTRAVENOUS
Status: COMPLETED | OUTPATIENT
Start: 2024-07-29 | End: 2024-07-29

## 2024-07-29 RX ORDER — SODIUM CHLORIDE 9 MG/ML
25 INJECTION, SOLUTION INTRAVENOUS PRN
Status: DISCONTINUED | OUTPATIENT
Start: 2024-07-29 | End: 2024-07-29 | Stop reason: HOSPADM

## 2024-07-29 RX ORDER — MEPERIDINE HYDROCHLORIDE 25 MG/ML
12.5 INJECTION INTRAMUSCULAR; INTRAVENOUS; SUBCUTANEOUS EVERY 5 MIN PRN
Status: DISCONTINUED | OUTPATIENT
Start: 2024-07-29 | End: 2024-07-29 | Stop reason: HOSPADM

## 2024-07-29 RX ORDER — PROPOFOL 10 MG/ML
INJECTION, EMULSION INTRAVENOUS PRN
Status: DISCONTINUED | OUTPATIENT
Start: 2024-07-29 | End: 2024-07-29 | Stop reason: SDUPTHER

## 2024-07-29 RX ORDER — ONDANSETRON 2 MG/ML
INJECTION INTRAMUSCULAR; INTRAVENOUS PRN
Status: DISCONTINUED | OUTPATIENT
Start: 2024-07-29 | End: 2024-07-29 | Stop reason: SDUPTHER

## 2024-07-29 RX ORDER — LIDOCAINE HYDROCHLORIDE 10 MG/ML
1 INJECTION, SOLUTION INFILTRATION; PERINEURAL
Status: DISCONTINUED | OUTPATIENT
Start: 2024-07-29 | End: 2024-07-29 | Stop reason: HOSPADM

## 2024-07-29 RX ADMIN — PROPOFOL 130 MG: 10 INJECTION, EMULSION INTRAVENOUS at 13:14

## 2024-07-29 RX ADMIN — CIPROFLOXACIN 400 MG: 400 INJECTION, SOLUTION INTRAVENOUS at 13:34

## 2024-07-29 RX ADMIN — LIDOCAINE HYDROCHLORIDE 80 MG: 20 INJECTION, SOLUTION EPIDURAL; INFILTRATION; INTRACAUDAL; PERINEURAL at 13:14

## 2024-07-29 RX ADMIN — DEXAMETHASONE SODIUM PHOSPHATE 10 MG: 4 INJECTION INTRA-ARTICULAR; INTRALESIONAL; INTRAMUSCULAR; INTRAVENOUS; SOFT TISSUE at 13:18

## 2024-07-29 RX ADMIN — PROPOFOL 70 MG: 10 INJECTION, EMULSION INTRAVENOUS at 13:25

## 2024-07-29 RX ADMIN — ONDANSETRON 4 MG: 2 INJECTION INTRAMUSCULAR; INTRAVENOUS at 14:38

## 2024-07-29 RX ADMIN — ONDANSETRON 4 MG: 2 INJECTION INTRAMUSCULAR; INTRAVENOUS at 13:18

## 2024-07-29 RX ADMIN — SODIUM CHLORIDE, POTASSIUM CHLORIDE, SODIUM LACTATE AND CALCIUM CHLORIDE: 600; 310; 30; 20 INJECTION, SOLUTION INTRAVENOUS at 11:58

## 2024-07-29 RX ADMIN — ACETAMINOPHEN 1000 MG: 500 TABLET, FILM COATED ORAL at 11:57

## 2024-07-29 RX ADMIN — Medication 80 MG: at 13:14

## 2024-07-29 ASSESSMENT — PAIN - FUNCTIONAL ASSESSMENT
PAIN_FUNCTIONAL_ASSESSMENT: 0-10
PAIN_FUNCTIONAL_ASSESSMENT: NONE - DENIES PAIN
PAIN_FUNCTIONAL_ASSESSMENT: NONE - DENIES PAIN

## 2024-07-29 NOTE — H&P
Gastroenterology and Interventional Endoscopy Pre-procedure HPI    Date of visit   :  07/29/24      Patient name :  Reagan Sandra  YOB: 1950    HPI:    Patient is a 74 y.o. year old male, who has been referred  for internalization of drain and stone removal      ____________________      Past Medical History:  Reviewed, and in prior HPI,documentation    Surgical History:    Reviewed, and in prior HPI,documentation    Medications:    Reviewed, and in prior HPI,documentation    Allergies:  Allergies   Allergen Reactions    Hydrochlorothiazide Anaphylaxis     Lips swelling    Lisinopril Swelling    Tramadol Itching and Rash       Social History:    Reviewed, and in prior HPI,documentation    Family History:    Reviewed, and in prior HPI,documentation  Physical Exam:    Vitals:    07/29/24 1124   BP: (!) 166/82   Pulse: 72   Resp: 12   Temp: 97.9 °F (36.6 °C)   SpO2: 100%     Body mass index is 23.35 kg/m².  [unfilled]      Constitutional: Alert, oriented.  No acute distress  Head: Normocephalic and Atraumatic  Eyes: No icterus, EOMI, no congestion  ENT: No deformity, no hearing loss   Cardiovascular: Regular rate and rhythm, S1-S2 normal, no murmur rub or gallop  Respiratory: Clear to auscultation bilaterally no wheezing rhonchi or crackles  Gastrointestinal:, No hepatosplenomegaly nontender nondistended bowel sounds present in all 4 quadrants  Musculoskeletal: No joint deformity, no swelling in larger joints including knees elbows hands shoulders  Skin: No new rash.  Neurologic: Alert oriented to time place and person , good affect  ____________________    Recommendations:  --Plan for ERCP with internalization of the prior stent    Clara Caro MD  Gastroenterology and Interventional Endoscopy

## 2024-07-29 NOTE — PERIOP NOTE
Discharge instructions reviewed with pt and family member who verbalize understanding of follow up care.      Pt able to ambulate and void prior to discharge.

## 2024-07-29 NOTE — ANESTHESIA POSTPROCEDURE EVALUATION
Department of Anesthesiology  Postprocedure Note    Patient: Reagan Sandra  MRN: [id number]  YOB: 1950  Date of evaluation: 7/29/2024    Procedure Summary       Date: 07/29/24 Room / Location: [facility] ENDO [facility] 1 / [facility] ENDOSCOPY    Anesthesia Start: 1312 Anesthesia Stop: 1355    Procedure: ENDOSCOPIC RETROGRADE CHOLANGIOPANCREATOGRAPHY/DRAIN REMOVAL/BALLOON SWEEP/STENT PLACEMENT X2 (Upper GI Region) Diagnosis:       Common bile duct stone      (Common bile duct stone [K80.50])    Surgeons: Clara Caro MD Responsible Provider: Grant Garza MD    Anesthesia Type: General ASA Status: 3            Anesthesia Type: General    Rose Phase I: Rose Score: 10    Rose Phase II:      Anesthesia Post Evaluation    Patient location during evaluation: PACU  Patient participation: complete - patient participated  Level of consciousness: awake and alert  Airway patency: patent  Nausea & Vomiting: no nausea and no vomiting  Cardiovascular status: hemodynamically stable  Respiratory status: acceptable  Hydration status: euvolemic  Multimodal analgesia pain management approach  Pain management: adequate    No notable events documented.

## 2024-07-29 NOTE — ANESTHESIA PRE PROCEDURE
Continuous Grant Garza MD        sodium chloride flush 0.9 % injection 5-40 mL  5-40 mL IntraVENous 2 times per day Grant Garza MD        sodium chloride flush 0.9 % injection 5-40 mL  5-40 mL IntraVENous PRN Grant Garza MD        0.9 % sodium chloride infusion   IntraVENous PRN Grant Garza MD        midazolam PF (VERSED) injection 2 mg  2 mg IntraVENous Once PRN Grant Garza MD        sodium chloride flush 0.9 % injection 5-40 mL  5-40 mL IntraVENous 2 times per day Clara Caro MD        sodium chloride flush 0.9 % injection 5-40 mL  5-40 mL IntraVENous PRN Clara Caro MD        0.9 % sodium chloride infusion  25 mL IntraVENous PRN Clara Caro MD           Allergies:    Allergies   Allergen Reactions    Hydrochlorothiazide Anaphylaxis     Lips swelling    Lisinopril Swelling    Tramadol Itching and Rash       Problem List:    Patient Active Problem List   Diagnosis Code    Tobacco use disorder F17.200    Mild protein-calorie malnutrition (HCC) E44.1    Leg swelling M79.89    COPD (chronic obstructive pulmonary disease) (HCC) J44.9    Hypertension I10    Multiple pulmonary nodules R91.8    Pulmonary emboli (HCC) I26.99    Hyperlipidemia E78.5    Coronary atherosclerosis of native coronary vessel I25.10    GI bleed K92.2    Gout M10.9    OA (osteoarthritis) of hip M16.9    Alcoholic pancreatitis K85.20    Dermatitis L30.9    Pancreatic mass K86.89    Elevated alkaline phosphatase level R74.8    Alcohol use disorder F10.90    Acute kidney injury (HCC) N17.9    Common bile duct stone K80.50    Pancreatitis K85.90    Normocytic anemia D64.9    Acute pancreatitis without infection or necrosis, unspecified pancreatitis type K85.90    SIRS (systemic inflammatory response syndrome) (HCC) R65.10    Sepsis (HCC) A41.9    Encounter for screening colonoscopy Z12.11       Past Medical History:        Diagnosis Date    Alcoholic pancreatitis 5/20/2021    per pt-- stopped

## 2024-07-29 NOTE — DISCHARGE INSTRUCTIONS
Endoscopic Retrograde Cholangiopancreatogram (ERCP): What to Expect at Home  Your Recovery  After you have an endoscopic retrograde cholangiopancreatogram (ERCP).  You will be able to go home after your doctor or a nurse checks to make sure you are not having any problems. If you stay in the hospital overnight, you may go home the next day.  You may have a sore throat for a day or two after the procedure.  This care sheet gives you a general idea about how long it will take for you to recover. But each person recovers at a different pace. Follow the steps below to get better as quickly as possible.  How can you care for yourself at home?  Activity  Rest as much as you need to after you go home.  You should be able to go back to your usual activities the day after the procedure.  Diet  Clear liquid diet for the next 24 hours.  Drink plenty of fluids (unless your doctor tells you not to).  Medicines  If you have a sore throat the next day, use an over-the-counter spray to numb your throat.      Follow-up care is a key part of your treatment and safety. Be sure to make and go to all appointments, and call your doctor if you are having problems. It’s also a good idea to know your test results and keep a list of the medicines you take.    When should you call for help?  Call 911 anytime you think you may need emergency care. For example, call if:  You vomit blood or what looks like coffee grounds.  You pass maroon or bloody stools.  Call your doctor now or go to the emergency room if:  You have trouble swallowing.  You have belly pain.  Your stools are black and tarlike or have streaks of blood.  You are sick to your stomach and cannot drink fluids.  You have a fever.  You have pain that does not get better after you take your pain medicine.  Watch closely for changes in your health, and be sure to contact your doctor if:  Your throat still hurts after a day or two.  You do not get better as expected.    After general

## 2024-07-29 NOTE — OP NOTE
Procedure: ERCP with stone removal and stent placement; Prior drain removal    Indication: CBD stones    Date of Procedure: 7/29/2024    Patient profile: Refer to patient note in chart for documentation of history and physical.    Providers: Clara Caro MD    Referring MD: Ilia Wang MD     PCP: Ilia Wang MD    Medicines: General Anesthesia, Ciprofloxacin 400mg IV x 1    Complication: No immediate complications.     Estimated blood loss: Minimal    Procedure: After the risks including, but not limited to medication reaction, infection, bleeding, perforation, missed lesions, benefits and alternatives of the procedure were discussed with the patient and all questions were answered, informed consent was obtained. The duodenoscope was passed under direct vision throughout the procedure, the patient's blood pressure pulse and oxygen saturation were monitored continuously. The scope was introduced through the mouth and advanced to the second part of duodenum. The endoscopy was performed without difficulty. The patient tolerated the procedure well. The ERCP was performed with the patient in the supine position.    Findings:     films obtained prior to start the procedure was normal. Prior drain noted    The duodenoscope was introduced from the mouth advanced into the esophagus into the stomach, and into the level of the ampulla.  Prior drain noted. The ampulla appeared normal. Next, a Rx 44 sphincterotome with 0.035 inch semi-stiff guidewire was introduced, and cannulation of the bile duct was attempted using the wire first cannulation technique along side the drain. This was successful. The wire was advanced deep into the intrahepatic ducts, followed by the sphincterotome over the wire. A cholangiogram was obtained, and the bile duct was visualized under fluoroscopy.    Next, once wire access was achieved, the external drain was cut and removed by manual traction. Following this a pressure dressing was applied to

## 2024-07-30 ENCOUNTER — PREP FOR PROCEDURE (OUTPATIENT)
Dept: GASTROENTEROLOGY | Age: 74
End: 2024-07-30

## 2024-07-30 DIAGNOSIS — K83.1 COMMON BILE DUCT (CBD) STRICTURE: ICD-10-CM

## 2024-07-31 RX ORDER — SODIUM CHLORIDE 9 MG/ML
25 INJECTION, SOLUTION INTRAVENOUS PRN
OUTPATIENT
Start: 2024-07-31

## 2024-07-31 RX ORDER — SODIUM CHLORIDE 0.9 % (FLUSH) 0.9 %
5-40 SYRINGE (ML) INJECTION PRN
OUTPATIENT
Start: 2024-07-31

## 2024-07-31 RX ORDER — SODIUM CHLORIDE 0.9 % (FLUSH) 0.9 %
5-40 SYRINGE (ML) INJECTION EVERY 12 HOURS SCHEDULED
OUTPATIENT
Start: 2024-07-31

## 2024-08-01 PROBLEM — Z12.11 ENCOUNTER FOR SCREENING COLONOSCOPY: Status: RESOLVED | Noted: 2024-05-28 | Resolved: 2024-08-01

## 2024-08-12 ENCOUNTER — TELEPHONE (OUTPATIENT)
Dept: GASTROENTEROLOGY | Age: 74
End: 2024-08-12

## 2024-08-12 NOTE — TELEPHONE ENCOUNTER
Patient scheduled for ERCP 9/30. Should we add on the repeat colonoscopy to the already scheduled procedure or do it separately? You did a sigmoidoscopy in hospital for him but prep was not complete. Please advise.

## 2024-08-15 ENCOUNTER — TELEPHONE (OUTPATIENT)
Dept: GASTROENTEROLOGY | Age: 74
End: 2024-08-15

## 2024-09-18 RX ORDER — ATORVASTATIN CALCIUM 40 MG/1
1 TABLET, FILM COATED ORAL DAILY
COMMUNITY

## 2024-09-18 RX ORDER — COLCHICINE 0.6 MG/1
0.6 TABLET ORAL DAILY
COMMUNITY

## 2024-09-18 RX ORDER — AMLODIPINE BESYLATE 10 MG/1
1 TABLET ORAL DAILY
COMMUNITY

## 2024-09-18 RX ORDER — GABAPENTIN 300 MG/1
300 CAPSULE ORAL 3 TIMES DAILY
COMMUNITY

## 2024-09-18 NOTE — PROGRESS NOTES
Patient verified name, , and procedure.    Type: 1a; abbreviated assessment per anesthesia guidelines    Labs per anesthesia: none    Instructed pt that they will be notified the day before their procedure by the GI Lab for time of arrival if their procedure is [place] and Pre-op for [facility] cases. Arrival times should be called by 5 pm. If no phone is received the patient should contact their respective hospital. The GI lab telephone number is [phone] and ES Pre-op is [phone].     Follow diet and prep instructions per office including NPO status.      Bath or shower the night before and the am of surgery with non-moisturizing soap. No lotions, oils, powders, cologne on skin. No make up, eye make up or jewelry. Wear loose fitting comfortable, clean clothing.     Must have adult present in building the entire time .     Medications for the day of procedure inhaler aspirin, trelegy, prilosec , patient to hold allopurinol, hygroton, losartan, thiamine, oxybutynin  per anesthesia guidelines.     The following discharge instructions reviewed with patient: medication given during procedure may cause drowsiness for several hours, therefore, do not drive or operate machinery for remainder of the day. You may not drink alcohol on the day of your procedure, please resume regular diet and activity unless otherwise directed. You may experience abdominal distention for several hours that is relieved by the passage of gas. Contact your physician if you have any of the following: fever or chills, severe abdominal pain or excessive amount of bleeding or a large amount when having a bowel movement. Occasional specks of blood with bowel movement would not be unusual.

## 2024-09-29 NOTE — PERIOP NOTE
Informed patient that his procedure is canceled for 9/30/24 and that someone would contact his to reschedule.

## 2024-10-02 ENCOUNTER — TELEPHONE (OUTPATIENT)
Dept: GASTROENTEROLOGY | Age: 74
End: 2024-10-02

## 2024-10-02 NOTE — TELEPHONE ENCOUNTER
Rescheduled ERCP 10/14, Pt verbally understood, pt did have questions as to what exactly will be done. Please advise

## 2024-10-03 NOTE — TELEPHONE ENCOUNTER
Returned call to pt as requested. Answered questions to pt satisfaction regarding upcoming ERCP with Dr. Caro. Pt verbalized understanding, agreeable to reach back out to office with any further questions or concerns prior to procedure.

## 2024-10-14 ENCOUNTER — APPOINTMENT (OUTPATIENT)
Dept: GENERAL RADIOLOGY | Age: 74
End: 2024-10-14
Attending: INTERNAL MEDICINE
Payer: MEDICARE

## 2024-10-14 ENCOUNTER — ANESTHESIA (OUTPATIENT)
Dept: ENDOSCOPY | Age: 74
End: 2024-10-14
Payer: MEDICARE

## 2024-10-14 ENCOUNTER — HOSPITAL ENCOUNTER (OUTPATIENT)
Age: 74
Discharge: HOME | End: 2024-10-14
Attending: INTERNAL MEDICINE | Admitting: INTERNAL MEDICINE
Payer: MEDICARE

## 2024-10-14 ENCOUNTER — ANESTHESIA EVENT (OUTPATIENT)
Dept: ENDOSCOPY | Age: 74
End: 2024-10-14
Payer: MEDICARE

## 2024-10-14 VITALS
RESPIRATION RATE: 14 BRPM | DIASTOLIC BLOOD PRESSURE: 96 MMHG | TEMPERATURE: 98.1 F | OXYGEN SATURATION: 100 % | HEIGHT: 74 IN | WEIGHT: 179 LBS | BODY MASS INDEX: 22.97 KG/M2 | SYSTOLIC BLOOD PRESSURE: 172 MMHG | HEART RATE: 67 BPM

## 2024-10-14 LAB — POTASSIUM BLD-SCNC: 4.1 MMOL/L (ref 3.5–5.1)

## 2024-10-14 PROCEDURE — C1769 GUIDE WIRE: HCPCS | Performed by: INTERNAL MEDICINE

## 2024-10-14 PROCEDURE — 3700000001 HC ADD 15 MINUTES (ANESTHESIA): Performed by: INTERNAL MEDICINE

## 2024-10-14 PROCEDURE — 74328 X-RAY BILE DUCT ENDOSCOPY: CPT | Performed by: INTERNAL MEDICINE

## 2024-10-14 PROCEDURE — 6360000004 HC RX CONTRAST MEDICATION: Performed by: INTERNAL MEDICINE

## 2024-10-14 PROCEDURE — 3700000000 HC ANESTHESIA ATTENDED CARE: Performed by: INTERNAL MEDICINE

## 2024-10-14 PROCEDURE — 2500000003 HC RX 250 WO HCPCS

## 2024-10-14 PROCEDURE — 2720000010 HC SURG SUPPLY STERILE: Performed by: INTERNAL MEDICINE

## 2024-10-14 PROCEDURE — 6360000002 HC RX W HCPCS

## 2024-10-14 PROCEDURE — 2709999900 HC NON-CHARGEABLE SUPPLY: Performed by: INTERNAL MEDICINE

## 2024-10-14 PROCEDURE — 84132 ASSAY OF SERUM POTASSIUM: CPT

## 2024-10-14 PROCEDURE — 43264 ERCP REMOVE DUCT CALCULI: CPT | Performed by: INTERNAL MEDICINE

## 2024-10-14 PROCEDURE — 7100000011 HC PHASE II RECOVERY - ADDTL 15 MIN: Performed by: INTERNAL MEDICINE

## 2024-10-14 PROCEDURE — 7100000001 HC PACU RECOVERY - ADDTL 15 MIN: Performed by: INTERNAL MEDICINE

## 2024-10-14 PROCEDURE — 7100000010 HC PHASE II RECOVERY - FIRST 15 MIN: Performed by: INTERNAL MEDICINE

## 2024-10-14 PROCEDURE — 3609015200 HC ERCP REMOVE CALCULI/DEBRIS BILIARY/PANCREAS DUCT: Performed by: INTERNAL MEDICINE

## 2024-10-14 PROCEDURE — 7100000000 HC PACU RECOVERY - FIRST 15 MIN: Performed by: INTERNAL MEDICINE

## 2024-10-14 PROCEDURE — 6370000000 HC RX 637 (ALT 250 FOR IP): Performed by: INTERNAL MEDICINE

## 2024-10-14 RX ORDER — SODIUM CHLORIDE 9 MG/ML
25 INJECTION, SOLUTION INTRAVENOUS PRN
Status: DISCONTINUED | OUTPATIENT
Start: 2024-10-14 | End: 2024-10-14 | Stop reason: HOSPADM

## 2024-10-14 RX ORDER — ROCURONIUM BROMIDE 10 MG/ML
INJECTION, SOLUTION INTRAVENOUS
Status: DISCONTINUED | OUTPATIENT
Start: 2024-10-14 | End: 2024-10-14 | Stop reason: SDUPTHER

## 2024-10-14 RX ORDER — SODIUM CHLORIDE 9 MG/ML
INJECTION, SOLUTION INTRAVENOUS PRN
Status: DISCONTINUED | OUTPATIENT
Start: 2024-10-14 | End: 2024-10-14 | Stop reason: HOSPADM

## 2024-10-14 RX ORDER — PROCHLORPERAZINE EDISYLATE 5 MG/ML
5 INJECTION INTRAMUSCULAR; INTRAVENOUS
Status: DISCONTINUED | OUTPATIENT
Start: 2024-10-14 | End: 2024-10-14 | Stop reason: HOSPADM

## 2024-10-14 RX ORDER — SODIUM CHLORIDE 0.9 % (FLUSH) 0.9 %
5-40 SYRINGE (ML) INJECTION EVERY 12 HOURS SCHEDULED
Status: DISCONTINUED | OUTPATIENT
Start: 2024-10-14 | End: 2024-10-14 | Stop reason: HOSPADM

## 2024-10-14 RX ORDER — LIDOCAINE HYDROCHLORIDE 20 MG/ML
INJECTION, SOLUTION EPIDURAL; INFILTRATION; INTRACAUDAL; PERINEURAL
Status: DISCONTINUED | OUTPATIENT
Start: 2024-10-14 | End: 2024-10-14 | Stop reason: SDUPTHER

## 2024-10-14 RX ORDER — ONDANSETRON 2 MG/ML
INJECTION INTRAMUSCULAR; INTRAVENOUS
Status: DISCONTINUED | OUTPATIENT
Start: 2024-10-14 | End: 2024-10-14 | Stop reason: SDUPTHER

## 2024-10-14 RX ORDER — DEXAMETHASONE SODIUM PHOSPHATE 4 MG/ML
INJECTION, SOLUTION INTRA-ARTICULAR; INTRALESIONAL; INTRAMUSCULAR; INTRAVENOUS; SOFT TISSUE
Status: DISCONTINUED | OUTPATIENT
Start: 2024-10-14 | End: 2024-10-14 | Stop reason: SDUPTHER

## 2024-10-14 RX ORDER — OXYCODONE HYDROCHLORIDE 5 MG/1
5 TABLET ORAL
Status: DISCONTINUED | OUTPATIENT
Start: 2024-10-14 | End: 2024-10-14 | Stop reason: HOSPADM

## 2024-10-14 RX ORDER — SODIUM CHLORIDE 0.9 % (FLUSH) 0.9 %
5-40 SYRINGE (ML) INJECTION PRN
Status: DISCONTINUED | OUTPATIENT
Start: 2024-10-14 | End: 2024-10-14 | Stop reason: HOSPADM

## 2024-10-14 RX ORDER — HYDROMORPHONE HYDROCHLORIDE 2 MG/ML
0.5 INJECTION, SOLUTION INTRAMUSCULAR; INTRAVENOUS; SUBCUTANEOUS EVERY 5 MIN PRN
Status: DISCONTINUED | OUTPATIENT
Start: 2024-10-14 | End: 2024-10-14 | Stop reason: HOSPADM

## 2024-10-14 RX ORDER — IOPAMIDOL 755 MG/ML
INJECTION, SOLUTION INTRAVASCULAR PRN
Status: DISCONTINUED | OUTPATIENT
Start: 2024-10-14 | End: 2024-10-14 | Stop reason: ALTCHOICE

## 2024-10-14 RX ORDER — PROPOFOL 10 MG/ML
INJECTION, EMULSION INTRAVENOUS
Status: DISCONTINUED | OUTPATIENT
Start: 2024-10-14 | End: 2024-10-14 | Stop reason: SDUPTHER

## 2024-10-14 RX ORDER — INDOMETHACIN 100 MG
SUPPOSITORY, RECTAL RECTAL PRN
Status: DISCONTINUED | OUTPATIENT
Start: 2024-10-14 | End: 2024-10-14 | Stop reason: ALTCHOICE

## 2024-10-14 RX ORDER — SUCCINYLCHOLINE CHLORIDE 20 MG/ML
INJECTION INTRAMUSCULAR; INTRAVENOUS
Status: DISCONTINUED | OUTPATIENT
Start: 2024-10-14 | End: 2024-10-14 | Stop reason: SDUPTHER

## 2024-10-14 RX ORDER — NALOXONE HYDROCHLORIDE 0.4 MG/ML
INJECTION, SOLUTION INTRAMUSCULAR; INTRAVENOUS; SUBCUTANEOUS PRN
Status: DISCONTINUED | OUTPATIENT
Start: 2024-10-14 | End: 2024-10-14 | Stop reason: HOSPADM

## 2024-10-14 RX ADMIN — Medication 100 MG: at 12:08

## 2024-10-14 RX ADMIN — LIDOCAINE HYDROCHLORIDE 100 MG: 20 INJECTION, SOLUTION EPIDURAL; INFILTRATION; INTRACAUDAL; PERINEURAL at 12:08

## 2024-10-14 RX ADMIN — PROPOFOL 170 MG: 10 INJECTION, EMULSION INTRAVENOUS at 12:08

## 2024-10-14 RX ADMIN — DEXAMETHASONE SODIUM PHOSPHATE 4 MG: 4 INJECTION INTRA-ARTICULAR; INTRALESIONAL; INTRAMUSCULAR; INTRAVENOUS; SOFT TISSUE at 12:25

## 2024-10-14 RX ADMIN — ONDANSETRON 4 MG: 2 INJECTION INTRAMUSCULAR; INTRAVENOUS at 12:25

## 2024-10-14 RX ADMIN — ROCURONIUM BROMIDE 5 MG: 10 INJECTION, SOLUTION INTRAVENOUS at 12:08

## 2024-10-14 RX ADMIN — PROPOFOL 30 MG: 10 INJECTION, EMULSION INTRAVENOUS at 12:14

## 2024-10-14 ASSESSMENT — PAIN - FUNCTIONAL ASSESSMENT: PAIN_FUNCTIONAL_ASSESSMENT: 0-10

## 2024-10-14 ASSESSMENT — PAIN DESCRIPTION - DESCRIPTORS: DESCRIPTORS: ACHING

## 2024-10-14 NOTE — ANESTHESIA POSTPROCEDURE EVALUATION
Department of Anesthesiology  Postprocedure Note    Patient: Reagan Sandra  MRN: [id number]  YOB: 1950  Date of evaluation: 10/14/2024    Procedure Summary       Date: 10/14/24 Room / Location: [facility] ENDO FLOURO 1 / [facility] ENDOSCOPY    Anesthesia Start: 1203 Anesthesia Stop: 1245    Procedure: ENDOSCOPIC RETROGRADE CHOLANGIOPANCREATOGRAPHY (Upper GI Region) Diagnosis:       Common bile duct (CBD) stricture      (Common bile duct (CBD) stricture [K83.1])    Surgeons: Clara Caro MD Responsible Provider: Evelin Sweeney MD    Anesthesia Type: general ASA Status: 3            Anesthesia Type: No value filed.    Rose Phase I: Rose Score: 9    Rose Phase II:      Anesthesia Post Evaluation    Patient location during evaluation: PACU  Patient participation: complete - patient participated  Level of consciousness: awake and alert  Airway patency: patent  Nausea & Vomiting: no nausea and no vomiting  Cardiovascular status: hemodynamically stable  Respiratory status: acceptable, nonlabored ventilation and spontaneous ventilation  Hydration status: euvolemic  Comments: BP (!) 150/76   Pulse 71   Temp 97 °F (36.1 °C)   Resp 14   Ht 1.88 m (6' 2\")   Wt 81.2 kg (179 lb)   SpO2 99%   BMI 22.98 kg/m²     Multimodal analgesia pain management approach  Pain management: adequate and satisfactory to patient    No notable events documented.   [facility]   [phone]  RENAL FOLLOW UP NOTE  --------------------------------------------------------------------------------  Chief Complaint:    24 hour events/subjective:    states feels well     PAST HISTORY  --------------------------------------------------------------------------------  No significant changes to PMH, PSH, FHx, SHx, unless otherwise noted    ALLERGIES & MEDICATIONS  --------------------------------------------------------------------------------  Allergies    No Known Allergies    Intolerances      Standing Inpatient Medications  ALBUTerol    90 MICROgram(s) HFA Inhaler 2 Puff(s) Inhalation every 6 hours  allopurinol 300 milliGRAM(s) Oral daily  aspirin enteric coated 81 milliGRAM(s) Oral daily  atorvastatin 40 milliGRAM(s) Oral at bedtime  DOBUTamine Infusion 2.5 MICROgram(s)/kG/Min IV Continuous <Continuous>  hydrALAZINE 25 milliGRAM(s) Oral three times a day  isosorbide   dinitrate Tablet (ISORDIL) 10 milliGRAM(s) Oral three times a day  montelukast 10 milliGRAM(s) Oral at bedtime  pantoprazole    Tablet 40 milliGRAM(s) Oral before breakfast  rivaroxaban 15 milliGRAM(s) Oral with dinner  sodium chloride 0.9% lock flush 3 milliLiter(s) IV Push every 8 hours  torsemide 20 milliGRAM(s) Oral two times a day    PRN Inpatient Medications  acetaminophen   Tablet .. 650 milliGRAM(s) Oral every 6 hours PRN  simethicone 80 milliGRAM(s) Chew four times a day PRN      REVIEW OF SYSTEMS  --------------------------------------------------------------------------------    Gen: denies fevers/chills,  CVS: denies chest pain/palpitations  Resp: denies SOB/Cough  GI: Denies N/V/Abd pain  : Denies dysuria/oliguria/hematuria    All other systems were reviewed and are negative, except as noted.    VITALS/PHYSICAL EXAM  --------------------------------------------------------------------------------  T(C): 36.5 (09-01-19 @ 04:26), Max: 36.6 (08-31-19 @ 13:45)  HR: 82 (09-01-19 @ 06:44) (82 - 99)  BP: 110/67 (09-01-19 @ 04:26) (107/58 - 114/62)  RR: 18 (09-01-19 @ 04:26) (16 - 18)  SpO2: 97% (09-01-19 @ 06:44) (96% - 100%)  Wt(kg): --        08-31-19 @ 07:01  -  09-01-19 @ 07:00  --------------------------------------------------------  IN: 1201.4 mL / OUT: 2575 mL / NET: -1373.6 mL    09-01-19 @ 07:01  -  09-01-19 @ 09:12  --------------------------------------------------------  IN: 360 mL / OUT: 0 mL / NET: 360 mL      Physical Exam:  	  Gen: comfortable appearing   	no   jvd ,  	Pulm: decrease bs  no rales or ronchi or wheezing  	CV: RRR, S1S2; no rub  	Abd: +BS, soft, nontender/nondistended  	: No suprapubic tenderness  	UE: Warm, no cyanosis  no clubbing,  no edema  	LE: Warm, no cyanosis  no clubbing, no edema   	Neuro: alert and oriented. speech coherent  	    LABS/STUDIES  --------------------------------------------------------------------------------              11.2   4.7   >-----------<  190      [09-01-19 @ 05:28]              35.9     138  |  100  |  48  ----------------------------<  96      [09-01-19 @ 05:28]  3.5   |  24  |  1.72        Ca     9.5     [09-01-19 @ 05:28]            Creatinine Trend:  SCr 1.72 [09-01 @ 05:28]  SCr 1.85 [08-31 @ 07:23]  SCr 1.76 [08-30 @ 06:05]  SCr 1.77 [08-29 @ 05:45]  SCr 1.65 [08-28 @ 06:30]                  HbA1c 7.2      [08-19-19 @ 19:14]  TSH 4.58      [08-19-19 @ 19:25]      a

## 2024-10-14 NOTE — ANESTHESIA PRE PROCEDURE
Department of Anesthesiology  Preprocedure Note       Name:  Reagan Sandra   Age:  74 y.o.  :  1950                                          MRN:  [id number]         Date:  10/14/2024      Surgeon: Surgeon(s):  Clara Caro MD    Procedure: Procedure(s):  ENDOSCOPIC RETROGRADE CHOLANGIOPANCREATOGRAPHY    Medications prior to admission:   Prior to Admission medications    Medication Sig Start Date End Date Taking? Authorizing Provider   amLODIPine (NORVASC) 10 MG tablet Take 1 tablet by mouth daily   Yes Reuben Lee MD   atorvastatin (LIPITOR) 40 MG tablet Take 1 tablet by mouth daily   Yes ProviderReuben MD   colchicine (COLCRYS) 0.6 MG tablet Take 1 tablet by mouth daily   Yes ProviderReuben MD   gabapentin (NEURONTIN) 300 MG capsule Take 1 capsule by mouth 3 times daily.   Yes ProviderReuben MD   aspirin 81 MG chewable tablet Take 1 tablet by mouth daily   Yes Provider, MD Reuben   thiamine 100 MG tablet Take 1 tablet by mouth daily 24  Yes Liam Olmstead MD   losartan (COZAAR) 50 MG tablet Take 1 tablet by mouth daily   Yes ProviderReuben MD   chlorthalidone (HYGROTEN) 50 MG tablet Take 1 tablet by mouth daily   Yes Provider, MD Reuben   omeprazole (PRILOSEC) 20 MG delayed release capsule Take 1 capsule by mouth daily   Yes ProviderReuben MD   fluticasone-umeclidin-vilant (TRELEGY ELLIPTA) 100-62.5-25 MCG/ACT AEPB inhaler INHALE 1 PUFF INTO THE LUNGS DAILY 24  Yes Candie Villalba PA   albuterol sulfate HFA (PROVENTIL;VENTOLIN;PROAIR) 108 (90 Base) MCG/ACT inhaler Inhale 2 puffs into the lungs every 6 hours as needed for Wheezing 23  Yes Hillary Saeed MD   allopurinol (ZYLOPRIM) 300 MG tablet Take 1 tablet by mouth every evening 22  Yes ProviderReuben MD   oxybutynin (DITROPAN-XL) 5 MG extended release tablet Take 1 tablet by mouth daily   Yes Automatic Reconciliation, Ar   albuterol (PROVENTIL) (2.5 MG/3ML)

## 2024-10-14 NOTE — OP NOTE
Procedure: ERCP with stone removal    Indication: CBD stones previously.    Date of Procedure: 10/14/2024    Patient profile: Refer to patient note in chart for documentation of history and physical.    Providers: Clara Caro MD    Referring MD: Dr. Hanna    PCP: Ilia Wang MD    Medicines: General Anesthesia    Complication: No immediate complications.     Estimated blood loss: Minimal    Procedure: After the risks including, but not limited to medication reaction, infection, bleeding, perforation, missed lesions, benefits and alternatives of the procedure were discussed with the patient and all questions were answered, informed consent was obtained. The duodenoscope was passed under direct vision throughout the procedure, the patient's blood pressure pulse and oxygen saturation were monitored continuously. The scope was introduced through the mouth and advanced to the second part of duodenum. The endoscopy was performed without difficulty. The patient tolerated the procedure well. The ERCP was performed with the patient in the  Supine position.    Findings:     films obtained prior to start the procedure was normal.    The duodenoscope was introduced from the mouth advanced into the esophagus into the stomach, and into the level of the ampulla. Prior stent was not noted, suggestive of migration. The ampulla appeared normal. Next, a Rx 44 sphincterotome with 0.035 inch semi-stiff guidewire was introduced, and cannulation of the bile duct was attempted using the wire first cannulation technique. This was successful. The wire was advanced deep into the intrahepatic ducts, followed by the sphincterotome over the wire. A cholangiogram was obtained, and the bile duct was visualized under fluoroscopy.    A dilated/ bile duct was identified.    A filling defect consistent with a stone was identified.  The sphincterotome was then exchanged out over the wire, and a 9-12 mm stone extraction balloon was introduced.

## 2024-10-14 NOTE — PERIOP NOTE
Discharge instructions given to Pretty, patient's daughter. They verbalized understanding and was given opportunity for questions.

## 2024-10-14 NOTE — H&P
Gastroenterology and Interventional Endoscopy Pre-procedure HPI    Date of visit   :  10/14/24      Patient name :  Reagan Sandra  YOB: 1950    HPI:    Patient is a 74 y.o. year old male, who has been referred for repeat ERCP with stent removal/exchange.      ____________________      Past Medical History:  Reviewed, and in prior HPI,documentation    Surgical History:    Reviewed, and in prior HPI,documentation    Medications:    Reviewed, and in prior HPI,documentation    Allergies:  Allergies   Allergen Reactions    Hydrochlorothiazide Anaphylaxis     Lips swelling    Lisinopril Swelling    Tramadol Itching and Rash       Social History:    Reviewed, and in prior HPI,documentation    Family History:    Reviewed, and in prior HPI,documentation  Physical Exam:    There were no vitals filed for this visit.  Body mass index is 23.1 kg/m².  [unfilled]      Constitutional: Alert, oriented.  No acute distress  Head: Normocephalic and Atraumatic  Eyes: No icterus, EOMI, no congestion  ENT: No deformity, no hearing loss   Cardiovascular: Regular rate and rhythm, S1-S2 normal, no murmur rub or gallop  Respiratory: Clear to auscultation bilaterally no wheezing rhonchi or crackles  Gastrointestinal:, No hepatosplenomegaly nontender nondistended bowel sounds present in all 4 quadrants  Musculoskeletal: No joint deformity, no swelling in larger joints including knees elbows hands shoulders  Skin: No new rash.  Neurologic: Alert oriented to time place and person , good affect  ____________________    Recommendations:  --Plan for ERCP with stent removal or exchange. Will also plan to use spyglass.    Clara Caro MD  Gastroenterology and Interventional Endoscopy

## 2024-10-14 NOTE — ANESTHESIA PROCEDURE NOTES
Airway  Date/Time: 10/14/2024 12:41 PM  Urgency: elective    Airway not difficult    General Information and Staff    Patient location during procedure: OR  Resident/CRNA: Veronique Farias APRN - CRNA  Performed by: Sly Coppola APRN - CRNA  Authorized by: Evelin Sweeney MD      Indications and Patient Condition  Indications for airway management: anesthesia  Spontaneous Ventilation: absent  Sedation level: deep  Preoxygenated: yes  Patient position: sniffing  MILS not maintained throughout  Mask difficulty assessment: vent by bag mask + OA or adjuvant +/- NMBA    Final Airway Details  Final airway type: endotracheal airway      Successful airway: ETT  Cuffed: yes   Successful intubation technique: direct laryngoscopy  Facilitating devices/methods: intubating stylet  Endotracheal tube insertion site: oral  Blade: Chong  Blade size: #4  ETT size (mm): 8.0  Cormack-Lehane Classification: grade I - full view of glottis  Placement verified by: chest auscultation, capnometry and palpation of cuff   Measured from: lips  ETT to lips (cm): 22  Number of attempts at approach: 1  Ventilation between attempts: bag mask  Number of other approaches attempted: 0    no

## 2024-10-14 NOTE — H&P
Gastroenterology and Interventional Endoscopy Pre-procedure HPI    Date of visit   :  10/14/24      Patient name :  Reagan Sandra  YOB: 1950    HPI:    Patient is a 74 y.o. year old male, who has been referred  for stent change vs removal. Previous stent removal       ____________________      Past Medical History:  Reviewed, and in prior HPI,documentation    Surgical History:    Reviewed, and in prior HPI,documentation    Medications:    Reviewed, and in prior HPI,documentation    Allergies:  Allergies   Allergen Reactions    Hydrochlorothiazide Anaphylaxis     Lips swelling    Lisinopril Swelling    Tramadol Itching and Rash       Social History:    Reviewed, and in prior HPI,documentation    Family History:    Reviewed, and in prior HPI,documentation  Physical Exam:    Vitals:    10/14/24 1033   BP: (!) 151/85   Pulse: 79   Resp: 16   Temp: 98.4 °F (36.9 °C)   SpO2: 99%            Constitutional: Alert, oriented.  No acute distress  Head: Normocephalic and Atraumatic  Eyes: No icterus, EOMI, no congestion  ENT: No deformity, no hearing loss   Cardiovascular: Regular rate and rhythm, S1-S2 normal, no murmur rub or gallop  Respiratory: Clear to auscultation bilaterally no wheezing rhonchi or crackles  Gastrointestinal:, No hepatosplenomegaly nontender nondistended bowel sounds present in all 4 quadrants  Musculoskeletal: No joint deformity, no swelling in larger joints including knees elbows hands shoulders  Skin: No new rash.  Neurologic: Alert oriented to time place and person , good affect  ____________________    Recommendations:  --Plan for ERCP    Clara Caro MD  Gastroenterology and Interventional Endoscopy

## 2025-01-29 DIAGNOSIS — J44.9 CHRONIC OBSTRUCTIVE PULMONARY DISEASE, UNSPECIFIED COPD TYPE (HCC): ICD-10-CM

## 2025-01-29 RX ORDER — ALBUTEROL SULFATE 90 UG/1
2 INHALANT RESPIRATORY (INHALATION) EVERY 6 HOURS PRN
Qty: 1 EACH | Refills: 11 | Status: SHIPPED | OUTPATIENT
Start: 2025-01-29

## 2025-01-29 RX ORDER — ALBUTEROL SULFATE 0.83 MG/ML
2.5 SOLUTION RESPIRATORY (INHALATION) EVERY 6 HOURS PRN
Qty: 120 EACH | Refills: 11 | Status: SHIPPED | OUTPATIENT
Start: 2025-01-29

## 2025-01-29 RX ORDER — FLUTICASONE FUROATE, UMECLIDINIUM BROMIDE AND VILANTEROL TRIFENATATE 100; 62.5; 25 UG/1; UG/1; UG/1
1 POWDER RESPIRATORY (INHALATION) DAILY
Qty: 1 EACH | Refills: 11 | Status: SHIPPED | OUTPATIENT
Start: 2025-01-29

## 2025-01-29 NOTE — TELEPHONE ENCOUNTER
Dr. Ortega, I pended the Albuterol and Trelegy to the patient's preferred pharmacy.  Please sign off if appropriate.  Thank you so much! // Ashely HU

## 2025-01-29 NOTE — TELEPHONE ENCOUNTER
Patient called refill line asking for a refill for Trelegy and Albuterol.    Pharmacy: [facility] in [place], [place]; on [address]

## 2025-02-04 RX ORDER — FLUTICASONE FUROATE, UMECLIDINIUM BROMIDE AND VILANTEROL TRIFENATATE 100; 62.5; 25 UG/1; UG/1; UG/1
1 POWDER RESPIRATORY (INHALATION) DAILY
Qty: 1 EACH | Refills: 11 | Status: SHIPPED | OUTPATIENT
Start: 2025-02-04

## 2025-02-04 RX ORDER — ALBUTEROL SULFATE 90 UG/1
2 INHALANT RESPIRATORY (INHALATION) EVERY 6 HOURS PRN
Qty: 1 EACH | Refills: 11 | Status: SHIPPED | OUTPATIENT
Start: 2025-02-04

## 2025-02-04 NOTE — TELEPHONE ENCOUNTER
Patient Refill Request     Last Office Visit: 05/18/2023     When they were supposed to follow up: 1 year     Upcoming Office Visit: N/A     Refills Requested: Trelegy and Albuterol HFA     Type of refill:      Requested [facility]acy: [facility] [place] on [address].      Is prescription pended? Yes

## 2025-06-19 ENCOUNTER — HOSPITAL ENCOUNTER (EMERGENCY)
Age: 75
Discharge: HOME | End: 2025-06-20
Attending: GENERAL PRACTICE
Payer: MEDICARE

## 2025-06-19 ENCOUNTER — APPOINTMENT (OUTPATIENT)
Dept: GENERAL RADIOLOGY | Age: 75
End: 2025-06-19
Payer: MEDICARE

## 2025-06-19 DIAGNOSIS — K59.00 CONSTIPATION, UNSPECIFIED CONSTIPATION TYPE: Primary | ICD-10-CM

## 2025-06-19 PROCEDURE — 80053 COMPREHEN METABOLIC PANEL: CPT

## 2025-06-19 PROCEDURE — 99284 EMERGENCY DEPT VISIT MOD MDM: CPT

## 2025-06-19 PROCEDURE — 71045 X-RAY EXAM CHEST 1 VIEW: CPT

## 2025-06-19 PROCEDURE — 85025 COMPLETE CBC W/AUTO DIFF WBC: CPT

## 2025-06-19 PROCEDURE — 74018 RADEX ABDOMEN 1 VIEW: CPT

## 2025-06-19 ASSESSMENT — PAIN - FUNCTIONAL ASSESSMENT: PAIN_FUNCTIONAL_ASSESSMENT: 0-10

## 2025-06-19 ASSESSMENT — PAIN SCALES - GENERAL: PAINLEVEL_OUTOF10: 0

## 2025-06-19 ASSESSMENT — LIFESTYLE VARIABLES
HOW MANY STANDARD DRINKS CONTAINING ALCOHOL DO YOU HAVE ON A TYPICAL DAY: 1 OR 2
HOW OFTEN DO YOU HAVE A DRINK CONTAINING ALCOHOL: 2-3 TIMES A WEEK

## 2025-06-20 ENCOUNTER — APPOINTMENT (OUTPATIENT)
Dept: CT IMAGING | Age: 75
End: 2025-06-20
Payer: MEDICARE

## 2025-06-20 ENCOUNTER — HOSPITAL ENCOUNTER (EMERGENCY)
Age: 75
Discharge: HOME | End: 2025-06-20
Attending: EMERGENCY MEDICINE
Payer: MEDICARE

## 2025-06-20 VITALS
SYSTOLIC BLOOD PRESSURE: 155 MMHG | TEMPERATURE: 97.9 F | OXYGEN SATURATION: 98 % | WEIGHT: 203 LBS | BODY MASS INDEX: 26.05 KG/M2 | HEART RATE: 105 BPM | HEIGHT: 74 IN | DIASTOLIC BLOOD PRESSURE: 99 MMHG | RESPIRATION RATE: 16 BRPM

## 2025-06-20 VITALS
HEIGHT: 74 IN | OXYGEN SATURATION: 99 % | RESPIRATION RATE: 10 BRPM | DIASTOLIC BLOOD PRESSURE: 97 MMHG | SYSTOLIC BLOOD PRESSURE: 130 MMHG | HEART RATE: 73 BPM | TEMPERATURE: 98.3 F | BODY MASS INDEX: 25.67 KG/M2 | WEIGHT: 200 LBS

## 2025-06-20 DIAGNOSIS — K59.00 CONSTIPATION, UNSPECIFIED CONSTIPATION TYPE: Primary | ICD-10-CM

## 2025-06-20 DIAGNOSIS — R91.1 PULMONARY NODULE: ICD-10-CM

## 2025-06-20 LAB
ALBUMIN SERPL-MCNC: 3.5 G/DL (ref 3.2–4.6)
ALBUMIN SERPL-MCNC: 3.7 G/DL (ref 3.2–4.6)
ALBUMIN/GLOB SERPL: 0.9 (ref 1–1.9)
ALBUMIN/GLOB SERPL: 0.9 (ref 1–1.9)
ALP SERPL-CCNC: 108 U/L (ref 40–129)
ALP SERPL-CCNC: 109 U/L (ref 40–129)
ALT SERPL-CCNC: 18 U/L (ref 8–55)
ALT SERPL-CCNC: 26 U/L (ref 8–55)
ANION GAP SERPL CALC-SCNC: 14 MMOL/L (ref 7–16)
ANION GAP SERPL CALC-SCNC: 14 MMOL/L (ref 7–16)
AST SERPL-CCNC: 30 U/L (ref 15–37)
AST SERPL-CCNC: 47 U/L (ref 15–37)
BASOPHILS # BLD: 0.03 K/UL (ref 0–0.2)
BASOPHILS # BLD: 0.04 K/UL (ref 0–0.2)
BASOPHILS NFR BLD: 0.6 % (ref 0–2)
BASOPHILS NFR BLD: 0.8 % (ref 0–2)
BILIRUB SERPL-MCNC: 0.3 MG/DL (ref 0–1.2)
BILIRUB SERPL-MCNC: <0.2 MG/DL (ref 0–1.2)
BUN SERPL-MCNC: 21 MG/DL (ref 8–23)
BUN SERPL-MCNC: 24 MG/DL (ref 8–23)
CALCIUM SERPL-MCNC: 10 MG/DL (ref 8.8–10.2)
CALCIUM SERPL-MCNC: 10.3 MG/DL (ref 8.8–10.2)
CHLORIDE SERPL-SCNC: 104 MMOL/L (ref 98–107)
CHLORIDE SERPL-SCNC: 105 MMOL/L (ref 98–107)
CO2 SERPL-SCNC: 19 MMOL/L (ref 20–29)
CO2 SERPL-SCNC: 23 MMOL/L (ref 20–29)
CREAT SERPL-MCNC: 1.51 MG/DL (ref 0.8–1.3)
CREAT SERPL-MCNC: 1.55 MG/DL (ref 0.8–1.3)
DIFFERENTIAL METHOD BLD: ABNORMAL
DIFFERENTIAL METHOD BLD: ABNORMAL
EKG ATRIAL RATE: 75 BPM
EKG DIAGNOSIS: NORMAL
EKG P AXIS: 53 DEGREES
EKG P-R INTERVAL: 176 MS
EKG Q-T INTERVAL: 408 MS
EKG QRS DURATION: 92 MS
EKG QTC CALCULATION (BAZETT): 455 MS
EKG R AXIS: -44 DEGREES
EKG T AXIS: 14 DEGREES
EKG VENTRICULAR RATE: 75 BPM
EOSINOPHIL # BLD: 0.11 K/UL (ref 0–0.8)
EOSINOPHIL # BLD: 0.15 K/UL (ref 0–0.8)
EOSINOPHIL NFR BLD: 2.1 % (ref 0.5–7.8)
EOSINOPHIL NFR BLD: 2.9 % (ref 0.5–7.8)
ERYTHROCYTE [DISTWIDTH] IN BLOOD BY AUTOMATED COUNT: 17.7 % (ref 11.9–14.6)
ERYTHROCYTE [DISTWIDTH] IN BLOOD BY AUTOMATED COUNT: 18 % (ref 11.9–14.6)
ETHANOL SERPL-MCNC: <11 MG/DL (ref 0–0.08)
GLOBULIN SER CALC-MCNC: 3.8 G/DL (ref 2.3–3.5)
GLOBULIN SER CALC-MCNC: 4.3 G/DL (ref 2.3–3.5)
GLUCOSE SERPL-MCNC: 104 MG/DL (ref 70–99)
GLUCOSE SERPL-MCNC: 126 MG/DL (ref 70–99)
HCT VFR BLD AUTO: 31.2 % (ref 41.1–50.3)
HCT VFR BLD AUTO: 33.1 % (ref 41.1–50.3)
HGB BLD-MCNC: 10.1 G/DL (ref 13.6–17.2)
HGB BLD-MCNC: 9.5 G/DL (ref 13.6–17.2)
IMM GRANULOCYTES # BLD AUTO: 0.01 K/UL (ref 0–0.5)
IMM GRANULOCYTES # BLD AUTO: 0.02 K/UL (ref 0–0.5)
IMM GRANULOCYTES NFR BLD AUTO: 0.2 % (ref 0–5)
IMM GRANULOCYTES NFR BLD AUTO: 0.4 % (ref 0–5)
LACTATE SERPL-SCNC: 0.9 MMOL/L (ref 0.5–2)
LIPASE SERPL-CCNC: 22 U/L (ref 13–60)
LYMPHOCYTES # BLD: 1.03 K/UL (ref 0.5–4.6)
LYMPHOCYTES # BLD: 1.5 K/UL (ref 0.5–4.6)
LYMPHOCYTES NFR BLD: 19.5 % (ref 13–44)
LYMPHOCYTES NFR BLD: 29.1 % (ref 13–44)
MAGNESIUM SERPL-MCNC: 2.2 MG/DL (ref 1.8–2.4)
MCH RBC QN AUTO: 23.8 PG (ref 26.1–32.9)
MCH RBC QN AUTO: 24.2 PG (ref 26.1–32.9)
MCHC RBC AUTO-ENTMCNC: 30.4 G/DL (ref 31.4–35)
MCHC RBC AUTO-ENTMCNC: 30.5 G/DL (ref 31.4–35)
MCV RBC AUTO: 78.1 FL (ref 82–102)
MCV RBC AUTO: 79.6 FL (ref 82–102)
MONOCYTES # BLD: 0.42 K/UL (ref 0.1–1.3)
MONOCYTES # BLD: 0.47 K/UL (ref 0.1–1.3)
MONOCYTES NFR BLD: 7.9 % (ref 4–12)
MONOCYTES NFR BLD: 9.1 % (ref 4–12)
NEUTS SEG # BLD: 3 K/UL (ref 1.7–8.2)
NEUTS SEG # BLD: 3.67 K/UL (ref 1.7–8.2)
NEUTS SEG NFR BLD: 58.1 % (ref 43–78)
NEUTS SEG NFR BLD: 69.3 % (ref 43–78)
NRBC # BLD: 0 K/UL (ref 0–0.2)
NRBC # BLD: 0 K/UL (ref 0–0.2)
PLATELET # BLD AUTO: 320 K/UL (ref 150–450)
PLATELET # BLD AUTO: 350 K/UL (ref 150–450)
PMV BLD AUTO: 11.2 FL (ref 9.4–12.3)
PMV BLD AUTO: 11.5 FL (ref 9.4–12.3)
POTASSIUM SERPL-SCNC: 3.8 MMOL/L (ref 3.5–5.1)
POTASSIUM SERPL-SCNC: ABNORMAL MMOL/L (ref 3.5–5.1)
PROT SERPL-MCNC: 7.3 G/DL (ref 6.3–8.2)
PROT SERPL-MCNC: 8 G/DL (ref 6.3–8.2)
RBC # BLD AUTO: 3.92 M/UL (ref 4.23–5.6)
RBC # BLD AUTO: 4.24 M/UL (ref 4.23–5.6)
SODIUM SERPL-SCNC: 138 MMOL/L (ref 136–145)
SODIUM SERPL-SCNC: 141 MMOL/L (ref 136–145)
TROPONIN T SERPL HS-MCNC: 26.7 NG/L (ref 0–22)
WBC # BLD AUTO: 5.2 K/UL (ref 4.3–11.1)
WBC # BLD AUTO: 5.3 K/UL (ref 4.3–11.1)

## 2025-06-20 PROCEDURE — 85025 COMPLETE CBC W/AUTO DIFF WBC: CPT

## 2025-06-20 PROCEDURE — 93005 ELECTROCARDIOGRAM TRACING: CPT | Performed by: EMERGENCY MEDICINE

## 2025-06-20 PROCEDURE — 6370000000 HC RX 637 (ALT 250 FOR IP): Performed by: GENERAL PRACTICE

## 2025-06-20 PROCEDURE — 84484 ASSAY OF TROPONIN QUANT: CPT

## 2025-06-20 PROCEDURE — 82077 ASSAY SPEC XCP UR&BREATH IA: CPT

## 2025-06-20 PROCEDURE — 83690 ASSAY OF LIPASE: CPT

## 2025-06-20 PROCEDURE — 83735 ASSAY OF MAGNESIUM: CPT

## 2025-06-20 PROCEDURE — 99284 EMERGENCY DEPT VISIT MOD MDM: CPT

## 2025-06-20 PROCEDURE — 93010 ELECTROCARDIOGRAM REPORT: CPT | Performed by: INTERNAL MEDICINE

## 2025-06-20 PROCEDURE — 80053 COMPREHEN METABOLIC PANEL: CPT

## 2025-06-20 PROCEDURE — 83605 ASSAY OF LACTIC ACID: CPT

## 2025-06-20 PROCEDURE — 74176 CT ABD & PELVIS W/O CONTRAST: CPT

## 2025-06-20 RX ORDER — DOCUSATE SODIUM 100 MG/1
100 CAPSULE, LIQUID FILLED ORAL 2 TIMES DAILY
Qty: 60 CAPSULE | Refills: 0 | Status: SHIPPED | OUTPATIENT
Start: 2025-06-20 | End: 2025-07-20

## 2025-06-20 RX ORDER — POLYETHYLENE GLYCOL 3350 17 G/17G
17 POWDER, FOR SOLUTION ORAL DAILY
Qty: 1530 G | Refills: 1 | Status: SHIPPED | OUTPATIENT
Start: 2025-06-20 | End: 2025-12-17

## 2025-06-20 RX ORDER — SODIUM PHOSPHATE, DIBASIC AND SODIUM PHOSPHATE, MONOBASIC 7; 19 G/230ML; G/230ML
1 ENEMA RECTAL
Qty: 1 ENEMA | Refills: 2 | Status: SHIPPED | OUTPATIENT
Start: 2025-06-20

## 2025-06-20 RX ORDER — DOCUSATE SODIUM 100 MG/1
100 CAPSULE, LIQUID FILLED ORAL 2 TIMES DAILY
Qty: 60 CAPSULE | Refills: 0 | Status: SHIPPED | OUTPATIENT
Start: 2025-06-20 | End: 2025-06-20

## 2025-06-20 RX ADMIN — MAJOR MAGNESIUM CITRATE ORAL SOLUTION - LEMON 296 ML: 1.75 LIQUID ORAL at 02:34

## 2025-06-20 ASSESSMENT — PAIN - FUNCTIONAL ASSESSMENT
PAIN_FUNCTIONAL_ASSESSMENT: 0-10
PAIN_FUNCTIONAL_ASSESSMENT: 0-10

## 2025-06-20 ASSESSMENT — PAIN SCALES - GENERAL
PAINLEVEL_OUTOF10: 0
PAINLEVEL_OUTOF10: 0
PAINLEVEL_OUTOF10: 8

## 2025-06-20 ASSESSMENT — ENCOUNTER SYMPTOMS: ABDOMINAL PAIN: 1

## 2025-06-20 ASSESSMENT — PAIN DESCRIPTION - LOCATION: LOCATION: ABDOMEN

## 2025-06-20 NOTE — DISCHARGE INSTRUCTIONS
You have a right pulmonary nodule that has been present on your CT scan in the past.  Please follow-up with pulmonology concerning this.

## 2025-06-20 NOTE — ED PROVIDER NOTES
placed or performed during the hospital encounter of 06/20/25   CT ABDOMEN PELVIS WO CONTRAST Additional Contrast? None    Narrative    EXAMINATION: CT  ABDOMEN / PELVIS   6/20/2025 2:42 PM    ACCESSION NUMBER:  [id number]    INDICATION:  Diffuse abdominal pain, constipation, history of pancreatitis    COMPARISON: Abdominal x-rays 6/19/2025, CT abdomen and pelvis 6/20/2024    TECHNIQUE: Contiguous axial computed tomographic images were obtained from the  domes of the diaphragm to the symphysis pubis without intravenous contrast.  Coronal reconstructions were also performed.     Please note that the detection of solid organ and vascular abnormalities is  limited in the absence of intravenous contrast.    Radiation dose reduction techniques were used for this study:  Our CT scanners  use one or all of the following: Automated exposure control, adjustment of the  mA and/or kVp according to patient's size, iterative reconstruction.    FINDINGS:    LIMITED THORAX: Multivessel coronary artery calcifications. The included  portions of the pericardium are unremarkable. 3 mm right lower lobe nodule  (axial image 3). 3 mm right middle lobe nodule (axial image 7).    PERITONEUM/MESENTERY: No evidence of ascites, free gas, or lymphadenopathy.    GI TRACT: Surgical changes status post hemicolectomy. Unremarkable ileocolonic  anastomosis. No evidence of small or large bowel obstruction. A large hiatal  hernia is similar to prior exams. There is large volume distal colonic stool.    SPLEEN: There is a 1.6 cm hypodensity in the superior portion of the spleen,  similar to the 6/23/2024 exam and favoring a benign etiology.    ADRENALS: Normal    PANCREAS: Normal    LIVER: There is unchanged trace pneumobilia in the left hepatic lobe, likely a  postprocedural.    GALLBLADDER: Cholelithiasis. Otherwise unremarkable.    KIDNEYS: No evidence of hydroureteronephrosis or nephroureterolithiasis.  Multiple bilateral renal cysts are again  0.00 - 0.80 K/UL    Basophils Absolute 0.04 0.00 - 0.20 K/UL    Immature Granulocytes Absolute 0.02 0.0 - 0.5 K/UL   CMP   Result Value Ref Range    Sodium 141 136 - 145 mmol/L    Potassium HEMOLYZED SPECIMEN 3.5 - 5.1 mmol/L    Chloride 104 98 - 107 mmol/L    CO2 23 20 - 29 mmol/L    Anion Gap 14 7 - 16 mmol/L    Glucose 104 (H) 70 - 99 mg/dL    BUN 21 8 - 23 MG/DL    Creatinine 1.51 (H) 0.80 - 1.30 MG/DL    Est, Glom Filt Rate 48 (L) >60 ml/min/1.73m2    Calcium 10.3 (H) 8.8 - 10.2 MG/DL    Total Bilirubin 0.3 0.0 - 1.2 MG/DL    ALT 26 8 - 55 U/L    AST 47 (H) 15 - 37 U/L    Alk Phosphatase 109 40 - 129 U/L    Total Protein 8.0 6.3 - 8.2 g/dL    Albumin 3.7 3.2 - 4.6 g/dL    Globulin 4.3 (H) 2.3 - 3.5 g/dL    Albumin/Globulin Ratio 0.9 (L) 1.0 - 1.9     ETOH   Result Value Ref Range    Ethanol Lvl <11 MG/DL   Lactate, Sepsis   Result Value Ref Range    Lactic Acid, Sepsis 0.9 0.5 - 2.0 MMOL/L   Lipase   Result Value Ref Range    Lipase 22 13 - 60 U/L   Magnesium   Result Value Ref Range    Magnesium 2.2 1.8 - 2.4 mg/dL   Troponin   Result Value Ref Range    Troponin T 26.7 (H) 0 - 22 ng/L   EKG 12 Lead (Chest Pain)   Result Value Ref Range    Ventricular Rate 75 BPM    Atrial Rate 75 BPM    P-R Interval 176 ms    QRS Duration 92 ms    Q-T Interval 408 ms    QTc Calculation (Bazett) 455 ms    P Axis 53 degrees    R Axis -44 degrees    T Axis 14 degrees    Diagnosis       Normal sinus rhythm  Left axis deviation  RSR' or QR pattern in V1 suggests right ventricular conduction delay  Abnormal ECG    Confirmed by CHHAYA MAYFIELD (), IZZY GARZA ([id number]) on 6/20/2025 5:17:17 PM     Results for orders placed or performed during the hospital encounter of 06/19/25   XR CHEST PORTABLE    Narrative    Chest X-ray    INDICATION: dyspnea    COMPARISON:  None    TECHNIQUE: AP/PA view of the chest was obtained.    FINDINGS: The lungs are clear. There are no infiltrates or effusions.  The heart  size is normal.  The bony thorax is

## 2025-06-20 NOTE — ED NOTES
Patient mobility status ambulates with mild difficulty.     I have reviewed discharge instructions with the patient.  The patient verbalized understanding.    Patient left ED via Discharge Method: ambulatory to Home with uber.    Opportunity for questions and clarification provided.     Patient given 2 e- scripts.            Ferrell, Neva, RN  06/20/25 1956

## 2025-06-20 NOTE — ED TRIAGE NOTES
Pt brought in by [facility] from home with complaint of shortness of breath, urinary retention, and constipation. Retention and constipation started yesterday. Shortness of breath today. Reports groin pain when trying to use the bathroom. Out of albuterol treatment.

## 2025-06-20 NOTE — ED PROVIDER NOTES
Emergency Department Provider Note       [facility] EMERGENCY DEPT   PCP: Ilia Wang MD   Age: 74 y.o.   Sex: male     DISPOSITION Discharge - Pending Orders Complete 06/20/2025 04:35:11 PM    ICD-10-CM    1. Constipation, unspecified constipation type  K59.00 [facility]      2. Pulmonary nodule  R91.1           Medical Decision Making     Patient is a 74-year-old male who presents with diffuse abdominal pain constipation states last bowel movement was 2 to 3 days ago.  Patient states he is having difficulty urinating as well.  Patient does have a history of alcoholic pancreatitis May 2021, arthritis, asthma, CAD, stent, COPD, colon CA, GI bleed, HLD, HTN, MI, multiple renal cysts, prostate CA.,  Biliary stent status post removal October 2024 for common bile duct stricture    Differential diagnosis includes but is not limited to constipation.    Patient's laboratory testing reveals CKD and troponin 26.7 which is chronically elevated and less than the troponin 8 days ago. Patient has had no chest pain or chest pressure.  Patient CT on the pelvis reveals constipation.  Enema has been ordered.  Advised patient to use MiraLAX, Colace and to follow-up as needed.     1 or more acute illnesses that pose a threat to life or bodily function.   Prescription drug management performed.  Shared medical decision making was utilized in creating the patients health plan today.  I independently ordered and reviewed each unique test.         ED cardiac monitoring rhythm strip was ordered and interpreted:  sinus rhythm, no evidence of an arrhythmia  ST Segments:Nonspecific ST segments - NO STEMI   Rate: 75  I interpreted the CT Scan a/p constipation.  ED provider's independent EKG interpretation sinus rhythm rate of 75, no STEMI            History     Patient is a 74-year-old male who presents with diffuse abdominal pain constipation states last bowel movement was 2 to 3 days ago.  Patient states he is

## 2025-06-20 NOTE — ED TRIAGE NOTES
Patient arrived with a complaint of urinary retention issue and constipation. Patient reports of severe lower abdominal pain and left flank pain.

## 2025-06-30 ENCOUNTER — TELEPHONE (OUTPATIENT)
Dept: PULMONOLOGY | Age: 75
End: 2025-06-30

## 2025-06-30 DIAGNOSIS — J44.9 CHRONIC OBSTRUCTIVE PULMONARY DISEASE, UNSPECIFIED COPD TYPE (HCC): Primary | ICD-10-CM

## 2025-06-30 DIAGNOSIS — Z87.891 PERSONAL HISTORY OF TOBACCO USE: ICD-10-CM

## 2025-06-30 NOTE — TELEPHONE ENCOUNTER
Ref by Mary Hart MD for pulmonary nodule, incidental finding. CXR, CT ABD/Pel 6/20/25 in chart.      CT ABD/PEL 6/23/25 :  3. 3 mm pulmonary nodules in the right lower lobe and right middle lobe  demonstrate nearly 1 year stability dating to the 6/24/2024 CT. No further  follow-up is recommended by Fleischner Society criteria.       LOV 5/18/2023 Dr Ortega-COPD, HX smoking.     Last LDCT 5/1/2023:  FINDINGS:  LUNGS:  Right apical pulmonary nodule, 4 mm, axial image #36.  Left lower lobe pulmonary nodule, 3 to 4 mm, axial image #177  Left lower lobe 2 mm pulmonary nodule laterally axial image #182.  No new worrisome pulmonary nodules.     AIRWAYS: Trachea and proximal bronchi grossly patent.  PLEURA: No effusion or thickening or calcifications.  LYMPH NODES: No enlarged axillary, hilar or mediastinal lymph nodes.  HEART: Normal size.  AORTA: Normal caliber.  UPPER ABDOMEN: Moderate size hiatal hernia..  SKELETAL/CHEST WALL: DJD, otherwise no gross bony lesions.     IMPRESSION:     L2 Benign appearance, stable small nodules.  Recommend noncontrast LDCT chest CT in 12 months.    Recent CT 6/20/2025 Ct abdomen:  IMPRESSION:     1. Large volume distal colonic stool. Correlation for constipation recommended.     2. Otherwise no noncontrast CT evidence of acute process in the abdomen or  pelvis, including no evidence of acute pancreatitis.     3. 3 mm pulmonary nodules in the right lower lobe and right middle lobe  demonstrate nearly 1 year stability dating to the 6/24/2024 CT. No further  follow-up is recommended by Fleischner Society criteria.    Patient has 25 pk year smoking HX, quit in 5/2019, HX to PE 2019    Dr Ortega-do you want patient to have LDCT prior to appointment. Please advise.

## 2025-07-01 NOTE — TELEPHONE ENCOUNTER
I have spoken with patient.  He is agreeable to repeat CT chest prior to appointment. He allows me to schedule this for him and office appointment.

## 2025-07-09 ENCOUNTER — HOSPITAL ENCOUNTER (OUTPATIENT)
Dept: CT IMAGING | Age: 75
Discharge: HOME | End: 2025-07-11
Attending: STUDENT IN AN ORGANIZED HEALTH CARE EDUCATION/TRAINING PROGRAM
Payer: MEDICARE

## 2025-07-09 DIAGNOSIS — J44.9 CHRONIC OBSTRUCTIVE PULMONARY DISEASE, UNSPECIFIED COPD TYPE (HCC): ICD-10-CM

## 2025-07-09 DIAGNOSIS — Z87.891 PERSONAL HISTORY OF TOBACCO USE: ICD-10-CM

## 2025-07-09 PROCEDURE — 71271 CT THORAX LUNG CANCER SCR C-: CPT

## 2025-07-11 NOTE — PROGRESS NOTES
obstructive pulmonary disease, unspecified (HCC); Personal history of  nicotine dependence    -Pack years = 20+ pack-year smoking history  -Current Smoker = Quit within last 15 years.  -Signs/symptoms of lung cancer = No  -Shared decision making = Yes    TECHNIQUE: Multiple 2D axial images were obtained through the chest without IV  contrast.  Low dose CT protocol was used.  Radiation dose reduction techniques  were used for this study:  All CT scans performed at this facility use one or  all of the following: Automated exposure control, adjustment of the mA and/or  kVp according to patient's size, iterative reconstruction.    COMPARISON: CT chest May 2023    FINDINGS:  A few scattered pulmonary nodules bilaterally (5 mm or less) are unchanged from  2023 and definitively benign. There is no new or growing nodule identified. Mild  paraseptal emphysema at the apices. No consolidation. No airway nodule. Normal  caliber of the thoracic aorta. Normal heart size. No pericardial effusion. Trace  coronary artery calcification. Moderate hiatal hernia is unchanged.    Imaging through the upper abdomen shows a right renal cortical cyst measuring  4.3 cm. Left renal cortical cyst measuring 4.6 cm. No vertebral fracture. No  suspicious bone lesion.    Impression  No significant pulmonary nodule.    LungRads 2 - Benign.  Continued annual screening is recommended.        CATEGORIZATION LIsted in the Impression above  EXPLANATIONS:    -Lung Rads Category 0:  Incomplete  - Waiting for priors  - Probably inflammatory = 1-3 month followup.  -Lung Rads Category 1:  Negative  - Negative or Nodules with benign features.  -Lung Rads Category 2:  Benign Appearance  -  Solid <6 mm in size, new nodules < 4mm.  -  Part-solid nodules < 6 mm total diameter.  -  Juxtapleural < 10mm and smooth/tyriangular/lentiform.  -  Ground glass nodules < 30 mm in size.  -  Nodules which have demonstrated > 6 month stability.  -Lung Rads Category 3:  Probably

## 2025-07-14 ENCOUNTER — OFFICE VISIT (OUTPATIENT)
Dept: PULMONOLOGY | Age: 75
End: 2025-07-14
Payer: MEDICARE

## 2025-07-14 VITALS
OXYGEN SATURATION: 94 % | DIASTOLIC BLOOD PRESSURE: 78 MMHG | WEIGHT: 201 LBS | HEIGHT: 73 IN | SYSTOLIC BLOOD PRESSURE: 147 MMHG | HEART RATE: 90 BPM | TEMPERATURE: 98.2 F | BODY MASS INDEX: 26.64 KG/M2

## 2025-07-14 DIAGNOSIS — Z87.891 PERSONAL HISTORY OF TOBACCO USE: ICD-10-CM

## 2025-07-14 DIAGNOSIS — R91.8 PULMONARY NODULES: Primary | ICD-10-CM

## 2025-07-14 DIAGNOSIS — J44.9 CHRONIC OBSTRUCTIVE PULMONARY DISEASE, UNSPECIFIED COPD TYPE (HCC): ICD-10-CM

## 2025-07-14 PROCEDURE — 1126F AMNT PAIN NOTED NONE PRSNT: CPT

## 2025-07-14 PROCEDURE — 99214 OFFICE O/P EST MOD 30 MIN: CPT

## 2025-07-14 PROCEDURE — 3078F DIAST BP <80 MM HG: CPT

## 2025-07-14 PROCEDURE — 3077F SYST BP >= 140 MM HG: CPT

## 2025-07-14 PROCEDURE — 1160F RVW MEDS BY RX/DR IN RCRD: CPT

## 2025-07-14 PROCEDURE — 1159F MED LIST DOCD IN RCRD: CPT

## 2025-07-14 PROCEDURE — 1123F ACP DISCUSS/DSCN MKR DOCD: CPT

## (undated) DEVICE — BUTTON SWITCH PENCIL BLADE ELECTRODE, HOLSTER: Brand: EDGE

## (undated) DEVICE — SPHINCTEROTOME: Brand: DREAMTOME™ RX 44

## (undated) DEVICE — ENDOSCOPIC KIT 1.1+ OP4 CA DE 2 GWN AAMI LEVEL 3

## (undated) DEVICE — AMD ANTIMICROBIAL NON-ADHERENT PAD,0.2% POLYHEXAMETHYLENE BIGUANIDE HCI (PHMB): Brand: TELFA

## (undated) DEVICE — AMD ANTIMICROBIAL GAUZE SPONGES,12 PLY USP TYPE VII, 0.2% POLYHEXAMETHYLENE BIGUANIDE HCI (PHMB): Brand: CURITY

## (undated) DEVICE — NEEDLE SYR 18GA L1.5IN RED PLAS HUB S STL BLNT FILL W/O

## (undated) DEVICE — CONNECTOR TBNG OD5-7MM O2 END DISP

## (undated) DEVICE — KENDALL RADIOLUCENT FOAM MONITORING ELECTRODE RECTANGULAR SHAPE: Brand: KENDALL

## (undated) DEVICE — SYRINGE MED 10ML LUERLOCK TIP W/O SFTY DISP

## (undated) DEVICE — 3M™ STERI-DRAPE™ INSTRUMENT POUCH 1018: Brand: STERI-DRAPE™

## (undated) DEVICE — FAN SPRAY KIT: Brand: PULSAVAC®

## (undated) DEVICE — SOLUTION IV 1000ML 0.9% SOD CHL

## (undated) DEVICE — SPHINCTEROTOME: Brand: JAGTOME RX 44

## (undated) DEVICE — RETRIEVAL BALLOON CATHETER: Brand: EXTRACTOR™ PRO RX

## (undated) DEVICE — ABDOMINAL PAD: Brand: DERMACEA

## (undated) DEVICE — CANNULA NSL ORAL AD FOR CAPNOFLEX CO2 O2 AIRLFE

## (undated) DEVICE — REM POLYHESIVE ADULT PATIENT RETURN ELECTRODE: Brand: VALLEYLAB

## (undated) DEVICE — 1200 GUARD II KIT W/5MM TUBE W/O VAC TUBE: Brand: GUARDIAN

## (undated) DEVICE — STRYKER PERFORMANCE SERIES SAGITTAL BLADE: Brand: STRYKER PERFORMANCE SERIES

## (undated) DEVICE — (D)PREP SKN CHLRAPRP APPL 26ML -- CONVERT TO ITEM 371833

## (undated) DEVICE — MOUTHPIECE ENDOSCP L CTRL OPN AND SIDE PORTS DISP

## (undated) DEVICE — 3M™ STERI-DRAPE™ INCISE DRAPE, XL 1051: Brand: STERI-DRAPE™

## (undated) DEVICE — NEEDLE SYRINGE 18GA L1.5IN RED PLAS HUB S STL BLNT FILL W/O

## (undated) DEVICE — 2000CC GUARDIAN II: Brand: GUARDIAN

## (undated) DEVICE — LUBE JELLY FOIL PACK 1.4 OZ: Brand: MEDLINE INDUSTRIES, INC.

## (undated) DEVICE — TRAY PREP DRY W/ PREM GLV 2 APPL 6 SPNG 2 UNDPD 1 OVERWRAP

## (undated) DEVICE — BLOCK BITE AD 60FR W/ VELC STRP ADDRESSES MOST PT AND

## (undated) DEVICE — SOLUTION IRRIG 3000ML 0.9% SOD CHL FLX CONT 0797208] ICU MEDICAL INC]

## (undated) DEVICE — SUTURE MCRYL SZ 2-0 L27IN ABSRB UD CP-1 1 L36MM 1/2 CIR REV Y266H

## (undated) DEVICE — SYR 10ML LUER LOK 1/5ML GRAD --

## (undated) DEVICE — Z DISCONTINUED USE 2423037 APPLICATOR MEDICATED 3 CC CHLORHEXIDINE GLUC 2% CHLORAPREP

## (undated) DEVICE — SYRINGE, LUER SLIP, STERILE, 60ML: Brand: MEDLINE

## (undated) DEVICE — SURGICAL PROCEDURE PACK TOT HIP CDS

## (undated) DEVICE — AIRLIFE™ OXYGEN TUBING 7 FEET (2.1 M) CRUSH RESISTANT OXYGEN TUBING, VINYL TIPPED: Brand: AIRLIFE™

## (undated) DEVICE — NEEDLE SPNL 22GA L3.5IN BLK HUB S STL REG WALL FIT STYL W/

## (undated) DEVICE — YANKAUER,BULB TIP,W/O VENT,RIGID,STERILE: Brand: MEDLINE

## (undated) DEVICE — SUTURE STRATAFIX SPRL SZ 1 L5IN ABSRB VLT CT-1 L36MM 1/2 SXPD2B401

## (undated) DEVICE — SYSTEM SKIN CLSR 22CM DERMBND PRINEO

## (undated) DEVICE — GAUZE,SPONGE,4"X4",12PLY,WOVEN,NS,LF: Brand: MEDLINE

## (undated) DEVICE — 1010 S-DRAPE TOWEL DRAPE 10/BX: Brand: STERI-DRAPE™

## (undated) DEVICE — SPHINCTEROTOME: Brand: JAGTOME RX 39

## (undated) DEVICE — SUTURE STRATAFIX SZ 3-0 L30CM NONABSORBABLE UD L19MM FS-2 SXMP2B408

## (undated) DEVICE — MEDI-VAC YANKAUER SUCTION HANDLE W/BULBOUS TIP: Brand: CARDINAL HEALTH

## (undated) DEVICE — SINGLE PORT MANIFOLD: Brand: NEPTUNE 2

## (undated) DEVICE — 1840 FOAM BLOCK NEEDLE COUNTER: Brand: DEVON

## (undated) DEVICE — BIPOLAR SEALER 23-112-1 AQM 6.0: Brand: AQUAMANTYS ®

## (undated) DEVICE — JELLY LUBRICATING 10GM PREFIL SYR LUBE

## (undated) DEVICE — SUTURE FIBERWIRE SZ 2 W/ TAPERED NEEDLE BLUE L38IN NONABSORB BLU L26.5MM 1/2 CIRCLE AR7200

## (undated) DEVICE — DRAPE,HIP,W/POUCHES,STERILE: Brand: MEDLINE

## (undated) DEVICE — MCLASS OSCILLATING SAW BLADE 19 X 1.27 (0.050") X 90 MM: Brand: MCLASS

## (undated) DEVICE — CONTAINER,SPECIMEN,O.R.STRL,4.5OZ: Brand: MEDLINE

## (undated) DEVICE — DRAPE,U/SHT,SPLIT,FILM,60X84,STERILE: Brand: MEDLINE

## (undated) DEVICE — 3000CC GUARDIAN II: Brand: GUARDIAN

## (undated) DEVICE — SYRINGE MEDICAL 3ML CLEAR PLASTIC STANDARD NON CONTROL LUERLOCK TIP DISPOSABLE

## (undated) DEVICE — GARMENT,MEDLINE,DVT,INT,CALF,LG, GEN2: Brand: MEDLINE

## (undated) DEVICE — HEWSON SUTURE RETRIEVER: Brand: HEWSON SUTURE RETRIEVER

## (undated) DEVICE — NEEDLE HYPO 21GA L1.5IN INTRAMUSCULAR S STL LATCH BVL UP

## (undated) DEVICE — BLADE ELECTRODE: Brand: VALLEYLAB

## (undated) DEVICE — STOCKINETTE TUBE 6X48 -- MEDICHOICE

## (undated) DEVICE — TRAY CATH 16F DRN BG LTX -- CONVERT TO ITEM 363158

## (undated) DEVICE — GARMENT,MEDLINE,DVT,INT,CALF,MED, GEN2: Brand: MEDLINE

## (undated) DEVICE — Device: Brand: BALLOON3

## (undated) DEVICE — T4 HOOD

## (undated) DEVICE — GOWN,AURORA,FABRIC-REINFORCED,2XL: Brand: MEDLINE

## (undated) DEVICE — ELECTRODE PT RET AD L9FT HI MOIST COND ADH HYDRGEL CORDED